# Patient Record
Sex: FEMALE | Race: WHITE | NOT HISPANIC OR LATINO | Employment: OTHER | URBAN - METROPOLITAN AREA
[De-identification: names, ages, dates, MRNs, and addresses within clinical notes are randomized per-mention and may not be internally consistent; named-entity substitution may affect disease eponyms.]

---

## 2017-04-04 ENCOUNTER — GENERIC CONVERSION - ENCOUNTER (OUTPATIENT)
Dept: OTHER | Facility: OTHER | Age: 67
End: 2017-04-04

## 2017-08-11 ENCOUNTER — ALLSCRIPTS OFFICE VISIT (OUTPATIENT)
Dept: OTHER | Facility: OTHER | Age: 67
End: 2017-08-11

## 2017-08-11 DIAGNOSIS — R29.890 LOSS OF HEIGHT: ICD-10-CM

## 2017-08-11 DIAGNOSIS — Z12.31 ENCOUNTER FOR SCREENING MAMMOGRAM FOR MALIGNANT NEOPLASM OF BREAST: ICD-10-CM

## 2017-08-11 DIAGNOSIS — Z01.419 ENCOUNTER FOR GYNECOLOGICAL EXAMINATION WITHOUT ABNORMAL FINDING: ICD-10-CM

## 2017-08-11 PROCEDURE — G0145 SCR C/V CYTO,THINLAYER,RESCR: HCPCS | Performed by: OBSTETRICS & GYNECOLOGY

## 2017-08-14 ENCOUNTER — LAB REQUISITION (OUTPATIENT)
Dept: LAB | Facility: HOSPITAL | Age: 67
End: 2017-08-14
Payer: MEDICARE

## 2017-08-14 DIAGNOSIS — Z01.419 ENCOUNTER FOR GYNECOLOGICAL EXAMINATION WITHOUT ABNORMAL FINDING: ICD-10-CM

## 2017-08-18 LAB
LAB AP GYN PRIMARY INTERPRETATION: NORMAL
Lab: NORMAL

## 2017-08-21 ENCOUNTER — GENERIC CONVERSION - ENCOUNTER (OUTPATIENT)
Dept: OTHER | Facility: OTHER | Age: 67
End: 2017-08-21

## 2017-08-25 ENCOUNTER — HOSPITAL ENCOUNTER (OUTPATIENT)
Dept: RADIOLOGY | Age: 67
Discharge: HOME | End: 2017-08-25
Payer: MEDICARE

## 2017-08-25 DIAGNOSIS — Z12.31 ENCOUNTER FOR SCREENING MAMMOGRAM FOR MALIGNANT NEOPLASM OF BREAST: ICD-10-CM

## 2017-08-25 DIAGNOSIS — R29.890 LOSS OF HEIGHT: ICD-10-CM

## 2017-08-25 PROCEDURE — 77080 DXA BONE DENSITY AXIAL: CPT

## 2017-08-25 PROCEDURE — G0202 SCR MAMMO BI INCL CAD: HCPCS

## 2017-08-25 PROCEDURE — 77063 BREAST TOMOSYNTHESIS BI: CPT

## 2017-08-28 ENCOUNTER — GENERIC CONVERSION - ENCOUNTER (OUTPATIENT)
Dept: OTHER | Facility: OTHER | Age: 67
End: 2017-08-28

## 2017-10-13 ENCOUNTER — GENERIC CONVERSION - ENCOUNTER (OUTPATIENT)
Dept: OTHER | Facility: OTHER | Age: 67
End: 2017-10-13

## 2017-11-01 ENCOUNTER — GENERIC CONVERSION - ENCOUNTER (OUTPATIENT)
Dept: OTHER | Facility: OTHER | Age: 67
End: 2017-11-01

## 2017-11-17 ENCOUNTER — ALLSCRIPTS OFFICE VISIT (OUTPATIENT)
Dept: OTHER | Facility: OTHER | Age: 67
End: 2017-11-17

## 2017-11-17 DIAGNOSIS — M85.80 OTHER SPECIFIED DISORDERS OF BONE DENSITY AND STRUCTURE, UNSPECIFIED SITE (CODE): ICD-10-CM

## 2017-11-17 DIAGNOSIS — M54.9 DORSALGIA: ICD-10-CM

## 2017-11-17 DIAGNOSIS — M54.50 LOW BACK PAIN: ICD-10-CM

## 2017-11-21 ENCOUNTER — GENERIC CONVERSION - ENCOUNTER (OUTPATIENT)
Dept: OTHER | Facility: OTHER | Age: 67
End: 2017-11-21

## 2017-11-29 ENCOUNTER — GENERIC CONVERSION - ENCOUNTER (OUTPATIENT)
Dept: OTHER | Facility: OTHER | Age: 67
End: 2017-11-29

## 2017-11-29 ENCOUNTER — LAB CONVERSION - ENCOUNTER (OUTPATIENT)
Dept: OTHER | Facility: OTHER | Age: 67
End: 2017-11-29

## 2017-11-29 LAB
25(OH)D3 SERPL-MCNC: 47 NG/ML (ref 30–100)
BUN SERPL-MCNC: 13 MG/DL (ref 7–25)
BUN/CREA RATIO (HISTORICAL): ABNORMAL (CALC) (ref 6–22)
CALCIUM SERPL-MCNC: 9.4 MG/DL (ref 8.6–10.4)
CHLORIDE SERPL-SCNC: 103 MMOL/L (ref 98–110)
CO2 SERPL-SCNC: 28 MMOL/L (ref 20–31)
CREAT SERPL-MCNC: 0.65 MG/DL (ref 0.5–0.99)
EGFR AFRICAN AMERICAN (HISTORICAL): 108 ML/MIN/1.73M2
EGFR-AMERICAN CALC (HISTORICAL): 93 ML/MIN/1.73M2
GLUCOSE (HISTORICAL): 111 MG/DL (ref 65–99)
POTASSIUM SERPL-SCNC: 4.1 MMOL/L (ref 3.5–5.3)
SODIUM SERPL-SCNC: 138 MMOL/L (ref 135–146)

## 2018-01-10 NOTE — PROGRESS NOTES
Assessment    1  Encounter for preventive health examination (V70 0) (Z00 00)   2  Chronic low back pain (724 2,338 29) (M54 5,G89 29)   3  Mid back pain (724 5) (M54 9)   4  Need for hepatitis C screening test (V73 89) (Z11 59)   5  Immunity status testing (V72 61) (Z01 84)   6  Need for vaccination with 13-polyvalent pneumococcal conjugate vaccine (V03 82) (Z23)    Plan  Chronic low back pain, Mid back pain    · Cyclobenzaprine HCl - 5 MG Oral Tablet; TAKE 1 TABLET 3 TIMES DAILY AS  NEEDED   · * XR SPINE LUMBAR MINIMUM 4 VIEWS NON INJURY; Status:Active; Requested  for:17Nov2017;    · * XR SPINE THORACIC 3 VIEW; Status:Active; Requested for:17Nov2017; Health Maintenance    · EKG/ECG- POC; Status:Complete - Retrospective By Protocol Authorization;   Done:  17GEQ7875 11:48AM  Immunity status testing    · (Q) VARICELLA ZOSTER VIRUS AB (IGG); Status:Active; Requested for:01Oct2018;   Need for hepatitis C screening test, Osteopenia    · (Q) HEPATITIS C ANTIBODY; Status:Active; Requested for:01Oct2018;    · *(Q) VITAMIN D, 25-HYDROXY, LC/MS/MS; Status:Active; Requested for:01Oct2018;   Need for vaccination with 13-polyvalent pneumococcal conjugate vaccine    · Prevnar 13 Intramuscular Suspension    Discussion/Summary    Her back pain appears muscular  take cyclobenzaprine prn- says she will take at night  no relief from Tylenol, intolerant to NSAIDs  I'll obtain copy of labs she did for Dr Allen Tafoya in October  Impression: Welcome to Medicare Visit, with preventive exam as well as age and risk appropriate counseling completed  Cardiovascular screening and counseling: EKG recommended  Diabetes screening and counseling: screening is current  Colorectal cancer screening and counseling: screening is current, colorectal cancer screening due every 10 year(s) and Due 2020  Breast cancer screening and counseling: screening not indicated  Cervical cancer screening and counseling: screening is current     Osteoporosis screening and counseling: screening is current  Abdominal aortic aneurysm screening and counseling: screening not indicated  Glaucoma screening and counseling: screening is current  HIV screening and counseling: screening not indicated  Hepatitis C Screening:  The patient agrees to Hepatitis C screening  Immunizations: pneumococcal vaccine due today and the risks and benefits of the Zostavax vaccine were discussed with the patient  Advance Directive Planning: paperwork and instructions were given to the patient  Patient Discussion: plan discussed with the patient, follow-up visit needed in one year  Possible side effects of new medications were reviewed with the patient/guardian today  The treatment plan was reviewed with the patient/guardian  The patient/guardian understands and agrees with the treatment plan      History of Present Illness  Welcome to Medicare and Wellness Visits: The patient is being seen for the welcome to medicare visit  Medicare Screening and Risk Factors   Hospitalizations: no previous hospitalizations  Once per lifetime medicare screening tests: ECG has not been done  Medicare Screening Tests Risk Questions   Abdominal aortic aneurysm risk assessment: none indicated  Osteoporosis risk assessment: , female gender and over 48years of age  HIV risk assessment: none indicated  Drug and Alcohol Use: The patient has never smoked cigarettes  The patient reports frequent alcohol use, drinking 1 drinks per day, drinking 6 drinks per week and drinking 24 drinks per month  Alcohol concern:   The patient has no concerns about alcohol abuse  Diet and Physical Activity: Current diet includes well balanced meals, 2 servings of fruit per day, 2 servings of vegetables per day, 1 servings of meat per day, 1 servings of dairy products per day and 1 cups of tea per day  She exercises infrequently  Exercise: walking     Mood Disorder and Cognitive Impairment Screening: Depression screening  no significant symptoms  She denies feeling down, depressed, or hopeless over the past two weeks  She denies feeling little interest or pleasure in doing things over the past two weeks  Cognitive impairment screening: denies difficulty learning/retaining new information, denies difficulty handling complex tasks, denies difficulty with reasoning, denies difficulty with language and denies difficulty with behavior  Functional Ability/Level of Safety: Hearing is normal bilaterally  She does not use a hearing aid  The patient is currently able to do activities of daily living without limitations, able to do instrumental activities of daily living without limitations, able to participate in social activities without limitations and able to drive without limitations  Activities of daily living details: does not need help using the phone, no transportation help needed, does not need help shopping, no meal preparation help needed, does not need help doing housework, does not need help doing laundry, does not need help managing medications and does not need help managing money  Fall risk factors: The patient fell 0 times in the past 12 months  Injury History: no polypharmacy, no alcohol use, no mobility impairment, antidepressant use, no deconditioning, no postural hypotension, no sedative use, no visual impairment, no urinary incontinence, antihypertensive use, no cognitive impairment, up and go test was normal and no previous fall  Home safety risk factors:  loose rugs and no grab bars in the bathroom, but no unfamiliar surroundings, no poor household lighting, no uneven floors, no household clutter and handrails on the stairs  Advance Directives: Advance directives: no living will, no durable power of  for health care directives and no advance directives     Co-Managers and Medical Equipment/Suppliers: See Patient Care Team      Patient Care Team    Care Team Member Role Specialty Office Number   Lisa Downey MD  Cardiology [phone]     Review of Systems    Constitutional: no fever and no chills  ENT: no hearing loss  Cardiovascular: no chest pain and no palpitations  Respiratory: no shortness of breath and no cough  Gastrointestinal: no abdominal pain, no diarrhea, no constipation, no bright red blood per rectum and no melena  Genitourinary: denies incontinence, denies vaginal bleeding  Musculoskeletal: pain across the lower and mid back on and off for years;currently seeing her chiropractor who is asking to get xrays to follow what was done last year  Active Problems    1  Anxiety (300 00) (F41 9)   2  Denied: History of Carpal tunnel syndrome   3  Encounter for routine gynecological examination (V72 31) (Z01 419)   4  Encounter for screening mammogram for malignant neoplasm of breast (V76 12)   (Z12 31)   5  Malignant neoplasm of right breast (174 9) (C50 911)   6  Osteopenia (733 90) (M85 80)   7  Palpitations (785 1) (R00 2)   8  Pap smear, as part of routine gynecological examination (V76 2) (Z01 419)   9  Visit for screening mammogram (V76 12) (Z12 31)    Past Medical History    · History of Absence of breast (V45 71) (Z90 10)   · History of Acute lumbar back pain (724 2) (M54 5)   · History of Bilateral hand pain (729 5) (M79 641,M79 642)   · Denied: History of Carpal tunnel syndrome   · History of squamous cell carcinoma of skin (V10 83) (Y63 384)   · History of syncope (V15 89) (P24 121)   · History of urinary frequency (V13 09) (J31 427)   · History of urinary frequency (V13 09) (K10 033)   · History of Left upper quadrant abdominal swelling, mass and lump (789 32) (R19 02)   · History of Stiffness of hand joint (719 54) (M25 649)   · History of Taking Female Hormones - Long-term Tamoxifen Use (V58 69)   · Urinary tract infection (599 0) (N39 0)   · History of UTI (urinary tract infection), bacterial (599 0,041 9) (N39 0,A49  9)    The active problems and past medical history were reviewed and updated today  Surgical History    · History of Appendectomy   · History of Breast Surgery Lumpectomy   · History of Breast Surgery Mastectomy   · History of Bunion Correction By Cheilectomy   · History of Colonoscopy (Fiberoptic)   · History of Dilation And Curettage    The surgical history was reviewed and updated today  Family History  Mother    · Family history of Diabetes Mellitus (V18 0)   · Family history of Osteoporosis (V17 81)  Father    · Family history of cardiac disorder (V17 49) (Z80 55)  Sister    · Family history of malignant neoplasm of breast (V16 3) (Z80 3)  Family History    · Family history of Acute Myocardial Infarction (V17 3)   · Family history of Benign Brain Tumor   · Family history of Club Foot    The family history was reviewed and updated today  Social History    · Being A Social Drinker   · Caffeine Use   · Never A Smoker   · One child  The social history was reviewed and updated today  Current Meds   1  Lexapro 10 MG Oral Tablet; Therapy: (Recorded:51Jlk1501) to Recorded   2  Metoprolol Tartrate 50 MG Oral Tablet; TAKE ONE  TABLET TWICEDAILY AS DIRECTED; Therapy: 86Qyl1373 to (Evaluate:58Bhn9136) Recorded   3  Oyster Shell Calcium + D 500-400 MG-UNIT Oral Tablet; Therapy: 07JSJ1150 to Recorded    The medication list was reviewed and updated today  Allergies    1  Advil TABS    Vitals  Signs    Heart Rate: 94  Systolic: 870  Diastolic: 76  Height: 5 ft 0 5 in  Weight: 134 lb   BMI Calculated: 25 74  BSA Calculated: 1 58  O2 Saturation: 97    Physical Exam    Constitutional   General appearance: No acute distress, well appearing and well nourished  Head and Face   Head and face: Normal     Eyes   Conjunctiva and lids: No swelling, erythema or discharge  Ears, Nose, Mouth, and Throat   Otoscopic examination: Tympanic membranes translucent with normal light reflex  Canals patent without erythema  Oropharynx: Normal with no erythema, edema, exudate or lesions  Neck   Neck: Supple, symmetric, trachea midline, no masses  Thyroid: Normal, no thyromegaly  Pulmonary   Respiratory effort: No increased work of breathing or signs of respiratory distress  Auscultation of lungs: Clear to auscultation  Cardiovascular   Auscultation of heart: Normal rate and rhythm, normal S1 and S2, no murmurs  Examination of extremities for edema and/or varicosities: Normal     Abdomen   Abdomen: Non-tender, no masses  Liver and spleen: No hepatomegaly or splenomegaly  Lymphatic   Palpation of lymph nodes in neck: No lymphadenopathy  Musculoskeletal   Gait and station: Normal   no tenderness over the spine  Psychiatric   Orientation to person, place, and time: Normal     Mood and affect: Normal        Results/Data  EKG/ECG- POC 74IYH5731 11:48AM Kalpesh Agent     Test Name Result Flag Reference   EKG/ECG 11/17/17       PHQ-9 Adult Depression Screening 48YGI0910 11:18AM User, Ahs     Test Name Result Flag Reference   PHQ-9 Adult Depression Score 0     Over the last two weeks, how often have you been bothered by any of the following problems? Little interest or pleasure in doing things: Not at all - 0  Feeling down, depressed, or hopeless: Not at all - 0  Trouble falling or staying asleep, or sleeping too much: Not at all - 0  Feeling tired or having little energy: Not at all - 0  Poor appetite or over eating: Not at all - 0  Feeling bad about yourself - or that you are a failure or have let yourself or your family down: Not at all - 0  Trouble concentrating on things, such as reading the newspaper or watching television: Not at all - 0  Moving or speaking so slowly that other people could have noticed   Or the opposite -  being so fidgety or restless that you have been moving around a lot more than usual: Not at all - 0  Thoughts that you would be better off dead, or of hurting yourself in some way: Not at all - 0   PHQ-9 Adult Depression Screening Negative     PHQ-9 Difficulty Level Not difficult at all     PHQ-9 Severity No Depression       PHQ-2 Adult Depression Screening 34XVR6770 11:17AM User, Ahs     Test Name Result Flag Reference   PHQ-2 Adult Depression Score 0     Over the last two weeks, how often have you been bothered by any of the following problems? Little interest or pleasure in doing things: Not at all - 0  Feeling down, depressed, or hopeless: Not at all - 0   PHQ-2 Adult Depression Screening Negative       Falls Risk Assessment (Dx Z13 89 Screen for Neurologic Disorder) [id number] 11:17AM User, Ahs     Test Name Result Flag Reference   Falls Risk      No falls in the past year     SNELLEN VISION- POC [id number] 11:17AM Sagrario Duarte     Test Name Result Flag Reference   Right Eye 20/25     Left Eye 20/25     Bilateral Eyes 20/30         Procedure    Procedure:   Results: 20/30 in both eyes with corrective device, 20/25 in the right eye with corrective device, 20/25 in the left eye with corrective device      Future Appointments    Date/Time Provider Specialty Site   11/19/2018 01:00 PM JESSICA Miller   Internal Medicine [facility]   08/15/2018 01:30 PM Raiza Gleason MD Obstetrics/Gynecology [facility] \[facility] [place] [facility]\""     Signatures   Electronically signed by : JESSICA Cox ; Nov 17 2017 12:03PM EST                       (Author)

## 2018-01-10 NOTE — MISCELLANEOUS
Message  Spoke with patient  Urine culture negative  Instructed her to stop antibiotic if she has any doses left  She is feeling well and has no complaints  Instructed her to follow up with PCP as needed        Signatures   Electronically signed by : Merry Darden, 10 Fabian May; Jun 9 2016 11:40AM EST                       (Author)

## 2018-01-11 NOTE — MISCELLANEOUS
Message   Recorded as Task   Date: 08/28/2017 02:11 PM, Created By: Monet Cruz   Task Name: Follow Up   Assigned To: Faby Uribe   Regarding Patient: Quincy Teran, Status: In Progress   Ino Romp - 19 Aug 2017 2:11 PM     TASK CREATED  Call Severa Crow her bone mineral density test shows osteopenia  This is best treated with oral calcium and vitamin D  Ángel Manley - 92 Aug 2017 2:51 PM     TASK IN PROGRESS   Ángel Manley - 28 Aug 2017 3:00 PM     TASK EDITED  Pt takes ca - 1200 and vit d - 800 to 1000 daily  Exercise daily - walking        Active Problems    1  Absence of breast (V45 71) (Z90 10)   2  Acute lumbar back pain (724 2) (M54 5)   3  Anxiety (300 00) (F41 9)   4  Denied: History of Carpal tunnel syndrome   5  Encounter for routine gynecological examination (V72 31) (Z01 419)   6  Encounter for screening mammogram for malignant neoplasm of breast (V76 12)   (Z12 31)   7  Left upper quadrant abdominal swelling, mass and lump (789 32) (R19 02)   8  Loss of height (781 91) (R29 890)   9  Malignant neoplasm of right breast (174 9) (C50 911)   10  Osteopenia (733 90) (M85 80)   11  Palpitations (785 1) (R00 2)   12  Pap smear, as part of routine gynecological examination (V76 2) (Z01 419)   13  Visit for screening mammogram (V76 12) (Z12 31)    Current Meds   1  Lexapro 10 MG Oral Tablet (Escitalopram Oxalate); Therapy: (Recorded:18Jan2016) to Recorded   2  Metoprolol Tartrate 50 MG Oral Tablet; TAKE ONE  TABLET TWICEDAILY AS DIRECTED; Therapy: 38Yuc5263 to (Evaluate:42Zqs7313) Recorded   3  Oyster Shell Calcium + D 500-400 MG-UNIT Oral Tablet; Therapy: 23HQU6730 to Recorded    Allergies    1  Advil TABS    Signatures   Electronically signed by :  Ariel Jacques, ; Aug 28 2017  3:01PM EST                       (Author)

## 2018-01-12 VITALS
BODY MASS INDEX: 25.3 KG/M2 | HEIGHT: 61 IN | OXYGEN SATURATION: 97 % | WEIGHT: 134 LBS | HEART RATE: 94 BPM | SYSTOLIC BLOOD PRESSURE: 136 MMHG | DIASTOLIC BLOOD PRESSURE: 76 MMHG

## 2018-01-12 NOTE — RESULT NOTES
Verified Results  (1) THIN PREP PAP WITH IMAGING 03Qhv4966 11:32AM David Loving     Test Name Result Flag Reference   LAB AP CASE REPORT (Report)     Gynecologic Cytology Report            Case: [id number]                  Authorizing Provider: Yadira Jo MD     Collected:      08/11/2017           First Screen:     GEOVANNA Hutton    Received:      08/15/2017 1351        Specimen:  LIQUID-BASED PAP, SCREENING, Cervix   LAB AP GYN PRIMARY INTERPRETATION      Negative for intraepithelial lesion or malignancy  Electronically signed by GEOVANNA Hutton on 8/18/2017 at 11:32 AM   LAB AP GYN SPECIMEN ADEQUACY      Satisfactory for evaluation  (See note)   LAB AP GYN NOTE      No endocervical cells identified  It is difficult to differentiate between   squamous metaplastic cells and parabasal cells in atrophic specimens due   to numerous causes including menopause, postpartum changes and   progestational agents  LAB AP GYN ADDITIONAL INFORMATION (Report)     [facility] FDA approved ,  and ThinPrep Imaging System are   utilized with strict adherence to the 's instruction manual to   prepare gynecologic and non-gynecologic cytology specimens for the   production of ThinPrep slides as well as for gynecologic ThinPrep imaging  These processes have been validated by our laboratory and/or by the     The Pap test is not a diagnostic procedure and should not be used as the   sole means to detect cervical cancer  It is only a screening procedure to   aid in the detection of cervical cancer and its precursors  Both   false-negative and false-positive results have been experienced  Your   patient's test result should be interpreted in this context together with   the history and clinical findings

## 2018-01-12 NOTE — MISCELLANEOUS
Message  Return to work or school:   Cindy Catalan is under my professional care  She was seen in my office on 1/18/2016   She is able to return to work on  1/22/2016            Future Appointments    Signatures   Electronically signed by :  Stuart Wilkinson MD; Jan 18 2016  8:16PM EST                       (Author)

## 2018-01-13 NOTE — RESULT NOTES
Verified Results  (B) PAP (REFLEX TO HPV PLUS WHEN ASC-US) [id number] 12:00AM Emily Fuentes     Test Name Result Flag Reference   PAP, LIQUID-BASED NILM     DIAGNOSIS:            Negative for intraepithelial lesion or malignancy  ADEQUACY:             Satisfactory for evaluation /                         Endocervical/transformation zone component                         present  COMMENT:              This Pap smear was screened with the assistance                         of the KoruPrep(TM) Imaging System and                         screened by a cytotechnologist   SPECIMEN SOURCE:      PAP (RFLX HPV PLUS WHENASC-US), CERVIX  CLINICAL INFORMATION: LMP: N/A                        Post menopausal                        Provided Diagnosis Codes: Z01 419,V72 31                                                Cervicovaginal cytology should be considered a                         screening procedure subject to false negatives                         and false positives  Results are more reliable                         when a satisfactory sample is obtained on a                         regular repetitive basis, and should be                         interpreted together with past and current                         clinical data    ELECTRONICALLY SIGNED   BY:                   Screened By: GEOVANNA [place] (ASCP)   Case                         Electronically Signed 08/11/2016

## 2018-01-14 VITALS
HEIGHT: 61 IN | SYSTOLIC BLOOD PRESSURE: 142 MMHG | BODY MASS INDEX: 25.3 KG/M2 | DIASTOLIC BLOOD PRESSURE: 80 MMHG | WEIGHT: 134 LBS

## 2018-01-14 NOTE — MISCELLANEOUS
Message   Recorded as Task   Date: 08/28/2017 02:13 PM, Created By: Marcus Alcantara   Task Name: Follow Up   Assigned To: Agustina Jenkins   Regarding Patient: Donald Mensah, Status: Active   CommentHazen Liner - 28 Aug 2017 2:13 PM     TASK CREATED  Call Kevin her left mammogram is benign she should repeat the left mammogram in one year  Melina Duranenid - 73 Aug 2017 2:59 PM     TASK EDITED  Pt aware  Active Problems    1  Absence of breast (V45 71) (Z90 10)   2  Acute lumbar back pain (724 2) (M54 5)   3  Anxiety (300 00) (F41 9)   4  Denied: History of Carpal tunnel syndrome   5  Encounter for routine gynecological examination (V72 31) (Z01 419)   6  Encounter for screening mammogram for malignant neoplasm of breast (V76 12)   (Z12 31)   7  Left upper quadrant abdominal swelling, mass and lump (789 32) (R19 02)   8  Loss of height (781 91) (R29 890)   9  Malignant neoplasm of right breast (174 9) (C50 911)   10  Osteopenia (733 90) (M85 80)   11  Palpitations (785 1) (R00 2)   12  Pap smear, as part of routine gynecological examination (V76 2) (Z01 419)   13  Visit for screening mammogram (V76 12) (Z12 31)    Current Meds   1  Lexapro 10 MG Oral Tablet (Escitalopram Oxalate); Therapy: (Recorded:01Qvg1617) to Recorded   2  Metoprolol Tartrate 50 MG Oral Tablet; TAKE ONE  TABLET TWICEDAILY AS DIRECTED; Therapy: 65Sou1356 to (Evaluate:27Qvt6305) Recorded   3  Oyster Shell Calcium + D 500-400 MG-UNIT Oral Tablet; Therapy: 47JJA4235 to Recorded    Allergies    1  Advil TABS    Signatures   Electronically signed by :  Mayank Jacques, ; Aug 28 2017  3:00PM EST                       (Author)

## 2018-01-15 NOTE — RESULT NOTES
Verified Results  * DXA BONE DENSITY SPINE HIP AND PELVIS [id number] 12:39PM Crisat Lara Order Number: [id number]    - Patient Instructions: To schedule this appointment, please contact Central Scheduling at [phone]  Test Name Result Flag Reference   DXA BONE DENSITY SPINE HIP AND PELVIS (Report)     CENTRAL DXA SCAN     CLINICAL HISTORY:  74year old post-menopausal  female risk factors include Boniva use and Fosamax use  Breast carcinoma  TECHNIQUE: Bone densitometry was performed using a Hologic Horizon A bone densitometer  Regions of interest appear properly placed  There are no obvious fractures or other confounding variables which could limit the study  COMPARISON: Several, most recent November 2, 2011     RESULTS:    LUMBAR SPINE: L1-L4:   BMD 0 875 gm/cm2   T-score -1 6   Z-score 0 2     LEFT TOTAL HIP:   BMD 0 847 gm/cm2   T-score -0 8   Z-score 0 5     LEFT FEMORAL NECK:   BMD 0 579 gm/cm2   T-score -2 4   Z-score -0 9             IMPRESSION:   1  Based on the [facility] classification, the T-score of -2 4 in the left hip is consistent with low bone mineral density  2  When compared to the prior examination, there has been NO SIGNIFICANT CHANGE in the total bone mineral density of the lumbar spine or hip, when allowing for the least significant change  3  Any secondary causes of low bone mineral density should be excluded prior to treatment, if clinically indicated  4  A daily intake of at least 1200 mg calcium and 800 to 1000 IU of Vitamin D, as well as weight bearing and muscle strengthening exercise, fall prevention and avoidance of tobacco and excessive alcohol intake as basic preventive measures are suggested  5  Repeat DXA in 18 - 24 months, on the same machine, as clinically indicated            The 10 year risk of hip fracture is 2%, with the 10 year risk of major osteoporotic fracture being 12%, as calculated by the [facility] fracture risk assessment tool (FRAX)  The current NOF guidelines recommend treating patients with FRAX 10 year risk score of   >3% for hip fracture and >20% for major osteoporotic fracture  WHO CLASSIFICATION:   Normal (a T-score of -1 0 or higher)   Low bone mineral density (a T-score of less than -1 0 but higher than -2 5)   Osteoporosis (a T-score of -2 5 or less)   Severe osteoporosis (a T-score of -2 5 or less with a fragility fracture)             Workstation performed: [id number]     Signed by:   Delmi Melendrez DO   8/25/17     MAMMO SCREENING LEFT W 3D & CAD [id number] 12:39PM Cheryl May Order Number: [id number]    - Patient Instructions: To schedule this appointment, please contact Central Scheduling at [phone]  Do not wear any perfume, powder, lotion or deodorant on breast or underarm area  Please bring your doctors order, referral (if needed) and insurance information with you on the day of the test  Failure to bring this information may result in this test being rescheduled  Arrive 15 minutes prior to your appointment time to register  On the day of your test, please bring any prior mammogram or breast studies with you that were not performed at a [facility]  Failure to bring prior exams may result in your test needing to be rescheduled  Test Name Result Flag Reference   MAMMO SCREENING LEFT W 3D & CAD (Report)     Patient History:   Patient is postmenopausal and has history of cancer in the right    breast at age 54  Family history of breast cancer at age 64 in sister, breast    cancer at age 77 in sister  Malignant mastectomy of the right breast, 2000  Took hormonal contraceptives for 10 years  Took tamoxifen for 6    years  Took unspecified hormones for 4 years  Patient has never smoked  Patient's BMI is 24 7  Reason for exam: screening, asymptomatic       Mammo Screening Left W DBT and CAD: August 25, 2017 - Check In #:   [de-identified]   2D/3D Procedure   3D views: MLO view(s) were taken of the left breast    2D views: CC and XCCL view(s) were taken of the left breast        Technologist: GARRISON Allred (R)(M)   Prior study comparison: August 22, 2016, mammo screening left W    CAD performed at [address] [place] [address]  July 29, 2015,    left breast DIGITAL UNILATERAL SCREENING MAMMOGRAM WITH CAD    performed at [address] [place] [address]  April 4, 2014, left    breast DIGITAL UNILATERAL SCREENING MAMMOGRAM WITH CAD performed    at [address] [place] [address]  March 5, 2013, left breast    DIGITAL UNILATERAL SCREENING MAMMOGRAM WITH CAD performed at [address] [place] [address]  October 19, 2012, right breast    ultrasound, performed at [address] [place] [address]  February 13, 2012, bilateral DIGITAL UNILATERAL SCREENING    MAMMOGRAM WITH CAD performed at [address] [place] [address]  There are scattered fibroglandular densities  A combination of    mediolateral oblique 3-D tomographic slices as well as standard    two-dimensional orthogonal images were obtained  The parenchymal pattern appears stable  No dominant soft tissue    mass or suspicious calcifications are noted  The skin and nipple   contours are within normal limits  No mammographic evidence of malignancy  No    significant changes when compared with prior studies  ACR BI-RADSï¾® Assessments: BiRad:1 - Negative     Recommendation:   Routine screening mammogram in 1 year  A reminder letter will be   scheduled  The patient is scheduled in a reminder system  8-10% of cancers will be missed on mammography  Management of a    palpable abnormality must be based on clinical grounds  Patients   will be notified of their results via letter from our facility  Accredited by [employer] of [employer] and FDA       Transcription Location: GARRISON Martinez 98: [id number]     Risk Value(s):   Myriad Table: 4 7%, MRS : Based on personal and/or    family history, consideration of hereditary risk assessment may    be warranted     Signed by:   Bob Rios MD   8/26/17

## 2018-01-16 NOTE — RESULT NOTES
Message  reviewed recent xrays- new compression deformities T10 T12, old T11 and L1  no relief from Tylenol, minimal from Flexeril when she took with Tylenol  will send for tramadol prn, do not take with Flexeril  also, I would like to treat her osteopenia in light of compression fractures  Will work on getting Prolia for her       Plan  Osteopenia    · Prolia 60 MG/ML Subcutaneous Solution; INJECT SUBCUTANEOUSLY  60 MG / 1  ML EVERY 6 MONTHS   · TraMADol HCl - 50 MG Oral Tablet; TAKE 1 TABLET BY MOUTH EVERY 6 TO 8  HOURS AS NEEDED FOR PAIN    Signatures   Electronically signed by : JESSICA Crooks ; Nov 21 2017  6:20PM EST                       (Author)

## 2018-01-17 NOTE — PROGRESS NOTES
Assessment    1  Acute lumbar back pain (724 2) (M54 5)    Plan  Acute lumbar back pain    · Hydrocodone-Acetaminophen 7 5-300 MG Oral Tablet; TAKE 1 TABLET Every  twelve hours PRN pain   · Methocarbamol 750 MG Oral Tablet; TAKE 1 TABLET 4 TIMES DAILY AS  NEEDED  Anxiety    · ALPRAZolam 0 25 MG Oral Tablet  Palpitations    · Metoprolol Tartrate 50 MG Oral Tablet; TAKE ONE AND A HALF (1&1/2) TABLET  TWICEDAILY AS DIRECTED  Unlinked    · Lexapro 10 MG Oral Tablet (Escitalopram Oxalate)   · Oyster Shell Calcium + D 500-400 MG-UNIT Oral Tablet    Discussion/Summary    Give muscle relaxant  Give vicodin  SE educated pt  Give work note  Call office if symptoms no improving or worse  Chief Complaint  Patient is here complaining of lower back sharp constant pain that started with a cold and a cough about two weeks ago  History of Present Illness  HPI: Pt is here with her     c/o lower back for 2 weeks  Start after cold/cough 2 weeks ago  Cough is better now  Lower back pain constant, 7/10, dull, spasm  Does not radiating to legs  No numbness/tingling of legs or feet  Denies urine/BM incontinence  Denies fever, SOB, CP, nv/abd pain  Denies injury  Review of Systems    Constitutional: No fever, no chills, feels well, no tiredness, no recent weight gain or loss  Cardiovascular: no complaints of slow or fast heart rate, no chest pain, no palpitations, no leg claudication or lower extremity edema  Respiratory: no complaints of shortness of breath, no wheezing, no dyspnea on exertion, no orthopnea or PND  Gastrointestinal: no complaints of abdominal pain, no constipation, no nausea or diarrhea, no vomiting, no bloody stools  Musculoskeletal: as noted in HPI  Active Problems    1  Absence of breast (V45 71) (Z90 10)   2  Acute lumbar back pain (724 2) (M54 5)   3  Anxiety (300 00) (F41 9)   4  Bilateral hand pain (729 5) (M79 641,M79 642)   5   Carpal tunnel syndrome (354 0) (G56 00)   6  Encounter for routine gynecological examination (V72 31) (Z01 419)   7  Encounter for screening mammogram for malignant neoplasm of breast (V76 12)   (Z12 31)   8  Malignant neoplasm of right breast (174 9) (C50 911)   9  Osteopenia (733 90) (M85 80)   10  Palpitations (785 1) (R00 2)   11  Pap smear, as part of routine gynecological examination (V76 2) (Z12 4)   12  Stiffness of hand joint (719 54) (M25 649)   13  Syncope (780 2) (R55)   14  Visit for screening mammogram (V76 12) (Z12 31)    Past Medical History    1  History of squamous cell carcinoma of skin (V10 83) (Z85 828)   2  History of Taking Female Hormones - Long-term Tamoxifen Use (V58 69)   3  Urinary tract infection (599 0) (N39 0)    Family History    1  Family history of Diabetes Mellitus (V18 0)   2  Family history of Osteoporosis (V17 81)    3  Family history of Acute Myocardial Infarction (V17 3)   4  Family history of Benign Brain Tumor   5  Family history of [address]    · Being A Social Drinker   · Caffeine Use   · Never A Smoker   · One child    Surgical History    1  History of Appendectomy   2  History of Breast Surgery Lumpectomy   3  History of Breast Surgery Mastectomy   4  History of Bunion Correction By Cheilectomy   5  History of Colonoscopy (Fiberoptic)   6  History of Dilation And Curettage    Current Meds   1  ALPRAZolam 0 25 MG Oral Tablet; Take 1/2-1 tablet po TID as needed; Therapy: 75Pkb3170 to (Evaluate:22Avr7829); Last Rx:75Gfa3754 Ordered   2  Lexapro 10 MG Oral Tablet; Therapy: (Recorded:18Jan2016) to Recorded   3  Methocarbamol 750 MG Oral Tablet; TAKE 1 TABLET 4 TIMES DAILY AS NEEDED; Therapy: 25MBP1203 to (Evaluate:22Jan2016)  Requested for: 88AHW4092; Last   Rx:14Jan2016 Ordered   4  Metoprolol Tartrate 50 MG Oral Tablet; TAKE ONE AND A HALF (1&1/2) TABLET   TWICEDAILY AS DIRECTED; Therapy: 75Khe3077 to (Evaluate:13Tbc9965); Last Rx:61Mgk8915 Ordered   5   Oyster Shell Calcium + D 500-400 MG-UNIT Oral Tablet; Therapy: 55OJG2660 to Recorded    Allergies    1  Advil TABS    Vitals   Recorded: 60KCP1793 07:49PM   Temperature 98 F   Heart Rate 116   Respiration 16   Systolic 977   Diastolic 90   Height 5 ft 2 2 in   Weight 140 lb 6 08 oz   BMI Calculated 25 51   BSA Calculated 1 64   O2 Saturation 96   Pain Scale 9     Physical Exam    Constitutional   General appearance: No acute distress, well appearing and well nourished  Pulmonary   Respiratory effort: No increased work of breathing or signs of respiratory distress  Auscultation of lungs: Clear to auscultation  Cardiovascular   Auscultation of heart: Normal rate and rhythm, normal S1 and S2, without murmurs  Examination of extremities for edema and/or varicosities: Normal     Carotid pulses: Normal     Abdomen   Abdomen: Non-tender, no masses  Liver and spleen: No hepatomegaly or splenomegaly  Lymphatic   Palpation of lymph nodes in neck: No lymphadenopathy  Musculoskeletal   Gait and station: Normal     Inspection/palpation of joints, bones, and muscles: Abnormal   lower back tenderness, worse on left side  Message  Return to work or school:   Bev Ramsey is under my professional care  She was seen in my office on 1/18/2016   She is able to return to work on  1/22/2016            Future Appointments    Date/Time Provider Specialty Site   07/29/2016 01:00 PM Velia Crowley MD Obstetrics/Gynecology  [facility]      Signatures   Electronically signed by :  Mervin Solis MD; Jan 18 2016  8:16PM EST                       (Author)

## 2018-01-18 NOTE — PROGRESS NOTES
Assessment    1  Acute lumbar back pain (724 2) (M54 5)    Plan  Acute lumbar back pain    · Methocarbamol 750 MG Oral Tablet; TAKE 1 TABLET 4 TIMES DAILY AS NEEDED   · Vicodin 5-300 MG Oral Tablet; 1 q 4-6 hours prn pain    Discussion/Summary  Discussion Summary:   May continue heat or ice as needed for back pain  Do not take Tylenol in addition to the narcotic pain medicine as this also has Tylenol in it  Do not take the muscle relaxant and the narcotic pain medicine at the same time  If no improvement or pain is worsening, proceed to the emergency room for further evaluation  Medication Side Effects Reviewed: Possible side effects of new medications were reviewed with the patient/guardian today  Understands and agrees with treatment plan: The treatment plan was reviewed with the patient/guardian  The patient/guardian understands and agrees with the treatment plan   Counseling Documentation With Imm: The patient was counseled regarding instructions for management  Follow Up Instructions: Follow Up with your Primary Care Provider in 5-7 days  If your symptoms worsen, go to the nearest [facility]  Chief Complaint    1  Back Pain  Chief Complaint Free Text Note Form: c/o low back pain  History of Present Illness  HPI: 82-year-old female with increased back pain over the last 2 weeks  Denies any known injury but says started with cold and cough symptoms 2 weeks ago and that's when her back pain started  Pain is worse with cough however her cough is improving  Now she is having a lot of spasms in her back and pain with motion  She feels best if she is laying on her back with her knees bent  No increased shortness of breath, palpitations, nausea vomiting  Denies constipation or incontinence of bowel  No difficulty with urination  No fever or chills  She states that she's had 2 prior episodes of severe back pain but it has been a few years   Denies any tingling numbness weakness of the legs  History of kidney stones  Hospital Based Practices Required Assessment:   Pain Assessment   the patient states they have pain  The pain is located in the low back pain  The patient describes the pain as sharp  Abuse And Domestic Violence Screen    Yes, the patient is safe at home  The patient states no one is hurting them  Depression And Suicide Screen  No, the patient has not had thoughts of hurting themself  No, the patient has not felt depressed in the past 7 days  Review of Systems  Focused-Female:   Constitutional: No fever, no chills, feels well, no tiredness, no recent weight gain or loss  ENT: no ear ache, no loss of hearing, no nosebleeds or nasal discharge, no sore throat or hoarseness  Cardiovascular: no complaints of slow or fast heart rate, no chest pain, no palpitations, no leg claudication or lower extremity edema  Respiratory: cough, but no complaints of shortness of breath, no wheezing, no dyspnea on exertion, no orthopnea or PND  Gastrointestinal: no complaints of abdominal pain, no constipation, no nausea or diarrhea, no vomiting, no bloody stools  Genitourinary: no complaints of dysuria, no incontinence, no pelvic pain, no dysmenorrhea, no vaginal discharge or abnormal vaginal bleeding  Musculoskeletal: as noted in HPI  Integumentary: no complaints of skin rash or lesion, no itching or dry skin, no skin wounds  Neurological: no complaints of headache, no confusion, no numbness or tingling, no dizziness or fainting  Active Problems    1  Absence of breast (V45 71) (Z90 10)   2  Anxiety (300 00) (F41 9)   3  Bilateral hand pain (729 5) (M79 641,M79 642)   4  Carpal tunnel syndrome (354 0) (G56 00)   5  Encounter for routine gynecological examination (V72 31) (Z01 419)   6  Encounter for screening mammogram for malignant neoplasm of breast (V76 12)   (Z12 31)   7  Malignant neoplasm of right breast (174 9) (C50 911)   8   Osteopenia (733 90) (M85 80)   9  Palpitations (785 1) (R00 2)   10  Pap smear, as part of routine gynecological examination (V76 2) (Z12 4)   11  Stiffness of hand joint (719 54) (M25 649)   12  Syncope (780 2) (R55)   13  Visit for screening mammogram (V76 12) (Z12 31)    Past Medical History    1  History of squamous cell carcinoma of skin (V10 83) (Z85 828)   2  History of Taking Female Hormones - Long-term Tamoxifen Use (V58 69)   3  Urinary tract infection (599 0) (N39 0)  Active Problems And Past Medical History Reviewed: The active problems and past medical history were reviewed and updated today  Family History    1  Family history of Diabetes Mellitus (V18 0)   2  Family history of Osteoporosis (V17 81)    3  Family history of Acute Myocardial Infarction (V17 3)   4  Family history of Benign Brain Tumor   5  Family history of Club Foot  Family History Reviewed: The family history was reviewed and updated today  Social History    · Being A Social Drinker   · Caffeine Use   · Never A Smoker   · One child  Social History Reviewed: The social history was reviewed and updated today  Surgical History    1  History of Appendectomy   2  History of Breast Surgery Lumpectomy   3  History of Breast Surgery Mastectomy   4  History of Bunion Correction By Cheilectomy   5  History of Colonoscopy (Fiberoptic)   6  History of Dilation And Curettage  Surgical History Reviewed: The surgical history was reviewed and updated today  Current Meds   1  ALPRAZolam 0 25 MG Oral Tablet; Take 1/2-1 tablet po TID as needed; Therapy: 77Zho3543 to (Evaluate:77Gmy8129); Last Rx:78Cxn2304 Ordered   2  Lexapro 10 MG Oral Tablet; Therapy: (Fay Cook) to Recorded   3  Metoprolol Tartrate 50 MG Oral Tablet; TAKE ONE AND A HALF (1&1/2) TABLET   TWICEDAILY AS DIRECTED; Therapy: 53Byt1337 to (Evaluate:28Ftj7377); Last Rx:97Vkq9295 Ordered   4  Oyster Shell Calcium + D 500-400 MG-UNIT Oral Tablet;    Therapy: 54DME8584 to Recorded  Medication List Reviewed: The medication list was reviewed and updated today  Allergies    1  Advil TABS    Vitals  Signs [Data Includes: Current Encounter]   Recorded: 31OKZ4712 03:03PM   Temperature: 98 6 F  Heart Rate: 107  Respiration: 18  Systolic: 299  Diastolic: 82    Physical Exam    Constitutional   General appearance: Abnormal   well developed, appears healthy, uncomfortable and well nourished  Eyes   Conjunctiva and lids: Abnormal   Sub-conjunctival hemorrhage of the right eye  Pupils and irises: Equal, round and reactive to light  Pulmonary   Respiratory effort: No increased work of breathing or signs of respiratory distress  Auscultation of lungs: Clear to auscultation  Cardiovascular   Palpation of heart: Normal PMI, no thrills  Auscultation of heart: Normal rate and rhythm, normal S1 and S2, without murmurs  Examination of extremities for edema and/or varicosities: Normal     Abdomen   Abdomen: Non-tender, no masses  Liver and spleen: No hepatomegaly or splenomegaly  Lymphatic   Palpation of lymph nodes in neck: No lymphadenopathy  Musculoskeletal   Gait and station: Abnormal   Antalgic gait holding back and slightly hunched over  Digits and nails: Normal without clubbing or cyanosis  Inspection/palpation of joints, bones, and muscles: Abnormal   Left paralumbar muscles tender to palpation  No gross bony tenderness to palpation of the vertebral spine  Pain with left lateral band of the spine and significant limited range of motion forward flexion due to pain  Skin   Skin and subcutaneous tissue: Normal without rashes or lesions  No vesicular or papular lesions noted of her back  Psychiatric   Orientation to person, place, and time: Normal     Mood and affect: Normal     Additional Exam:  Hips with good range of motion  No EHL weakness  Sensory motor of the lower extremities is intact  Patellar and Achilles DTRs are equal bilaterally   Negative straight leg raise in seated position  Future Appointments    Date/Time Provider Specialty Site   07/29/2016 01:00 PM Lorena Barnett MD Obstetrics/Gynecology [facility]  [address]     Signatures   Electronically signed by :  Gabe Alexandra [facility]; Jan 14 2016  4:06PM EST                       (Author)    Electronically signed by : Yaritza Gonzales DO; Jan 14 2016  4:28PM EST                       (Co-author)

## 2018-03-16 ENCOUNTER — OFFICE VISIT (OUTPATIENT)
Dept: INTERNAL MEDICINE CLINIC | Facility: CLINIC | Age: 68
End: 2018-03-16
Payer: MEDICARE

## 2018-03-16 VITALS
HEART RATE: 107 BPM | DIASTOLIC BLOOD PRESSURE: 80 MMHG | SYSTOLIC BLOOD PRESSURE: 138 MMHG | BODY MASS INDEX: 26.74 KG/M2 | WEIGHT: 136.2 LBS | HEIGHT: 60 IN

## 2018-03-16 DIAGNOSIS — M54.9 MID BACK PAIN: ICD-10-CM

## 2018-03-16 DIAGNOSIS — M85.89 OSTEOPENIA OF MULTIPLE SITES: ICD-10-CM

## 2018-03-16 DIAGNOSIS — S22.000S CLOSED COMPRESSION FRACTURE OF THORACIC VERTEBRA, SEQUELA: Primary | ICD-10-CM

## 2018-03-16 PROBLEM — S22.000A CLOSED COMPRESSION FRACTURE OF THORACIC VERTEBRA (HCC): Status: ACTIVE | Noted: 2018-03-16

## 2018-03-16 PROCEDURE — 99214 OFFICE O/P EST MOD 30 MIN: CPT | Performed by: INTERNAL MEDICINE

## 2018-03-16 RX ORDER — METOPROLOL TARTRATE 50 MG/1
50 TABLET, FILM COATED ORAL 2 TIMES DAILY
COMMUNITY
Start: 2013-08-20 | End: 2018-12-10 | Stop reason: SDUPTHER

## 2018-03-16 RX ORDER — ALENDRONATE SODIUM 70 MG/1
70 TABLET ORAL
Qty: 12 TABLET | Refills: 3 | Status: SHIPPED | OUTPATIENT
Start: 2018-03-16 | End: 2019-02-28 | Stop reason: SDUPTHER

## 2018-03-16 RX ORDER — ESCITALOPRAM OXALATE 10 MG/1
10 TABLET ORAL DAILY
COMMUNITY
Start: 2018-01-08 | End: 2019-10-13 | Stop reason: SDUPTHER

## 2018-03-16 NOTE — PROGRESS NOTES
Assessment/Plan:    Mid back pain appears muscular- schedule PT  Agrees to starting oral bisphosphonate given osteopenia and osteoporosis related fractures in the spine  BMP in 4 weeks after starting Fosamax  Cont Ca +D  Tylenol prn     Problem List Items Addressed This Visit     Mid back pain    Relevant Orders    Ambulatory referral to Physical Therapy    Osteopenia    Relevant Medications    alendronate (FOSAMAX) 70 mg tablet    Other Relevant Orders    Ambulatory referral to Physical Therapy    Basic metabolic panel    Closed compression fracture of thoracic vertebra (HCC) - Primary    Relevant Medications    alendronate (FOSAMAX) 70 mg tablet    Other Relevant Orders    Ambulatory referral to Physical Therapy    Basic metabolic panel            Subjective:      Patient ID: Saray Daniel is a 74 y o  female  HPI   Chronic low back pain lower back across the back, non radiating  Worsened last year after she lifted something  Pain on the sides of her back   Taking Tylenol prn  She was and continues to see the chiropractor 75 % better from November  Asking about PT  New compression deformities T10 T12 on Xray in November, old  T11 and L1 compression deformities  DXA in Sept  Was stable from 2011 +loss of height  She took [place] in the past but after a while had unexplaned pelvic pain so stopped it and pain resolved  She read side effects of Prolia and does not want to take it  Denies abdominal issues-heartburn, pain, reflux  +frequent hiccups  +h/o breast cancer, was on Femara    The following portions of the patient's history were reviewed and updated as appropriate: allergies, current medications, past family history, past medical history, past social history, past surgical history and problem list     Review of Systems   Constitutional: Negative for fatigue, fever and unexpected weight change  HENT: Negative for sinus pain, sinus pressure and sore throat      Respiratory: Negative for cough, shortness of breath and wheezing  Cardiovascular: Positive for palpitations  Negative for chest pain  Gastrointestinal: Negative for abdominal pain, constipation, diarrhea, nausea and vomiting  Musculoskeletal: Positive for back pain  Objective:      /80 (BP Location: Left arm, Patient Position: Sitting, Cuff Size: Standard)   Pulse (!) 107   Ht 5' (1 524 m)   Wt 61 8 kg (136 lb 3 2 oz)   BMI 26 60 kg/m²          Physical Exam   Constitutional: She is oriented to person, place, and time  She appears well-developed and well-nourished  HENT:   Head: Normocephalic and atraumatic  Eyes: Conjunctivae are normal    Cardiovascular: Normal rate, regular rhythm and normal heart sounds  No murmur heard  Pulmonary/Chest: Effort normal and breath sounds normal  No respiratory distress  She has no wheezes  She has no rales  Abdominal: Soft  Bowel sounds are normal  She exhibits no distension and no mass  There is no tenderness  There is no rebound and no guarding  Musculoskeletal: Normal range of motion  She exhibits tenderness (thoracic paraspinal muscles , no tenderness over the thoracic /lumbar spine)  Neurological: She is alert and oriented to person, place, and time  Skin: Skin is warm and dry  Psychiatric: She has a normal mood and affect   Her behavior is normal  Judgment and thought content normal

## 2018-03-26 ENCOUNTER — EVALUATION (OUTPATIENT)
Dept: PHYSICAL THERAPY | Facility: CLINIC | Age: 68
End: 2018-03-26
Payer: MEDICARE

## 2018-03-26 DIAGNOSIS — S22.000S CLOSED COMPRESSION FRACTURE OF THORACIC VERTEBRA, SEQUELA: Primary | ICD-10-CM

## 2018-03-26 DIAGNOSIS — M54.9 MID BACK PAIN: ICD-10-CM

## 2018-03-26 DIAGNOSIS — M85.89 OSTEOPENIA OF MULTIPLE SITES: ICD-10-CM

## 2018-03-26 PROCEDURE — 97112 NEUROMUSCULAR REEDUCATION: CPT | Performed by: PHYSICAL MEDICINE & REHABILITATION

## 2018-03-26 PROCEDURE — G8991 OTHER PT/OT GOAL STATUS: HCPCS | Performed by: PHYSICAL MEDICINE & REHABILITATION

## 2018-03-26 PROCEDURE — G8990 OTHER PT/OT CURRENT STATUS: HCPCS | Performed by: PHYSICAL MEDICINE & REHABILITATION

## 2018-03-26 PROCEDURE — 97162 PT EVAL MOD COMPLEX 30 MIN: CPT | Performed by: PHYSICAL MEDICINE & REHABILITATION

## 2018-03-26 NOTE — PROGRESS NOTES
PT Evaluation     Today's date: 3/26/2018  Patient name: Garcia Sauceda  : 1950  MRN: [id number]  Referring provider: Daryl Hagen MD  Dx:   Encounter Diagnosis     ICD-10-CM    1  Closed compression fracture of thoracic vertebra, sequela S22 000S Ambulatory referral to Physical Therapy   2  Osteopenia of multiple sites M85 89 Ambulatory referral to Physical Therapy   3  Mid back pain M54 9 Ambulatory referral to Physical Therapy     Start Time: 0915  Stop Time: 1000  Total time in clinic (min): 45 minutes    Assessment    Assessment details: Patient is a 75 y/o female who reports lower back pain which she reports occurred after lower Tspine and upper Lspine fractures  No further referral appears necessary at this time based upon examination results  Resulting in pathoanatomical symptoms of pain, decreased strength, decrease weight bearing, and decreased ROM which is limiting her ability to walk, carry objects, sit for long periods of time, and vacuuming  Skilled physical therapy is warranted for the application of the interventions found below in the planned interventions section  Etiologic factors include osteoporosis  Prognosis is good given HEP compliance and attendance to physical therapy 2x a week for 8 weeks  Positive prognostic indicators include positive attitude  Negative prognostic indicators include osteoporosis and re-occurring problem  Please contact me if you have any questions or recommendations  Thank you for the referral and the opportunity to share in Luis care  Patient verbalized understanding of POC, HEP, and return demonstrated HEP  Goals  STG:  King Rachelle will report lifting and carrying groceries is 50% easier than at IE within 4 weeks to increase independence  King Rachelle will demonstrate proper sit to stand form within 4 weeks to increase independence      LTG:  King Rachelle will report lifting and carrying groceries is 80% easier than at IE within 8 weeks to increase independence  Darren Fritz will report she can walk for one mile with max 3/10 pain within 8 weeks to increase independence and ability to exercise  Plan  Planned therapy interventions: massage, balance, neuromuscular re-education, motor coordination training, patient education, postural training, strengthening, stretching, therapeutic activities, therapeutic exercise, therapeutic training, home exercise program, graded exercise and graded activity  Frequency: 2x week  Duration in weeks: 8  Treatment plan discussed with: patient        Subjective Evaluation    History of Present Illness  Mechanism of injury: Darren Fritz presents to outpatient physical therapy with a referral for thoracic pain following thoracic fxs, she reports the first one was 2016, and more recently in 2017  Work/School: retired - office work  Home Life: cooking, laundry, dogs, cleaning, bending over activities increases her pain, sitting activities increases her pain  Hobbies/exercise: shopping, walking,   Pain location: along waist line, left sided pain, waist line to the L glute  HPI: she reports having had fxs in 2016 which made it hard to walk, she does not remember the pain distrubution, more recently Nov she reports she was underneath a table and tried to lift the table up and heard a "crunching sound"  Aggravating factors: bending forward, twisting, standing for 15-20mins  Relieving factors: rest after it settles down,   PMH: breast cancer, skin cancer,   Patient Goals: increase muscles around my waist, afraid to do anything because she doesn't want to hurt herself more, be able to walk around for a day,     Denies night pain without movement, however, if she wakes up at night to use the restroom and starts moving the pain is worse at night than if she did that in the morning     Quality of life: good    Pain  Current pain ratin  At best pain ratin  At worst pain ratin          Objective     Static Posture Comments  ROM:  Flexion: 50% limited - positive for Ankita's sign   - patient unable to tolerate prone instability test  Extension: 50% limited  Rotation: R 75% limited, L 75% limited    Prone:  Lying flat caused constant pain  Prone on elbows took away pain in static position, changing positions caused an increase in pain     Bed mobility mechanics:  Decreased mechanics prone to sit - increased her pain    Gait:  Felisha Zambrano demonstrated decreased L LE weight bearing during gait, after 200ft she exhibited left sided back pain which subsided the moment she stopped moving           Flowsheet Rows    Flowsheet Row Most Recent Value   PT/OT G-Codes   Current Score  42   Projected Score  47   FOTO information reviewed  Yes   Assessment Type  Evaluation   G code set  Other PT/OT Primary   Other PT Primary Current Status ()  CK   Other PT Primary Goal Status ()  CH          Precautions: osteoporosis, history of cancer    Daily Treatment Diary     Manual  3/26/2018                                                                                 Exercise Diary              Bike - progress to tread             Gait training             Standing marches             Heel raises             Prone on elbows             TrA contractions                                                                                                                                                                                      HEP education and return demonstration 15min                Modalities

## 2018-04-02 ENCOUNTER — OFFICE VISIT (OUTPATIENT)
Dept: PHYSICAL THERAPY | Facility: CLINIC | Age: 68
End: 2018-04-02
Payer: MEDICARE

## 2018-04-02 DIAGNOSIS — M54.9 MID BACK PAIN: ICD-10-CM

## 2018-04-02 DIAGNOSIS — S22.000S CLOSED COMPRESSION FRACTURE OF THORACIC VERTEBRA, SEQUELA: Primary | ICD-10-CM

## 2018-04-02 PROCEDURE — 97112 NEUROMUSCULAR REEDUCATION: CPT | Performed by: PHYSICAL MEDICINE & REHABILITATION

## 2018-04-02 PROCEDURE — 97110 THERAPEUTIC EXERCISES: CPT | Performed by: PHYSICAL MEDICINE & REHABILITATION

## 2018-04-02 NOTE — PROGRESS NOTES
Daily Note     Today's date: 2018  Patient name: Darin Kapoor  : 1950  MRN: [id number]  Referring provider: Anny Louis MD  Dx: No diagnosis found  Subjective: Mary Riojas reported "I have a 2 out of 10 for pain today "      Objective: See treatment diary below  Precautions: osteoporosis, history of cancer    Daily Treatment Diary     Manual  3/26/2018 2018                                                                                Exercise Diary              Bike - progress to tread             Gait training             Standing marches             Heel raises             Prone on elbows  15mins           TrA contractions  3x 10           Standing extensions  3x 10                                                                                                                                                                       HEP education and return demonstration 15min                Modalities                                                         Assessment: Tolerated treatment well  Patient would benefit from continued PT  Mary Riojas was able to centralize her symptoms to higher on her back  Initially, that increased her pain, however, following additional standing extensions decreased the centralized pain  She reported feeling more stiffness than pain following the centralization and decrease in pain  She also demonstrated needing to practice breathing during TrA activation to properly support her back  Plan: Continue per plan of care

## 2018-04-06 ENCOUNTER — OFFICE VISIT (OUTPATIENT)
Dept: PHYSICAL THERAPY | Facility: CLINIC | Age: 68
End: 2018-04-06
Payer: MEDICARE

## 2018-04-06 DIAGNOSIS — S22.000S CLOSED COMPRESSION FRACTURE OF THORACIC VERTEBRA, SEQUELA: Primary | ICD-10-CM

## 2018-04-06 DIAGNOSIS — M54.9 MID BACK PAIN: ICD-10-CM

## 2018-04-06 PROCEDURE — 97112 NEUROMUSCULAR REEDUCATION: CPT | Performed by: PHYSICAL MEDICINE & REHABILITATION

## 2018-04-06 PROCEDURE — 97110 THERAPEUTIC EXERCISES: CPT | Performed by: PHYSICAL MEDICINE & REHABILITATION

## 2018-04-06 NOTE — PROGRESS NOTES
Daily Note     Today's date: 2018  Patient name: Austin Cohn  : 1950  MRN: [id number]  Referring provider: Beau Almaraz MD  Dx:   Encounter Diagnosis     ICD-10-CM    1  Closed compression fracture of thoracic vertebra, sequela S22 000S    2  Mid back pain M54 9                   Subjective: Kareem Robertson reported "I don't have too much pain, I have maybe a 1 for pain today, but it is more stiffness than pain "      Objective: See treatment diary below  Precautions: osteoporosis, history of cancer    Daily Treatment Diary     Manual  3/26/2018 2018 2018                                                                               Exercise Diary              Bike - progress to tread   8min          Gait training   100ft          Standing marches             Heel raises   3x 10          Prone on elbows  15mins 5min          TrA contractions  3x 10 3x 12          Standing extensions  3x 10 3x 10          Leg fall outs   3x 10 x 2          squats   2x 15                                                                                                                                            HEP education and return demonstration 15min                Modalities                                                         Assessment: Tolerated treatment well  Patient would benefit from continued PT  Kareem Robertson was able to add squats to her therapy program during today's session which shows progress towards her goals  She needed verbal cueing for proper standing extensions however, once she did them correctly she reported a decrease in pain  Plan: Continue per plan of care

## 2018-04-10 ENCOUNTER — OFFICE VISIT (OUTPATIENT)
Dept: PHYSICAL THERAPY | Facility: CLINIC | Age: 68
End: 2018-04-10
Payer: MEDICARE

## 2018-04-10 DIAGNOSIS — S22.000S CLOSED COMPRESSION FRACTURE OF THORACIC VERTEBRA, SEQUELA: Primary | ICD-10-CM

## 2018-04-10 PROCEDURE — 97112 NEUROMUSCULAR REEDUCATION: CPT | Performed by: PHYSICAL MEDICINE & REHABILITATION

## 2018-04-10 PROCEDURE — 97110 THERAPEUTIC EXERCISES: CPT | Performed by: PHYSICAL MEDICINE & REHABILITATION

## 2018-04-10 NOTE — PROGRESS NOTES
Daily Note     Today's date: 4/10/2018  Patient name: Arabella Torres  : 1950  MRN: [id number]  Referring provider: Simón Perry MD  Dx:   Encounter Diagnosis     ICD-10-CM    1  Closed compression fracture of thoracic vertebra, sequela S22 000S                   Subjective: Vazquez Rdz reported "I had a 1/10 pain over the weekend, it is much much better "    Objective: See treatment diary below  Precautions: osteoporosis, history of cancer    Daily Treatment Diary     Manual  3/26/2018 2018 2018 4/10/2018                                                                              Exercise Diary              Bike - progress to tread   8min 8min         Gait training   100ft          Standing marches             Heel raises   3x 10          Prone on elbows  15mins 5min 5min         TrA contractions  3x 10 3x 12 3x 12         Standing extensions  3x 10 3x 10 2x 10         Leg fall outs   3x 10 x 2 3x 10 x 2         squats   2x 15 2x 15         EIL - therapist overpressure    2x 10                                                                                                                              HEP education and return demonstration 15min                Modalities                                                         Assessment: Tolerated treatment well  Patient would benefit from continued PT  Vazquezalexander Rdz was able to add extension in lying to her therapy which shows progress towards her goals  Vazquez Rdz was also able to abolish her pain while laying prone which also shows progress towards her goals  Plan: Continue per plan of care

## 2018-04-13 ENCOUNTER — OFFICE VISIT (OUTPATIENT)
Dept: PHYSICAL THERAPY | Facility: CLINIC | Age: 68
End: 2018-04-13
Payer: MEDICARE

## 2018-04-13 DIAGNOSIS — S22.000S CLOSED COMPRESSION FRACTURE OF THORACIC VERTEBRA, SEQUELA: Primary | ICD-10-CM

## 2018-04-13 PROCEDURE — 97110 THERAPEUTIC EXERCISES: CPT | Performed by: PHYSICAL MEDICINE & REHABILITATION

## 2018-04-13 PROCEDURE — 97112 NEUROMUSCULAR REEDUCATION: CPT | Performed by: PHYSICAL MEDICINE & REHABILITATION

## 2018-04-13 NOTE — PROGRESS NOTES
Daily Note     Today's date: 2018  Patient name: Arlene Damon  : 1950  MRN: [id number]  Referring provider: Neel Miller MD  Dx:   Encounter Diagnosis     ICD-10-CM    1  Closed compression fracture of thoracic vertebra, sequela S22 000S                   Subjective: Cecile Armando reported "I still have pain while sitting for long periods of time and getting in and out of bed "      Objective: See treatment diary below  Precautions: osteoporosis, history of cancer    Daily Treatment Diary     Manual  3/26/2018 2018 2018 4/10/2018 2018                                                                             Exercise Diary              Bike - progress to tread   8min 8min 8min        Gait training   100ft          Standing marches             Heel raises   3x 10  3x 15        Prone on elbows  15mins 5min 5min         TrA contractions  3x 10 3x 12 3x 12 3x 15        Standing extensions  3x 10 3x 10 2x 10 3x 15        Leg fall outs   3x 10 x 2 3x 10 x 2 3x 12 x 2        squats   2x 15 2x 15         EIL - therapist overpressure    2x 10         Bed mobility training     20min        Standing calf stretch     3x 30sec                                                                                                   HEP education and return demonstration 15min                Modalities                                                         Assessment: Tolerated treatment well  Patient would benefit from continued PT  Following bed mobility training Cecile Armando was able to demonstrate a decrease in her pain during transfers which shows progress towards her goals  [place] also needed sitting posture verbal cueing during today's session  Plan: Continue per plan of care

## 2018-04-17 ENCOUNTER — OFFICE VISIT (OUTPATIENT)
Dept: PHYSICAL THERAPY | Facility: CLINIC | Age: 68
End: 2018-04-17
Payer: MEDICARE

## 2018-04-17 DIAGNOSIS — S22.000S CLOSED COMPRESSION FRACTURE OF THORACIC VERTEBRA, SEQUELA: Primary | ICD-10-CM

## 2018-04-17 PROCEDURE — 97110 THERAPEUTIC EXERCISES: CPT | Performed by: PHYSICAL MEDICINE & REHABILITATION

## 2018-04-17 PROCEDURE — 97112 NEUROMUSCULAR REEDUCATION: CPT | Performed by: PHYSICAL MEDICINE & REHABILITATION

## 2018-04-17 NOTE — PROGRESS NOTES
Daily Note     Today's date: 2018  Patient name: Flory Qureshi  : 1950  MRN: [id number]  Referring provider: Marylee Smack, MD  Dx: No diagnosis found  Subjective: Benjamin Arango reported "I have less pain while turning in bed, and I was able to wash my windows without pain which was really nice"    Objective: See treatment diary below  Precautions: osteoporosis, history of cancer    Daily Treatment Diary     Manual  3/26/2018 2018 2018 4/10/2018 2018 2018                                                                            Exercise Diary              Bike - progress to tread   8min 8min 8min 8min       Gait training   100ft          Standing marches             Heel raises   3x 10  3x 15 3x 20       Prone on elbows  15mins 5min 5min         TrA contractions  3x 10 3x 12 3x 12 3x 15 3x 15       Standing extensions  3x 10 3x 10 2x 10 3x 15 1x 15       Leg fall outs   3x 10 x 2 3x 10 x 2 3x 12 x 2 3x 15 x 2       squats   2x 15 2x 15  3x 15       EIL - therapist overpressure    2x 10         Bed mobility training     20min        Standing calf stretch     3x 30sec 3x 30sec       Extension in prone      3x 10       TrA with reciprocal arms      3x 10                                                                        HEP education and return demonstration 15min                Modalities                                                         Assessment: Tolerated treatment well  Patient would benefit from continued PT  Benjamin Arango was able to add reciprocal arm movements to her TrA contractions today without pain which shows progress towards her goals  She reported some stiffness following the session after performing her bed mobility to sit up which showed an improvement over pain from previous sessions  Plan: Continue per plan of care

## 2018-04-20 ENCOUNTER — OFFICE VISIT (OUTPATIENT)
Dept: PHYSICAL THERAPY | Facility: CLINIC | Age: 68
End: 2018-04-20
Payer: MEDICARE

## 2018-04-20 DIAGNOSIS — S22.000S CLOSED COMPRESSION FRACTURE OF THORACIC VERTEBRA, SEQUELA: Primary | ICD-10-CM

## 2018-04-20 DIAGNOSIS — M54.9 MID BACK PAIN: ICD-10-CM

## 2018-04-20 DIAGNOSIS — M85.89 OSTEOPENIA OF MULTIPLE SITES: ICD-10-CM

## 2018-04-20 PROCEDURE — G8986 CARRY D/C STATUS: HCPCS | Performed by: PHYSICAL MEDICINE & REHABILITATION

## 2018-04-20 PROCEDURE — G8985 CARRY GOAL STATUS: HCPCS | Performed by: PHYSICAL MEDICINE & REHABILITATION

## 2018-04-20 PROCEDURE — 97112 NEUROMUSCULAR REEDUCATION: CPT | Performed by: PHYSICAL MEDICINE & REHABILITATION

## 2018-04-20 NOTE — PROGRESS NOTES
PT Discharge    Today's date: 2018  Patient name: Melchor Scott  : 1950  MRN: [id number]  Referring provider: Param Jaramillo MD  Dx:   Encounter Diagnosis     ICD-10-CM    1  Closed compression fracture of thoracic vertebra, sequela S22 000S    2  Mid back pain M54 9    3  Osteopenia of multiple sites M85 89                 Assessment    Assessment details: Yordy Castro has made completed progress towards her therapy goals  She has demonstrated good exercise form and good understanding of her HEP  She reports she feels she is almost at 100% and is confident she can continue to make progress towards her goals utilizing her HEP  Due to her progress and correct demonstration as well as confidence with her HEP she has been D/Chinedu to her HEP at this time  Patient verbalized understanding of POC and HEP  Thank you for the opportunity to share in Luis care  Understanding of Dx/Px/POC: good   Prognosis: good    Goals  STG:  Yordy Castro will report lifting and carrying groceries is 50% easier than at IE within 4 weeks to increase independence  - met  Yordy Castro will demonstrate proper sit to stand form within 4 weeks to increase independence  - met    LTG:  Yordy Castro will report lifting and carrying groceries is 80% easier than at IE within 8 weeks to increase independence  - met  Yordy Castro will report she can walk for one mile with max 3/10 pain within 8 weeks to increase independence and ability to exercise  - met    Plan  Planned therapy interventions: massage, balance, neuromuscular re-education, motor coordination training, patient education, postural training, strengthening, stretching, therapeutic activities, therapeutic exercise, therapeutic training, home exercise program, graded exercise and graded activity  Treatment plan discussed with: patient  Plan details: D/C patient to HEP          Subjective Evaluation    History of Present Illness  Mechanism of injury: Yordy Castro reported "I feel like I am just about at 100% better than I was before  I feel confident I am continue to make progress by doing my exercises at home "  Quality of life: good    Pain  Current pain ratin  At best pain ratin  At worst pain rating: 3          Objective     Static Posture     Comments  ROM:  Flexion: 25% limited - Ankita's sign no longer present    Extension: 25% limited  Rotation: R 25% limited, L 25% limited    Prone:  Lying flat caused constant pain  Prone on elbows took away pain in static position, changing positions caused an increase in pain     Bed mobility mechanics:  Proper bed mobility - patient reported slight increase in pain        Flowsheet Rows    Flowsheet Row Most Recent Value   PT/OT G-Codes   Current Score  47   Projected Score  41   FOTO information reviewed  Yes   Assessment Type  Discharge   G code set  Carrying, Moving & Handling Objects   Carrying, Moving and Handling Objects Goal Status ()  [address] [place] [address]   Carrying, Moving and Handling Objects Discharge Status ()  CK          Precautions: osteoporosis, history of cancer    Daily Treatment Diary     Manual  2018                                                                                 Exercise Diary              Bike - progress to tread             Gait training             Standing marches             Heel raises             Prone on elbows             TrA contractions                                                                                                                                                                                      HEP education and return demonstration 15min                Modalities

## 2018-04-24 ENCOUNTER — APPOINTMENT (OUTPATIENT)
Dept: PHYSICAL THERAPY | Facility: CLINIC | Age: 68
End: 2018-04-24
Payer: MEDICARE

## 2018-04-27 ENCOUNTER — APPOINTMENT (OUTPATIENT)
Dept: PHYSICAL THERAPY | Facility: CLINIC | Age: 68
End: 2018-04-27
Payer: MEDICARE

## 2018-04-27 LAB
BUN SERPL-MCNC: 14 MG/DL (ref 7–25)
BUN/CREAT SERPL: NORMAL (CALC) (ref 6–22)
CALCIUM SERPL-MCNC: 9.3 MG/DL (ref 8.6–10.4)
CHLORIDE SERPL-SCNC: 103 MMOL/L (ref 98–110)
CO2 SERPL-SCNC: 27 MMOL/L (ref 20–31)
CREAT SERPL-MCNC: 0.66 MG/DL (ref 0.5–0.99)
GLUCOSE SERPL-MCNC: 95 MG/DL (ref 65–99)
POTASSIUM SERPL-SCNC: 3.9 MMOL/L (ref 3.5–5.3)
SL AMB EGFR AFRICAN AMERICAN: 107 ML/MIN/1.73M2
SL AMB EGFR NON AFRICAN AMERICAN: 93 ML/MIN/1.73M2
SODIUM SERPL-SCNC: 137 MMOL/L (ref 135–146)

## 2018-05-15 ENCOUNTER — TELEPHONE (OUTPATIENT)
Dept: OBGYN CLINIC | Facility: CLINIC | Age: 68
End: 2018-05-15

## 2018-05-15 NOTE — TELEPHONE ENCOUNTER
Pt gets mastectomy items from [employer] and needs a fax with notes from her last visit for authorization for medicare   Fax # [phone]

## 2018-08-15 ENCOUNTER — ANNUAL EXAM (OUTPATIENT)
Dept: OBGYN CLINIC | Facility: MEDICAL CENTER | Age: 68
End: 2018-08-15
Payer: MEDICARE

## 2018-08-15 VITALS
DIASTOLIC BLOOD PRESSURE: 84 MMHG | WEIGHT: 135 LBS | HEIGHT: 61 IN | SYSTOLIC BLOOD PRESSURE: 156 MMHG | BODY MASS INDEX: 25.49 KG/M2

## 2018-08-15 DIAGNOSIS — Z12.31 ENCOUNTER FOR SCREENING MAMMOGRAM FOR MALIGNANT NEOPLASM OF BREAST: ICD-10-CM

## 2018-08-15 DIAGNOSIS — Z01.419 ENCOUNTER FOR GYNECOLOGICAL EXAMINATION (GENERAL) (ROUTINE) WITHOUT ABNORMAL FINDINGS: Primary | ICD-10-CM

## 2018-08-15 PROCEDURE — G0101 CA SCREEN;PELVIC/BREAST EXAM: HCPCS | Performed by: OBSTETRICS & GYNECOLOGY

## 2018-08-15 NOTE — PATIENT INSTRUCTIONS
This 73-year-old patient was told that her breast and pelvic exam were normal  She will call if she sees any vaginal bleeding over the next year

## 2018-08-15 NOTE — PROGRESS NOTES
Assessment/Plan: This 82-year-old patient is seen this 82-year-old patient is seen today for gyn evaluation she does have a history of right breast cancer in mastectomy  She has had as thoracic vertebra fractures  No problem-specific Assessment & Plan notes found for this encounter  Subjective:      Patient ID: Yanet Shrestha is a 79 y o  female  This 82-year-old patient has had no vaginal bleeding or episodes of vaginitis over the past year  She does use a lubricant with intercourse and is comfortable  She takes Fosamax calcium and vitamin D for compression fractures of her thoracic vertebra  She has no chronic headaches or fainting spells or flashes  She is in bed from 11 night till 9 in the morning  Her bowel function is normal  She occasionally sees blood with hemorrhoids  The she occasionally does lose urine with stress and wears a liner at times  She has had no urinary tract infections over the year  Review of Systems   Constitutional: Negative  HENT: Negative  Eyes: Negative  Respiratory: Negative  Cardiovascular: Negative  Gastrointestinal: Positive for blood in stool  Endocrine: Negative  Genitourinary: Negative  Musculoskeletal: Negative  Skin: Negative  Allergic/Immunologic: Negative  Neurological: Negative  Hematological: Negative  Psychiatric/Behavioral: Negative  Objective:      /84 (BP Location: Left arm, Patient Position: Sitting, Cuff Size: Standard)   Ht 5' 0 5" (1 537 m)   Wt 61 2 kg (135 lb)   BMI 25 93 kg/m²          Physical Exam   Constitutional: She is oriented to person, place, and time  She appears well-developed and well-nourished  HENT:   Head: Normocephalic  Neck: Normal range of motion  Neck supple  Cardiovascular: Normal rate, regular rhythm and intact distal pulses  Murmur heard  Pulmonary/Chest: Effort normal and breath sounds normal    Abdominal: Soft   Bowel sounds are normal  Genitourinary: Uterus normal    Musculoskeletal: Normal range of motion  Neurological: She is alert and oriented to person, place, and time  Skin: Skin is warm and dry  Psychiatric: She has a normal mood and affect  Nursing note and vitals reviewed  pelvic exam revealed uterus to be anterior mobile normal size and well supported  No adnexal masses are present  The cervix is normal to appearance  There is no blood or discharge in the vagina  The vulva is normal  Rectal exam shows no bladder masses in the rectum and no nodularity in the cul-de-sac   Breast exam is normal

## 2018-08-16 ENCOUNTER — TRANSCRIBE ORDERS (OUTPATIENT)
Dept: ADMINISTRATIVE | Facility: HOSPITAL | Age: 68
End: 2018-08-16

## 2018-08-16 DIAGNOSIS — Z12.31 VISIT FOR SCREENING MAMMOGRAM: Primary | ICD-10-CM

## 2018-08-28 ENCOUNTER — HOSPITAL ENCOUNTER (OUTPATIENT)
Dept: RADIOLOGY | Age: 68
Discharge: HOME | End: 2018-08-28
Payer: MEDICARE

## 2018-08-28 DIAGNOSIS — Z12.31 SCREENING MAMMOGRAM, ENCOUNTER FOR: Primary | ICD-10-CM

## 2018-08-28 DIAGNOSIS — Z12.31 SCREENING MAMMOGRAM, ENCOUNTER FOR: ICD-10-CM

## 2018-08-28 DIAGNOSIS — Z12.31 VISIT FOR SCREENING MAMMOGRAM: ICD-10-CM

## 2018-08-28 PROCEDURE — 77063 BREAST TOMOSYNTHESIS BI: CPT

## 2018-08-28 PROCEDURE — 77067 SCR MAMMO BI INCL CAD: CPT

## 2018-10-09 ENCOUNTER — HOSPITAL ENCOUNTER (EMERGENCY)
Facility: HOSPITAL | Age: 68
Discharge: HOME | End: 2018-10-09
Attending: EMERGENCY MEDICINE | Admitting: EMERGENCY MEDICINE
Payer: MEDICARE

## 2018-10-09 ENCOUNTER — APPOINTMENT (EMERGENCY)
Dept: RADIOLOGY | Facility: HOSPITAL | Age: 68
End: 2018-10-09
Payer: MEDICARE

## 2018-10-09 VITALS
DIASTOLIC BLOOD PRESSURE: 79 MMHG | SYSTOLIC BLOOD PRESSURE: 154 MMHG | OXYGEN SATURATION: 95 % | HEART RATE: 95 BPM | RESPIRATION RATE: 20 BRPM | TEMPERATURE: 98 F

## 2018-10-09 DIAGNOSIS — R55 NEAR SYNCOPE: ICD-10-CM

## 2018-10-09 DIAGNOSIS — M54.9 BACK PAIN: Primary | ICD-10-CM

## 2018-10-09 LAB
ALBUMIN SERPL BCP-MCNC: 3.8 G/DL (ref 3.5–5)
ALP SERPL-CCNC: 73 U/L (ref 46–116)
ALT SERPL W P-5'-P-CCNC: 27 U/L (ref 12–78)
ANION GAP SERPL CALCULATED.3IONS-SCNC: 6 MMOL/L (ref 4–13)
AST SERPL W P-5'-P-CCNC: 21 U/L (ref 5–45)
ATRIAL RATE: 83 BPM
BASOPHILS # BLD AUTO: 0.01 THOUSANDS/ΜL (ref 0–0.1)
BASOPHILS NFR BLD AUTO: 0 % (ref 0–1)
BILIRUB SERPL-MCNC: 0.46 MG/DL (ref 0.2–1)
BUN SERPL-MCNC: 16 MG/DL (ref 5–25)
CALCIUM SERPL-MCNC: 9 MG/DL (ref 8.3–10.1)
CHLORIDE SERPL-SCNC: 106 MMOL/L (ref 100–108)
CO2 SERPL-SCNC: 25 MMOL/L (ref 21–32)
CREAT SERPL-MCNC: 0.74 MG/DL (ref 0.6–1.3)
EOSINOPHIL # BLD AUTO: 0.04 THOUSAND/ΜL (ref 0–0.61)
EOSINOPHIL NFR BLD AUTO: 1 % (ref 0–6)
ERYTHROCYTE [DISTWIDTH] IN BLOOD BY AUTOMATED COUNT: 13 % (ref 11.6–15.1)
GFR SERPL CREATININE-BSD FRML MDRD: 85 ML/MIN/1.73SQ M
GLUCOSE SERPL-MCNC: 124 MG/DL (ref 65–140)
HCT VFR BLD AUTO: 37.8 % (ref 34.8–46.1)
HGB BLD-MCNC: 12.7 G/DL (ref 11.5–15.4)
IMM GRANULOCYTES # BLD AUTO: 0.09 THOUSAND/UL (ref 0–0.2)
IMM GRANULOCYTES NFR BLD AUTO: 1 % (ref 0–2)
LYMPHOCYTES # BLD AUTO: 2.58 THOUSANDS/ΜL (ref 0.6–4.47)
LYMPHOCYTES NFR BLD AUTO: 31 % (ref 14–44)
MCH RBC QN AUTO: 31.1 PG (ref 26.8–34.3)
MCHC RBC AUTO-ENTMCNC: 33.6 G/DL (ref 31.4–37.4)
MCV RBC AUTO: 93 FL (ref 82–98)
MONOCYTES # BLD AUTO: 0.69 THOUSAND/ΜL (ref 0.17–1.22)
MONOCYTES NFR BLD AUTO: 8 % (ref 4–12)
NEUTROPHILS # BLD AUTO: 4.86 THOUSANDS/ΜL (ref 1.85–7.62)
NEUTS SEG NFR BLD AUTO: 59 % (ref 43–75)
NRBC BLD AUTO-RTO: 0 /100 WBCS
P AXIS: 37 DEGREES
PLATELET # BLD AUTO: 326 THOUSANDS/UL (ref 149–390)
PMV BLD AUTO: 9.3 FL (ref 8.9–12.7)
POTASSIUM SERPL-SCNC: 3.7 MMOL/L (ref 3.5–5.3)
PR INTERVAL: 150 MS
PROT SERPL-MCNC: 8 G/DL (ref 6.4–8.2)
QRS AXIS: -49 DEGREES
QRSD INTERVAL: 94 MS
QT INTERVAL: 370 MS
QTC INTERVAL: 434 MS
RBC # BLD AUTO: 4.08 MILLION/UL (ref 3.81–5.12)
SODIUM SERPL-SCNC: 137 MMOL/L (ref 136–145)
T WAVE AXIS: 14 DEGREES
TROPONIN I SERPL-MCNC: <0.02 NG/ML
VENTRICULAR RATE: 83 BPM
WBC # BLD AUTO: 8.27 THOUSAND/UL (ref 4.31–10.16)

## 2018-10-09 PROCEDURE — 36415 COLL VENOUS BLD VENIPUNCTURE: CPT

## 2018-10-09 PROCEDURE — 84484 ASSAY OF TROPONIN QUANT: CPT | Performed by: EMERGENCY MEDICINE

## 2018-10-09 PROCEDURE — 93005 ELECTROCARDIOGRAM TRACING: CPT

## 2018-10-09 PROCEDURE — 99285 EMERGENCY DEPT VISIT HI MDM: CPT

## 2018-10-09 PROCEDURE — 96374 THER/PROPH/DIAG INJ IV PUSH: CPT

## 2018-10-09 PROCEDURE — 80053 COMPREHEN METABOLIC PANEL: CPT | Performed by: EMERGENCY MEDICINE

## 2018-10-09 PROCEDURE — 85025 COMPLETE CBC W/AUTO DIFF WBC: CPT | Performed by: EMERGENCY MEDICINE

## 2018-10-09 PROCEDURE — 93010 ELECTROCARDIOGRAM REPORT: CPT | Performed by: INTERNAL MEDICINE

## 2018-10-09 RX ORDER — LIDOCAINE 50 MG/G
1 PATCH TOPICAL ONCE
Status: DISCONTINUED | OUTPATIENT
Start: 2018-10-09 | End: 2018-10-09 | Stop reason: HOSPADM

## 2018-10-09 RX ORDER — METHOCARBAMOL 500 MG/1
500 TABLET, FILM COATED ORAL 2 TIMES DAILY
Qty: 20 TABLET | Refills: 0 | Status: SHIPPED | OUTPATIENT
Start: 2018-10-09 | End: 2018-10-16 | Stop reason: ALTCHOICE

## 2018-10-09 RX ORDER — DIAZEPAM 5 MG/1
5 TABLET ORAL ONCE
Status: COMPLETED | OUTPATIENT
Start: 2018-10-09 | End: 2018-10-09

## 2018-10-09 RX ORDER — KETOROLAC TROMETHAMINE 30 MG/ML
15 INJECTION, SOLUTION INTRAMUSCULAR; INTRAVENOUS ONCE
Status: COMPLETED | OUTPATIENT
Start: 2018-10-09 | End: 2018-10-09

## 2018-10-09 RX ADMIN — KETOROLAC TROMETHAMINE 15 MG: 30 INJECTION, SOLUTION INTRAMUSCULAR at 16:16

## 2018-10-09 RX ADMIN — DIAZEPAM 5 MG: 5 TABLET ORAL at 16:16

## 2018-10-09 RX ADMIN — LIDOCAINE 1 PATCH: 50 PATCH CUTANEOUS at 16:17

## 2018-10-09 NOTE — ED ATTENDING ATTESTATION
Evi Vargas MD, saw and evaluated the patient  All available labs and X-rays were ordered by me or the resident and have been reviewed by myself  I discussed the patient with the resident / non-physician and agree with the resident's / non-physician practitioner's findings and plan as documented in the resident's / non-physician practicitioner's note, except where noted  At this point, I agree with the current assessment done in the ED  Chief Complaint   Patient presents with    Syncope     syncopal episode after injuring back with her dogs  patient reports pain was so severe, and she was worried she re-injured her lower back, she states she lost consciousness, and was lowered to the ground by bystanders who state she did not fall or hit her head  This is a 79year old female presenting for near syncope  The patient has a very long history of chronic back pain for years  She has been following up with physical therapy about this pain in the past  She thinks in the last 2 weeks the pain has been significnatly better and controlled  She has been at her baseline for several days, being pain free  Today she was getting her cockerspaniels from the car to go to the [place]  One of the jumped and pulled her at a weird angle and she had onset of severe back strain/pain  She then felt LH from this and felt like she passed out onto her car  The [employer] came outside and lowered her to the ground  She never struck the ground  She had enough tone to maintain a standing position  Denies f/ch/n/v/cp/sob  No palpitations  No prodrome  Denies any urinary tract infection symptoms (burning, itching, pain, blood, frequency)  Denies any upper respiratory tract infection symptoms (cough, congestion, rhinorrhea, sore throat)  No saddle anesthesia  No numbness/tingling down the arms or legs    PMH:  - SCC / Breast CA  - chronic back pain  PSH:  - Appendectomy  - Mastectomy  - Cheilectomy  - Colonoscopy  - D&C  No smoking drinking drugs  PE:  Vitals:    10/09/18 1459 10/09/18 1600   BP: 146/66 154/79   BP Location: Right arm Right arm   Pulse: 85 95   Resp: 18 20   Temp: 98 °F (36 7 °C)    TempSrc: Oral    SpO2: 94% 95%   General: VSS, NAD, awake, alert  Well-nourished, well-developed  Appears stated age  Speaking normally in full sentences  Head: Normocephalic, atraumatic, nontender  Eyes: PERRL, EOM-I  No diplopia  No hyphema  No subconjunctival hemorrhages  Symmetrical lids  ENT: Atraumatic external nose and ears  MMM  No malocclusion  No stridor  Normal phonation  No drooling  Normal swallowing  Neck: Symmetric, trachea midline  No JVD  CV: RRR  +S1/S2  No murmurs or gallops  Peripheral pulses +2 throughout  No chest wall tenderness  Lungs:   Unlabored No retractions  CTAB, lungs sounds equal bilateral    No tachypnea  Abd: +BS, soft, NT/ND    MSK:   FROM   SLR negative bilaterally but causes back pain when I'm raising it bilterally   No midline tenderness, has only paravertebral tenderness and nothing that is point tenderness  No saddle anesthesia  EHL FHL PF DF 5/5  No saddle  2+ pulses bilaterally equal  Back:   No rashes  Skin: Dry, intact  Neuro: AAOx3, GCS 15, CN II-XII grossly intact  Motor grossly intact  Psychiatric/Behavioral: Appropriate mood and affect   Exam: deferred  A:  - near syncope from back pain  P:  - near syncope r/o cardiac cause  - treat back pain, back sprain  - likely DC with supportive care --> reassurance  - 13 point ROS was performed and all are normal unless stated in the history above  - Nursing note reviewed  Vitals reviewed  - Orders placed by myself and/or advanced practitioner / resident     - Previous chart was reviewed  - No language barrier    - History obtained from patient  - There are no limitations to the history obtained  - Critical care time: Not applicable for this patient  Final Diagnosis:  1  Back pain    2   Near syncope ED Course as of Oct 12 1321   Tue Oct 09, 2018   1557 This EKG was interpreted by me  The EKG demonstrates Normal sinus rhythm, normal intervals and axis, normal QRS, non specific acute ST-T changes  Only new change in inverted T-wave in III alone compared to old EKG      Medications   diazepam (VALIUM) tablet 5 mg (5 mg Oral Given 10/9/18 1616)   ketorolac (TORADOL) injection 15 mg (15 mg Intravenous Given 10/9/18 1616)     No orders to display     Orders Placed This Encounter   Procedures    Comprehensive metabolic panel    CBC and differential    Troponin I    Continuous cardiac monitoring    Continuous pulse oximetry    EKG RESULTS    ECG 12 lead    ECG 12 lead     Labs Reviewed   TROPONIN I - Normal       Result Value Ref Range Status    Troponin I <0 02  <=0 04 ng/mL Final    Comment:   [employer] Chemistry analyzer 99% cutoff is > 0 04 ng/mL in network labs     o cTnI 99% cutoff is useful only when applied to patients in the clinical setting of myocardial ischemia   o cTnI 99% cutoff should be interpreted in the context of clinical history, ECG findings and possibly cardiac imaging to establish correct diagnosis  o cTnI 99% cutoff may be suggestive but clearly not indicative of a coronary event without the clinical setting of myocardial ischemia  COMPREHENSIVE METABOLIC PANEL    Sodium 519  136 - 145 mmol/L Final    Potassium 3 7  3 5 - 5 3 mmol/L Final    Chloride 106  100 - 108 mmol/L Final    CO2 25  21 - 32 mmol/L Final    ANION GAP 6  4 - 13 mmol/L Final    BUN 16  5 - 25 mg/dL Final    Creatinine 0 74  0 60 - 1 30 mg/dL Final    Comment: Standardized to IDMS reference method    Glucose 124  65 - 140 mg/dL Final    Comment:   If the patient is fasting, the ADA then defines impaired fasting glucose as > 100 mg/dL and diabetes as > or equal to 123 mg/dL  Specimen collection should occur prior to Sulfasalazine administration due to the potential for falsely depressed results   Specimen collection should occur prior to Sulfapyridine administration due to the potential for falsely elevated results  Calcium 9 0  8 3 - 10 1 mg/dL Final    AST 21  5 - 45 U/L Final    Comment:   Specimen collection should occur prior to Sulfasalazine administration due to the potential for falsely depressed results  ALT 27  12 - 78 U/L Final    Comment:   Specimen collection should occur prior to Sulfasalazine and/or Sulfapyridine administration due to the potential for falsely depressed results  Alkaline Phosphatase 73  46 - 116 U/L Final    Total Protein 8 0  6 4 - 8 2 g/dL Final    Albumin 3 8  3 5 - 5 0 g/dL Final    Total Bilirubin 0 46  0 20 - 1 00 mg/dL Final    eGFR 85  ml/min/1 73sq m Final    Narrative:     National Kidney Disease Education Program recommendations are as follows:  GFR calculation is accurate only with a steady state creatinine  Chronic Kidney disease less than 60 ml/min/1 73 sq  meters  Kidney failure less than 15 ml/min/1 73 sq  meters     CBC AND DIFFERENTIAL    WBC 8 27  4 31 - 10 16 Thousand/uL Final    RBC 4 08  3 81 - 5 12 Million/uL Final    Hemoglobin 12 7  11 5 - 15 4 g/dL Final    Hematocrit 37 8  34 8 - 46 1 % Final    MCV 93  82 - 98 fL Final    MCH 31 1  26 8 - 34 3 pg Final    MCHC 33 6  31 4 - 37 4 g/dL Final    RDW 13 0  11 6 - 15 1 % Final    MPV 9 3  8 9 - 12 7 fL Final    Platelets 166  542 - 390 Thousands/uL Final    nRBC 0  /100 WBCs Final    Neutrophils Relative 59  43 - 75 % Final    Immat GRANS % 1  0 - 2 % Final    Lymphocytes Relative 31  14 - 44 % Final    Monocytes Relative 8  4 - 12 % Final    Eosinophils Relative 1  0 - 6 % Final    Basophils Relative 0  0 - 1 % Final    Neutrophils Absolute 4 86  1 85 - 7 62 Thousands/µL Final    Immature Grans Absolute 0 09  0 00 - 0 20 Thousand/uL Final    Lymphocytes Absolute 2 58  0 60 - 4 47 Thousands/µL Final    Monocytes Absolute 0 69  0 17 - 1 22 Thousand/µL Final    Eosinophils Absolute 0 04  0 00 - 0 61 Thousand/µL Final    Basophils Absolute 0 01  0 00 - 0 10 Thousands/µL Final     Time reflects when diagnosis was documented in both MDM as applicable and the Disposition within this note     Time User Action Codes Description Comment    10/9/2018  4:51 PM Dandy Potash Add [M54 9] Back pain     10/9/2018  4:51 PM Dandy Potash Modify [M54 9] Back pain acute on chronic    10/9/2018  4:51 PM Dandy Summers Add [R55] Near syncope       ED Disposition     ED Disposition Condition Comment    Discharge  Favian Martines discharge to home/self care      Condition at discharge: Good        Follow-up Information     Follow up With Specialties Details Why Contact Info Additional Luz Marina Mandel MD Internal Medicine Schedule an appointment as soon as possible for a visit in 1 day  follow-up [address] [place]        [facility] Physical Therapy Call  make appointment for back pain [address] [id number] [phone] [facility], [place], [place], [address] [facility] Emergency Department Emergency Medicine Go in 1 day As needed, If symptoms worsen [address]  [phone]  ED, [address] [place] [address], [id number]        Discharge Medication List as of 10/9/2018  4:58 PM      START taking these medications    Details   diclofenac sodium (VOLTAREN) 1 % Apply 2 g topically 4 (four) times a day, Starting Tue 10/9/2018, Print      methocarbamol (ROBAXIN) 500 mg tablet Take 1 tablet (500 mg total) by mouth 2 (two) times a day, Starting Tue 10/9/2018, Print         CONTINUE these medications which have NOT CHANGED    Details   alendronate (FOSAMAX) 70 mg tablet Take 1 tablet (70 mg total) by mouth every 7 days, Starting Fri 3/16/2018, Normal      Calcium Citrate-Vitamin D (CALCIUM CITRATE + D PO) Take 1 tablet by mouth 2 (two) times a week, Starting Mon 8/13/2012, Historical Med      escitalopram (LEXAPRO) 10 mg tablet Starting Mon 1/8/2018, Historical Med      metoprolol tartrate (LOPRESSOR) 50 mg tablet Take 50 mg by mouth 2 (two) times a day , Starting Tue 8/20/2013, Historical Med           No discharge procedures on file  Prior to Admission Medications   Prescriptions Last Dose Informant Patient Reported? Taking? Calcium Citrate-Vitamin D (CALCIUM CITRATE + D PO) 10/9/2018 at Unknown time  Yes Yes   Sig: Take 1 tablet by mouth 2 (two) times a week   alendronate (FOSAMAX) 70 mg tablet 10/9/2018 at Unknown time  No Yes   Sig: Take 1 tablet (70 mg total) by mouth every 7 days   escitalopram (LEXAPRO) 10 mg tablet 10/9/2018 at Unknown time  Yes Yes   metoprolol tartrate (LOPRESSOR) 50 mg tablet 10/9/2018 at Unknown time  Yes Yes   Sig: Take 50 mg by mouth 2 (two) times a day       Facility-Administered Medications: None       Portions of the record may have been created with voice recognition software  Occasional wrong word or "sound a like" substitutions may have occurred due to the inherent limitations of voice recognition software  Read the chart carefully and recognize, using context, where substitutions have occurred      Electronically signed by:  Hilarie Fothergill

## 2018-10-09 NOTE — DISCHARGE INSTRUCTIONS
Acute Low Back Pain   WHAT YOU NEED TO KNOW:   Acute low back pain is sudden discomfort in your lower back area that lasts for up to 6 weeks  The discomfort makes it difficult to tolerate activity  DISCHARGE INSTRUCTIONS:   Seek care immediately or call 911 if:   · You have severe pain  · You have sudden stiffness and heaviness on both buttocks down to both legs  · You have numbness or weakness in one leg, or pain in both legs  · You have numbness in your genital area or across your lower back  · You cannot control your urine or bowel movements  Contact your healthcare provider if:   · You have a fever  · You have pain at night or when you rest     · Your pain does not get better with treatment  · You have pain that worsens when you cough or sneeze  · You suddenly feel something pop or snap in your back  · You have questions or concerns about your condition or care  Medicines: The following medicines may be ordered by your healthcare provider:  · Acetaminophen  decreases pain  It is available without a doctor's order  Ask how much to take and how often to take it  Follow directions  Acetaminophen can cause liver damage if not taken correctly  · NSAIDs  help decrease swelling and pain  This medicine is available with or without a doctor's order  NSAIDs can cause stomach bleeding or kidney problems in certain people  If you take blood thinner medicine, always ask your healthcare provider if NSAIDs are safe for you  Always read the medicine label and follow directions  · Prescription pain medicine  may be given  Ask your healthcare provider how to take this medicine safely  · Muscle relaxers  decrease pain by relaxing the muscles in your lower spine  · Take your medicine as directed  Contact your healthcare provider if you think your medicine is not helping or if you have side effects  Tell him of her if you are allergic to any medicine   Keep a list of the medicines, vitamins, and herbs you take  Include the amounts, and when and why you take them  Bring the list or the pill bottles to follow-up visits  Carry your medicine list with you in case of an emergency  Self-care:   · Stay active  as much as you can without causing more pain  Bed rest could make your back pain worse  Start with some light exercises such as walking  Avoid heavy lifting until your pain is gone  Ask for more information about the activities or exercises that are right for you  · Ice  helps decrease swelling, pain, and muscle spams  Put crushed ice in a plastic bag  Cover it with a towel  Place it on your lower back for 20 to 30 minutes every 2 hours  Do this for about 2 to 3 days after your pain starts, or as directed  · Heat  helps decrease pain and muscle spasms  Start to use heat after treatment with ice has stopped  Use a small towel dampened with warm water or a heating pad, or sit in a warm bath  Apply heat on the area for 20 to 30 minutes every 2 hours for as many days as directed  Alternate heat and ice  Prevent acute low back pain:   · Use proper body mechanics  ¨ Bend at the hips and knees when you  objects  Do not bend from the waist  Use your leg muscles as you lift the load  Do not use your back  Keep the object close to your chest as you lift it  Try not to twist or lift anything above your waist     ¨ Change your position often when you stand for long periods of time  Rest one foot on a small box or footrest, and then switch to the other foot often  ¨ Try not to sit for long periods of time  When you do, sit in a straight-backed chair with your feet flat on the floor  Never reach, pull, or push while you are sitting  · Do exercises that strengthen your back muscles  Warm up before you exercise  Ask your healthcare provider the best exercises for you  · Maintain a healthy weight  Ask your healthcare provider how much you should weigh   Ask him to help you create a weight loss plan if you are overweight  Follow up with your healthcare provider as directed:  Return for a follow-up visit if you still have pain after 1 to 3 weeks of treatment  You may need to visit an orthopedist if your back pain lasts more than 6 to 12 weeks  Write down your questions so you remember to ask them during your visits  © 2017 [employer]  Information is for End User's use only and may not be sold, redistributed or otherwise used for commercial purposes  All illustrations and images included in CareNotes® are the copyrighted property of [employer]  or Chadd Mattson  The above information is an  only  It is not intended as medical advice for individual conditions or treatments  Talk to your doctor, nurse or pharmacist before following any medical regimen to see if it is safe and effective for you

## 2018-10-09 NOTE — ED PROVIDER NOTES
History  Chief Complaint   Patient presents with    Syncope     syncopal episode after injuring back with her dogs  patient reports pain was so severe, and she was worried she re-injured her lower back, she states she lost consciousness, and was lowered to the ground by bystanders who state she did not fall or hit her head  Presents for LOC which she states occurred with back pain about 2 hrs ago  She was unloading her dogs from a groomer and they pulled her and caused sharp back pain and she passed out  Is lower back no radiation; she has had this pain for 2 years but acutely with this episode  She has done PT and currently done with that with improvement  She did not hit the ground she leaned against the care and two groomers ran out of building and laid her to the ground therefore no trauma  Therefore also unlikely syncope as did not lose lower extremity tone  She denies CP, SOB, Abd pain  Denies any neuro symptoms of loss of bowel bladder/vision changes, light headedness  Denies GI bleeding  Only complaint as of now is back pain  Denies issues with urination, no numbness tingling or weakness             Prior to Admission Medications   Prescriptions Last Dose Informant Patient Reported? Taking?    Calcium Citrate-Vitamin D (CALCIUM CITRATE + D PO) 10/9/2018 at Unknown time  Yes Yes   Sig: Take 1 tablet by mouth 2 (two) times a week   alendronate (FOSAMAX) 70 mg tablet 10/9/2018 at Unknown time  No Yes   Sig: Take 1 tablet (70 mg total) by mouth every 7 days   escitalopram (LEXAPRO) 10 mg tablet 10/9/2018 at Unknown time  Yes Yes   metoprolol tartrate (LOPRESSOR) 50 mg tablet 10/9/2018 at Unknown time  Yes Yes   Sig: Take 50 mg by mouth 2 (two) times a day       Facility-Administered Medications: None       Past Medical History:   Diagnosis Date    Breast CA ([facility] [address] )     Squamous cell carcinoma of skin        Past Surgical History:   Procedure Laterality Date    APPENDECTOMY  01/2011    BREAST LUMPECTOMY      CHEILECTOMY      Resolved:  2006, Bunion correction    COLONOSCOPY  07/2010    Diverticula; recheck 5 yrs    DILATION AND CURETTAGE, DIAGNOSTIC / THERAPEUTIC      MASTECTOMY Right 2000       Family History   Problem Relation Age of Onset    Diabetes Mother     Osteoporosis Mother     Heart disease Father     Breast cancer Sister     Heart attack Family     Other Family         Benign brain tumor    Club foot Family      I have reviewed and agree with the history as documented  Social History   Substance Use Topics    Smoking status: Never Smoker    Smokeless tobacco: Never Used    Alcohol use Yes      Comment: Social        Review of Systems   Constitutional: Negative for activity change, appetite change, chills and fever  HENT: Negative for congestion, ear pain, rhinorrhea, sore throat and trouble swallowing  Eyes: Negative for photophobia and pain  Respiratory: Negative for cough, chest tightness, shortness of breath and wheezing  Cardiovascular: Negative for chest pain and palpitations  Gastrointestinal: Negative for abdominal distention, abdominal pain, constipation, diarrhea, nausea and vomiting  Genitourinary: Negative for dysuria, flank pain, frequency, hematuria and urgency  Musculoskeletal: Positive for back pain  Negative for arthralgias, joint swelling, myalgias, neck pain and neck stiffness  Skin: Negative for color change, pallor and rash  Neurological: Positive for light-headedness  Negative for dizziness, seizures, weakness and headaches  She states syncope would never lost lower extremity tone so near syncope   Hematological: Negative for adenopathy  Psychiatric/Behavioral: Negative for agitation, confusion, hallucinations and self-injury                     Physical Exam  ED Triage Vitals [10/09/18 1459]   Temperature Pulse Respirations Blood Pressure SpO2   98 °F (36 7 °C) 85 18 146/66 94 %      Temp Source Heart Rate Source Patient Position - Orthostatic VS BP Location FiO2 (%)   Oral Monitor Sitting Right arm --      Pain Score       7           Orthostatic Vital Signs  Vitals:    10/09/18 1459 10/09/18 1600   BP: 146/66 154/79   Pulse: 85 95   Patient Position - Orthostatic VS: Sitting Lying       Physical Exam   Constitutional: She is oriented to person, place, and time  She appears well-developed and well-nourished  No distress  HENT:   Head: Normocephalic and atraumatic  Mouth/Throat: Oropharynx is clear and moist  No oropharyngeal exudate  Eyes: Conjunctivae and EOM are normal  Right eye exhibits no discharge  Left eye exhibits no discharge  Neck: Normal range of motion  Neck supple  No tracheal deviation present  No thyromegaly present  Cardiovascular: Normal rate and normal heart sounds  No murmur heard  Pulmonary/Chest: Effort normal and breath sounds normal  No respiratory distress  She has no wheezes  She has no rales  Abdominal: Soft  Bowel sounds are normal  She exhibits no distension  There is no tenderness  There is no rebound and no guarding  No discomfort throughout    Pictures of aorta below no aortic dilation appreciated     Musculoskeletal: Normal range of motion  She exhibits no edema or deformity  Lymphadenopathy:     She has no cervical adenopathy  Neurological: She is alert and oriented to person, place, and time  No cranial nerve deficit  She exhibits normal muscle tone  GCS 15  Able to ambulate  Normal finger to nose  No dysarthria  No cranial nerve findings  Skin: Skin is warm  Capillary refill takes less than 2 seconds  No rash noted  She is not diaphoretic  No erythema  Psychiatric: She has a normal mood and affect   Her behavior is normal  Judgment and thought content normal        ED Medications  Medications   diazepam (VALIUM) tablet 5 mg (5 mg Oral Given 10/9/18 1616)   ketorolac (TORADOL) injection 15 mg (15 mg Intravenous Given 10/9/18 1616)       Diagnostic Studies  Results Reviewed     Procedure Component Value Units Date/Time    Troponin I [[id number]]  (Normal) Collected:  10/09/18 1505    Lab Status:  Final result Specimen:  Blood from Arm, Left Updated:  10/09/18 1534     Troponin I <0 02 ng/mL     Comprehensive metabolic panel [[id number]] Collected:  10/09/18 1505    Lab Status:  Final result Specimen:  Blood from Arm, Left Updated:  10/09/18 1532     Sodium 137 mmol/L      Potassium 3 7 mmol/L      Chloride 106 mmol/L      CO2 25 mmol/L      ANION GAP 6 mmol/L      BUN 16 mg/dL      Creatinine 0 74 mg/dL      Glucose 124 mg/dL      Calcium 9 0 mg/dL      AST 21 U/L      ALT 27 U/L      Alkaline Phosphatase 73 U/L      Total Protein 8 0 g/dL      Albumin 3 8 g/dL      Total Bilirubin 0 46 mg/dL      eGFR 85 ml/min/1 73sq m     Narrative:         National Kidney Disease Education Program recommendations are as follows:  GFR calculation is accurate only with a steady state creatinine  Chronic Kidney disease less than 60 ml/min/1 73 sq  meters  Kidney failure less than 15 ml/min/1 73 sq  meters      CBC and differential [[id number]] Collected:  10/09/18 1505    Lab Status:  Final result Specimen:  Blood from Arm, Left Updated:  10/09/18 1521     WBC 8 27 Thousand/uL      RBC 4 08 Million/uL      Hemoglobin 12 7 g/dL      Hematocrit 37 8 %      MCV 93 fL      MCH 31 1 pg      MCHC 33 6 g/dL      RDW 13 0 %      MPV 9 3 fL      Platelets 643 Thousands/uL      nRBC 0 /100 WBCs      Neutrophils Relative 59 %      Immat GRANS % 1 %      Lymphocytes Relative 31 %      Monocytes Relative 8 %      Eosinophils Relative 1 %      Basophils Relative 0 %      Neutrophils Absolute 4 86 Thousands/µL      Immature Grans Absolute 0 09 Thousand/uL      Lymphocytes Absolute 2 58 Thousands/µL      Monocytes Absolute 0 69 Thousand/µL      Eosinophils Absolute 0 04 Thousand/µL      Basophils Absolute 0 01 Thousands/µL                  No orders to display Procedures  Procedures      Phone Consults  ED Phone Contact    ED Course  ED Course as of Oct 10 1234   Tue Oct 09, 2018   1602 EKG shows sinus rhythm rate of 82  Only finding is nonspecific T-wave abnormality in lead 3 compared to prior  Stable left axis  Normal R-wave progression  66 91 21   The patient refusing chest x-ray    1651  Patient is sleeping in the room states she got significant relief with medication                                MDM  Number of Diagnoses or Management Options  Back pain:   Near syncope:   Diagnosis management comments: Acute exacerbation of chronic lower back pain cause near-syncope never lost lower extremity tone was lowered to the ground  No external evidence of trauma  Normal EKG no evidence of anemia or electrolyte abnormalities patient refused chest x-ray states back pain was backed how was before physical therapy  Did give medications for back pain on re-evaluation patient is sleeping notes much improvement  Discussed comprehensive spine follow-up given script for diclofenac gel along with muscle relaxant  CritCare Time    Disposition  Final diagnoses:   Back pain - acute on chronic   Near syncope     Time reflects when diagnosis was documented in both MDM as applicable and the Disposition within this note     Time User Action Codes Description Comment    10/9/2018  4:51 PM Rosie Sanchez Add [M54 9] Back pain     10/9/2018  4:51 PM Rosie Sanchez Modify [M54 9] Back pain acute on chronic    10/9/2018  4:51 PM Rosie Sanchez Add [R55] Near syncope       ED Disposition     ED Disposition Condition Comment    Discharge  Garcia Sauceda discharge to home/self care      Condition at discharge: Good        Follow-up Information     Follow up With Specialties Details Why Contact Info Additional Jonathan Benitez MD Internal Medicine Schedule an appointment as soon as possible for a visit in 1 day  follow-up [address]  [phone] [facility] Physical Therapy Call  make appointment for back pain [address] [address] [phone] [facility], [place], [place], [address] [facility] Emergency Medicine Go in 1 day As needed, If symptoms worsen [address] [place] [address] ED, [address], [address]          Discharge Medication List as of 10/9/2018  4:58 PM      START taking these medications    Details   diclofenac sodium (VOLTAREN) 1 % Apply 2 g topically 4 (four) times a day, Starting Tue 10/9/2018, Print      methocarbamol (ROBAXIN) 500 mg tablet Take 1 tablet (500 mg total) by mouth 2 (two) times a day, Starting Tue 10/9/2018, Print         CONTINUE these medications which have NOT CHANGED    Details   alendronate (FOSAMAX) 70 mg tablet Take 1 tablet (70 mg total) by mouth every 7 days, Starting Fri 3/16/2018, Normal      Calcium Citrate-Vitamin D (CALCIUM CITRATE + D PO) Take 1 tablet by mouth 2 (two) times a week, Starting Mon 8/13/2012, Historical Med      escitalopram (LEXAPRO) 10 mg tablet Starting Mon 1/8/2018, Historical Med      metoprolol tartrate (LOPRESSOR) 50 mg tablet Take 50 mg by mouth 2 (two) times a day , Starting Tue 8/20/2013, Historical Med           No discharge procedures on file  ED Provider  Attending physically available and evaluated Curtis Eveleth  I managed the patient along with the ED Attending      Electronically Signed by         Chayo Allison,   10/10/18 4185 Alexandre Rico,   10/10/18 9408

## 2018-10-15 ENCOUNTER — NURSE TRIAGE (OUTPATIENT)
Dept: PHYSICAL THERAPY | Facility: OTHER | Age: 68
End: 2018-10-15

## 2018-10-15 DIAGNOSIS — M54.41 BILATERAL LOW BACK PAIN WITH RIGHT-SIDED SCIATICA, UNSPECIFIED CHRONICITY: Primary | ICD-10-CM

## 2018-10-15 NOTE — TELEPHONE ENCOUNTER
Acute issue on chronic back pain  Patient has also had a thoracic spine compression fracture  Reason for Disposition   Is this a chronic condition? Additional Information   Commented on: Has the patient experienced major trauma? (fall from height, high speed collision, direct blow to spine) and is also experiencing nausea, light-headedness, or loss of consciousness? Dennis Calderón on 10/9/18 walking dog - managed in ED  Background - Initial Assessment  Clinical complaint: Low back pain and R thigh pain  Date of onset: On and off for two years   This episode 10/9/18  Mechanism of injury: Dennis Calderón on 10/9 walking dog    Previous Treatment - Previous Treatment  Previous evaluation:ED evaluation 10/9/18  Current provider: Dr Rosanna Toribio: Xrays ordered   Previous treatment: Physical therapy within past few months    Protocols used:  AMB [address] [facility]

## 2018-10-15 NOTE — PROGRESS NOTES
Assessment/Plan:      Diagnoses and all orders for this visit:    Muscle spasm  -     tiZANidine (ZANAFLEX) 2 mg tablet; Take 1 tablet (2 mg total) by mouth 3 (three) times a day for 90 days  -     Ambulatory referral to Physical Therapy; Future    Thoracolumbar back pain  -     XR spine lumbar minimum 4 views non injury; Future  -     Ambulatory referral to Physical Therapy; Future  -     XR spine thoracic 3 vw; Future          Subjective:     Patient ID: Daysi Cm is a 79 y o  female  HPI Was referred through comprehensive spine protocol  Was evaluated in ED for this issue 10/9/18 with benign cardiac work-up  Was unloading dogs from car when she felt sharp pain in her back causing her to almost faint  Was prescribed diclofenac gel and methocarbamol  XR of thoracic and lumbar spine ordered but not completed  Previously completed PTx for this issue with improvement  Feels pain back to where it was prior to PTx completion on April 2018  Injured back in 2016 feels from severe cold and cough, saw DC x 6 months with temporary benefit  Believes fractured thoracic vertebrae was from severe coughing  Pain returned and PCP sent her to PTx  Has a history of thoracic compression fracture  DEXA recently completed showing osteopenia  Taking Calcium and Vit D supplementation  History of breast cancer  Describes pain as an axial aching, pinching sensation in thoracolumbar spine  Notes occasional pain in right lateral thigh but not radiating  Denies N/T  Does feel she has some weakness/stiffness in right leg but denies falls  Denies bowel or bladder changes, denies saddle anesthesia  Does have increased pain with valsalva  Currently taking tylenol 3G/day for pain relief; cannot take NSAIDs cause palpitations  Methocarbamol was not effective  Fernando Rhodes RN   10/15/18 10:56 AM   Note      Acute issue on chronic back pain   Patient has also had a thoracic spine compression fracture      Reason for Disposition   Is this a chronic condition? Additional Information   Commented on: Has the patient experienced major trauma? (fall from height, high speed collision, direct blow to spine) and is also experiencing nausea, light-headedness, or loss of consciousness? Deon Leach on 10/9/18 walking dog - managed in ED  Background - Initial Assessment  Clinical complaint: Low back pain and R thigh pain  Date of onset: On and off for two years   This episode 10/9/18  Mechanism of injury: Deon Leach on 10/9 walking dog    Previous Treatment - Previous Treatment  Previous evaluation:ED evaluation 10/9/18  Current provider: Dr Marley Castro: Xrays ordered   Previous treatment: Physical therapy within past few months    Protocols used: SL AMB [address] PROTOCOL          PAST MEDICAL HISTORY  Past Medical History:   Diagnosis Date    Breast CA ([facility] [address] )     Squamous cell carcinoma of skin      PAST SURGICAL HISTORY  Past Surgical History:   Procedure Laterality Date    APPENDECTOMY  01/2011    BREAST LUMPECTOMY      CHEILECTOMY      Resolved:  2006, Bunion correction    COLONOSCOPY  07/2010    Diverticula; recheck 5 yrs    DILATION AND CURETTAGE, DIAGNOSTIC / THERAPEUTIC      MASTECTOMY Right 2000       FAMILY HISTORY  Family History   Problem Relation Age of Onset    Diabetes Mother     Osteoporosis Mother     Heart disease Father     Breast cancer Sister     Heart attack Family     Other Family         Benign brain tumor    Club foot Family      HOME MEDICATIONS  Current Outpatient Prescriptions   Medication Sig Dispense Refill    alendronate (FOSAMAX) 70 mg tablet Take 1 tablet (70 mg total) by mouth every 7 days 12 tablet 3    Calcium Citrate-Vitamin D (CALCIUM CITRATE + D PO) Take 1 tablet by mouth 2 (two) times a week      diclofenac sodium (VOLTAREN) 1 % Apply 2 g topically 4 (four) times a day 1 Tube 0    escitalopram (LEXAPRO) 10 mg tablet       metoprolol tartrate (LOPRESSOR) 50 mg tablet Take 50 mg by mouth 2 (two) times a day       tiZANidine (ZANAFLEX) 2 mg tablet Take 1 tablet (2 mg total) by mouth 3 (three) times a day for 90 days 90 tablet 0     No current facility-administered medications for this visit  ALLERGIES  Ibuprofen      SOCIAL HISTORY  History   Alcohol Use    Yes     Comment: Social     History   Drug Use No     History   Smoking Status    Never Smoker   Smokeless Tobacco    Never Used     Review of Systems   Constitutional: Positive for activity change  Negative for chills, fatigue, fever and unexpected weight change  Respiratory: Negative  Negative for chest tightness and shortness of breath  Cardiovascular: Negative  Negative for chest pain  Gastrointestinal: Negative  Genitourinary: Negative  Musculoskeletal: Positive for back pain and myalgias  Negative for arthralgias, gait problem, joint swelling, neck pain and neck stiffness  Skin: Negative for rash  Neurological: Positive for weakness (right leg)  Negative for numbness  Psychiatric/Behavioral: Positive for sleep disturbance  Objective:  Vitals:    10/16/18 0845   BP: 148/92   Pulse: 88       Imaging:  No images are attached to the encounter  Labs:  Ref Range & Units 11/28/17 11:17 AM    Vit D, 25-Hydroxy 30 - 100 ng/mL 47           Physical Exam   Constitutional: She is oriented to person, place, and time  She appears well-developed and well-nourished  No distress  Musculoskeletal:        Lumbar back: She exhibits decreased range of motion, bony tenderness and spasm  She exhibits no tenderness and no pain  Back:    Bony tenderness T12 and L5 no swelling or edema    Markedly tight hip girdle R>L, hamstrings, and hip flexors    No TTP GTB, ITB, SIJ, or piriformis       -- Lionel's maneuver bilaterally      Neurological: She is alert and oriented to person, place, and time  She has normal strength  She displays no atrophy and no tremor  No sensory deficit   She exhibits normal muscle tone  Coordination and gait normal    Reflex Scores:       Tricep reflexes are 2+ on the right side and 2+ on the left side  Bicep reflexes are 2+ on the right side and 2+ on the left side  Brachioradialis reflexes are 2+ on the right side and 2+ on the left side  Patellar reflexes are 1+ on the right side and 2+ on the left side  Achilles reflexes are 2+ on the right side and 2+ on the left side  -- SLR bilaterally  NO root tension signs   No ankle clonus     LE Strength 5/5 bilaterally    Able to heel stand, toe stand and knee bend        Skin: She is not diaphoretic  Psychiatric: She has a normal mood and affect   Her behavior is normal

## 2018-10-16 ENCOUNTER — OFFICE VISIT (OUTPATIENT)
Dept: PAIN MEDICINE | Facility: CLINIC | Age: 68
End: 2018-10-16
Payer: MEDICARE

## 2018-10-16 ENCOUNTER — APPOINTMENT (OUTPATIENT)
Dept: RADIOLOGY | Facility: CLINIC | Age: 68
End: 2018-10-16
Payer: MEDICARE

## 2018-10-16 VITALS
HEART RATE: 88 BPM | HEIGHT: 60 IN | BODY MASS INDEX: 26.7 KG/M2 | WEIGHT: 136 LBS | DIASTOLIC BLOOD PRESSURE: 92 MMHG | SYSTOLIC BLOOD PRESSURE: 148 MMHG

## 2018-10-16 DIAGNOSIS — M54.50 THORACOLUMBAR BACK PAIN: ICD-10-CM

## 2018-10-16 DIAGNOSIS — M54.6 THORACOLUMBAR BACK PAIN: ICD-10-CM

## 2018-10-16 DIAGNOSIS — M62.838 MUSCLE SPASM: Primary | ICD-10-CM

## 2018-10-16 PROCEDURE — 99204 OFFICE O/P NEW MOD 45 MIN: CPT | Performed by: PHYSICIAN ASSISTANT

## 2018-10-16 PROCEDURE — 72110 X-RAY EXAM L-2 SPINE 4/>VWS: CPT

## 2018-10-16 RX ORDER — TIZANIDINE 2 MG/1
2 TABLET ORAL 3 TIMES DAILY
Qty: 90 TABLET | Refills: 0 | Status: SHIPPED | OUTPATIENT
Start: 2018-10-16 | End: 2019-01-17 | Stop reason: ALTCHOICE

## 2018-10-16 NOTE — PATIENT INSTRUCTIONS
1  New medication for muscle spasm take at bedtime   2  X-Ray lumbar and thoracic spine, will call with results  3   Restart physical therapy as scheduled

## 2018-10-19 ENCOUNTER — TELEPHONE (OUTPATIENT)
Dept: PAIN MEDICINE | Facility: CLINIC | Age: 68
End: 2018-10-19

## 2018-10-22 ENCOUNTER — TELEPHONE (OUTPATIENT)
Dept: PAIN MEDICINE | Facility: CLINIC | Age: 68
End: 2018-10-22

## 2018-10-22 DIAGNOSIS — M54.40 LOW BACK PAIN WITH SCIATICA, SCIATICA LATERALITY UNSPECIFIED, UNSPECIFIED BACK PAIN LATERALITY, UNSPECIFIED CHRONICITY: ICD-10-CM

## 2018-10-22 DIAGNOSIS — S32.000A FRACTURE, LUMBAR VERTEBRA, COMPRESSION, CLOSED, INITIAL ENCOUNTER (HCC): Primary | ICD-10-CM

## 2018-10-22 NOTE — TELEPHONE ENCOUNTER
Called patient regarding XR of lumbar spine  Left VM stating to hold physical therapy pending MRI of L/S to further evaluate fractures

## 2018-10-29 ENCOUNTER — TELEPHONE (OUTPATIENT)
Dept: PAIN MEDICINE | Facility: CLINIC | Age: 68
End: 2018-10-29

## 2018-10-29 NOTE — TELEPHONE ENCOUNTER
Returned pts call 10/29/18 6620  Discussed results of lumbar XR in detail  States she had know previous fracture at T11, T12 and L1, but not L4  Advised to hold off on PTx and complete MRI

## 2018-10-29 NOTE — TELEPHONE ENCOUNTER
Pt returned call and states that she did schedule the MRI for 11/6 at [facility]  Pt can be reached at [phone]

## 2018-11-06 ENCOUNTER — HOSPITAL ENCOUNTER (OUTPATIENT)
Dept: RADIOLOGY | Age: 68
Discharge: HOME | End: 2018-11-06
Payer: MEDICARE

## 2018-11-06 ENCOUNTER — TELEPHONE (OUTPATIENT)
Dept: PAIN MEDICINE | Facility: CLINIC | Age: 68
End: 2018-11-06

## 2018-11-06 DIAGNOSIS — S32.040A CLOSED COMPRESSION FRACTURE OF FOURTH LUMBAR VERTEBRA, INITIAL ENCOUNTER: ICD-10-CM

## 2018-11-06 DIAGNOSIS — S22.000K CLOSED COMPRESSION FRACTURE OF THORACIC VERTEBRA WITH NONUNION, SUBSEQUENT ENCOUNTER: ICD-10-CM

## 2018-11-06 DIAGNOSIS — S32.000A FRACTURE, LUMBAR VERTEBRA, COMPRESSION, CLOSED, INITIAL ENCOUNTER (HCC): ICD-10-CM

## 2018-11-06 DIAGNOSIS — C50.011 MALIGNANT NEOPLASM OF NIPPLE OF RIGHT BREAST IN FEMALE, UNSPECIFIED ESTROGEN RECEPTOR STATUS (HCC): Primary | ICD-10-CM

## 2018-11-06 PROCEDURE — 72158 MRI LUMBAR SPINE W/O & W/DYE: CPT

## 2018-11-06 PROCEDURE — A9585 GADOBUTROL INJECTION: HCPCS | Performed by: PHYSICIAN ASSISTANT

## 2018-11-06 RX ADMIN — GADOBUTROL 6 ML: 604.72 INJECTION INTRAVENOUS at 11:46

## 2018-11-06 NOTE — TELEPHONE ENCOUNTER
Called patient to review results of MRI, placed referral to Dr Theo Viramontes for possible metastasis with Hx of breast cancer

## 2018-11-07 ENCOUNTER — TELEPHONE (OUTPATIENT)
Dept: HEMATOLOGY ONCOLOGY | Facility: CLINIC | Age: 68
End: 2018-11-07

## 2018-11-07 NOTE — TELEPHONE ENCOUNTER
Patient needs to see me or Susan  Last seen in Aug 2015  Please schedule as new patient  I am not sure which office she will prefer  ----- Message from Mayank Cuba PA-C sent at 11/6/2018  4:34 PM EST -----  You previously saw this patient for breast cancer in 2015  I placed a referral for you to see her again to evaluate for metastasis    ----- Message -----  From: Interface, Radiology Results In  Sent: 11/6/2018   3:57 PM  To:  Mayank Cuba PA-C

## 2018-11-08 LAB
25(OH)D3 SERPL-MCNC: 52 NG/ML (ref 30–100)
HCV AB S/CO SERPL IA: 0
HCV AB SERPL QL IA: NORMAL
VZV IGG SER IA-ACNC: 1772 INDEX

## 2018-11-12 ENCOUNTER — OFFICE VISIT (OUTPATIENT)
Dept: HEMATOLOGY ONCOLOGY | Facility: CLINIC | Age: 68
End: 2018-11-12
Payer: MEDICARE

## 2018-11-12 VITALS
HEIGHT: 60 IN | SYSTOLIC BLOOD PRESSURE: 138 MMHG | HEART RATE: 88 BPM | TEMPERATURE: 97.6 F | BODY MASS INDEX: 26.31 KG/M2 | RESPIRATION RATE: 18 BRPM | OXYGEN SATURATION: 98 % | DIASTOLIC BLOOD PRESSURE: 98 MMHG | WEIGHT: 134 LBS

## 2018-11-12 DIAGNOSIS — C50.011 MALIGNANT NEOPLASM OF NIPPLE OF RIGHT BREAST IN FEMALE, UNSPECIFIED ESTROGEN RECEPTOR STATUS (HCC): ICD-10-CM

## 2018-11-12 DIAGNOSIS — S22.000K CLOSED COMPRESSION FRACTURE OF THORACIC VERTEBRA WITH NONUNION, SUBSEQUENT ENCOUNTER: ICD-10-CM

## 2018-11-12 DIAGNOSIS — S32.040A CLOSED COMPRESSION FRACTURE OF FOURTH LUMBAR VERTEBRA, INITIAL ENCOUNTER: ICD-10-CM

## 2018-11-12 DIAGNOSIS — M84.48XA PATHOLOGICAL FRACTURE OF VERTEBRA, UNSPECIFIED PATHOLOGICAL CAUSE, INITIAL ENCOUNTER: Primary | ICD-10-CM

## 2018-11-12 PROCEDURE — 99204 OFFICE O/P NEW MOD 45 MIN: CPT | Performed by: PHYSICIAN ASSISTANT

## 2018-11-12 NOTE — LETTER
November 12, 2018     [address]  [address] [facility]    Patient: Garcia Sauceda   YOB: 1950   Date of Visit: 11/12/2018       Dear Dr Beck Hager: Thank you for referring Raghav Grady to me for evaluation  Below are my notes for this consultation  If you have questions, please do not hesitate to call me  I look forward to following your patient along with you  Sincerely,        Tito Fregoso PA-C        CC: MD Tito Alvares PA-C  11/12/2018  4:58 PM  Sign at close encounter  Hematology/Oncology Outpatient Consult  Garcia Sauceda 79 y o  female 1950 [id number]    Date:  11/12/2018      Assessment and Plan:    1  Malignant neoplasm of nipple of right breast in female, unspecified estrogen receptor status ([facility] [address] ), 2  Pathological fracture of vertebra, unspecified pathological cause, initial encounter  80-year-old female with history of stage I breast cancer in 2000  She recently started to experience worsening back pain of the lumbar area  She was seen by [facility] [place] to at our center  She had an MRI which showed possible pathologic fracture  Discussed that this possibly could be recurrent breast cancer  We will proceed with a PET/CT to assess for other lesions  We then would proceed with a biopsy to determine treatment  Reviewed that a CBC, CMP were all normal   Patient does not have any of the symptoms except for her back pain  Will also evaluate CA 27-29  Mammogram assessed in August 2018 was negative for recurrent disease     - NM PET CT skull base to mid thigh; Future  - Cancer antigen 27 29    HPI:  80-year-old female with history of breast cancer  In 2000 patient was diagnosed with grade 1, stage I, 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   ER/[place] positive, her 2 negative  She had lymph node dissection and right mastectomy    She was treated with tamoxifen for 5 years  Patient was seen in the emergency department on 10/09/2018 due to a syncopal episode after injuring her back when on loading dogs from her car  She had a workup in the emergency room which was negative for cardiac source  She then was referred to the comprehensive spine program     X-ray of the lumbar spine showed multilevel compression deformities of uncertain chronicity  Patient was recommended to have MRI of the lumbar spine  This was performed on 11/06/2018 this showed an L4 compression fracture with associated T1 hypo intense infiltrate enhancing marrow lesion involving the entire L4 vertebral body and associated with anterior lateral paravertebral enhancing soft tissue  This was concerning for pathologic fracture from metastatic disease  ROS: Review of Systems   Constitutional: Negative for activity change, appetite change, chills, fatigue, fever and unexpected weight change  HENT: Negative for mouth sores and nosebleeds  Respiratory: Negative for cough and shortness of breath  Cardiovascular: Negative for chest pain, palpitations and leg swelling  Gastrointestinal: Negative for abdominal pain, blood in stool, constipation, diarrhea, nausea and vomiting  Genitourinary: Negative for difficulty urinating, dysuria and hematuria  Musculoskeletal: Positive for arthralgias, back pain and joint swelling  Skin: Negative  Neurological: Negative for dizziness, weakness, light-headedness, numbness and headaches  Hematological: Negative  Psychiatric/Behavioral: Negative          Past Medical History:   Diagnosis Date    Breast CA ([facility] [address] )     Squamous cell carcinoma of skin        Past Surgical History:   Procedure Laterality Date    APPENDECTOMY  01/2011    BREAST LUMPECTOMY      CHEILECTOMY      Resolved:  2006, Bunion correction    COLONOSCOPY  07/2010    Diverticula; recheck 5 yrs    DILATION AND CURETTAGE, DIAGNOSTIC / THERAPEUTIC      MASTECTOMY Right 2000       Social History     Social History    Marital status: /Civil Union     Spouse name: N/A    Number of children: 1    Years of education: N/A     Social History Main Topics    Smoking status: Never Smoker    Smokeless tobacco: Never Used    Alcohol use Yes      Comment: Social    Drug use: No    Sexual activity: Yes     Other Topics Concern    None     Social History Narrative    Caffeine use       Family History   Problem Relation Age of Onset    Diabetes Mother     Osteoporosis Mother     Heart disease Father     Breast cancer Sister     Heart attack Family     Other Family         Benign brain tumor    Club foot Family        Allergies   Allergen Reactions    Ibuprofen Other (See Comments)     Reaction Date: [id number];          Current Outpatient Prescriptions:     alendronate (FOSAMAX) 70 mg tablet, Take 1 tablet (70 mg total) by mouth every 7 days, Disp: 12 tablet, Rfl: 3    Calcium Citrate-Vitamin D (CALCIUM CITRATE + D PO), Take 1 tablet by mouth 2 (two) times a week, Disp: , Rfl:     escitalopram (LEXAPRO) 10 mg tablet, , Disp: , Rfl:     metoprolol tartrate (LOPRESSOR) 50 mg tablet, Take 50 mg by mouth 2 (two) times a day , Disp: , Rfl:     diclofenac sodium (VOLTAREN) 1 %, Apply 2 g topically 4 (four) times a day (Patient not taking: Reported on 11/12/2018 ), Disp: 1 Tube, Rfl: 0    tiZANidine (ZANAFLEX) 2 mg tablet, Take 1 tablet (2 mg total) by mouth 3 (three) times a day for 90 days (Patient not taking: Reported on 11/12/2018 ), Disp: 90 tablet, Rfl: 0      Physical Exam:  /98 (BP Location: Left arm, Patient Position: Sitting, Cuff Size: Adult)   Pulse 88   Temp 97 6 °F (36 4 °C)   Resp 18   Ht 5' (1 524 m)   Wt 60 8 kg (134 lb)   SpO2 98%   BMI 26 17 kg/m²      Physical Exam   Constitutional: She is oriented to person, place, and time  She appears well-developed and well-nourished  No distress  HENT:   Head: Normocephalic and atraumatic     Eyes: Conjunctivae are normal  No scleral icterus  Neck: Normal range of motion  Neck supple  Cardiovascular: Normal rate, regular rhythm and normal heart sounds  No murmur heard  Pulmonary/Chest: Effort normal and breath sounds normal  No respiratory distress  Abdominal: Soft  There is no tenderness  Musculoskeletal: Normal range of motion  She exhibits no edema or tenderness  Lymphadenopathy:     She has no cervical adenopathy  She has no axillary adenopathy  Right: No supraclavicular adenopathy present  Left: No supraclavicular adenopathy present  Neurological: She is alert and oriented to person, place, and time  No cranial nerve deficit  Skin: Skin is warm and dry  Psychiatric: She has a normal mood and affect  Vitals reviewed  Labs:  Lab Results   Component Value Date    WBC 8 27 10/09/2018    HGB 12 7 10/09/2018    HCT 37 8 10/09/2018    MCV 93 10/09/2018     10/09/2018     Lab Results   Component Value Date     11/28/2017    K 3 7 10/09/2018     10/09/2018    CO2 25 10/09/2018    ANIONGAP 9 09/02/2014    BUN 16 10/09/2018    CREATININE 0 74 10/09/2018    GLUCOSE 93 09/02/2014    CALCIUM 9 0 10/09/2018    AST 21 10/09/2018    ALT 27 10/09/2018    ALKPHOS 73 10/09/2018    PROT 8 0 09/02/2014    BILITOT 0 32 09/02/2014    EGFR 85 10/09/2018       Patient voiced understanding and agreement in the above discussion  Aware to contact our office with questions/symptoms in the interim

## 2018-11-12 NOTE — PROGRESS NOTES
Hematology/Oncology Outpatient Consult  Gordo Han 79 y o  female 1950 [id number]    Date:  11/12/2018      Assessment and Plan:    1  Malignant neoplasm of nipple of right breast in female, unspecified estrogen receptor status ([address] ), 2  Pathological fracture of vertebra, unspecified pathological cause, initial encounter  80-year-old female with history of stage I breast cancer in 2000  She recently started to experience worsening back pain of the lumbar area  She was seen by [facility] [place] to at our center  She had an MRI which showed possible pathologic fracture  Discussed that this possibly could be recurrent breast cancer  We will proceed with a PET/CT to assess for other lesions  We then would proceed with a biopsy to determine treatment  Reviewed that a CBC, CMP were all normal   Patient does not have any of the symptoms except for her back pain  Will also evaluate CA 27-29  Mammogram assessed in August 2018 was negative for recurrent disease     - NM PET CT skull base to mid thigh; Future  - Cancer antigen 27 29    HPI:  80-year-old female with history of breast cancer  In 2000 patient was diagnosed with grade 1, stage I, 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   ER/[place] positive, her 2 negative  She had lymph node dissection and right mastectomy  She was treated with tamoxifen for 5 years  Patient was seen in the emergency department on 10/09/2018 due to a syncopal episode after injuring her back when on loading dogs from her car  She had a workup in the emergency room which was negative for cardiac source  She then was referred to the comprehensive spine program     X-ray of the lumbar spine showed multilevel compression deformities of uncertain chronicity  Patient was recommended to have MRI of the lumbar spine    This was performed on 11/06/2018 this showed an L4 compression fracture with associated T1 hypo intense infiltrate enhancing marrow lesion involving the entire L4 vertebral body and associated with anterior lateral paravertebral enhancing soft tissue  This was concerning for pathologic fracture from metastatic disease  ROS: Review of Systems   Constitutional: Negative for activity change, appetite change, chills, fatigue, fever and unexpected weight change  HENT: Negative for mouth sores and nosebleeds  Respiratory: Negative for cough and shortness of breath  Cardiovascular: Negative for chest pain, palpitations and leg swelling  Gastrointestinal: Negative for abdominal pain, blood in stool, constipation, diarrhea, nausea and vomiting  Genitourinary: Negative for difficulty urinating, dysuria and hematuria  Musculoskeletal: Positive for arthralgias, back pain and joint swelling  Skin: Negative  Neurological: Negative for dizziness, weakness, light-headedness, numbness and headaches  Hematological: Negative  Psychiatric/Behavioral: Negative          Past Medical History:   Diagnosis Date    Breast CA ([facility] [address] )     Squamous cell carcinoma of skin        Past Surgical History:   Procedure Laterality Date    APPENDECTOMY  01/2011    BREAST LUMPECTOMY      CHEILECTOMY      Resolved:  2006, Bunion correction    COLONOSCOPY  07/2010    Diverticula; recheck 5 yrs    DILATION AND CURETTAGE, DIAGNOSTIC / THERAPEUTIC      MASTECTOMY Right 2000       Social History     Social History    Marital status: /Civil Union     Spouse name: N/A    Number of children: 1    Years of education: N/A     Social History Main Topics    Smoking status: Never Smoker    Smokeless tobacco: Never Used    Alcohol use Yes      Comment: Social    Drug use: No    Sexual activity: Yes     Other Topics Concern    None     Social History Narrative    Caffeine use       Family History   Problem Relation Age of Onset    Diabetes Mother     Osteoporosis Mother     Heart disease Father     Breast cancer Sister     Heart attack Family     Other Family         Benign brain tumor    Club foot Family        Allergies   Allergen Reactions    Ibuprofen Other (See Comments)     Reaction Date: [id number];          Current Outpatient Prescriptions:     alendronate (FOSAMAX) 70 mg tablet, Take 1 tablet (70 mg total) by mouth every 7 days, Disp: 12 tablet, Rfl: 3    Calcium Citrate-Vitamin D (CALCIUM CITRATE + D PO), Take 1 tablet by mouth 2 (two) times a week, Disp: , Rfl:     escitalopram (LEXAPRO) 10 mg tablet, , Disp: , Rfl:     metoprolol tartrate (LOPRESSOR) 50 mg tablet, Take 50 mg by mouth 2 (two) times a day , Disp: , Rfl:     diclofenac sodium (VOLTAREN) 1 %, Apply 2 g topically 4 (four) times a day (Patient not taking: Reported on 11/12/2018 ), Disp: 1 Tube, Rfl: 0    tiZANidine (ZANAFLEX) 2 mg tablet, Take 1 tablet (2 mg total) by mouth 3 (three) times a day for 90 days (Patient not taking: Reported on 11/12/2018 ), Disp: 90 tablet, Rfl: 0      Physical Exam:  /98 (BP Location: Left arm, Patient Position: Sitting, Cuff Size: Adult)   Pulse 88   Temp 97 6 °F (36 4 °C)   Resp 18   Ht 5' (1 524 m)   Wt 60 8 kg (134 lb)   SpO2 98%   BMI 26 17 kg/m²     Physical Exam   Constitutional: She is oriented to person, place, and time  She appears well-developed and well-nourished  No distress  HENT:   Head: Normocephalic and atraumatic  Eyes: Conjunctivae are normal  No scleral icterus  Neck: Normal range of motion  Neck supple  Cardiovascular: Normal rate, regular rhythm and normal heart sounds  No murmur heard  Pulmonary/Chest: Effort normal and breath sounds normal  No respiratory distress  Abdominal: Soft  There is no tenderness  Musculoskeletal: Normal range of motion  She exhibits no edema or tenderness  Lymphadenopathy:     She has no cervical adenopathy  She has no axillary adenopathy  Right: No supraclavicular adenopathy present  Left: No supraclavicular adenopathy present     Neurological: She is alert and oriented to person, place, and time  No cranial nerve deficit  Skin: Skin is warm and dry  Psychiatric: She has a normal mood and affect  Vitals reviewed  Labs:  Lab Results   Component Value Date    WBC 8 27 10/09/2018    HGB 12 7 10/09/2018    HCT 37 8 10/09/2018    MCV 93 10/09/2018     10/09/2018     Lab Results   Component Value Date     11/28/2017    K 3 7 10/09/2018     10/09/2018    CO2 25 10/09/2018    ANIONGAP 9 09/02/2014    BUN 16 10/09/2018    CREATININE 0 74 10/09/2018    GLUCOSE 93 09/02/2014    CALCIUM 9 0 10/09/2018    AST 21 10/09/2018    ALT 27 10/09/2018    ALKPHOS 73 10/09/2018    PROT 8 0 09/02/2014    BILITOT 0 32 09/02/2014    EGFR 85 10/09/2018       Patient voiced understanding and agreement in the above discussion  Aware to contact our office with questions/symptoms in the interim

## 2018-11-17 LAB — CANCER AG27-29 SERPL-ACNC: <8 U/ML

## 2018-11-19 ENCOUNTER — OFFICE VISIT (OUTPATIENT)
Dept: INTERNAL MEDICINE CLINIC | Facility: CLINIC | Age: 68
End: 2018-11-19
Payer: MEDICARE

## 2018-11-19 VITALS
BODY MASS INDEX: 26.19 KG/M2 | WEIGHT: 133.4 LBS | DIASTOLIC BLOOD PRESSURE: 76 MMHG | OXYGEN SATURATION: 97 % | SYSTOLIC BLOOD PRESSURE: 142 MMHG | HEIGHT: 60 IN | HEART RATE: 90 BPM

## 2018-11-19 DIAGNOSIS — Z23 NEED FOR PNEUMOCOCCAL VACCINATION: ICD-10-CM

## 2018-11-19 DIAGNOSIS — Z23 NEED FOR INFLUENZA VACCINATION: ICD-10-CM

## 2018-11-19 DIAGNOSIS — Z00.00 MEDICARE ANNUAL WELLNESS VISIT, INITIAL: Primary | ICD-10-CM

## 2018-11-19 PROCEDURE — G0008 ADMIN INFLUENZA VIRUS VAC: HCPCS

## 2018-11-19 PROCEDURE — G0009 ADMIN PNEUMOCOCCAL VACCINE: HCPCS

## 2018-11-19 PROCEDURE — 90732 PPSV23 VACC 2 YRS+ SUBQ/IM: CPT

## 2018-11-19 PROCEDURE — G0438 PPPS, INITIAL VISIT: HCPCS | Performed by: INTERNAL MEDICINE

## 2018-11-19 PROCEDURE — 90662 IIV NO PRSV INCREASED AG IM: CPT

## 2018-11-19 NOTE — PROGRESS NOTES
Assessment and Plan:    Problem List Items Addressed This Visit     None      Visit Diagnoses     Medicare annual wellness visit, initial    -  Primary    Need for influenza vaccination        Relevant Orders    influenza vaccine, [id number], high-dose, PF 0 5 mL, for patients 65 yr+ (FLUZONE HIGH-DOSE) (Completed)    Need for pneumococcal vaccination        Relevant Orders    PNEUMOCOCCAL POLYSACCHARIDE VACCINE 23-VALENT =>3YO SQ IM (Completed)        Health Maintenance Due   Topic Date Due    DTaP,Tdap,and Td Vaccines (1 - Tdap) 06/09/1973    Pneumococcal PPSV23/PCV13 65+ Years / High and Highest Risk (2 of 2 - PPSV23) 01/12/2018    INFLUENZA VACCINE  07/01/2018         HPI:  Unique Holt is a 79 y o  female here for her Initial Wellness Visit       Scheduled for PET CT tomorrow- had a lumbar MRI concerning for pathologic fracture from metastatic disease    Patient Active Problem List   Diagnosis    Back pain    Mid back pain    Osteopenia    Malignant neoplasm of right breast ([address] )    Closed compression fracture of thoracic vertebra (HCC)    Closed compression fracture of fourth lumbar vertebra (HCC)     Past Medical History:   Diagnosis Date    Breast CA ([address] )     Squamous cell carcinoma of skin      Past Surgical History:   Procedure Laterality Date    APPENDECTOMY  01/2011    BREAST LUMPECTOMY      CHEILECTOMY      Resolved:  2006, Bunion correction    COLONOSCOPY  07/2010    Diverticula; recheck 5 yrs    DILATION AND CURETTAGE, DIAGNOSTIC / THERAPEUTIC      MASTECTOMY Right 2000     Family History   Problem Relation Age of Onset    Diabetes Mother     Osteoporosis Mother     Heart disease Father     Breast cancer Sister     Heart attack Family     Other Family         Benign brain tumor    Club foot Family      History   Smoking Status    Never Smoker   Smokeless Tobacco    Never Used     History   Alcohol Use    Yes     Comment: Social      History   Drug Use No       Current Outpatient Prescriptions   Medication Sig Dispense Refill    alendronate (FOSAMAX) 70 mg tablet Take 1 tablet (70 mg total) by mouth every 7 days 12 tablet 3    Calcium Citrate-Vitamin D (CALCIUM CITRATE + D PO) Take 1 tablet by mouth 2 (two) times a week      diclofenac sodium (VOLTAREN) 1 % Apply 2 g topically 4 (four) times a day 1 Tube 0    escitalopram (LEXAPRO) 10 mg tablet       metoprolol tartrate (LOPRESSOR) 50 mg tablet Take 50 mg by mouth 2 (two) times a day       tiZANidine (ZANAFLEX) 2 mg tablet Take 1 tablet (2 mg total) by mouth 3 (three) times a day for 90 days 90 tablet 0     No current facility-administered medications for this visit  Allergies   Allergen Reactions    Ibuprofen Other (See Comments)     Reaction Date: [id number];      Immunization History   Administered Date(s) Administered    Influenza, high dose seasonal 0 5 mL 11/19/2018    Pneumococcal Conjugate 13-Valent 11/17/2017    Pneumococcal Polysaccharide PPV23 11/19/2018       Patient Care Team:  Kumar Gaffney MD as PCP - General    Medicare Screening Tests and Risk Assessments:  Darryl Santos is here for her Initial Wellness visit  Health Risk Assessment:  Patient rates overall health as good  Patient feels that their physical health rating is Same  Eyesight was rated as Same  Hearing was rated as Same  Patient feels that their emotional and mental health rating is Same  Pain experienced by patient in the last 7 days has been Some  Patient's pain rating has been 2/10  Patient states that she has experienced no weight loss or gain in last 6 months  Emotional/Mental Health:  Patient has been feeling nervous/anxious  PHQ-9 Depression Screening:    Frequency of the following problems over the past two weeks:      1  Little interest or pleasure in doing things: 1 - several days      2  Feeling down, depressed, or hopeless: 1 - several days      3   Trouble falling or staying asleep, or sleeping too much: 1 - several days 4  Feeling tired or having little energy: 2 - more than half the days      5  Poor appetite or overeatin - not at all      6  Feeling bad about yourself - or that you are a failure or have let yourself or your family down: 0 - not at all      7  Trouble concentrating on things, such as reading the newspaper or watching television: 0 - not at all      8  Moving or speaking so slowly that other people could have noticed  Or the opposite - being so fidgety or restless that you have been moving around a lot more than usual: 0 - not at all      9  Thoughts that you would be better off dead, or of hurting yourself in some way: 0 - not at all  PHQ-2 Score: 2  PHQ-9 Score: 5    Broken Bones/Falls: Fall Risk Assessment:    In the past year, patient has experienced: History of falling in past year     Number of falls: 1          Bladder/Bowel:  Patient has not leaked urine accidently in the last six months  Patient reports no loss of bowel control  Immunizations:  Patient has had a flu vaccination within the last year  Patient has received a pneumonia shot  Patient has not received a shingles shot  Patient has received tetanus/diphtheria shot  Home Safety:  Patient does not have trouble with stairs inside or outside of their home  Patient currently reports that there are no safety hazards present in home, working smoke alarms, working carbon monoxide detectors  Preventative Screenings:   Breast cancer screening performed, 2018  colon cancer screen completed, 2010  cholesterol screen completed, glaucoma eye exam completed,     Nutrition:  Current diet: Regular with servings of the following:    Medications:  Patient is currently taking over-the-counter supplements  List of OTC medications includes: calcium caltrate  Patient is able to manage medications  Lifestyle Choices:  Patient reports no tobacco use    Patient has not smoked or used tobacco in the past   Patient reports alcohol use         Alcohol use per week: 5 glasses   Patient drives a vehicle  Patient wears seat belt  Current level of exercise of physical activity described by patient as: walk for 10-15 minutes daily   Activities of Daily Living:  Can get out of bed by his or her self, able to dress self, able to make own meals, able to do own shopping, able to bathe self, can do own laundry/housekeeping, can manage own money, pay bills and track expenses    Previous Hospitalizations:  Hospitalization or ED visit in past 12 months  Number of hospitalizations within the last year: 1-2  Additional Comments: ER visit on 10/09/18 for back pain  Advanced Directives:  Patient has not decided on power of   Patient has not completed advanced directive  Preventative Screening/Counseling:      Cardiovascular:      General: Screening Current          Diabetes:      General: Screening Current          Colorectal Cancer:      General: Screening Current      Comments: Due for colonoscopy in 2020        Breast Cancer:      General: Screening Current          Cervical Cancer:      General: Screening Current          Osteoporosis:      General: Screening Current          AAA:      General: Screening Not Indicated          Glaucoma:      Comments: Due for eye exam        HIV:      General: Screening Not Indicated          Hepatitis C:      General: Screening Current        Advanced Directives:   Patient has no living will for healthcare,     Review of Systems   Constitutional: Negative for fatigue, fever and unexpected weight change  HENT: Negative for sinus pain, sinus pressure and sore throat  Respiratory: Negative for cough, shortness of breath and wheezing  Cardiovascular: Negative for chest pain, palpitations  Gastrointestinal: Negative for abdominal pain, constipation, diarrhea, nausea and vomiting  Musculoskeletal: Positive for chronic low back pain       Physical Exam   Constitutional: She is oriented to person, place, and time  She appears well-developed and well-nourished  HENT:   Head: Normocephalic and atraumatic  Eyes: Conjunctivae are normal    Cardiovascular: Normal rate, regular rhythm and normal heart sounds  No murmur heard  Pulmonary/Chest: Effort normal and breath sounds normal  No respiratory distress  She has no wheezes  She has no rales  Abdominal: Soft  Bowel sounds are normal  She exhibits no distension and no mass  There is no tenderness  There is no rebound and no guarding  Musculoskeletal: Normal range of motion  No tenderness over the spine  Neurological: She is alert and oriented to person, place, and time  Skin: Skin is warm and dry  Psychiatric: She has a normal mood and affect   Her behavior is normal  Judgment and thought content normal

## 2018-11-20 ENCOUNTER — HOSPITAL ENCOUNTER (OUTPATIENT)
Dept: RADIOLOGY | Age: 68
Discharge: HOME | End: 2018-11-20
Payer: MEDICARE

## 2018-11-20 DIAGNOSIS — C50.011 MALIGNANT NEOPLASM OF NIPPLE OF RIGHT BREAST IN FEMALE, UNSPECIFIED ESTROGEN RECEPTOR STATUS (HCC): ICD-10-CM

## 2018-11-20 DIAGNOSIS — M84.48XA PATHOLOGICAL FRACTURE OF VERTEBRA, UNSPECIFIED PATHOLOGICAL CAUSE, INITIAL ENCOUNTER: ICD-10-CM

## 2018-11-20 LAB — GLUCOSE SERPL-MCNC: 113 MG/DL (ref 65–140)

## 2018-11-20 PROCEDURE — 82948 REAGENT STRIP/BLOOD GLUCOSE: CPT

## 2018-11-23 ENCOUNTER — HOSPITAL ENCOUNTER (OUTPATIENT)
Dept: RADIOLOGY | Age: 68
Discharge: HOME | End: 2018-11-23
Payer: MEDICARE

## 2018-11-23 LAB — GLUCOSE SERPL-MCNC: 91 MG/DL (ref 65–140)

## 2018-11-23 PROCEDURE — 82948 REAGENT STRIP/BLOOD GLUCOSE: CPT

## 2018-11-23 PROCEDURE — 78815 PET IMAGE W/CT SKULL-THIGH: CPT

## 2018-11-23 PROCEDURE — A9552 F18 FDG: HCPCS

## 2018-11-27 ENCOUNTER — OFFICE VISIT (OUTPATIENT)
Dept: PAIN MEDICINE | Facility: CLINIC | Age: 68
End: 2018-11-27
Payer: MEDICARE

## 2018-11-27 VITALS
DIASTOLIC BLOOD PRESSURE: 90 MMHG | HEART RATE: 92 BPM | SYSTOLIC BLOOD PRESSURE: 146 MMHG | BODY MASS INDEX: 26.5 KG/M2 | HEIGHT: 60 IN | WEIGHT: 135 LBS

## 2018-11-27 DIAGNOSIS — S22.000K CLOSED COMPRESSION FRACTURE OF THORACIC VERTEBRA WITH NONUNION, SUBSEQUENT ENCOUNTER: ICD-10-CM

## 2018-11-27 DIAGNOSIS — M85.89 OSTEOPENIA OF MULTIPLE SITES: Primary | ICD-10-CM

## 2018-11-27 DIAGNOSIS — S32.040A CLOSED COMPRESSION FRACTURE OF FOURTH LUMBAR VERTEBRA, INITIAL ENCOUNTER: ICD-10-CM

## 2018-11-27 PROCEDURE — 99214 OFFICE O/P EST MOD 30 MIN: CPT | Performed by: PHYSICIAN ASSISTANT

## 2018-11-27 NOTE — PROGRESS NOTES
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, subsequent encounter  -     Ambulatory referral to Physical Therapy; Future    Closed compression fracture of fourth lumbar vertebra, initial encounter [facility])  -     Ambulatory referral to Physical Therapy; Future      Discussion: 78 yo female presenting for follow up of acute thoracic and lumbar back pain  MRI of lumbar spine reviewed with patient today  Fractures discussed in detail and images reviewed with patient  She was advised to follow up with oncology as scheduled for possible metastatic disease  I will refer to physical therapy for HEP, light weight bearing exercises and core strengthening for her known osteoporosis  She is to follow up with PCP for further management of osteoporosis  I will follow up with patient after starting physical therapy  Subjective:     Patient ID: Garcia Sauceda is a 79 y o  female  HPI Last seen 10/16/18 for consultation with myself  Was referred through [facility] for acute lumbar and thoracic back pain after [place] [facility] [address] [place]  XR initially ordered showing multiple fractures of indeterminate age  Was then sent for MRI of lumbar spine showing possible pathologic L4 compression fracture from metastatic disease as well as previous T11-L1 fractures  She has a hx of right breast cancer  Was referred back to oncologist and completed PET scan of spine  She has follow up scheduled to discuss this Thursday  No taking any medication for her back  States pain has improved since last visit  Still experiences occasional axial LBP without radiation with certain activities as well as morning stiffness improved with movement  She denies any bowel or bladder changes, N/T, saddle anesthesia or LE weakness       PAST MEDICAL HISTORY  Past Medical History:   Diagnosis Date    Breast CA ([facility] [address] )     Squamous cell carcinoma of skin      PAST SURGICAL HISTORY  Past Surgical History:   Procedure Laterality Date    APPENDECTOMY  01/2011    BREAST LUMPECTOMY      CHEILECTOMY      Resolved:  2006, Bunion correction    COLONOSCOPY  07/2010    Diverticula; recheck 5 yrs    DILATION AND CURETTAGE, DIAGNOSTIC / THERAPEUTIC      MASTECTOMY Right 2000       FAMILY HISTORY  Family History   Problem Relation Age of Onset    Diabetes Mother     Osteoporosis Mother     Heart disease Father     Breast cancer Sister     Heart attack Family     Other Family         Benign brain tumor    Club foot Family      HOME MEDICATIONS  Current Outpatient Prescriptions   Medication Sig Dispense Refill    alendronate (FOSAMAX) 70 mg tablet Take 1 tablet (70 mg total) by mouth every 7 days 12 tablet 3    Calcium Citrate-Vitamin D (CALCIUM CITRATE + D PO) Take 1 tablet by mouth 2 (two) times a week      escitalopram (LEXAPRO) 10 mg tablet       metoprolol tartrate (LOPRESSOR) 50 mg tablet Take 50 mg by mouth 2 (two) times a day       tiZANidine (ZANAFLEX) 2 mg tablet Take 1 tablet (2 mg total) by mouth 3 (three) times a day for 90 days 90 tablet 0     No current facility-administered medications for this visit  ALLERGIES  Ibuprofen      SOCIAL HISTORY  History   Alcohol Use    Yes     Comment: Social     History   Drug Use No     History   Smoking Status    Never Smoker   Smokeless Tobacco    Never Used     Review of Systems   Constitutional: Positive for fatigue  Negative for chills, diaphoresis, fever and unexpected weight change  HENT: Negative for trouble swallowing  Eyes: Negative for visual disturbance  Respiratory: Negative  Negative for shortness of breath  Cardiovascular: Negative  Negative for chest pain  Gastrointestinal: Negative  Genitourinary: Negative  Musculoskeletal: Positive for back pain   Negative for arthralgias, gait problem and myalgias  Skin: Negative  Negative for rash  Neurological: Negative for weakness and numbness  Psychiatric/Behavioral: Negative for sleep disturbance  Objective:  Vitals:    11/27/18 1100   BP: 146/90   Pulse: 92       Imaging:  MRI LUMBAR SPINE WITH AND WITHOUT CONTRAST 11/6/18     INDICATION: S32 [id number]: Wedge compression fracture of unspecified lumbar vertebra, initial encounter for closed fracture      COMPARISON:  Plain film 10/16/2018     TECHNIQUE:  Sagittal T1, sagittal T2, sagittal inversion recovery, axial T1 and axial T2, coronal T2  Sagittal and axial T1 postcontrast      IV Contrast:  6 mL of Gadobutrol injection (SINGLE-DOSE)      IMAGE QUALITY:  Diagnostic     FINDINGS:     ALIGNMENT AND MARROW SIGNAL: There is dextroscoliotic curvature of lumbar spine with apex centered at L1      Redemonstration of multiple compression fracture deformities including: Mild anterior wedge compression fracture deformity of T10 and T11; T12 anterior wedge compression deformity with approximately 30 % height loss, L1 compression deformity with   approximately 75% height loss, and L4 compression deformity with approximately 60 % height loss  These are also visualized on plain film 10/16/2018        There is T1 hypointense infiltrative enhancing marrow lesion involving entire L4 vertebral body with associated anterior and lateral paravertebral enhancing soft tissue  There is ventral epidural extension of enhancing soft tissue causing mild canal   stenosis at level L4        DISTAL CORD AND CONUS:  Normal size and signal of the distal cord and conus  The conus ends at the L2 level      PARASPINAL SOFT TISSUES:  Other than described paraspinal enhancing soft tissue the remaining visualized soft tissues are unremarkable      SACRUM:  Normal signal within the sacrum   No evidence of insufficiency or stress fracture      LOWER THORACIC DISC SPACES:  Normal disc height and signal   No disc herniation, canal stenosis or foraminal narrowing      LUMBAR DISC SPACES:       L1-L2:  Mild retrolisthesis of chronically fractured L1 on L2 with associated disc bulge causing mild canal narrowing and moderate to severe left foraminal stenosis     L2-L3:  Disc bulge  Moderate bilateral facet arthropathy  No spinal canal stenosis  Mild bilateral foraminal narrowing      L3-L4:  Disc bulge  Moderate bilateral facet arthropathy  Ligamentum flavum infolding  Mild canal narrowing  Mild right and mild to moderate left foraminal stenosis      L4-L5:  Disc bulge  Moderate bilateral facet arthropathy  No canal stenosis  Moderate right and mild left foraminal stenosis from degenerative changes and extension of described enhancing lesion      L5-S1:  No significant disc bulge  Mild facet arthropathy  No spinal or foraminal stenosis     IMPRESSION:     1   Redemonstration of L4 compression fracture deformity as seen on plain film 10/16/2018 with associated T1 hypointense infiltrative enhancing marrow lesion involving entire L4 vertebral body and associated anterior lateral paravertebral enhancing soft   tissue, and mild extension to the ventral epidural space causing mild canal narrowing at L4 vertebral body level  This is concerning for a pathologic fracture from metastatic disease      2   Redemonstration of other chronic compression fracture deformities of T10, T11, T12, and L1      3  Degenerative changes of lumbar spine, as described, without high-grade canal stenosis  Moderate to severe left foraminal narrowing at level L1-2      I personally discussed this study with Lorelei Jonas on 11/6/2018 at 3:55 PM        Physical Exam   Constitutional: She is oriented to person, place, and time  She appears well-developed and well-nourished  No distress  HENT:   Head: Normocephalic and atraumatic  Musculoskeletal:        Lumbar back: She exhibits decreased range of motion   She exhibits no tenderness, no bony tenderness, no pain and no spasm  Limited extension with pain  Neurological: She is alert and oriented to person, place, and time  She has normal strength  She displays no atrophy  No sensory deficit  She exhibits normal muscle tone  Coordination and gait normal    Reflex Scores:       Tricep reflexes are 2+ on the right side and 2+ on the left side  Bicep reflexes are 2+ on the right side and 2+ on the left side  Brachioradialis reflexes are 2+ on the right side and 2+ on the left side  Patellar reflexes are 2+ on the right side and 2+ on the left side  Achilles reflexes are 2+ on the right side and 2+ on the left side   - SLR bilaterally    Skin: Skin is warm and dry  No rash noted  She is not diaphoretic  Psychiatric: She has a normal mood and affect   Her behavior is normal  Judgment and thought content normal

## 2018-11-27 NOTE — PATIENT INSTRUCTIONS
1  Follow up with oncology as scheduled  2  New order for physical therapy for core strengthening and light weight bearing exercises  3   Ask PCP for endocrinologist recommendations for osteoporosis

## 2018-11-29 ENCOUNTER — OFFICE VISIT (OUTPATIENT)
Dept: HEMATOLOGY ONCOLOGY | Facility: CLINIC | Age: 68
End: 2018-11-29
Payer: MEDICARE

## 2018-11-29 VITALS
TEMPERATURE: 98 F | RESPIRATION RATE: 20 BRPM | WEIGHT: 135 LBS | OXYGEN SATURATION: 97 % | DIASTOLIC BLOOD PRESSURE: 78 MMHG | HEART RATE: 85 BPM | SYSTOLIC BLOOD PRESSURE: 130 MMHG | HEIGHT: 60 IN | BODY MASS INDEX: 26.5 KG/M2

## 2018-11-29 DIAGNOSIS — M84.48XA PATHOLOGICAL FRACTURE OF VERTEBRA, UNSPECIFIED PATHOLOGICAL CAUSE, INITIAL ENCOUNTER: ICD-10-CM

## 2018-11-29 DIAGNOSIS — S22.009A CLOSED FRACTURE OF MULTIPLE THORACIC VERTEBRAE, INITIAL ENCOUNTER (HCC): ICD-10-CM

## 2018-11-29 DIAGNOSIS — C50.011 MALIGNANT NEOPLASM OF NIPPLE OF RIGHT BREAST IN FEMALE, UNSPECIFIED ESTROGEN RECEPTOR STATUS (HCC): Primary | ICD-10-CM

## 2018-11-29 PROCEDURE — 99214 OFFICE O/P EST MOD 30 MIN: CPT | Performed by: PHYSICIAN ASSISTANT

## 2018-12-03 ENCOUNTER — TELEPHONE (OUTPATIENT)
Dept: HEMATOLOGY ONCOLOGY | Facility: CLINIC | Age: 68
End: 2018-12-03

## 2018-12-03 DIAGNOSIS — N94.89 ADNEXAL MASS: Primary | ICD-10-CM

## 2018-12-03 NOTE — PROGRESS NOTES
Hematology/Oncology Outpatient Follow-up  Harshal Abreu 79 y o  female 1950 [id number]    Date:  12/3/2018      Assessment and Plan:    1  Malignant neoplasm of nipple of right breast in female, unspecified estrogen receptor status ([facility] [address] ), 2  Closed fracture of multiple thoracic vertebrae, initial encounter ([facility] [address] ), 3  Pathological fracture of vertebra, unspecified pathological cause, initial encounter  71-year-old female with history of breast cancer in 2000  Due to suspicion of pathologic fracture found on MRI, patient underwent PET/CT  Corresponding areas on PET/CTs MRI do show some activity  Patient will proceed with biopsy of bone  CA 27-29 was normal, CBC and CMP are also normal  Discussed with patient and  that this may not be metastatic disease but it is safe to do biopsy to know definitively then to miss treatment  Follow-up after biopsy to discuss results  - IR image guided biopsy/aspiration bone; Future      HPI:  71-year-old female with history of breast cancer  In 2000 patient was diagnosed with grade 1, stage I, 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   ER/VT positive, her 2 negative  She had lymph node dissection and right mastectomy  She was treated with tamoxifen for 5 years      Patient was seen in the emergency department on 10/09/2018 due to a syncopal episode after injuring her back when on loading dogs from her car  She had a workup in the emergency room which was negative for cardiac source  She then was referred to the [facility]      X-ray of the lumbar spine showed multilevel compression deformities of uncertain chronicity  Patient was recommended to have MRI of the lumbar spine  This was performed on 11/06/2018 this showed an L4 compression fracture with associated T1 hypo intense infiltrate enhancing marrow lesion involving the entire L4 vertebral body and associated with anterior lateral paravertebral enhancing soft tissue  This was concerning for pathologic fracture from metastatic disease  She had PET/CT on 11/23/18 which showed multiple thoracic and lumbar compression fractures with L4, T9, T7 compression fractures demonstrating more intense activity  Possibility for underlying metastasis is possible  No additional hypermetabolic metastases visualized  4 x 3 1 cm right adnexal mass without significant FDG activity  ROS: Review of Systems   Constitutional: Negative for activity change, appetite change, fatigue and fever  Respiratory: Negative for cough and shortness of breath  Cardiovascular: Negative for palpitations and leg swelling  Gastrointestinal: Negative for abdominal pain, constipation, diarrhea and nausea  Genitourinary: Negative for difficulty urinating and dysuria  Musculoskeletal: Positive for back pain (will be restarting PT)  Skin: Negative  Neurological: Negative for dizziness, weakness, light-headedness and headaches  Hematological: Negative  Psychiatric/Behavioral: The patient is nervous/anxious          Past Medical History:   Diagnosis Date    Breast CA ([facility] [address] )     Squamous cell carcinoma of skin        Past Surgical History:   Procedure Laterality Date    APPENDECTOMY  01/2011    BREAST LUMPECTOMY      CHEILECTOMY      Resolved:  2006, Bunion correction    COLONOSCOPY  07/2010    Diverticula; recheck 5 yrs    DILATION AND CURETTAGE, DIAGNOSTIC / THERAPEUTIC      MASTECTOMY Right 2000       Social History     Social History    Marital status: /Civil Union     Spouse name: N/A    Number of children: 1    Years of education: N/A     Social History Main Topics    Smoking status: Never Smoker    Smokeless tobacco: Never Used    Alcohol use Yes      Comment: Social    Drug use: No    Sexual activity: Yes     Other Topics Concern    None     Social History Narrative    Caffeine use       Family History   Problem Relation Age of Onset    Diabetes Mother     Osteoporosis Mother     Heart disease Father     Breast cancer Sister     Heart attack Family     Other Family         Benign brain tumor    Club foot Family        Allergies   Allergen Reactions    Ibuprofen Other (See Comments)     Reaction Date: 14Jun2011;          Current Outpatient Prescriptions:     alendronate (FOSAMAX) 70 mg tablet, Take 1 tablet (70 mg total) by mouth every 7 days, Disp: 12 tablet, Rfl: 3    Calcium Citrate-Vitamin D (CALCIUM CITRATE + D PO), Take 1 tablet by mouth 2 (two) times a week, Disp: , Rfl:     escitalopram (LEXAPRO) 10 mg tablet, , Disp: , Rfl:     metoprolol tartrate (LOPRESSOR) 50 mg tablet, Take 50 mg by mouth 2 (two) times a day , Disp: , Rfl:     tiZANidine (ZANAFLEX) 2 mg tablet, Take 1 tablet (2 mg total) by mouth 3 (three) times a day for 90 days, Disp: 90 tablet, Rfl: 0      Physical Exam:  /78 (BP Location: Left arm, Patient Position: Sitting, Cuff Size: Adult)   Pulse 85   Temp 98 °F (36 7 °C)   Resp 20   Ht 4' 11 75" (1 518 m)   Wt 61 2 kg (135 lb)   SpO2 97%   BMI 26 59 kg/m²     Physical Exam   Constitutional: She is oriented to person, place, and time  She appears well-developed and well-nourished  No distress  HENT:   Head: Normocephalic and atraumatic  Eyes: Conjunctivae are normal  No scleral icterus  Neck: Normal range of motion  Neck supple  Cardiovascular: Normal rate, regular rhythm and normal heart sounds  No murmur heard  Pulmonary/Chest: Effort normal and breath sounds normal  No respiratory distress  Abdominal: Soft  There is no tenderness  Musculoskeletal: Normal range of motion  She exhibits no edema or tenderness  Neurological: She is alert and oriented to person, place, and time  No cranial nerve deficit  Skin: Skin is warm and dry  Psychiatric: She has a normal mood and affect  Vitals reviewed          Labs:  Lab Results   Component Value Date    WBC 8 27 10/09/2018    HGB 12 7 10/09/2018 HCT 37 8 10/09/2018    MCV 93 10/09/2018     10/09/2018     Lab Results   Component Value Date     11/28/2017    K 3 7 10/09/2018     10/09/2018    CO2 25 10/09/2018    ANIONGAP 9 09/02/2014    BUN 16 10/09/2018    CREATININE 0 74 10/09/2018    GLUCOSE 93 09/02/2014    CALCIUM 9 0 10/09/2018    AST 21 10/09/2018    ALT 27 10/09/2018    ALKPHOS 73 10/09/2018    PROT 8 0 09/02/2014    BILITOT 0 32 09/02/2014    EGFR 85 10/09/2018       Patient voiced understanding and agreement in the above discussion  Aware to contact our office with questions/symptoms in the interim

## 2018-12-03 NOTE — TELEPHONE ENCOUNTER
Apt for US pelvis scheduled at [facility], Dec  21, 10:30 am  Spoke with Elpidio Cruz, she is aware of this apt

## 2018-12-10 ENCOUNTER — OFFICE VISIT (OUTPATIENT)
Dept: CARDIOLOGY CLINIC | Facility: CLINIC | Age: 68
End: 2018-12-10
Payer: MEDICARE

## 2018-12-10 VITALS
DIASTOLIC BLOOD PRESSURE: 80 MMHG | WEIGHT: 134 LBS | HEIGHT: 60 IN | SYSTOLIC BLOOD PRESSURE: 140 MMHG | BODY MASS INDEX: 26.31 KG/M2 | RESPIRATION RATE: 18 BRPM | HEART RATE: 80 BPM

## 2018-12-10 DIAGNOSIS — I10 ESSENTIAL HYPERTENSION: ICD-10-CM

## 2018-12-10 DIAGNOSIS — R00.0 TACHYCARDIA: ICD-10-CM

## 2018-12-10 DIAGNOSIS — I35.1 NONRHEUMATIC AORTIC VALVE INSUFFICIENCY: Primary | ICD-10-CM

## 2018-12-10 PROCEDURE — 99213 OFFICE O/P EST LOW 20 MIN: CPT | Performed by: INTERNAL MEDICINE

## 2018-12-10 RX ORDER — METOPROLOL TARTRATE 50 MG/1
75 TABLET, FILM COATED ORAL 2 TIMES DAILY
Qty: 270 TABLET | Refills: 3 | Status: SHIPPED | OUTPATIENT
Start: 2018-12-10 | End: 2019-11-21

## 2018-12-10 NOTE — PROGRESS NOTES
Assessment/Plan:    Tachycardia  Tachycardia, stable with no symptoms of palpitation or racing heart  Nonrheumatic aortic valve insufficiency  Aortic regurgitation, moderate, stable at this time with no symptoms of chest pain should or shortness of breath  Essential hypertension  Hypertension  The patient presented with elevated blood pressure initially in the vicinity of 150/100 however upon resting it settled down to 140/80  Heart rate was in the city of 80 beats per minute  Patient may benefit from increasing the dose of metoprolol to 75 mg b i d  Diagnoses and all orders for this visit:    Nonrheumatic aortic valve insufficiency    Tachycardia    Essential hypertension  -     metoprolol tartrate (LOPRESSOR) 50 mg tablet; Take 1 5 tablets (75 mg total) by mouth 2 (two) times a day          Subjective:  Feels well but for residual back pain  Patient ID: Adonsi Vicente is a 79 y o  female  The patient presented to this office for cardiac follow-up  She has a history of palpitation associated with sinus tachycardia  She also had a history of anxiety disorder  She was recently seen in the emergency room because she passed out presumably because of severe pain in the back related to a vertebral fracture  Further workup so far showed no evidence of any recurrence of malignancy  She described no symptoms of chest pain, shortness of breath, palpitation, dizziness or lightheadedness  She has no leg edema  The following portions of the patient's history were reviewed and updated as appropriate: allergies, current medications, past family history, past medical history, past social history, past surgical history and problem list     Review of Systems   Respiratory: Negative for apnea, cough, chest tightness, shortness of breath and wheezing  Cardiovascular: Negative for chest pain, palpitations and leg swelling  Gastrointestinal: Negative for abdominal pain     Musculoskeletal: Positive for back pain  Neurological: Negative for dizziness and light-headedness  Objective:  Stable cardiac-wise but for mildly elevated blood pressure  /80   Pulse 80   Resp 18   Ht 5' (1 524 m)   Wt 60 8 kg (134 lb)   BMI 26 17 kg/m²          Physical Exam   Constitutional: She appears well-developed and well-nourished  No distress  HENT:   Head: Normocephalic and atraumatic  Neck: Normal range of motion  Neck supple  No JVD present  No thyromegaly present  Cardiovascular: Normal rate, regular rhythm, S1 normal and S2 normal   Exam reveals no gallop and no friction rub  Murmur heard  Crescendo systolic murmur is present with a grade of 2/6   Pulmonary/Chest: Effort normal  No respiratory distress  She has no wheezes  She has no rales  She exhibits no tenderness  Abdominal: Soft  Musculoskeletal: She exhibits no edema or deformity  Neurological: She is alert  Skin: Skin is warm and dry  She is not diaphoretic  Psychiatric: She has a normal mood and affect  Vitals reviewed

## 2018-12-10 NOTE — ASSESSMENT & PLAN NOTE
Hypertension  The patient presented with elevated blood pressure initially in the vicinity of 150/100 however upon resting it settled down to 140/80  Heart rate was in the city of 80 beats per minute  Patient may benefit from increasing the dose of metoprolol to 75 mg b i d

## 2018-12-10 NOTE — LETTER
December 10, 2018     Aleyda Melara, [address] [facility] [address]  [address] [id number]    Patient: Abhi Crawley   YOB: 1950   Date of Visit: 12/10/2018       Dear Dr Lubna Mishra: Thank you for referring Paty Mary to me for evaluation  Below are my notes for this consultation  If you have questions, please do not hesitate to call me  I look forward to following your patient along with you           Sincerely,        Aleisha Benitez MD        CC: No Recipients

## 2018-12-10 NOTE — ASSESSMENT & PLAN NOTE
Aortic regurgitation, moderate, stable at this time with no symptoms of chest pain should or shortness of breath

## 2018-12-10 NOTE — PATIENT INSTRUCTIONS
The patient was advised to increase metoprolol tartrate to 75 mg b i d  She will otherwise continue on the remainder of her medications unchanged

## 2018-12-18 ENCOUNTER — HOSPITAL ENCOUNTER (OUTPATIENT)
Dept: RADIOLOGY | Facility: HOSPITAL | Age: 68
Discharge: HOME | End: 2018-12-18
Admitting: RADIOLOGY
Payer: MEDICARE

## 2018-12-18 VITALS
DIASTOLIC BLOOD PRESSURE: 62 MMHG | WEIGHT: 132 LBS | HEIGHT: 60 IN | TEMPERATURE: 97.5 F | OXYGEN SATURATION: 98 % | BODY MASS INDEX: 25.91 KG/M2 | HEART RATE: 79 BPM | RESPIRATION RATE: 15 BRPM | SYSTOLIC BLOOD PRESSURE: 110 MMHG

## 2018-12-18 DIAGNOSIS — M84.48XA PATHOLOGICAL FRACTURE OF VERTEBRA, UNSPECIFIED PATHOLOGICAL CAUSE, INITIAL ENCOUNTER: ICD-10-CM

## 2018-12-18 DIAGNOSIS — S22.009A CLOSED FRACTURE OF MULTIPLE THORACIC VERTEBRAE, INITIAL ENCOUNTER (HCC): ICD-10-CM

## 2018-12-18 DIAGNOSIS — C50.011 MALIGNANT NEOPLASM OF NIPPLE OF RIGHT BREAST IN FEMALE, UNSPECIFIED ESTROGEN RECEPTOR STATUS (HCC): ICD-10-CM

## 2018-12-18 LAB
INR PPP: 1.03 (ref 0.86–1.17)
PROTHROMBIN TIME: 13.6 SECONDS (ref 11.8–14.2)

## 2018-12-18 PROCEDURE — 88364 INSITU HYBRIDIZATION (FISH): CPT | Performed by: PATHOLOGY

## 2018-12-18 PROCEDURE — 85610 PROTHROMBIN TIME: CPT | Performed by: RADIOLOGY

## 2018-12-18 PROCEDURE — 88333 PATH CONSLTJ SURG CYTO XM 1: CPT | Performed by: PATHOLOGY

## 2018-12-18 PROCEDURE — 88305 TISSUE EXAM BY PATHOLOGIST: CPT | Performed by: PATHOLOGY

## 2018-12-18 PROCEDURE — 88365 INSITU HYBRIDIZATION (FISH): CPT | Performed by: PATHOLOGY

## 2018-12-18 PROCEDURE — 88341 IMHCHEM/IMCYTCHM EA ADD ANTB: CPT | Performed by: PATHOLOGY

## 2018-12-18 PROCEDURE — 77002 NEEDLE LOCALIZATION BY XRAY: CPT

## 2018-12-18 PROCEDURE — 99152 MOD SED SAME PHYS/QHP 5/>YRS: CPT | Performed by: RADIOLOGY

## 2018-12-18 PROCEDURE — 20225 BONE BIOPSY TROCAR/NDL DEEP: CPT | Performed by: RADIOLOGY

## 2018-12-18 PROCEDURE — 99153 MOD SED SAME PHYS/QHP EA: CPT

## 2018-12-18 PROCEDURE — 88342 IMHCHEM/IMCYTCHM 1ST ANTB: CPT | Performed by: PATHOLOGY

## 2018-12-18 PROCEDURE — 20225 BONE BIOPSY TROCAR/NDL DEEP: CPT

## 2018-12-18 PROCEDURE — 77012 CT SCAN FOR NEEDLE BIOPSY: CPT | Performed by: RADIOLOGY

## 2018-12-18 PROCEDURE — 99152 MOD SED SAME PHYS/QHP 5/>YRS: CPT

## 2018-12-18 RX ORDER — FENTANYL CITRATE 50 UG/ML
INJECTION, SOLUTION INTRAMUSCULAR; INTRAVENOUS CODE/TRAUMA/SEDATION MEDICATION
Status: COMPLETED | OUTPATIENT
Start: 2018-12-18 | End: 2018-12-18

## 2018-12-18 RX ORDER — SODIUM CHLORIDE 9 MG/ML
75 INJECTION, SOLUTION INTRAVENOUS CONTINUOUS
Status: DISCONTINUED | OUTPATIENT
Start: 2018-12-18 | End: 2018-12-19 | Stop reason: HOSPADM

## 2018-12-18 RX ORDER — MIDAZOLAM HYDROCHLORIDE 1 MG/ML
INJECTION INTRAMUSCULAR; INTRAVENOUS CODE/TRAUMA/SEDATION MEDICATION
Status: COMPLETED | OUTPATIENT
Start: 2018-12-18 | End: 2018-12-18

## 2018-12-18 RX ADMIN — FENTANYL CITRATE 25 MCG: 50 INJECTION INTRAMUSCULAR; INTRAVENOUS at 14:40

## 2018-12-18 RX ADMIN — FENTANYL CITRATE 50 MCG: 50 INJECTION INTRAMUSCULAR; INTRAVENOUS at 14:38

## 2018-12-18 RX ADMIN — MIDAZOLAM 1 MG: 1 INJECTION INTRAMUSCULAR; INTRAVENOUS at 14:38

## 2018-12-18 RX ADMIN — FENTANYL CITRATE 25 MCG: 50 INJECTION INTRAMUSCULAR; INTRAVENOUS at 15:01

## 2018-12-18 RX ADMIN — SODIUM CHLORIDE 75 ML/HR: 0.9 INJECTION, SOLUTION INTRAVENOUS at 12:36

## 2018-12-18 RX ADMIN — MIDAZOLAM 0.5 MG: 1 INJECTION INTRAMUSCULAR; INTRAVENOUS at 14:46

## 2018-12-18 RX ADMIN — MIDAZOLAM 0.5 MG: 1 INJECTION INTRAMUSCULAR; INTRAVENOUS at 14:40

## 2018-12-18 RX ADMIN — FENTANYL CITRATE 25 MCG: 50 INJECTION INTRAMUSCULAR; INTRAVENOUS at 14:47

## 2018-12-18 NOTE — DISCHARGE INSTRUCTIONS
POST BIOPSY    Care after your procedure:    1  Limit your activities for 24 hours after your biopsy  2  No driving day of biopsy  3  Return to your normal diet  Small sips of flat soda will help with mild nausea  4  Remove band-aid or dressing 24 hours after procedure  Contact Interventional Radiology at [phone] [place] PATIENTS: Contact Interventional Radiology at [phone]) Keren Blankenship PATIENTS: Contact Interventional Radiology at [phone]) if:    1  Difficulty breathing, nausea or vomiting  2  Chills or fever above 101 degrees F      3  Pain at biopsy site not relieved by medication  4  Develop any redness, swelling, heat, unusual drainage, heavy bruising or bleeding from biopsy site

## 2018-12-18 NOTE — BRIEF OP NOTE (RAD/CATH)
IR IMAGE GUIDED BIOPSY/ASPIRATION BONE  Procedure Note    PATIENT NAME: Jhony Rick  : 1950  MRN: [id number]     Pre-op Diagnosis:   1  Malignant neoplasm of nipple of right breast in female, unspecified estrogen receptor status ([facility] )    2  Closed fracture of multiple thoracic vertebrae, initial encounter ([facility] )    3  Pathological fracture of vertebra, unspecified pathological cause, initial encounter      Post-op Diagnosis:   1  Malignant neoplasm of nipple of right breast in female, unspecified estrogen receptor status ([facility] )    2  Closed fracture of multiple thoracic vertebrae, initial encounter ([place] [address] )    3   Pathological fracture of vertebra, unspecified pathological cause, initial encounter        Surgeon:   Hafsa Manjarrez MD  Assistants:     No qualified resident was available, Resident is only observing    Estimated Blood Loss: Minimal  Findings: L4 core biopsy with fluoro x 3    Specimens: L4    Complications:  none    Anesthesia: Conscious sedation and Local    Hafsa Manjarrez MD     Date: 2018  Time: 3:33 PM

## 2018-12-21 ENCOUNTER — HOSPITAL ENCOUNTER (OUTPATIENT)
Dept: RADIOLOGY | Age: 68
Discharge: HOME | End: 2018-12-21
Payer: MEDICARE

## 2018-12-21 DIAGNOSIS — N94.89 ADNEXAL MASS: ICD-10-CM

## 2018-12-21 PROCEDURE — 76830 TRANSVAGINAL US NON-OB: CPT

## 2018-12-21 PROCEDURE — 76856 US EXAM PELVIC COMPLETE: CPT

## 2018-12-26 ENCOUNTER — OFFICE VISIT (OUTPATIENT)
Dept: HEMATOLOGY ONCOLOGY | Facility: MEDICAL CENTER | Age: 68
End: 2018-12-26
Payer: MEDICARE

## 2018-12-26 VITALS
HEART RATE: 72 BPM | RESPIRATION RATE: 18 BRPM | OXYGEN SATURATION: 98 % | SYSTOLIC BLOOD PRESSURE: 140 MMHG | TEMPERATURE: 98.7 F | DIASTOLIC BLOOD PRESSURE: 90 MMHG

## 2018-12-26 DIAGNOSIS — N83.201 CYST OF RIGHT OVARY: ICD-10-CM

## 2018-12-26 DIAGNOSIS — M84.48XD PATHOLOGICAL FRACTURE OF VERTEBRA WITH ROUTINE HEALING, UNSPECIFIED PATHOLOGICAL CAUSE, SUBSEQUENT ENCOUNTER: ICD-10-CM

## 2018-12-26 DIAGNOSIS — D72.822 PLASMACYTOSIS: Primary | ICD-10-CM

## 2018-12-26 PROCEDURE — 99215 OFFICE O/P EST HI 40 MIN: CPT | Performed by: PHYSICIAN ASSISTANT

## 2018-12-26 NOTE — PROGRESS NOTES
Hematology/Oncology Outpatient Follow-up  Mayi Orellana 79 y o  female 1950 [id number]    Date:  12/26/2018      Assessment and Plan:  1  Pathological fracture of vertebra with routine healing, unspecified pathological cause, subsequent encounter, 2  Plasmacytosis  79year old female with history of breast cancer in 2000  Due to suspicion of pathologic fracture found on MRI, patient underwent PET/CT  Corresponding areas on PET/CTs MRI do show some activity  Due to this she underwent biopsy of bone  This showed 10% lambda predominant plasmacytosis  Hematopoietic marrow with trilineage hematopoiesis  No metastatic carcinoma identified in the material submitted  There was plasma cells that compromised 10-15% of hematopoietic elements  Kappa and lambda demonstrate predominance of lambda positive cells with a kappa to lambda ratio of 1-4  The significance of plasma cell proliferation was noted to be undetermined  Further workup with labs as below as well as urine testing  Discussed with patient and her  that we are ruling out multiple myeloma or MGUS  We discussed both of these diagnoses  Stated that further investigation with lab in urine testing will give us an answer  Acknowledge that this process has been frustrating in regards to finding an answer for presence of pathologic fracture seen on MRI  - Beta 2 microglobulin, serum; Future  - CBC and differential; Future  - Comprehensive metabolic panel; Future  - IgG, IgA, IgM; Future  - Immunoglobulin free LT chains blood; Future  - LD,Blood; Future  - Protein electrophoresis, serum; Future  - Protein electrophoresis, urine  - Kappa / lambda light chains, urine, 24 hour; Future    3  Cyst of right ovary  PET scan in November 2018 showed a right adnexal mass without FDG activity  An ultrasound was recommended  This was performed on 12/21/2018    This showed right ovarian cyst measuring 3 x 2 4 x 2 9 cm appearing to be a simple cyst   However there is no of focus of debris or soft tissue along 1 wall without internal vascularity  Radiology recommended follow-up ultrasound in 12 weeks  Patient is hesitant to repeat as it did not have activity on PET/CT and also noted as to likely be a simple cyst   We will rediscuss this at her next follow-up in less than 2 weeks  HPI:  61-year-old female with history of breast cancer   In 2000 patient was diagnosed with grade 1, stage I, 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   ER/HI positive, her 2 negative   She had lymph node dissection and right mastectomy   She was treated with tamoxifen for 5 years      Patient was seen in the emergency department on 10/09/2018 due to a syncopal episode after injuring her back when on loading dogs from her car   She had a workup in the emergency room which was negative for cardiac source   She then was referred to the comprehensive spine program      X-ray of the lumbar spine showed multilevel compression deformities of uncertain chronicity   Patient was recommended to have MRI of the lumbar spine   This was performed on 11/06/2018 this showed an L4 compression fracture with associated T1 hypo intense infiltrate enhancing marrow lesion involving the entire L4 vertebral body and associated with anterior lateral paravertebral enhancing soft tissue   This was concerning for pathologic fracture from metastatic disease      She had PET/CT on 11/23/18 which showed multiple thoracic and lumbar compression fractures with L4, T9, T7 compression fractures demonstrating more intense activity  Possibility for underlying metastasis is possible  No additional hypermetabolic metastases visualized  4 x 3 1 cm right adnexal mass without significant FDG activity  Interval history: previous back pain is intermittent  She does not require pain medication  She states it sometimes becomes bothersome after being on her feet all day       ROS: Review of Systems Constitutional: Negative for activity change, appetite change, fatigue and unexpected weight change  Respiratory: Negative for cough and shortness of breath  Cardiovascular: Negative for palpitations and leg swelling  Gastrointestinal: Negative for abdominal pain, constipation, diarrhea, nausea and vomiting  Genitourinary: Negative for difficulty urinating and dysuria  Musculoskeletal: Negative for arthralgias  Skin: Negative  Neurological: Negative for dizziness, weakness, light-headedness and headaches  Hematological: Negative for adenopathy  Does not bruise/bleed easily  Psychiatric/Behavioral: Negative          Past Medical History:   Diagnosis Date    Aortic valve disorder     Breast CA ([address] )     2000-R mastectomy-took tamoxifen/femira    Cardiac dysrhythmia     History of spinal fracture     8 since 2016-1 fall related  spinal bone marrow bx today 12/18/2018    Hypertension     Osteoporosis     Palpitations     Squamous cell carcinoma of skin        Past Surgical History:   Procedure Laterality Date    APPENDECTOMY  01/2011    BREAST LUMPECTOMY      CHEILECTOMY      Resolved:  2006, Bunion correction    COLONOSCOPY  07/2010    Diverticula; recheck 5 yrs    DILATION AND CURETTAGE, DIAGNOSTIC / THERAPEUTIC      IR IMAGE GUIDED BIOPSY/ASPIRATION BONE  12/18/2018    MASTECTOMY Right 2000       Social History     Social History    Marital status: /Civil Union     Spouse name: N/A    Number of children: 1    Years of education: N/A     Social History Main Topics    Smoking status: Never Smoker    Smokeless tobacco: Never Used    Alcohol use 3 0 oz/week     5 Glasses of wine per week      Comment: Social   5  glasses weekl;y    Drug use: No    Sexual activity: Yes     Other Topics Concern    None     Social History Narrative    Caffeine use       Family History   Problem Relation Age of Onset    Diabetes Mother     Osteoporosis Mother     Heart disease Father  Breast cancer Sister     Heart attack Family     Other Family         Benign brain tumor    Club foot Family        Allergies   Allergen Reactions    Ibuprofen Other (See Comments)     Reaction Date: [id number];          Current Outpatient Prescriptions:     alendronate (FOSAMAX) 70 mg tablet, Take 1 tablet (70 mg total) by mouth every 7 days, Disp: 12 tablet, Rfl: 3    Calcium Citrate-Vitamin D (CALCIUM CITRATE + D PO), Take 1 tablet by mouth 2 (two) times a week, Disp: , Rfl:     escitalopram (LEXAPRO) 10 mg tablet, , Disp: , Rfl:     metoprolol tartrate (LOPRESSOR) 50 mg tablet, Take 1 5 tablets (75 mg total) by mouth 2 (two) times a day, Disp: 270 tablet, Rfl: 3    tiZANidine (ZANAFLEX) 2 mg tablet, Take 1 tablet (2 mg total) by mouth 3 (three) times a day for 90 days, Disp: 90 tablet, Rfl: 0      Physical Exam:  /90 (BP Location: Left arm, Cuff Size: Standard)   Pulse 72   Temp 98 7 °F (37 1 °C) (Tympanic)   Resp 18   SpO2 98%     Physical Exam   Constitutional: She is oriented to person, place, and time  She appears well-developed and well-nourished  No distress  HENT:   Head: Normocephalic and atraumatic  Eyes: Conjunctivae are normal  No scleral icterus  Neck: Normal range of motion  Neck supple  Cardiovascular: Normal rate, regular rhythm and normal heart sounds  No murmur heard  Pulmonary/Chest: Effort normal and breath sounds normal  No respiratory distress  Abdominal: Soft  There is no tenderness  Musculoskeletal: Normal range of motion  She exhibits no edema or tenderness  Neurological: She is alert and oriented to person, place, and time  No cranial nerve deficit  Skin: Skin is warm and dry  Psychiatric: She has a normal mood and affect  Vitals reviewed          Labs:  Lab Results   Component Value Date    WBC 8 27 10/09/2018    HGB 12 7 10/09/2018    HCT 37 8 10/09/2018    MCV 93 10/09/2018     10/09/2018     Lab Results   Component Value Date    NA 138 11/28/2017    K 3 7 10/09/2018     10/09/2018    CO2 25 10/09/2018    ANIONGAP 9 09/02/2014    BUN 16 10/09/2018    CREATININE 0 74 10/09/2018    GLUCOSE 93 09/02/2014    CALCIUM 9 0 10/09/2018    AST 21 10/09/2018    ALT 27 10/09/2018    ALKPHOS 73 10/09/2018    PROT 8 0 09/02/2014    BILITOT 0 32 09/02/2014    EGFR 85 10/09/2018       Patient voiced understanding and agreement in the above discussion  Aware to contact our office with questions/symptoms in the interim

## 2018-12-26 NOTE — PATIENT INSTRUCTIONS
Further work up to determine if this is consistent with either of the below:     Multiple myeloma or MGUS (monoclonal gammopathy of undetermined significance)

## 2018-12-28 LAB
ALBUMIN ?TM MFR UR ELPH: 100 %
ALBUMIN SERPL ELPH-MCNC: 4.1 G/DL (ref 3.8–4.8)
ALBUMIN SERPL-MCNC: 4.3 G/DL (ref 3.6–5.1)
ALBUMIN/GLOB SERPL: 1.4 (CALC) (ref 1–2.5)
ALP SERPL-CCNC: 58 U/L (ref 33–130)
ALPHA1 GLOB ?TM MFR UR ELPH: 0 %
ALPHA1 GLOB SERPL ELPH-MCNC: 0.3 G/DL (ref 0.2–0.3)
ALPHA2 GLOB ?TM MFR UR ELPH: 0 %
ALPHA2 GLOB SERPL ELPH-MCNC: 0.7 G/DL (ref 0.5–0.9)
ALT SERPL-CCNC: 13 U/L (ref 6–29)
AST SERPL-CCNC: 16 U/L (ref 10–35)
B-GLOBULIN ?TM MFR UR ELPH: 0 %
B2 MICROGLOB SERPL-MCNC: 2.11 MG/L
BASOPHILS # BLD AUTO: 11 CELLS/UL (ref 0–200)
BASOPHILS NFR BLD AUTO: 0.2 %
BETA1 GLOB SERPL ELPH-MCNC: 0.4 G/DL (ref 0.4–0.6)
BETA2 GLOB SERPL ELPH-MCNC: 0.3 G/DL (ref 0.2–0.5)
BILIRUB SERPL-MCNC: 0.4 MG/DL (ref 0.2–1.2)
BUN SERPL-MCNC: 13 MG/DL (ref 7–25)
BUN/CREAT SERPL: NORMAL (CALC) (ref 6–22)
CALCIUM SERPL-MCNC: 9.7 MG/DL (ref 8.6–10.4)
CHLORIDE SERPL-SCNC: 104 MMOL/L (ref 98–110)
CO2 SERPL-SCNC: 25 MMOL/L (ref 20–32)
CREAT SERPL-MCNC: 0.62 MG/DL (ref 0.5–0.99)
CREAT UR-MCNC: 24 MG/DL (ref 20–275)
EOSINOPHIL # BLD AUTO: 90 CELLS/UL (ref 15–500)
EOSINOPHIL NFR BLD AUTO: 1.6 %
ERYTHROCYTE [DISTWIDTH] IN BLOOD BY AUTOMATED COUNT: 13.2 % (ref 11–15)
GAMMA GLOB ?TM MFR UR ELPH: 0 %
GAMMA GLOB SERPL ELPH-MCNC: 1.7 G/DL (ref 0.8–1.7)
GLOBULIN SER CALC-MCNC: 3.1 G/DL (CALC) (ref 1.9–3.7)
GLUCOSE SERPL-MCNC: 93 MG/DL (ref 65–139)
HCT VFR BLD AUTO: 36.6 % (ref 35–45)
HGB BLD-MCNC: 12.9 G/DL (ref 11.7–15.5)
IGA SERPL-MCNC: 63 MG/DL (ref 81–463)
IGG SERPL-MCNC: 1644 MG/DL (ref 694–1618)
IGM SERPL-MCNC: 97 MG/DL (ref 48–271)
KAPPA LC FREE SER-MCNC: 9.9 MG/L (ref 3.3–19.4)
LDH SERPL-CCNC: 70 U/L (ref 120–250)
LYMPHOCYTES # BLD AUTO: 1758 CELLS/UL (ref 850–3900)
LYMPHOCYTES NFR BLD AUTO: 31.4 %
M PROTEIN 1 SERPL ELPH-MCNC: 1.1 G/DL
MCH RBC QN AUTO: 32.3 PG (ref 27–33)
MCHC RBC AUTO-ENTMCNC: 35.2 G/DL (ref 32–36)
MCV RBC AUTO: 91.5 FL (ref 80–100)
MONOCYTES # BLD AUTO: 678 CELLS/UL (ref 200–950)
MONOCYTES NFR BLD AUTO: 12.1 %
NEUTROPHILS # BLD AUTO: 3063 CELLS/UL (ref 1500–7800)
NEUTROPHILS NFR BLD AUTO: 54.7 %
PLATELET # BLD AUTO: 330 THOUSAND/UL (ref 140–400)
PMV BLD REES-ECKER: 9.8 FL (ref 7.5–12.5)
POTASSIUM SERPL-SCNC: 4.4 MMOL/L (ref 3.5–5.3)
PROT SERPL-MCNC: 7.4 G/DL (ref 6.1–8.1)
PROT SERPL-MCNC: 7.4 G/DL (ref 6.1–8.1)
PROT UR-MCNC: <4 MG/DL (ref 5–24)
PROT/CREAT UR: ABNORMAL MG/G CREAT (ref 21–161)
RBC # BLD AUTO: 4 MILLION/UL (ref 3.8–5.1)
SL AMB EGFR AFRICAN AMERICAN: 109 ML/MIN/1.73M2
SL AMB EGFR NON AFRICAN AMERICAN: 94 ML/MIN/1.73M2
SODIUM SERPL-SCNC: 139 MMOL/L (ref 135–146)
WBC # BLD AUTO: 5.6 THOUSAND/UL (ref 3.8–10.8)

## 2019-01-03 DIAGNOSIS — D72.822 PLASMACYTOSIS: Primary | ICD-10-CM

## 2019-01-04 LAB
KAPPA LC FREE UR-MCNC: 7.54 MG/L (ref 1.35–24.19)
KAPPA LC FREE/LAMBDA FREE UR: 5.5 (ref 2.04–10.37)
LAMBDA LC FREE UR-MCNC: 1.37 MG/L (ref 0.24–6.66)

## 2019-01-07 ENCOUNTER — APPOINTMENT (OUTPATIENT)
Dept: LAB | Facility: CLINIC | Age: 69
End: 2019-01-07
Payer: MEDICARE

## 2019-01-07 ENCOUNTER — TRANSCRIBE ORDERS (OUTPATIENT)
Dept: LAB | Facility: CLINIC | Age: 69
End: 2019-01-07

## 2019-01-07 ENCOUNTER — TELEPHONE (OUTPATIENT)
Dept: HEMATOLOGY ONCOLOGY | Facility: CLINIC | Age: 69
End: 2019-01-07

## 2019-01-07 DIAGNOSIS — D72.822 PLASMACYTOSIS: ICD-10-CM

## 2019-01-07 DIAGNOSIS — D72.818: ICD-10-CM

## 2019-01-07 DIAGNOSIS — E74.09 1,4,ALPHA-GLUCAN 6-ALPHA-GLUCOSYLTRANSFERASE DEFICIENCY (HCC): Primary | ICD-10-CM

## 2019-01-07 DIAGNOSIS — E74.09 1,4,ALPHA-GLUCAN 6-ALPHA-GLUCOSYLTRANSFERASE DEFICIENCY (HCC): ICD-10-CM

## 2019-01-07 DIAGNOSIS — D72.822 PLASMACYTOSIS: Primary | ICD-10-CM

## 2019-01-07 LAB
IGA SERPL-MCNC: 64 MG/DL (ref 70–400)
IGG SERPL-MCNC: 1670 MG/DL (ref 700–1600)
IGM SERPL-MCNC: 101 MG/DL (ref 40–230)

## 2019-01-07 PROCEDURE — 83883 ASSAY NEPHELOMETRY NOT SPEC: CPT

## 2019-01-07 PROCEDURE — 36415 COLL VENOUS BLD VENIPUNCTURE: CPT

## 2019-01-07 PROCEDURE — 86334 IMMUNOFIX E-PHORESIS SERUM: CPT

## 2019-01-07 PROCEDURE — 86334 IMMUNOFIX E-PHORESIS SERUM: CPT | Performed by: PATHOLOGY

## 2019-01-07 PROCEDURE — 82784 ASSAY IGA/IGD/IGG/IGM EACH: CPT

## 2019-01-07 PROCEDURE — 84165 PROTEIN E-PHORESIS SERUM: CPT | Performed by: PATHOLOGY

## 2019-01-07 PROCEDURE — 84165 PROTEIN E-PHORESIS SERUM: CPT

## 2019-01-07 NOTE — TELEPHONE ENCOUNTER
Spoke with patient regarding labs  [facility] did not complete the correct labs  She will go to [facility] today to have completed  I will call her with results by the end of the week  Follow up to be advised after labs reviewed  Please cancel appt for tomorrow 1/8/19

## 2019-01-08 LAB
KAPPA LC FREE SER-MCNC: 8.5 MG/L (ref 3.3–19.4)
KAPPA LC FREE SER-MCNC: 8.7 MG/L (ref 3.3–19.4)
KAPPA LC FREE/LAMBDA FREE SER: 0.06 {RATIO} (ref 0.26–1.65)
KAPPA LC FREE/LAMBDA FREE SER: 0.06 {RATIO} (ref 0.26–1.65)
LAMBDA LC FREE SERPL-MCNC: 137.4 MG/L (ref 5.7–26.3)
LAMBDA LC FREE SERPL-MCNC: 145.1 MG/L (ref 5.7–26.3)

## 2019-01-09 LAB
ALBUMIN SERPL ELPH-MCNC: 4.54 G/DL (ref 3.5–5)
ALBUMIN SERPL ELPH-MCNC: 56 % (ref 52–65)
ALPHA1 GLOB SERPL ELPH-MCNC: 0.33 G/DL (ref 0.1–0.4)
ALPHA1 GLOB SERPL ELPH-MCNC: 4.1 % (ref 2.5–5)
ALPHA2 GLOB SERPL ELPH-MCNC: 0.75 G/DL (ref 0.4–1.2)
ALPHA2 GLOB SERPL ELPH-MCNC: 9.2 % (ref 7–13)
BETA GLOB ABNORMAL SERPL ELPH-MCNC: 0.42 G/DL (ref 0.4–0.8)
BETA1 GLOB SERPL ELPH-MCNC: 5.2 % (ref 5–13)
BETA2 GLOB SERPL ELPH-MCNC: 3.6 % (ref 2–8)
BETA2+GAMMA GLOB SERPL ELPH-MCNC: 0.29 G/DL (ref 0.2–0.5)
GAMMA GLOB ABNORMAL SERPL ELPH-MCNC: 1.77 G/DL (ref 0.5–1.6)
GAMMA GLOB SERPL ELPH-MCNC: 21.9 % (ref 12–22)
IGG/ALB SER: 1.27 {RATIO} (ref 1.1–1.8)
M PROTEIN 1 MFR SERPL ELPH: 11.8 %
M PROTEIN 1 SERPL ELPH-MCNC: 0.96 G/DL
PROT SERPL-MCNC: 8.1 G/DL (ref 6.4–8.2)

## 2019-01-10 LAB — INTERPRETATION UR IFE-IMP: NORMAL

## 2019-01-11 ENCOUNTER — TELEPHONE (OUTPATIENT)
Dept: HEMATOLOGY ONCOLOGY | Facility: HOSPITAL | Age: 69
End: 2019-01-11

## 2019-01-11 DIAGNOSIS — C90.30 PLASMACYTOMA (HCC): Primary | ICD-10-CM

## 2019-01-11 NOTE — TELEPHONE ENCOUNTER
Spoke with patient  Discussed that labs are consistent with plasmacytoma  Recommend RT  [facility] arrange [facility]

## 2019-01-14 ENCOUNTER — TELEPHONE (OUTPATIENT)
Dept: HEMATOLOGY ONCOLOGY | Facility: CLINIC | Age: 69
End: 2019-01-14

## 2019-01-14 NOTE — TELEPHONE ENCOUNTER
Spoke w/ pt and informed her that she is scheduled for Thur, 1/17 at 12:30 at the [facility] for a consult with Dr Rina Santiago  Pt was fine with this

## 2019-01-14 NOTE — TELEPHONE ENCOUNTER
Please also arrange follow up with Dr Nely Dejesus in 2 months, prefers [facility]

## 2019-01-15 NOTE — TELEPHONE ENCOUNTER
Patient has been scheduled for 03/12/19 at 11:40 pm with Dr Bjorn Gonzales in the [place] location  Patient is aware of this appt

## 2019-01-17 ENCOUNTER — RADIATION ONCOLOGY CONSULT (OUTPATIENT)
Dept: RADIATION ONCOLOGY | Facility: HOSPITAL | Age: 69
End: 2019-01-17
Attending: RADIOLOGY
Payer: MEDICARE

## 2019-01-17 ENCOUNTER — CLINICAL SUPPORT (OUTPATIENT)
Dept: RADIATION ONCOLOGY | Facility: HOSPITAL | Age: 69
End: 2019-01-17
Payer: MEDICARE

## 2019-01-17 VITALS
OXYGEN SATURATION: 98 % | SYSTOLIC BLOOD PRESSURE: 128 MMHG | TEMPERATURE: 98.3 F | HEART RATE: 74 BPM | WEIGHT: 133.8 LBS | BODY MASS INDEX: 26.27 KG/M2 | HEIGHT: 60 IN | RESPIRATION RATE: 16 BRPM | DIASTOLIC BLOOD PRESSURE: 80 MMHG

## 2019-01-17 DIAGNOSIS — C90.30 PLASMACYTOMA (HCC): Primary | ICD-10-CM

## 2019-01-17 DIAGNOSIS — C50.011 MALIGNANT NEOPLASM OF NIPPLE OF RIGHT BREAST IN FEMALE, UNSPECIFIED ESTROGEN RECEPTOR STATUS (HCC): Primary | ICD-10-CM

## 2019-01-17 DIAGNOSIS — C90.30 PLASMACYTOMA (HCC): ICD-10-CM

## 2019-01-17 PROCEDURE — 99215 OFFICE O/P EST HI 40 MIN: CPT | Performed by: RADIOLOGY

## 2019-01-17 NOTE — PROGRESS NOTES
Consultation - Radiation Oncology     YXT:[id number] : 1950  Encounter: [id number]  Patient Information: [place]  Chief Complaint   Patient presents with    Consult     Cancer Staging  Plasmacytoma [facility])  Staging form: Plasma Cell Myeloma and Disorders, AJCC 8th Edition  - Clinical: No stage assigned - Unsigned           History of Present Illness   Garcia Sauceda is a 79y o  year old female with a history of stage I right breast carcinoma in   She receive right mastectomy and lymph node dissection  She completed 5 years of tamoxifen therapy  In 2018 she was pulled by her dog and fell injuring her back  She was seen in the emergency room at which time x-ray revealed multi Level compression deformities  She was referred to the spine program   MRI of the spine revealed a L4 compression fracture associated with enhancing marrow lesion involving the L the entire L4 vertebral body and anterior lateral paravertebral soft tissue  This was concerning for pathologic fracture due to metastasis  On 2018 she underwent PET-CT which revealed multiple thoracic and lumbar compression fractures at L4, T7, T9  There is a right adnexal mass measuring 4 x 3 1 cm without FDG activity  On 2018 she underwent L4 biopsy  The pathology was consistent with plasma cells  She was seen by Dr Oscar Li and underwent workup for myeloma  On discussion with him he believe she has plasmacytoma at L4 and no clinical evidence of myeloma  She has intermittent pain in her low back  She currently is pain-free and does not take pain medications    No numbness or tingling             Historical Information   Oncology History                              Malignant neoplasm of right breast ([facility] [address] )    2000 Surgery     Right breast mastectomy         2012 Initial Diagnosis     Malignant neoplasm of right breast (HCC)          Chemotherapy     Tamoxifen for 5 years 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[facility] [address] )    12/18/2018 Biopsy     Final Diagnosis   A  Bone, L-4, core biopsy:  - Fragments of trabecular bone with changes compatible with prior fracture site  - 10% lambda predominant plasmacytosis  See note  - Hematopoietic marrow with trilineage hematopoiesis  - No epithelial elements identified, confirmed with negative keratin AE1/AE3 stains                 12/18/2018 Initial Diagnosis     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HCC)              Past Medical History:   Diagnosis Date    Aortic valve disorder     Breast CA ([facility] [address] )     2000-R mastectomy-took tamoxifen/femira    Cardiac dysrhythmia     History of spinal fracture     8 since 2016-1 fall related  spinal bone marrow bx today 12/18/2018    Hypertension     Osteoporosis     Palpitations     Squamous cell carcinoma of skin      Past Surgical History:   Procedure Laterality Date    APPENDECTOMY  01/2011    BREAST LUMPECTOMY      CHEILECTOMY      Resolved:  2006, Bunion correction    COLONOSCOPY  07/2010    Diverticula; recheck 5 yrs    DILATION AND CURETTAGE, DIAGNOSTIC / THERAPEUTIC      IR IMAGE GUIDED BIOPSY/ASPIRATION BONE  12/18/2018    MASTECTOMY Right 2000       Family History   Problem Relation Age of Onset    Diabetes Mother     Osteoporosis Mother     Heart disease Father     Breast cancer Sister     Heart attack Family     Other Family         Benign brain tumor    Club foot Family     Breast cancer Sister        Social History   History   Alcohol Use    3 0 oz/week    5 Glasses of wine per week     Comment: Social   5  glasses weekl;y     History   Drug Use No     History   Smoking Status    Never Smoker   Smokeless Tobacco    Never Used         Meds/Allergies     Current Outpatient Prescriptions:     alendronate (FOSAMAX) 70 mg tablet, Take 1 tablet (70 mg total) by mouth every 7 days, Disp: 12 tablet, Rfl: 3    Calcium Citrate-Vitamin D (CALCIUM CITRATE + D PO), Take 1 tablet by mouth 2 (two) times a day  , Disp: , Rfl:    escitalopram (LEXAPRO) 10 mg tablet, Take 10 mg by mouth daily  , Disp: , Rfl:     metoprolol tartrate (LOPRESSOR) 50 mg tablet, Take 1 5 tablets (75 mg total) by mouth 2 (two) times a day, Disp: 270 tablet, Rfl: 3  Allergies   Allergen Reactions    Ibuprofen Abdominal Pain and Tachycardia     Reaction Date: [id number];          Review of Systems     Constitutional: Positive for fatigue  HENT: Negative  Eyes: Negative  Respiratory: Negative  Cardiovascular: Negative  Gastrointestinal: Negative  Endocrine: Negative  Genitourinary: Negative  Musculoskeletal: Positive for arthralgias and back pain  Skin: Negative  Allergic/Immunologic: Negative  Neurological: Negative  Hematological: Negative  Psychiatric/Behavioral: Positive for sleep disturbance (Having difficulty falling asleep)  The patient is nervous/anxious              OBJECTIVE:   /80 (BP Location: Left arm, Patient Position: Sitting, Cuff Size: Standard)   Pulse 74   Temp 98 3 °F (36 8 °C) (Oral)   Resp 16   Ht 5' (1 524 m)   Wt 60 7 kg (133 lb 12 8 oz)   SpO2 98%   BMI 26 13 kg/m²   Pain Assessment:  0  Performance Status: ECOG/Zubrod/WHO: 1 - Symptomatic but completely ambulatory    Physical Exam   Constitutional: She appears well-developed  HENT:   Head: Normocephalic  Hair is normal    Mouth/Throat: Oropharynx is clear and moist    Eyes: EOM are normal  No scleral icterus  Neck: Neck supple  No spinous process tenderness present  No edema present  Cardiovascular: Normal rate and regular rhythm  Pulmonary/Chest: Effort normal and breath sounds normal  No respiratory distress  She has no wheezes  She exhibits no tenderness  Abdominal: Soft  Bowel sounds are normal  She exhibits no distension, no ascites and no mass  There is no hepatosplenomegaly  There is no tenderness  There is no rebound  Genitourinary: No breast swelling or tenderness  Musculoskeletal: Normal range of motion   She exhibits no edema or tenderness  She has no tenderness to palpation throughout the spine  Her muscle strength in the upper lower extremity symmetric bilaterally  She is ambulating independently without any signs of imbalance or assistive device  Lymphadenopathy:     She has no cervical adenopathy  She has no axillary adenopathy  Neurological: She is alert  She has normal strength  No cranial nerve deficit  Coordination and gait normal    Skin: Skin is intact  Psychiatric: She has a normal mood and affect  Her speech is normal and behavior is normal           RESULTS  Lab Results    Chemistry        Component Value Date/Time     11/28/2017 1117    K 4 4 12/26/2018 1410     12/26/2018 1410    CO2 25 12/26/2018 1410    BUN 13 12/26/2018 1410    CREATININE 0 74 10/09/2018 1505    CREATININE 0 65 11/28/2017 1117        Component Value Date/Time    CALCIUM 9 7 12/26/2018 1410    ALKPHOS 58 12/26/2018 1410    AST 21 10/09/2018 1505    AST 23 09/02/2014 1207    ALT 27 10/09/2018 1505    ALT 24 09/02/2014 1207    BILITOT 0 32 09/02/2014 1207            Lab Results   Component Value Date    WBC 5 6 12/26/2018    HGB 12 9 12/26/2018    HCT 36 6 12/26/2018    MCV 91 5 12/26/2018     12/26/2018         Imaging Studies  Ir Image Guided Biopsy/aspiration Bone    Result Date: 12/18/2018  Narrative: Fluoroscopic guided vertebral biopsy Clinical History: History of breast and skin cancer with PET positive L4 compression fracture Sedation time: 30 minutes Fluoroscopy time: 6 3 minutes Images: 3 Procedure: After explaining the risks and benefits of the procedure to the patient, informed consent was obtained  CT was used to localize the L4 vertebral body   Radiation dose length product (DLP) for this visit:  532 mGy     This examination, like all CT scans performed in the [facility], was performed utilizing techniques to minimize radiation dose exposure, including the use of iterative reconstruction and automated exposure control  The overlying skin was prepped and draped in the usual sterile fashion  Local anesthesia was obtained with a 1% lidocaine solution  Using CT guidance, 3 passes with a trapped needle set was performed using a left transpedicular approach  The tissue was given to Dr Dora Arredondo from the department of pathology  The preliminary interpretation is nondiagnostic  Deferred until review of the cores   The patient tolerated the procedure well and left the department in stable condition  Impression: Impression: Successful biopsy of the L4 vertebral body   Workstation performed: [id number]     Us Pelvis Complete W Transvaginal    Result Date: 12/21/2018  Narrative: PELVIC ULTRASOUND, COMPLETE INDICATION:  79years old  N94 9: Unspecified condition associated with female genital organs and menstrual cycle  History of abnormal PET/CT  Further evaluation of right adnexal nodule COMPARISON: Head CT performed on 11/23/2018 TECHNIQUE:   Transabdominal pelvic ultrasound was performed in sagittal and transverse planes with a curvilinear transducer  Additional transvaginal imaging was performed to better evaluate the endometrium and ovaries  Imaging included volumetric sweeps as well as traditional still imaging technique  FINDINGS: UTERUS: The uterus is anteverted in position, measuring 7 2 x 2 8 x 4 6 cm  Contour and echotexture appear normal  The cervix shows no suspicious abnormality  ENDOMETRIUM:  Normal caliber of 3 mm  Homogenous and normal in appearance  OVARIES/ADNEXA: Right ovary:  4 0 x 3 0 x 4 3 cm  I right ovarian cyst is identified, predominantly fluid-filled and without abnormal vascularity but with an ovoid area of soft tissue or debris along one wall measuring approximately 1 1 x 0 9 cm  There is no internal vascularity in this region and on the prior PET scan, there was no abnormal glucose metabolism    The overall size of the cyst is 2 9 x 3 0 x 2 4 cm Left ovary: 1 7 x 0 8 x 1 6 cm  No suspicious left ovarian abnormality  Doppler flow within normal limits  There is no free fluid  Impression:  Right ovarian cyst measuring 3 0 x 2 4 x 2 9 cm appearing photopenic on prior PET scan  Although predominantly of a simple cystic nature there is a focus of debris or soft tissue along one wall without internal vascularity  According to the consensus conference statement from the Society of Radiologists in Ultrasound (Radiology:Volume 256: Number 3-September 2010  pp [phone] ), the nodule renders the cyst indeterminate in nature  Recommend reassessment ultrasound in 12 weeks  Workstation performed: [id number]         Pathology:  A  Bone, L-4, core biopsy:  - Fragments of trabecular bone with changes compatible with prior fracture site  - 10% lambda predominant plasmacytosis  See note  - Hematopoietic marrow with trilineage hematopoiesis  - No epithelial elements identified, confirmed with negative keratin AE1/AE3 stains  See note      Note: Plasma cells are highlighted with  immunoperoxidase stain and comprise approximately 10 to 15% pf hematopoietic elements  In situ hybridization with kappa and lambda demonstrate predominance of lambda positive cells with a kappa to lambda ratio of 1:4  The significance of the plasma cells proliferation is undetermined  Further patient evaluation, including serum and urine protein electrophoresis is recommended to exclude a plasma cell neoplasm as clinically indicated  No metastatic carcinoma identified in the material submitted  ASSESSMENT  1  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[facility] [address] )  Radiation Simulation Treatment     Cancer Staging  Plasmacytoma 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    I have discussed with Dr Amin Staff possible need for bone marrow biopsy however he feels her laboratory results in conjunction with her imaging does not support a diagnosis of multiple myeloma  Therefore we discussed definitive therapy for plasmacytoma to the L4 vertebral body  We discussed the local field to this site to a dose of 4500 cGy  They understand the goal of treatment and had multiple questions regarding the diagnosis and possibility of transitioning to myeloma which we discussed can occur in the future and will be monitored by Dr Amin Staff  We also discussed that should she want to undergo bone marrow biopsy now this can also be arranged however she is okay with proceeding  with CT simulation planning next week with her treatment initiating shortly thereafter  All of her and her 's questions were answered to their satisfaction I believe  Marlene Palma MD  1/17/2019,3:28 PM      Portions of the record may have been created with voice recognition software   Occasional wrong word or "sound a like" substitutions may have occurred due to the inherent limitations of voice recognition software   Read the chart carefully and recognize, using context, where substitutions have occurred

## 2019-01-17 NOTE — PROGRESS NOTES
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   ER/OR positive, her 2 negative  9/2000 underwent lymph node dissection and right mastectomy  Completed 5 years of tamoxifen      10/09/2018 presented to the ED  due to a syncopal episode after injuring her back  She was referred to the comprehensive spine program      X-ray of the lumbar spine showed multilevel compression deformities of uncertain chronicity  11/06/2018 MRI of the lumbar spine showed an L4 compression fracture with associated T1 hypo intense infiltrate enhancing marrow lesion involving the entire L4 vertebral body and associated with anterior lateral paravertebral enhancing soft tissue   This was concerning for pathologic fracture from metastatic disease      11/23/18 PET/CT showed multiple thoracic and lumbar compression fractures with L4, T9, T7 compression fractures demonstrating more intense activity  No additional hypermetabolic metastases visualized   4 x 3 1 cm right adnexal mass without significant FDG activity      12/18/18 underwent bone biopsy with pathology positive for plasmacytosis      12/26/18 F/U with Medical Oncology: No metastatic carcinoma identified in the material submitted   There was plasma cells that compromised 10-15% of hematopoietic elements   Kappa and lambda demonstrate predominance of lambda positive cells with a kappa to lambda ratio of 1-4  Further workup with labs as well as urine testing needed for ruling out multiple myeloma or MGUS      Referred to Radiation Oncology         Clinical Trial: no    Screening  Tobacco  Current tobacco user: no  If yes, brief counseling provided: No    Hypertension  Hypertension screening performed: yes  Normotensive:  no  If no, referred to PCP: yes, no    Depression Screening  Screened for depression using PHQ-2: yes    Screened for depression using PHQ-9:  yes  Screening positive or negative:  negative  If score >4, was any of the following actions taken?    Additional evaluation for depression, suicide risk assesment, referral to PCP or psychiatry, medication started:  no    Advanced Care Planning for Patients >65 years  Advanced Care Planning Discussed:  yes  Patient named surrogate decision maker or care plan in chart: yes      Health Maintenance   Topic Date Due    DTaP,Tdap,and Td Vaccines (1 - Tdap) 06/09/1973    Fall Risk  11/19/2019    Depression Screening PHQ  11/19/2019    Urinary Incontinence Screening  11/19/2019    Medicare Annual Wellness Visit (AWV)  11/19/2019    CRC Screening: Colonoscopy  07/26/2020    Hepatitis C Screening  Completed    INFLUENZA VACCINE  Completed    Pneumococcal PPSV23/PCV13 65+ Years / High and Highest Risk  Completed       Patient Active Problem List   Diagnosis    Back pain    Mid back pain    Osteopenia    Malignant neoplasm of right breast ([address]    Closed compression fracture of thoracic vertebra ([address]    Closed compression fracture of fourth lumbar vertebra ([address]    Mult fractures of thoracic spine, closed ([address] )    Nonrheumatic aortic valve insufficiency    Tachycardia    Essential hypertension    Plasmacytosis    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[address] )     Past Medical History:   Diagnosis Date    Aortic valve disorder     Breast CA ([address] )     2000-R mastectomy-took tamoxifen/femira    Cardiac dysrhythmia     History of spinal fracture     8 since 2016-1 fall related  spinal bone marrow bx today 12/18/2018    Hypertension     Osteoporosis     Palpitations     Squamous cell carcinoma of skin      Past Surgical History:   Procedure Laterality Date    APPENDECTOMY  01/2011    BREAST LUMPECTOMY      CHEILECTOMY      Resolved:  2006, Bunion correction    COLONOSCOPY  07/2010    Diverticula; recheck 5 yrs    DILATION AND CURETTAGE, DIAGNOSTIC / THERAPEUTIC      IR IMAGE GUIDED BIOPSY/ASPIRATION BONE  12/18/2018    MASTECTOMY Right 2000     Family History   Problem Relation Age of Onset    Diabetes Mother     Osteoporosis Mother     Heart disease Father     Breast cancer Sister     Heart attack Family     Other Family         Benign brain tumor    Club foot Family     Breast cancer Sister      Social History     Social History    Marital status: /Civil Union     Spouse name: Phoebe Da Silva Number of children: 1    Years of education: N/A     Occupational History    Not on file  Social History Main Topics    Smoking status: Never Smoker    Smokeless tobacco: Never Used    Alcohol use 3 0 oz/week     5 Glasses of wine per week      Comment: Social   5  glasses weekl;y    Drug use: No    Sexual activity: Yes     Other Topics Concern    Not on file     Social History Narrative    Caffeine use       Current Outpatient Prescriptions:     alendronate (FOSAMAX) 70 mg tablet, Take 1 tablet (70 mg total) by mouth every 7 days, Disp: 12 tablet, Rfl: 3    Calcium Citrate-Vitamin D (CALCIUM CITRATE + D PO), Take 1 tablet by mouth 2 (two) times a day  , Disp: , Rfl:     escitalopram (LEXAPRO) 10 mg tablet, Take 10 mg by mouth daily  , Disp: , Rfl:     metoprolol tartrate (LOPRESSOR) 50 mg tablet, Take 1 5 tablets (75 mg total) by mouth 2 (two) times a day, Disp: 270 tablet, Rfl: 3    Allergies   Allergen Reactions    Ibuprofen Abdominal Pain and Tachycardia     Reaction Date: [id number];        Review of Systems:  Review of Systems   Constitutional: Positive for fatigue  HENT: Negative  Eyes: Negative  Respiratory: Negative  Cardiovascular: Negative  Gastrointestinal: Negative  Endocrine: Negative  Genitourinary: Negative  Musculoskeletal: Positive for arthralgias and back pain  Skin: Negative  Allergic/Immunologic: Negative  Neurological: Negative      Hematological: Negative  Psychiatric/Behavioral: Positive for sleep disturbance (Having difficulty falling asleep)  The patient is nervous/anxious  Vitals:    01/17/19 1245   BP: 128/80   BP Location: Left arm   Patient Position: Sitting   Cuff Size: Standard   Pulse: 74   Resp: 16   Temp: 98 3 °F (36 8 °C)   TempSrc: Oral   SpO2: 98%   Weight: 60 7 kg (133 lb 12 8 oz)   Height: 5' (1 524 m)        Imaging:Ir Image Guided Biopsy/aspiration Bone    Result Date: 12/18/2018  Narrative: Fluoroscopic guided vertebral biopsy Clinical History: History of breast and skin cancer with PET positive L4 compression fracture Sedation time: 30 minutes Fluoroscopy time: 6 3 minutes Images: 3 Procedure: After explaining the risks and benefits of the procedure to the patient, informed consent was obtained  CT was used to localize the L4 vertebral body   Radiation dose length product (DLP) for this visit:  532 mGy   This examination, like all CT scans performed in the [facility], was performed utilizing techniques to minimize radiation dose exposure, including the use of iterative reconstruction and automated exposure control  The overlying skin was prepped and draped in the usual sterile fashion  Local anesthesia was obtained with a 1% lidocaine solution  Using CT guidance, 3 passes with a trapped needle set was performed using a left transpedicular approach  The tissue was given to Dr Araceli Hernandez from the department of pathology  The preliminary interpretation is nondiagnostic  Deferred until review of the cores   The patient tolerated the procedure well and left the department in stable condition  Impression: Impression: Successful biopsy of the L4 vertebral body   Workstation performed: [id number]     Us Pelvis Complete W Transvaginal    Result Date: 12/21/2018  Narrative: PELVIC ULTRASOUND, COMPLETE INDICATION:  79years old  N94 9: Unspecified condition associated with female genital organs and menstrual cycle  History of abnormal PET/CT  Further evaluation of right adnexal nodule COMPARISON: Head CT performed on 11/23/2018 TECHNIQUE:   Transabdominal pelvic ultrasound was performed in sagittal and transverse planes with a curvilinear transducer  Additional transvaginal imaging was performed to better evaluate the endometrium and ovaries  Imaging included volumetric sweeps as well as traditional still imaging technique  FINDINGS: UTERUS: The uterus is anteverted in position, measuring 7 2 x 2 8 x 4 6 cm  Contour and echotexture appear normal  The cervix shows no suspicious abnormality  ENDOMETRIUM:  Normal caliber of 3 mm  Homogenous and normal in appearance  OVARIES/ADNEXA: Right ovary:  4 0 x 3 0 x 4 3 cm  I right ovarian cyst is identified, predominantly fluid-filled and without abnormal vascularity but with an ovoid area of soft tissue or debris along one wall measuring approximately 1 1 x 0 9 cm  There is no internal vascularity in this region and on the prior PET scan, there was no abnormal glucose metabolism  The overall size of the cyst is 2 9 x 3 0 x 2 4 cm Left ovary:  1 7 x 0 8 x 1 6 cm  No suspicious left ovarian abnormality  Doppler flow within normal limits  There is no free fluid  Impression:  Right ovarian cyst measuring 3 0 x 2 4 x 2 9 cm appearing photopenic on prior PET scan  Although predominantly of a simple cystic nature there is a focus of debris or soft tissue along one wall without internal vascularity  According to the consensus conference statement from the Society of Radiologists in Ultrasound (Radiology:Volume 256: Number 3-September 2010  pp [phone] ), the nodule renders the cyst indeterminate in nature  Recommend reassessment ultrasound in 12 weeks   Workstation performed: [id number]     Teaching

## 2019-01-17 NOTE — PROGRESS NOTES
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   ER/[place] positive, her 2 negative  2000 underwent lymph node dissection and right mastectomy  Completed 5 years of tamoxifen      10/09/2018 presented to the ED  due to a syncopal episode after injuring her back  She was referred to the comprehensive spine program      X-ray of the lumbar spine showed multilevel compression deformities of uncertain chronicity  2018 MRI of the lumbar spine showed an L4 compression fracture with associated T1 hypo intense infiltrate enhancing marrow lesion involving the entire L4 vertebral body and associated with anterior lateral paravertebral enhancing soft tissue   This was concerning for pathologic fracture from metastatic disease      18 PET/CT showed multiple thoracic and lumbar compression fractures with L4, T9, T7 compression fractures demonstrating more intense activity  No additional hypermetabolic metastases visualized   4 x 3 1 cm right adnexal mass without significant FDG activity  18 underwent bone biopsy with pathology positive for plasmacytosis  18 F/U with Medical Oncology: No metastatic carcinoma identified in the material submitted  There was plasma cells that compromised 10-15% of hematopoietic elements  Kappa and lambda demonstrate predominance of lambda positive cells with a kappa to lambda ratio of 1-4    Further workup with labs as well as urine testing needed for ruling out multiple myeloma or MGUS  Referred to Radiation Oncology  Malignant neoplasm of right [address] ([facility] [address] )    9/2000 Surgery     Right breast mastectomy         8/13/2012 Initial Diagnosis     Malignant neoplasm of right breast [facility])          Chemotherapy     Tamoxifen for 5 years           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[facility] [address] )    12/18/2018 Biopsy     Final Diagnosis   A  Bone, L-4, core biopsy:  - Fragments of trabecular bone with changes compatible with prior fracture site  - 10% lambda predominant plasmacytosis  See note  - Hematopoietic marrow with trilineage hematopoiesis  - No epithelial elements identified, confirmed with negative keratin AE1/AE3 stains                 12/18/2018 Initial Diagnosis     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[facility])              Past Medical History:   Diagnosis Date    Aortic valve disorder     Breast CA ([facility] )     2000-R mastectomy-took tamoxifen/femira    Cardiac dysrhythmia     History of spinal fracture     8 since 2016-1 fall related  spinal bone marrow bx today 12/18/2018    Hypertension     Osteoporosis     Palpitations     Squamous cell carcinoma of skin      Past Surgical History:   Procedure Laterality Date    APPENDECTOMY  01/2011    BREAST LUMPECTOMY      CHEILECTOMY      Resolved:  2006, Bunion correction    COLONOSCOPY  07/2010    Diverticula; recheck 5 yrs    DILATION AND CURETTAGE, DIAGNOSTIC / THERAPEUTIC      IR IMAGE GUIDED BIOPSY/ASPIRATION BONE  12/18/2018    MASTECTOMY Right 2000       Family History   Problem Relation Age of Onset    Diabetes Mother     Osteoporosis Mother     Heart disease Father     Breast cancer Sister     Heart attack Family     Other Family         Benign brain tumor    Club foot Family     Breast cancer Sister        Social History   History   Alcohol Use    3 0 oz/week    5 Glasses of wine per week     Comment: Social   5  glasses weekl;y     History   Drug Use No     History   Smoking Status    Never Smoker Smokeless Tobacco    Never Used         Meds/Allergies     Current Outpatient Prescriptions:     alendronate (FOSAMAX) 70 mg tablet, Take 1 tablet (70 mg total) by mouth every 7 days, Disp: 12 tablet, Rfl: 3    Calcium Citrate-Vitamin D (CALCIUM CITRATE + D PO), Take 1 tablet by mouth 2 (two) times a day  , Disp: , Rfl:     escitalopram (LEXAPRO) 10 mg tablet, Take 10 mg by mouth daily  , Disp: , Rfl:     metoprolol tartrate (LOPRESSOR) 50 mg tablet, Take 1 5 tablets (75 mg total) by mouth 2 (two) times a day, Disp: 270 tablet, Rfl: 3  Allergies   Allergen Reactions    Ibuprofen Abdominal Pain and Tachycardia     Reaction Date: [id number];          Review of Systems***      OBJECTIVE:   /80 (BP Location: Left arm, Patient Position: Sitting, Cuff Size: Standard)   Pulse 74   Temp 98 3 °F (36 8 °C) (Oral)   Resp 16   Ht 5' (1 524 m)   Wt 60 7 kg (133 lb 12 8 oz)   SpO2 98%   BMI 26 13 kg/m²   Pain Assessment:  {Pain Score 0-10, 0 default:92500}  Performance Status: {MDA IP Performance Status Choice:84479}    Physical Exam ***      RESULTS  Lab Results    Chemistry        Component Value Date/Time     11/28/2017 1117    K 4 4 12/26/2018 1410     12/26/2018 1410    CO2 25 12/26/2018 1410    BUN 13 12/26/2018 1410    CREATININE 0 74 10/09/2018 1505    CREATININE 0 65 11/28/2017 1117        Component Value Date/Time    CALCIUM 9 7 12/26/2018 1410    ALKPHOS 58 12/26/2018 1410    AST 21 10/09/2018 1505    AST 23 09/02/2014 1207    ALT 27 10/09/2018 1505    ALT 24 09/02/2014 1207    BILITOT 0 32 09/02/2014 1207            Lab Results   Component Value Date    WBC 5 6 12/26/2018    HGB 12 9 12/26/2018    HCT 36 6 12/26/2018    MCV 91 5 12/26/2018     12/26/2018         Imaging Studies  Ir Image Guided Biopsy/aspiration Bone    Result Date: 12/18/2018  Narrative: Fluoroscopic guided vertebral biopsy Clinical History: History of breast and skin cancer with PET positive L4 compression fracture Sedation time: 30 minutes Fluoroscopy time: 6 3 minutes Images: 3 Procedure: After explaining the risks and benefits of the procedure to the patient, informed consent was obtained  CT was used to localize the L4 vertebral body   Radiation dose length product (DLP) for this visit:  532 mGy   This examination, like all CT scans performed in the [facility], was performed utilizing techniques to minimize radiation dose exposure, including the use of iterative reconstruction and automated exposure control  The overlying skin was prepped and draped in the usual sterile fashion  Local anesthesia was obtained with a 1% lidocaine solution  Using CT guidance, 3 passes with a trapped needle set was performed using a left transpedicular approach  The tissue was given to Dr Darci Cortez from the department of pathology  The preliminary interpretation is nondiagnostic  Deferred until review of the cores   The patient tolerated the procedure well and left the department in stable condition  Impression: Impression: Successful biopsy of the L4 vertebral body   Workstation performed: [id number]     Us Pelvis Complete W Transvaginal    Result Date: 12/21/2018  Narrative: PELVIC ULTRASOUND, COMPLETE INDICATION:  79years old  N94 9: Unspecified condition associated with female genital organs and menstrual cycle  History of abnormal PET/CT  Further evaluation of right adnexal nodule COMPARISON: Head CT performed on 11/23/2018 TECHNIQUE:   Transabdominal pelvic ultrasound was performed in sagittal and transverse planes with a curvilinear transducer  Additional transvaginal imaging was performed to better evaluate the endometrium and ovaries  Imaging included volumetric sweeps as well as traditional still imaging technique  FINDINGS: UTERUS: The uterus is anteverted in position, measuring 7 2 x 2 8 x 4 6 cm  Contour and echotexture appear normal  The cervix shows no suspicious abnormality   ENDOMETRIUM: Normal caliber of 3 mm  Homogenous and normal in appearance  OVARIES/ADNEXA: Right ovary:  4 0 x 3 0 x 4 3 cm  I right ovarian cyst is identified, predominantly fluid-filled and without abnormal vascularity but with an ovoid area of soft tissue or debris along one wall measuring approximately 1 1 x 0 9 cm  There is no internal vascularity in this region and on the prior PET scan, there was no abnormal glucose metabolism  The overall size of the cyst is 2 9 x 3 0 x 2 4 cm Left ovary:  1 7 x 0 8 x 1 6 cm  No suspicious left ovarian abnormality  Doppler flow within normal limits  There is no free fluid  Impression:  Right ovarian cyst measuring 3 0 x 2 4 x 2 9 cm appearing photopenic on prior PET scan  Although predominantly of a simple cystic nature there is a focus of debris or soft tissue along one wall without internal vascularity  According to the consensus conference statement from the Society of Radiologists in Ultrasound (Radiology:Volume 256: Number 3-September 2010  pp [phone] ), the nodule renders the cyst indeterminate in nature  Recommend reassessment ultrasound in 12 weeks  Workstation performed: [id number]         Pathology:***        ASSESSMENT  1  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[address] )  Radiation Simulation Treatment     Cancer Staging  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[address] )  Staging form: Plasma Cell Myeloma and Disorders, AJCC 8th Edition  - Clinical: No stage assigned - Unsigned        PLAN/DISCUSSION  Orders Placed This Encounter   Procedures    Radiation Simulation Treatment          Brody Peguero is a 79y o  year old female with ***          Sommer Sanford MD  1/17/2019,3:06 PM      Portions of the record may have been created with voice recognition software   Occasional wrong word or "sound a like" substitutions may have occurred due to the inherent limitations of voice recognition software   Read the chart carefully and recognize, using context, where substitutions have occurred

## 2019-01-24 ENCOUNTER — APPOINTMENT (OUTPATIENT)
Dept: RADIATION ONCOLOGY | Facility: HOSPITAL | Age: 69
End: 2019-01-24
Attending: RADIOLOGY
Payer: MEDICARE

## 2019-01-24 PROCEDURE — 77290 THER RAD SIMULAJ FIELD CPLX: CPT | Performed by: RADIOLOGY

## 2019-01-30 PROCEDURE — 77334 RADIATION TREATMENT AID(S): CPT | Performed by: RADIOLOGY

## 2019-01-30 PROCEDURE — 77295 3-D RADIOTHERAPY PLAN: CPT | Performed by: RADIOLOGY

## 2019-01-30 PROCEDURE — 77300 RADIATION THERAPY DOSE PLAN: CPT | Performed by: RADIOLOGY

## 2019-01-31 PROCEDURE — 77280 THER RAD SIMULAJ FIELD SMPL: CPT | Performed by: RADIOLOGY

## 2019-02-01 ENCOUNTER — APPOINTMENT (OUTPATIENT)
Dept: RADIATION ONCOLOGY | Facility: HOSPITAL | Age: 69
End: 2019-02-01
Attending: RADIOLOGY
Payer: MEDICARE

## 2019-02-01 PROCEDURE — 77387 GUIDANCE FOR RADJ TX DLVR: CPT | Performed by: RADIOLOGY

## 2019-02-01 PROCEDURE — 77412 RADIATION TX DELIVERY LVL 3: CPT | Performed by: RADIOLOGY

## 2019-02-04 ENCOUNTER — APPOINTMENT (OUTPATIENT)
Dept: RADIATION ONCOLOGY | Facility: HOSPITAL | Age: 69
End: 2019-02-04
Attending: RADIOLOGY
Payer: MEDICARE

## 2019-02-04 PROCEDURE — 77387 GUIDANCE FOR RADJ TX DLVR: CPT | Performed by: RADIOLOGY

## 2019-02-04 PROCEDURE — 77412 RADIATION TX DELIVERY LVL 3: CPT | Performed by: RADIOLOGY

## 2019-02-04 PROCEDURE — 77331 SPECIAL RADIATION DOSIMETRY: CPT | Performed by: RADIOLOGY

## 2019-02-05 ENCOUNTER — APPOINTMENT (OUTPATIENT)
Dept: RADIATION ONCOLOGY | Facility: HOSPITAL | Age: 69
End: 2019-02-05
Attending: RADIOLOGY
Payer: MEDICARE

## 2019-02-05 PROCEDURE — 77387 GUIDANCE FOR RADJ TX DLVR: CPT | Performed by: RADIOLOGY

## 2019-02-05 PROCEDURE — 77412 RADIATION TX DELIVERY LVL 3: CPT | Performed by: RADIOLOGY

## 2019-02-06 ENCOUNTER — APPOINTMENT (OUTPATIENT)
Dept: RADIATION ONCOLOGY | Facility: HOSPITAL | Age: 69
End: 2019-02-06
Attending: RADIOLOGY
Payer: MEDICARE

## 2019-02-06 DIAGNOSIS — C90.30 PLASMACYTOMA (HCC): Primary | ICD-10-CM

## 2019-02-06 PROCEDURE — 77387 GUIDANCE FOR RADJ TX DLVR: CPT | Performed by: RADIOLOGY

## 2019-02-06 PROCEDURE — 77412 RADIATION TX DELIVERY LVL 3: CPT | Performed by: RADIOLOGY

## 2019-02-07 ENCOUNTER — APPOINTMENT (OUTPATIENT)
Dept: RADIATION ONCOLOGY | Facility: HOSPITAL | Age: 69
End: 2019-02-07
Attending: RADIOLOGY
Payer: MEDICARE

## 2019-02-07 ENCOUNTER — APPOINTMENT (OUTPATIENT)
Dept: LAB | Facility: CLINIC | Age: 69
End: 2019-02-07
Payer: MEDICARE

## 2019-02-07 LAB
BASOPHILS # BLD AUTO: 0.01 THOUSANDS/ΜL (ref 0–0.1)
BASOPHILS NFR BLD AUTO: 0 % (ref 0–1)
EOSINOPHIL # BLD AUTO: 0.05 THOUSAND/ΜL (ref 0–0.61)
EOSINOPHIL NFR BLD AUTO: 1 % (ref 0–6)
ERYTHROCYTE [DISTWIDTH] IN BLOOD BY AUTOMATED COUNT: 13 % (ref 11.6–15.1)
HCT VFR BLD AUTO: 38.9 % (ref 34.8–46.1)
HGB BLD-MCNC: 13.2 G/DL (ref 11.5–15.4)
IMM GRANULOCYTES # BLD AUTO: 0.01 THOUSAND/UL (ref 0–0.2)
IMM GRANULOCYTES NFR BLD AUTO: 0 % (ref 0–2)
LYMPHOCYTES # BLD AUTO: 1.84 THOUSANDS/ΜL (ref 0.6–4.47)
LYMPHOCYTES NFR BLD AUTO: 33 % (ref 14–44)
MCH RBC QN AUTO: 31 PG (ref 26.8–34.3)
MCHC RBC AUTO-ENTMCNC: 33.9 G/DL (ref 31.4–37.4)
MCV RBC AUTO: 91 FL (ref 82–98)
MONOCYTES # BLD AUTO: 0.71 THOUSAND/ΜL (ref 0.17–1.22)
MONOCYTES NFR BLD AUTO: 13 % (ref 4–12)
NEUTROPHILS # BLD AUTO: 2.9 THOUSANDS/ΜL (ref 1.85–7.62)
NEUTS SEG NFR BLD AUTO: 53 % (ref 43–75)
NRBC BLD AUTO-RTO: 0 /100 WBCS
PLATELET # BLD AUTO: 319 THOUSANDS/UL (ref 149–390)
PMV BLD AUTO: 9.5 FL (ref 8.9–12.7)
RBC # BLD AUTO: 4.26 MILLION/UL (ref 3.81–5.12)
WBC # BLD AUTO: 5.52 THOUSAND/UL (ref 4.31–10.16)

## 2019-02-07 PROCEDURE — 77387 GUIDANCE FOR RADJ TX DLVR: CPT | Performed by: RADIOLOGY

## 2019-02-07 PROCEDURE — 85025 COMPLETE CBC W/AUTO DIFF WBC: CPT

## 2019-02-07 PROCEDURE — 77336 RADIATION PHYSICS CONSULT: CPT | Performed by: RADIOLOGY

## 2019-02-07 PROCEDURE — 77412 RADIATION TX DELIVERY LVL 3: CPT | Performed by: RADIOLOGY

## 2019-02-07 PROCEDURE — 36415 COLL VENOUS BLD VENIPUNCTURE: CPT

## 2019-02-08 ENCOUNTER — APPOINTMENT (OUTPATIENT)
Dept: RADIATION ONCOLOGY | Facility: HOSPITAL | Age: 69
End: 2019-02-08
Attending: RADIOLOGY
Payer: MEDICARE

## 2019-02-08 PROCEDURE — 77387 GUIDANCE FOR RADJ TX DLVR: CPT | Performed by: RADIOLOGY

## 2019-02-08 PROCEDURE — 77412 RADIATION TX DELIVERY LVL 3: CPT | Performed by: RADIOLOGY

## 2019-02-11 ENCOUNTER — APPOINTMENT (OUTPATIENT)
Dept: RADIATION ONCOLOGY | Facility: HOSPITAL | Age: 69
End: 2019-02-11
Attending: RADIOLOGY
Payer: MEDICARE

## 2019-02-11 PROCEDURE — 77387 GUIDANCE FOR RADJ TX DLVR: CPT | Performed by: RADIOLOGY

## 2019-02-11 PROCEDURE — 77412 RADIATION TX DELIVERY LVL 3: CPT | Performed by: RADIOLOGY

## 2019-02-12 ENCOUNTER — APPOINTMENT (OUTPATIENT)
Dept: RADIATION ONCOLOGY | Facility: HOSPITAL | Age: 69
End: 2019-02-12
Attending: RADIOLOGY
Payer: MEDICARE

## 2019-02-12 PROCEDURE — 77412 RADIATION TX DELIVERY LVL 3: CPT | Performed by: RADIOLOGY

## 2019-02-12 PROCEDURE — 77387 GUIDANCE FOR RADJ TX DLVR: CPT | Performed by: RADIOLOGY

## 2019-02-13 ENCOUNTER — APPOINTMENT (OUTPATIENT)
Dept: RADIATION ONCOLOGY | Facility: HOSPITAL | Age: 69
End: 2019-02-13
Attending: RADIOLOGY
Payer: MEDICARE

## 2019-02-13 PROCEDURE — 77387 GUIDANCE FOR RADJ TX DLVR: CPT | Performed by: RADIOLOGY

## 2019-02-13 PROCEDURE — 77412 RADIATION TX DELIVERY LVL 3: CPT | Performed by: RADIOLOGY

## 2019-02-13 PROCEDURE — 77417 THER RADIOLOGY PORT IMAGE(S): CPT | Performed by: RADIOLOGY

## 2019-02-14 ENCOUNTER — APPOINTMENT (OUTPATIENT)
Dept: RADIATION ONCOLOGY | Facility: HOSPITAL | Age: 69
End: 2019-02-14
Attending: RADIOLOGY
Payer: MEDICARE

## 2019-02-14 PROCEDURE — 77412 RADIATION TX DELIVERY LVL 3: CPT | Performed by: RADIOLOGY

## 2019-02-14 PROCEDURE — 77387 GUIDANCE FOR RADJ TX DLVR: CPT | Performed by: RADIOLOGY

## 2019-02-14 PROCEDURE — 77336 RADIATION PHYSICS CONSULT: CPT | Performed by: RADIOLOGY

## 2019-02-15 ENCOUNTER — APPOINTMENT (OUTPATIENT)
Dept: RADIATION ONCOLOGY | Facility: HOSPITAL | Age: 69
End: 2019-02-15
Attending: RADIOLOGY
Payer: MEDICARE

## 2019-02-15 PROCEDURE — 77412 RADIATION TX DELIVERY LVL 3: CPT | Performed by: RADIOLOGY

## 2019-02-15 PROCEDURE — 77387 GUIDANCE FOR RADJ TX DLVR: CPT | Performed by: RADIOLOGY

## 2019-02-18 ENCOUNTER — APPOINTMENT (OUTPATIENT)
Dept: RADIATION ONCOLOGY | Facility: HOSPITAL | Age: 69
End: 2019-02-18
Attending: RADIOLOGY
Payer: MEDICARE

## 2019-02-18 PROCEDURE — 77387 GUIDANCE FOR RADJ TX DLVR: CPT | Performed by: RADIOLOGY

## 2019-02-18 PROCEDURE — 77412 RADIATION TX DELIVERY LVL 3: CPT | Performed by: RADIOLOGY

## 2019-02-19 ENCOUNTER — APPOINTMENT (OUTPATIENT)
Dept: RADIATION ONCOLOGY | Facility: HOSPITAL | Age: 69
End: 2019-02-19
Attending: RADIOLOGY
Payer: MEDICARE

## 2019-02-19 PROCEDURE — 77387 GUIDANCE FOR RADJ TX DLVR: CPT | Performed by: RADIOLOGY

## 2019-02-19 PROCEDURE — 77412 RADIATION TX DELIVERY LVL 3: CPT | Performed by: RADIOLOGY

## 2019-02-20 ENCOUNTER — APPOINTMENT (OUTPATIENT)
Dept: RADIATION ONCOLOGY | Facility: HOSPITAL | Age: 69
End: 2019-02-20
Attending: RADIOLOGY
Payer: MEDICARE

## 2019-02-20 PROCEDURE — 77412 RADIATION TX DELIVERY LVL 3: CPT | Performed by: RADIOLOGY

## 2019-02-20 PROCEDURE — 77387 GUIDANCE FOR RADJ TX DLVR: CPT | Performed by: RADIOLOGY

## 2019-02-21 ENCOUNTER — APPOINTMENT (OUTPATIENT)
Dept: LAB | Facility: CLINIC | Age: 69
End: 2019-02-21
Payer: MEDICARE

## 2019-02-21 ENCOUNTER — APPOINTMENT (OUTPATIENT)
Dept: RADIATION ONCOLOGY | Facility: HOSPITAL | Age: 69
End: 2019-02-21
Attending: RADIOLOGY
Payer: MEDICARE

## 2019-02-21 PROCEDURE — 77412 RADIATION TX DELIVERY LVL 3: CPT | Performed by: RADIOLOGY

## 2019-02-21 PROCEDURE — 77336 RADIATION PHYSICS CONSULT: CPT | Performed by: RADIOLOGY

## 2019-02-21 PROCEDURE — 77387 GUIDANCE FOR RADJ TX DLVR: CPT | Performed by: RADIOLOGY

## 2019-02-21 PROCEDURE — 77417 THER RADIOLOGY PORT IMAGE(S): CPT | Performed by: RADIOLOGY

## 2019-02-22 ENCOUNTER — APPOINTMENT (OUTPATIENT)
Dept: RADIATION ONCOLOGY | Facility: HOSPITAL | Age: 69
End: 2019-02-22
Attending: RADIOLOGY
Payer: MEDICARE

## 2019-02-22 PROCEDURE — 77387 GUIDANCE FOR RADJ TX DLVR: CPT | Performed by: RADIOLOGY

## 2019-02-22 PROCEDURE — 77412 RADIATION TX DELIVERY LVL 3: CPT | Performed by: RADIOLOGY

## 2019-02-25 ENCOUNTER — APPOINTMENT (OUTPATIENT)
Dept: RADIATION ONCOLOGY | Facility: HOSPITAL | Age: 69
End: 2019-02-25
Attending: RADIOLOGY
Payer: MEDICARE

## 2019-02-25 PROCEDURE — 77412 RADIATION TX DELIVERY LVL 3: CPT | Performed by: RADIOLOGY

## 2019-02-25 PROCEDURE — 77387 GUIDANCE FOR RADJ TX DLVR: CPT | Performed by: RADIOLOGY

## 2019-02-26 ENCOUNTER — APPOINTMENT (OUTPATIENT)
Dept: RADIATION ONCOLOGY | Facility: HOSPITAL | Age: 69
End: 2019-02-26
Attending: RADIOLOGY
Payer: MEDICARE

## 2019-02-26 PROCEDURE — 77387 GUIDANCE FOR RADJ TX DLVR: CPT | Performed by: RADIOLOGY

## 2019-02-26 PROCEDURE — 77412 RADIATION TX DELIVERY LVL 3: CPT | Performed by: RADIOLOGY

## 2019-02-27 ENCOUNTER — APPOINTMENT (OUTPATIENT)
Dept: RADIATION ONCOLOGY | Facility: HOSPITAL | Age: 69
End: 2019-02-27
Attending: RADIOLOGY
Payer: MEDICARE

## 2019-02-27 PROCEDURE — 77412 RADIATION TX DELIVERY LVL 3: CPT | Performed by: RADIOLOGY

## 2019-02-27 PROCEDURE — 77387 GUIDANCE FOR RADJ TX DLVR: CPT | Performed by: RADIOLOGY

## 2019-02-28 ENCOUNTER — APPOINTMENT (OUTPATIENT)
Dept: RADIATION ONCOLOGY | Facility: HOSPITAL | Age: 69
End: 2019-02-28
Attending: RADIOLOGY
Payer: MEDICARE

## 2019-02-28 DIAGNOSIS — M85.89 OSTEOPENIA OF MULTIPLE SITES: ICD-10-CM

## 2019-02-28 DIAGNOSIS — S22.000S CLOSED COMPRESSION FRACTURE OF THORACIC VERTEBRA, SEQUELA: ICD-10-CM

## 2019-02-28 PROCEDURE — 77412 RADIATION TX DELIVERY LVL 3: CPT | Performed by: RADIOLOGY

## 2019-02-28 PROCEDURE — 77336 RADIATION PHYSICS CONSULT: CPT | Performed by: RADIOLOGY

## 2019-02-28 PROCEDURE — 77387 GUIDANCE FOR RADJ TX DLVR: CPT | Performed by: RADIOLOGY

## 2019-02-28 PROCEDURE — 77417 THER RADIOLOGY PORT IMAGE(S): CPT | Performed by: RADIOLOGY

## 2019-02-28 RX ORDER — ALENDRONATE SODIUM 70 MG/1
TABLET ORAL
Qty: 12 TABLET | Refills: 3 | Status: SHIPPED | OUTPATIENT
Start: 2019-02-28 | End: 2020-01-27 | Stop reason: ALTCHOICE

## 2019-03-01 ENCOUNTER — APPOINTMENT (OUTPATIENT)
Dept: RADIATION ONCOLOGY | Facility: HOSPITAL | Age: 69
End: 2019-03-01
Attending: RADIOLOGY
Payer: MEDICARE

## 2019-03-01 PROCEDURE — 77387 GUIDANCE FOR RADJ TX DLVR: CPT | Performed by: RADIOLOGY

## 2019-03-01 PROCEDURE — 77412 RADIATION TX DELIVERY LVL 3: CPT | Performed by: RADIOLOGY

## 2019-03-04 ENCOUNTER — APPOINTMENT (OUTPATIENT)
Dept: RADIATION ONCOLOGY | Facility: HOSPITAL | Age: 69
End: 2019-03-04
Attending: RADIOLOGY
Payer: MEDICARE

## 2019-03-04 PROCEDURE — 77387 GUIDANCE FOR RADJ TX DLVR: CPT | Performed by: RADIOLOGY

## 2019-03-04 PROCEDURE — 77412 RADIATION TX DELIVERY LVL 3: CPT | Performed by: RADIOLOGY

## 2019-03-05 ENCOUNTER — APPOINTMENT (OUTPATIENT)
Dept: RADIATION ONCOLOGY | Facility: HOSPITAL | Age: 69
End: 2019-03-05
Attending: RADIOLOGY
Payer: MEDICARE

## 2019-03-05 PROCEDURE — 77412 RADIATION TX DELIVERY LVL 3: CPT | Performed by: RADIOLOGY

## 2019-03-05 PROCEDURE — 77387 GUIDANCE FOR RADJ TX DLVR: CPT | Performed by: RADIOLOGY

## 2019-03-06 ENCOUNTER — APPOINTMENT (OUTPATIENT)
Dept: RADIATION ONCOLOGY | Facility: HOSPITAL | Age: 69
End: 2019-03-06
Attending: RADIOLOGY
Payer: MEDICARE

## 2019-03-06 PROCEDURE — 77387 GUIDANCE FOR RADJ TX DLVR: CPT | Performed by: RADIOLOGY

## 2019-03-06 PROCEDURE — 77412 RADIATION TX DELIVERY LVL 3: CPT | Performed by: RADIOLOGY

## 2019-03-07 ENCOUNTER — APPOINTMENT (OUTPATIENT)
Dept: RADIATION ONCOLOGY | Facility: HOSPITAL | Age: 69
End: 2019-03-07
Attending: RADIOLOGY
Payer: MEDICARE

## 2019-03-07 PROCEDURE — 77336 RADIATION PHYSICS CONSULT: CPT | Performed by: RADIOLOGY

## 2019-03-07 PROCEDURE — 77412 RADIATION TX DELIVERY LVL 3: CPT | Performed by: RADIOLOGY

## 2019-03-07 PROCEDURE — 77387 GUIDANCE FOR RADJ TX DLVR: CPT | Performed by: RADIOLOGY

## 2019-03-12 ENCOUNTER — TELEPHONE (OUTPATIENT)
Dept: OBGYN CLINIC | Facility: CLINIC | Age: 69
End: 2019-03-12

## 2019-03-12 ENCOUNTER — OFFICE VISIT (OUTPATIENT)
Dept: HEMATOLOGY ONCOLOGY | Facility: CLINIC | Age: 69
End: 2019-03-12
Payer: MEDICARE

## 2019-03-12 VITALS
DIASTOLIC BLOOD PRESSURE: 80 MMHG | BODY MASS INDEX: 25.3 KG/M2 | OXYGEN SATURATION: 99 % | TEMPERATURE: 98.1 F | SYSTOLIC BLOOD PRESSURE: 112 MMHG | WEIGHT: 134 LBS | HEART RATE: 85 BPM | HEIGHT: 61 IN | RESPIRATION RATE: 18 BRPM

## 2019-03-12 DIAGNOSIS — N83.201 CYST OF RIGHT OVARY: ICD-10-CM

## 2019-03-12 DIAGNOSIS — N83.209 CYST OF OVARY, UNSPECIFIED LATERALITY: Primary | ICD-10-CM

## 2019-03-12 DIAGNOSIS — C90.30 PLASMACYTOMA (HCC): Primary | ICD-10-CM

## 2019-03-12 DIAGNOSIS — C50.011 MALIGNANT NEOPLASM OF NIPPLE OF RIGHT BREAST IN FEMALE, UNSPECIFIED ESTROGEN RECEPTOR STATUS (HCC): ICD-10-CM

## 2019-03-12 PROCEDURE — 99214 OFFICE O/P EST MOD 30 MIN: CPT | Performed by: INTERNAL MEDICINE

## 2019-03-12 NOTE — PATIENT INSTRUCTIONS
Blood and urine tests and bone marrow test prior to next visit in July 2019   Referral to Dr Floyd Naik for right ovarian cyst

## 2019-03-12 NOTE — PROGRESS NOTES
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 and also compression fracture of lower dorsal/lumbar spine  Patient has completed radiation to lower dorsal/lumbar spine area and last treatment was on 03/07/2019  Workup for multiple myeloma showed a small spike IgG spike of 960 mg  Patient was symptomatic from plasmacytoma  Now she does not have low back pain and feels much better  Patient will have follow-up workup for plasma cell dyscrasia/plasmacytoma and original plan was to repeat blood and urine tests and also to bone marrow test in June 2019 but she is going to be away in the later part of May and whole month of June and have tests in early July and visit after that  As part of the workup she had PET-CT scan that also showed right ovarian cyst without FDG activity  Patient will be referred to her gynecologist   Patient has remote past history of stage I, grade 1, 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  Positive hormone receptors and negative her 2  Patient had right mastectomy  She had adjuvant tamoxifen for 5 years     She has some tiredness                                     Current Outpatient Medications:     alendronate (FOSAMAX) 70 mg tablet, TAKE 1 TABLET BY MOUTH EVERY 7 DAYS, Disp: 12 tablet, Rfl: 3    Calcium Citrate-Vitamin D (CALCIUM CITRATE + D PO), Take 1 tablet by mouth 2 (two) times a day  , Disp: , Rfl:     escitalopram (LEXAPRO) 10 mg tablet, Take 10 mg by mouth daily  , Disp: , Rfl:     metoprolol tartrate (LOPRESSOR) 50 mg tablet, Take 1 5 tablets (75 mg total) by mouth 2 (two) times a day, Disp: 270 tablet, Rfl: 3    Allergies   Allergen Reactions    Ibuprofen Abdominal Pain and Tachycardia     Reaction Date: [id number];         Oncology History                              Malignant neoplasm of right breast ([facility] [address] )    9/2000 Surgery     Right breast mastectomy         8/13/2012 Initial Diagnosis     Malignant neoplasm of right breast ([facility] [address] ) Chemotherapy     Tamoxifen for 5 years           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[facility] [address] )    12/18/2018 Biopsy     Final Diagnosis   A  Bone, L-4, core biopsy:  - Fragments of trabecular bone with changes compatible with prior fracture site  - 10% lambda predominant plasmacytosis  See note  - Hematopoietic marrow with trilineage hematopoiesis  - No epithelial elements identified, confirmed with negative keratin AE1/AE3 stains  12/18/2018 Initial Diagnosis     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HCC)            ROS:  03/12/19 Reviewed 13 systems:  Presently no headaches, seizures, dizziness, diplopia, dysphagia, hoarseness, chest pain, palpitations, shortness of breath, cough, hemoptysis, abdominal pain, nausea, vomiting, change in bowel habits, melena, hematuria, fever, chills, bleeding, bone pains, skin rash, weight loss, arthritic symptoms,   weakness, numbness,  claudication and gait problem  No frequent infections  Not unusually sensitive to heat or cold  No swelling of the ankles  No swollen glands  Patient is anxious     No GYN symptoms  Other symptoms are in HPI        /80 (BP Location: Left arm)   Pulse 85   Temp 98 1 °F (36 7 °C) (Oral)   Resp 18   Ht 5' 0 63" (1 54 m)   Wt 60 8 kg (134 lb)   SpO2 99%   BMI 25 63 kg/m²     Physical Exam:  Alert, oriented, not in distress, no icterus, no oral thrush, no palpable neck mass, clear lung fields, regular heart rate, abdomen  soft and non tender, no palpable abdominal mass, no ascites, no edema of ankles, no calf tenderness, no focal neurological deficit, no skin rash, no palpable lymphadenopathy, good arterial pulses, no clubbing  Patient is anxious     No lymphedema  She goes to her gynecologist for GYN examination, examination of breasts and mammography  Performance status 1      IMAGING:      LABS:  Results for orders placed or performed in visit on 02/15/19   CBC and differential   Result Value Ref Range    WBC 4 22 (L) 4 31 - 10 16 Thousand/uL    RBC 4 28 3 81 - 5 12 Million/uL    Hemoglobin 13 6 11 5 - 15 4 g/dL    Hematocrit 39 4 34 8 - 46 1 %    MCV 92 82 - 98 fL    MCH 31 8 26 8 - 34 3 pg    MCHC 34 5 31 4 - 37 4 g/dL    RDW 12 8 11 6 - 15 1 %    MPV 9 2 8 9 - 12 7 fL    Platelets 794 833 - 933 Thousands/uL    nRBC 0 /100 WBCs    Neutrophils Relative 53 43 - 75 %    Immat GRANS % 0 0 - 2 %    Lymphocytes Relative 31 14 - 44 %    Monocytes Relative 15 (H) 4 - 12 %    Eosinophils Relative 1 0 - 6 %    Basophils Relative 0 0 - 1 %    Neutrophils Absolute 2 22 1 85 - 7 62 Thousands/µL    Immature Grans Absolute 0 01 0 00 - 0 20 Thousand/uL    Lymphocytes Absolute 1 31 0 60 - 4 47 Thousands/µL    Monocytes Absolute 0 63 0 17 - 1 22 Thousand/µL    Eosinophils Absolute 0 04 0 00 - 0 61 Thousand/µL    Basophils Absolute 0 01 0 00 - 0 10 Thousands/µL     Labs, Imaging, & Other studies:   All pertinent labs and imaging studies were personally reviewed    Lab Results   Component Value Date     11/28/2017    K 4 4 12/26/2018     12/26/2018    CO2 25 12/26/2018    ANIONGAP 9 09/02/2014    BUN 13 12/26/2018    CREATININE 0 74 10/09/2018    GLUCOSE 93 09/02/2014    CALCIUM 9 7 12/26/2018    AST 16 12/26/2018    ALT 13 12/26/2018    ALKPHOS 58 12/26/2018    PROT 8 0 09/02/2014    BILITOT 0 32 09/02/2014    EGFR 85 10/09/2018     Lab Results   Component Value Date    WBC 4 22 (L) 02/21/2019    HGB 13 6 02/21/2019    HCT 39 4 02/21/2019    MCV 92 02/21/2019     02/21/2019       Reviewed and discussed with patient  Assessment and plan: 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 and also compression fracture of lower dorsal/lumbar spine  Patient has completed radiation to lower dorsal/lumbar spine area and last treatment was on 03/07/2019  Workup for multiple myeloma showed a small spike IgG spike of 960 mg  Patient was symptomatic from plasmacytoma  Now she does not have low back pain and feels much better    Patient will have follow-up workup for plasma cell dyscrasia/plasmacytoma and original plan was to repeat blood and urine tests and also to bone marrow test in June 2019 but she is going to be away in the later part of May and whole month of June and have tests in early July and visit after that  As part of the workup she had PET-CT scan that also showed right ovarian cyst without FDG activity  Patient will be referred to her gynecologist   Patient has remote past history of stage I, grade 1, 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  Positive hormone receptors and negative her 2  Patient had right mastectomy  She had adjuvant tamoxifen for 5 years     She has some tiredness  Dara Soulier Physical examination and test results are as recorded and discussed  See orders below  All discussed in detail  Questions answered  Also discussed the importance of self-breast examination, eating healthy foods, staying active and health screening tests  Patient is capable of self-care  1  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HCC)    - Beta 2 microglobulin, serum; Future  - CBC and differential; Future  - Comprehensive metabolic panel; Future  - IgG, IgA, IgM; Future  - Immunoglobulin free LT chains blood; Future  - LD,Blood; Future  - Protein electrophoresis, serum; Future  - Protein electrophoresis, urine; Future  - Ambulatory referral to Interventional Radiology; Future  - IR bone marrow biopsy/aspiration; Future  - MRI lumbar spine w wo contrast; Future    2  Malignant neoplasm of nipple of right breast in female, unspecified estrogen receptor status ([facility] [address] )      3  Cyst of right ovary  - Ambulatory referral to Gynecology; Future        Patient voiced understanding and agreement in the discussion  Counseling / Coordination of Care   Greater than 50% of total time was spent with the patient and / or family counseling and / or coordination of care

## 2019-03-12 NOTE — LETTER
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 and also compression fracture of lower dorsal/lumbar spine  Patient has completed radiation to lower dorsal/lumbar spine area and last treatment was on 03/07/2019  Workup for multiple myeloma showed a small spike IgG spike of 960 mg  Patient was symptomatic from plasmacytoma  Now she does not have low back pain and feels much better  Patient will have follow-up workup for plasma cell dyscrasia/plasmacytoma and original plan was to repeat blood and urine tests and also to bone marrow test in June 2019 but she is going to be away in the later part of May and whole month of June and have tests in early July and visit after that  As part of the workup she had PET-CT scan that also showed right ovarian cyst without FDG activity  Patient will be referred to her gynecologist   Patient has remote past history of stage I, grade 1, 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  Positive hormone receptors and negative her 2  Patient had right mastectomy  She had adjuvant tamoxifen for 5 years       She has some tiredness                                     Current Outpatient Medications:     alendronate (FOSAMAX) 70 mg tablet, TAKE 1 TABLET BY MOUTH EVERY 7 DAYS, Disp: 12 tablet, Rfl: 3    Calcium Citrate-Vitamin D (CALCIUM CITRATE + D PO), Take 1 tablet by mouth 2 (two) times a day  , Disp: , Rfl:     escitalopram (LEXAPRO) 10 mg tablet, Take 10 mg by mouth daily  , Disp: , Rfl:     metoprolol tartrate (LOPRESSOR) 50 mg tablet, Take 1 5 tablets (75 mg total) by mouth 2 (two) times a day, Disp: 270 tablet, Rfl: 3    Allergies   Allergen Reactions    Ibuprofen Abdominal Pain and Tachycardia     Reaction Date: [id number]; Oncology History                              Malignant neoplasm of right breast ([facility] )    9/2000 Surgery     Right breast mastectomy         8/13/2012 Initial Diagnosis     Malignant neoplasm of right breast [facility])          Chemotherapy     Tamoxifen for 5 years           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[facility] )    12/18/2018 Biopsy     Final Diagnosis   A  Bone, L-4, core biopsy:  - Fragments of trabecular bone with changes compatible with prior fracture site  - 10% lambda predominant plasmacytosis  See note  - Hematopoietic marrow with trilineage hematopoiesis  - No epithelial elements identified, confirmed with negative keratin AE1/AE3 stains  12/18/2018 Initial Diagnosis     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HCC)            ROS:  03/12/19 Reviewed 13 systems:  Presently no headaches, seizures, dizziness, diplopia, dysphagia, hoarseness, chest pain, palpitations, shortness of breath, cough, hemoptysis, abdominal pain, nausea, vomiting, change in bowel habits, melena, hematuria, fever, chills, bleeding, bone pains, skin rash, weight loss, arthritic symptoms,   weakness, numbness,  claudication and gait problem  No frequent infections  Not unusually sensitive to heat or cold  No swelling of the ankles  No swollen glands  Patient is anxious     No GYN symptoms    Other symptoms are in HPI        /80 (BP Location: Left arm)   Pulse 85   Temp 98 1 °F (36 7 °C) (Oral)   Resp 18   Ht 5' 0 63" (1 54 m)   Wt 60 8 kg (134 lb)   SpO2 99%   BMI 25 63 kg/m²      Physical Exam:  Alert, oriented, not in distress, no icterus, no oral thrush, no palpable neck mass, clear lung fields, regular heart rate, abdomen  soft and non tender, no palpable abdominal mass, no ascites, no edema of ankles, no calf tenderness, no focal neurological deficit, no skin rash, no palpable lymphadenopathy, good arterial pulses, no clubbing  Patient is anxious     No lymphedema  She goes to her gynecologist for GYN examination, examination of breasts and mammography  Performance status 1      IMAGING:      LABS:  Results for orders placed or performed in visit on 02/15/19   CBC and differential   Result Value Ref Range    WBC 4 22 (L) 4 31 - 10 16 Thousand/uL    RBC 4 28 3 81 - 5 12 Million/uL    Hemoglobin 13 6 11 5 - 15 4 g/dL    Hematocrit 39 4 34 8 - 46 1 %    MCV 92 82 - 98 fL    MCH 31 8 26 8 - 34 3 pg    MCHC 34 5 31 4 - 37 4 g/dL    RDW 12 8 11 6 - 15 1 %    MPV 9 2 8 9 - 12 7 fL    Platelets 990 519 - 662 Thousands/uL    nRBC 0 /100 WBCs    Neutrophils Relative 53 43 - 75 %    Immat GRANS % 0 0 - 2 %    Lymphocytes Relative 31 14 - 44 %    Monocytes Relative 15 (H) 4 - 12 %    Eosinophils Relative 1 0 - 6 %    Basophils Relative 0 0 - 1 %    Neutrophils Absolute 2 22 1 85 - 7 62 Thousands/µL    Immature Grans Absolute 0 01 0 00 - 0 20 Thousand/uL    Lymphocytes Absolute 1 31 0 60 - 4 47 Thousands/µL    Monocytes Absolute 0 63 0 17 - 1 22 Thousand/µL    Eosinophils Absolute 0 04 0 00 - 0 61 Thousand/µL    Basophils Absolute 0 01 0 00 - 0 10 Thousands/µL     Labs, Imaging, & Other studies:   All pertinent labs and imaging studies were personally reviewed    Lab Results   Component Value Date     11/28/2017    K 4 4 12/26/2018     12/26/2018    CO2 25 12/26/2018    ANIONGAP 9 09/02/2014    BUN 13 12/26/2018    CREATININE 0 74 10/09/2018    GLUCOSE 93 09/02/2014    CALCIUM 9 7 12/26/2018    AST 16 12/26/2018    ALT 13 12/26/2018    ALKPHOS 58 12/26/2018    PROT 8 0 09/02/2014    BILITOT 0 32 09/02/2014    EGFR 85 10/09/2018     Lab Results   Component Value Date    WBC 4 22 (L) 02/21/2019    HGB 13 6 02/21/2019    HCT 39 4 02/21/2019    MCV 92 02/21/2019     02/21/2019       Reviewed and discussed with patient  Assessment and plan: 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 and also compression fracture of lower dorsal/lumbar spine  Patient has completed radiation to lower dorsal/lumbar spine area and last treatment was on 03/07/2019  Workup for multiple myeloma showed a small spike IgG spike of 960 mg  Patient was symptomatic from plasmacytoma  Now she does not have low back pain and feels much better  Patient will have follow-up workup for plasma cell dyscrasia/plasmacytoma and original plan was to repeat blood and urine tests and also to bone marrow test in June 2019 but she is going to be away in the later part of May and whole month of June and have tests in early July and visit after that  As part of the workup she had PET-CT scan that also showed right ovarian cyst without FDG activity  Patient will be referred to her gynecologist   Patient has remote past history of stage I, grade 1, 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  Positive hormone receptors and negative her 2  Patient had right mastectomy  She had adjuvant tamoxifen for 5 years     She has some tiredness  Alysas Carlton Physical examination and test results are as recorded and discussed  See orders below  All discussed in detail  Questions answered  Also discussed the importance of self-breast examination, eating healthy foods, staying active and health screening tests  Patient is capable of self-care  1  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HCC)    - Beta 2 microglobulin, serum; Future  - CBC and differential; Future  - Comprehensive metabolic panel; Future  - IgG, IgA, IgM; Future  - Immunoglobulin free LT chains blood; Future  - LD,Blood; Future  - Protein electrophoresis, serum; Future  - Protein electrophoresis, urine; Future  - Ambulatory referral to Interventional Radiology; Future  - IR bone marrow biopsy/aspiration;  Future  - MRI lumbar spine w wo contrast; Future    2  Malignant neoplasm of nipple of right breast in female, unspecified estrogen receptor status ([facility] [address] )      3  Cyst of right ovary  - Ambulatory referral to Gynecology; Future        Patient voiced understanding and agreement in the discussion  Counseling / Coordination of Care   Greater than 50% of total time was spent with the patient and / or family counseling and / or coordination of care

## 2019-03-12 NOTE — TELEPHONE ENCOUNTER
Dr Baism Bray  is  suggesting that Daphne Daley have a follow up ultrasound for an ovarian cyst  She is not having symptoms; she is being treated for plasma cytosis  Pt  Of Dr Candance Gehrig

## 2019-03-19 ENCOUNTER — HOSPITAL ENCOUNTER (OUTPATIENT)
Dept: RADIOLOGY | Age: 69
Discharge: HOME | End: 2019-03-19
Payer: MEDICARE

## 2019-03-19 DIAGNOSIS — N83.209 CYST OF OVARY, UNSPECIFIED LATERALITY: ICD-10-CM

## 2019-03-19 PROCEDURE — 76830 TRANSVAGINAL US NON-OB: CPT

## 2019-03-19 PROCEDURE — 76856 US EXAM PELVIC COMPLETE: CPT

## 2019-03-25 ENCOUNTER — TELEPHONE (OUTPATIENT)
Dept: OBGYN CLINIC | Facility: CLINIC | Age: 69
End: 2019-03-25

## 2019-03-25 NOTE — TELEPHONE ENCOUNTER
----- Message from Irving Bonilla MD sent at 3/25/2019  3:39 PM EDT -----  I talked to Linnea Augustine and told her of the radiologist concern about her right ovarian cyst   I contacted the gyn Oncology dept  who will make an appointment in [place] to evaluate her ovarian cyst

## 2019-03-29 PROBLEM — D49.59 OVARIAN NEOPLASM: Status: ACTIVE | Noted: 2019-03-12

## 2019-04-02 ENCOUNTER — CONSULT (OUTPATIENT)
Dept: GYNECOLOGIC ONCOLOGY | Facility: CLINIC | Age: 69
End: 2019-04-02
Payer: MEDICARE

## 2019-04-02 ENCOUNTER — APPOINTMENT (OUTPATIENT)
Dept: LAB | Facility: CLINIC | Age: 69
End: 2019-04-02
Payer: MEDICARE

## 2019-04-02 VITALS
HEIGHT: 61 IN | DIASTOLIC BLOOD PRESSURE: 84 MMHG | BODY MASS INDEX: 25.3 KG/M2 | WEIGHT: 134 LBS | SYSTOLIC BLOOD PRESSURE: 138 MMHG | HEART RATE: 86 BPM | TEMPERATURE: 98.2 F | RESPIRATION RATE: 16 BRPM

## 2019-04-02 DIAGNOSIS — D49.59 OVARIAN NEOPLASM: Primary | ICD-10-CM

## 2019-04-02 DIAGNOSIS — D49.59 OVARIAN NEOPLASM: ICD-10-CM

## 2019-04-02 LAB
ABO GROUP BLD: NORMAL
ALBUMIN SERPL BCP-MCNC: 4 G/DL (ref 3.5–5)
ALP SERPL-CCNC: 73 U/L (ref 46–116)
ALT SERPL W P-5'-P-CCNC: 28 U/L (ref 12–78)
ANION GAP SERPL CALCULATED.3IONS-SCNC: 11 MMOL/L (ref 4–13)
AST SERPL W P-5'-P-CCNC: 19 U/L (ref 5–45)
BILIRUB SERPL-MCNC: 0.4 MG/DL (ref 0.2–1)
BLD GP AB SCN SERPL QL: NEGATIVE
BUN SERPL-MCNC: 15 MG/DL (ref 5–25)
CALCIUM SERPL-MCNC: 9.9 MG/DL (ref 8.3–10.1)
CANCER AG125 SERPL-ACNC: 5.6 U/ML (ref 0–30)
CHLORIDE SERPL-SCNC: 103 MMOL/L (ref 100–108)
CO2 SERPL-SCNC: 26 MMOL/L (ref 21–32)
CREAT SERPL-MCNC: 0.75 MG/DL (ref 0.6–1.3)
EST. AVERAGE GLUCOSE BLD GHB EST-MCNC: 114 MG/DL
GFR SERPL CREATININE-BSD FRML MDRD: 83 ML/MIN/1.73SQ M
GLUCOSE SERPL-MCNC: 105 MG/DL (ref 65–140)
HBA1C MFR BLD: 5.6 % (ref 4.2–6.3)
POTASSIUM SERPL-SCNC: 4.2 MMOL/L (ref 3.5–5.3)
PROT SERPL-MCNC: 8.3 G/DL (ref 6.4–8.2)
RH BLD: POSITIVE
SODIUM SERPL-SCNC: 140 MMOL/L (ref 136–145)
SPECIMEN EXPIRATION DATE: NORMAL

## 2019-04-02 PROCEDURE — 83036 HEMOGLOBIN GLYCOSYLATED A1C: CPT

## 2019-04-02 PROCEDURE — 86901 BLOOD TYPING SEROLOGIC RH(D): CPT

## 2019-04-02 PROCEDURE — 36415 COLL VENOUS BLD VENIPUNCTURE: CPT

## 2019-04-02 PROCEDURE — 80053 COMPREHEN METABOLIC PANEL: CPT

## 2019-04-02 PROCEDURE — 86850 RBC ANTIBODY SCREEN: CPT

## 2019-04-02 PROCEDURE — 99214 OFFICE O/P EST MOD 30 MIN: CPT | Performed by: OBSTETRICS & GYNECOLOGY

## 2019-04-02 PROCEDURE — 86900 BLOOD TYPING SEROLOGIC ABO: CPT

## 2019-04-02 PROCEDURE — 86304 IMMUNOASSAY TUMOR CA 125: CPT

## 2019-04-02 RX ORDER — CEFAZOLIN SODIUM 1 G/50ML
1000 SOLUTION INTRAVENOUS ONCE
Status: CANCELLED | OUTPATIENT
Start: 2019-04-02 | End: 2019-04-02

## 2019-04-03 ENCOUNTER — ANESTHESIA EVENT (OUTPATIENT)
Dept: PERIOP | Facility: HOSPITAL | Age: 69
End: 2019-04-03
Payer: MEDICARE

## 2019-04-04 ENCOUNTER — ANESTHESIA (OUTPATIENT)
Dept: PERIOP | Facility: HOSPITAL | Age: 69
End: 2019-04-04
Payer: MEDICARE

## 2019-04-04 ENCOUNTER — HOSPITAL ENCOUNTER (OUTPATIENT)
Facility: HOSPITAL | Age: 69
Setting detail: OUTPATIENT SURGERY
Discharge: HOME | End: 2019-04-04
Attending: OBSTETRICS & GYNECOLOGY | Admitting: OBSTETRICS & GYNECOLOGY
Payer: MEDICARE

## 2019-04-04 VITALS
OXYGEN SATURATION: 94 % | BODY MASS INDEX: 26.5 KG/M2 | TEMPERATURE: 97.4 F | HEART RATE: 77 BPM | DIASTOLIC BLOOD PRESSURE: 60 MMHG | HEIGHT: 60 IN | RESPIRATION RATE: 18 BRPM | WEIGHT: 135 LBS | SYSTOLIC BLOOD PRESSURE: 128 MMHG

## 2019-04-04 DIAGNOSIS — Z90.710 HISTORY OF ROBOT-ASSISTED LAPAROSCOPIC HYSTERECTOMY: Primary | ICD-10-CM

## 2019-04-04 DIAGNOSIS — D49.59 OVARIAN NEOPLASM: ICD-10-CM

## 2019-04-04 LAB — GLUCOSE SERPL-MCNC: 104 MG/DL (ref 65–140)

## 2019-04-04 PROCEDURE — 88307 TISSUE EXAM BY PATHOLOGIST: CPT | Performed by: PATHOLOGY

## 2019-04-04 PROCEDURE — 88112 CYTOPATH CELL ENHANCE TECH: CPT | Performed by: PATHOLOGY

## 2019-04-04 PROCEDURE — 58571 TLH W/T/O 250 G OR LESS: CPT | Performed by: OBSTETRICS & GYNECOLOGY

## 2019-04-04 PROCEDURE — NC001 PR NO CHARGE: Performed by: PHYSICIAN ASSISTANT

## 2019-04-04 PROCEDURE — 82948 REAGENT STRIP/BLOOD GLUCOSE: CPT

## 2019-04-04 RX ORDER — SODIUM CHLORIDE 9 MG/ML
INJECTION, SOLUTION INTRAVENOUS CONTINUOUS PRN
Status: DISCONTINUED | OUTPATIENT
Start: 2019-04-04 | End: 2019-04-04 | Stop reason: SURG

## 2019-04-04 RX ORDER — OXYCODONE HYDROCHLORIDE AND ACETAMINOPHEN 5; 325 MG/1; MG/1
1 TABLET ORAL EVERY 4 HOURS PRN
Qty: 15 TABLET | Refills: 0 | Status: SHIPPED | OUTPATIENT
Start: 2019-04-04 | End: 2019-04-04 | Stop reason: SDUPTHER

## 2019-04-04 RX ORDER — HYDROMORPHONE HCL/PF 1 MG/ML
0.5 SYRINGE (ML) INJECTION
Status: DISCONTINUED | OUTPATIENT
Start: 2019-04-04 | End: 2019-04-04 | Stop reason: HOSPADM

## 2019-04-04 RX ORDER — MAGNESIUM HYDROXIDE 1200 MG/15ML
LIQUID ORAL AS NEEDED
Status: DISCONTINUED | OUTPATIENT
Start: 2019-04-04 | End: 2019-04-04 | Stop reason: HOSPADM

## 2019-04-04 RX ORDER — EPHEDRINE SULFATE 50 MG/ML
INJECTION INTRAVENOUS AS NEEDED
Status: DISCONTINUED | OUTPATIENT
Start: 2019-04-04 | End: 2019-04-04 | Stop reason: SURG

## 2019-04-04 RX ORDER — METOCLOPRAMIDE HYDROCHLORIDE 5 MG/ML
10 INJECTION INTRAMUSCULAR; INTRAVENOUS ONCE AS NEEDED
Status: DISCONTINUED | OUTPATIENT
Start: 2019-04-04 | End: 2019-04-04 | Stop reason: HOSPADM

## 2019-04-04 RX ORDER — HYDROMORPHONE HCL/PF 1 MG/ML
SYRINGE (ML) INJECTION AS NEEDED
Status: DISCONTINUED | OUTPATIENT
Start: 2019-04-04 | End: 2019-04-04 | Stop reason: SURG

## 2019-04-04 RX ORDER — CEFAZOLIN SODIUM 1 G/50ML
1000 SOLUTION INTRAVENOUS ONCE
Status: COMPLETED | OUTPATIENT
Start: 2019-04-04 | End: 2019-04-04

## 2019-04-04 RX ORDER — ONDANSETRON 2 MG/ML
4 INJECTION INTRAMUSCULAR; INTRAVENOUS ONCE AS NEEDED
Status: DISCONTINUED | OUTPATIENT
Start: 2019-04-04 | End: 2019-04-04 | Stop reason: HOSPADM

## 2019-04-04 RX ORDER — GLYCOPYRROLATE 0.2 MG/ML
INJECTION INTRAMUSCULAR; INTRAVENOUS AS NEEDED
Status: DISCONTINUED | OUTPATIENT
Start: 2019-04-04 | End: 2019-04-04 | Stop reason: SURG

## 2019-04-04 RX ORDER — OXYCODONE HYDROCHLORIDE 5 MG/1
5 TABLET ORAL EVERY 4 HOURS PRN
Status: DISCONTINUED | OUTPATIENT
Start: 2019-04-04 | End: 2019-04-04 | Stop reason: HOSPADM

## 2019-04-04 RX ORDER — ROCURONIUM BROMIDE 10 MG/ML
INJECTION, SOLUTION INTRAVENOUS AS NEEDED
Status: DISCONTINUED | OUTPATIENT
Start: 2019-04-04 | End: 2019-04-04 | Stop reason: SURG

## 2019-04-04 RX ORDER — ACETAMINOPHEN 325 MG/1
650 TABLET ORAL EVERY 6 HOURS PRN
Status: DISCONTINUED | OUTPATIENT
Start: 2019-04-04 | End: 2019-04-04 | Stop reason: HOSPADM

## 2019-04-04 RX ORDER — OXYCODONE HYDROCHLORIDE 5 MG/1
10 TABLET ORAL EVERY 4 HOURS PRN
Status: DISCONTINUED | OUTPATIENT
Start: 2019-04-04 | End: 2019-04-04 | Stop reason: HOSPADM

## 2019-04-04 RX ORDER — FENTANYL CITRATE 50 UG/ML
INJECTION, SOLUTION INTRAMUSCULAR; INTRAVENOUS AS NEEDED
Status: DISCONTINUED | OUTPATIENT
Start: 2019-04-04 | End: 2019-04-04 | Stop reason: SURG

## 2019-04-04 RX ORDER — DEXAMETHASONE SODIUM PHOSPHATE 4 MG/ML
INJECTION, SOLUTION INTRA-ARTICULAR; INTRALESIONAL; INTRAMUSCULAR; INTRAVENOUS; SOFT TISSUE AS NEEDED
Status: DISCONTINUED | OUTPATIENT
Start: 2019-04-04 | End: 2019-04-04 | Stop reason: SURG

## 2019-04-04 RX ORDER — ONDANSETRON 2 MG/ML
INJECTION INTRAMUSCULAR; INTRAVENOUS AS NEEDED
Status: DISCONTINUED | OUTPATIENT
Start: 2019-04-04 | End: 2019-04-04 | Stop reason: SURG

## 2019-04-04 RX ORDER — MIDAZOLAM HYDROCHLORIDE 1 MG/ML
INJECTION INTRAMUSCULAR; INTRAVENOUS AS NEEDED
Status: DISCONTINUED | OUTPATIENT
Start: 2019-04-04 | End: 2019-04-04 | Stop reason: SURG

## 2019-04-04 RX ORDER — FENTANYL CITRATE/PF 50 MCG/ML
50 SYRINGE (ML) INJECTION
Status: DISCONTINUED | OUTPATIENT
Start: 2019-04-04 | End: 2019-04-04 | Stop reason: HOSPADM

## 2019-04-04 RX ORDER — PROPOFOL 10 MG/ML
INJECTION, EMULSION INTRAVENOUS AS NEEDED
Status: DISCONTINUED | OUTPATIENT
Start: 2019-04-04 | End: 2019-04-04 | Stop reason: SURG

## 2019-04-04 RX ORDER — NEOSTIGMINE METHYLSULFATE 1 MG/ML
INJECTION INTRAVENOUS AS NEEDED
Status: DISCONTINUED | OUTPATIENT
Start: 2019-04-04 | End: 2019-04-04 | Stop reason: SURG

## 2019-04-04 RX ORDER — BUPIVACAINE HYDROCHLORIDE 2.5 MG/ML
INJECTION, SOLUTION INFILTRATION; PERINEURAL AS NEEDED
Status: DISCONTINUED | OUTPATIENT
Start: 2019-04-04 | End: 2019-04-04 | Stop reason: HOSPADM

## 2019-04-04 RX ORDER — CEFAZOLIN SODIUM 1 G/50ML
SOLUTION INTRAVENOUS AS NEEDED
Status: DISCONTINUED | OUTPATIENT
Start: 2019-04-04 | End: 2019-04-04 | Stop reason: SURG

## 2019-04-04 RX ORDER — OXYCODONE HYDROCHLORIDE AND ACETAMINOPHEN 5; 325 MG/1; MG/1
1 TABLET ORAL EVERY 4 HOURS PRN
Qty: 15 TABLET | Refills: 0 | Status: SHIPPED | OUTPATIENT
Start: 2019-04-04 | End: 2019-04-15 | Stop reason: ALTCHOICE

## 2019-04-04 RX ORDER — SODIUM CHLORIDE 9 MG/ML
50 INJECTION, SOLUTION INTRAVENOUS CONTINUOUS
Status: CANCELLED | OUTPATIENT
Start: 2019-04-04

## 2019-04-04 RX ORDER — ONDANSETRON 2 MG/ML
4 INJECTION INTRAMUSCULAR; INTRAVENOUS EVERY 6 HOURS PRN
Status: DISCONTINUED | OUTPATIENT
Start: 2019-04-04 | End: 2019-04-04 | Stop reason: HOSPADM

## 2019-04-04 RX ADMIN — NEOSTIGMINE METHYLSULFATE 4 MG: 1 INJECTION INTRAVENOUS at 14:25

## 2019-04-04 RX ADMIN — HYDROMORPHONE HYDROCHLORIDE 0.5 MG: 1 INJECTION, SOLUTION INTRAMUSCULAR; INTRAVENOUS; SUBCUTANEOUS at 13:14

## 2019-04-04 RX ADMIN — ROCURONIUM BROMIDE 10 MG: 10 INJECTION, SOLUTION INTRAVENOUS at 13:15

## 2019-04-04 RX ADMIN — SODIUM CHLORIDE: 9 INJECTION, SOLUTION INTRAVENOUS at 11:05

## 2019-04-04 RX ADMIN — EPHEDRINE SULFATE 5 MG: 50 INJECTION, SOLUTION INTRAVENOUS at 13:00

## 2019-04-04 RX ADMIN — ROCURONIUM BROMIDE 40 MG: 10 INJECTION, SOLUTION INTRAVENOUS at 12:46

## 2019-04-04 RX ADMIN — MIDAZOLAM HYDROCHLORIDE 2 MG: 1 INJECTION, SOLUTION INTRAMUSCULAR; INTRAVENOUS at 12:37

## 2019-04-04 RX ADMIN — DEXAMETHASONE SODIUM PHOSPHATE 8 MG: 4 INJECTION, SOLUTION INTRAMUSCULAR; INTRAVENOUS at 13:14

## 2019-04-04 RX ADMIN — EPHEDRINE SULFATE 10 MG: 50 INJECTION, SOLUTION INTRAVENOUS at 13:02

## 2019-04-04 RX ADMIN — SODIUM CHLORIDE: 0.9 INJECTION, SOLUTION INTRAVENOUS at 12:55

## 2019-04-04 RX ADMIN — CEFAZOLIN SODIUM 1000 MG: 1 SOLUTION INTRAVENOUS at 11:56

## 2019-04-04 RX ADMIN — EPHEDRINE SULFATE 5 MG: 50 INJECTION, SOLUTION INTRAVENOUS at 13:56

## 2019-04-04 RX ADMIN — OXYCODONE HYDROCHLORIDE 10 MG: 5 TABLET ORAL at 17:23

## 2019-04-04 RX ADMIN — PROPOFOL 200 MG: 10 INJECTION, EMULSION INTRAVENOUS at 12:45

## 2019-04-04 RX ADMIN — ROCURONIUM BROMIDE 10 MG: 10 INJECTION, SOLUTION INTRAVENOUS at 13:30

## 2019-04-04 RX ADMIN — FENTANYL CITRATE 50 MCG: 50 INJECTION INTRAMUSCULAR; INTRAVENOUS at 13:16

## 2019-04-04 RX ADMIN — PHENYLEPHRINE HYDROCHLORIDE 20 MCG/MIN: 10 INJECTION INTRAVENOUS at 13:56

## 2019-04-04 RX ADMIN — ROCURONIUM BROMIDE 10 MG: 10 INJECTION, SOLUTION INTRAVENOUS at 13:54

## 2019-04-04 RX ADMIN — ROCURONIUM BROMIDE 10 MG: 10 INJECTION, SOLUTION INTRAVENOUS at 14:10

## 2019-04-04 RX ADMIN — GLYCOPYRROLATE 0.6 MG: 0.2 INJECTION, SOLUTION INTRAMUSCULAR; INTRAVENOUS at 14:25

## 2019-04-04 RX ADMIN — CEFAZOLIN SODIUM 1000 MG: 1 SOLUTION INTRAVENOUS at 12:35

## 2019-04-04 RX ADMIN — FENTANYL CITRATE 50 MCG: 50 INJECTION INTRAMUSCULAR; INTRAVENOUS at 13:46

## 2019-04-04 RX ADMIN — FENTANYL CITRATE 50 MCG: 50 INJECTION INTRAMUSCULAR; INTRAVENOUS at 12:45

## 2019-04-04 RX ADMIN — ONDANSETRON 4 MG: 2 INJECTION INTRAMUSCULAR; INTRAVENOUS at 14:25

## 2019-04-04 RX ADMIN — FENTANYL CITRATE 50 MCG: 50 INJECTION INTRAMUSCULAR; INTRAVENOUS at 14:45

## 2019-04-04 RX ADMIN — LIDOCAINE HYDROCHLORIDE 100 MG: 20 INJECTION, SOLUTION INTRAVENOUS at 12:44

## 2019-04-10 ENCOUNTER — TELEPHONE (OUTPATIENT)
Dept: GYNECOLOGIC ONCOLOGY | Facility: CLINIC | Age: 69
End: 2019-04-10

## 2019-04-15 ENCOUNTER — OFFICE VISIT (OUTPATIENT)
Dept: CARDIOLOGY CLINIC | Facility: CLINIC | Age: 69
End: 2019-04-15
Payer: MEDICARE

## 2019-04-15 VITALS
RESPIRATION RATE: 18 BRPM | HEART RATE: 70 BPM | DIASTOLIC BLOOD PRESSURE: 80 MMHG | WEIGHT: 134.4 LBS | SYSTOLIC BLOOD PRESSURE: 130 MMHG | BODY MASS INDEX: 26.39 KG/M2 | HEIGHT: 60 IN

## 2019-04-15 DIAGNOSIS — I35.1 NONRHEUMATIC AORTIC VALVE INSUFFICIENCY: Primary | ICD-10-CM

## 2019-04-15 DIAGNOSIS — I10 ESSENTIAL HYPERTENSION: ICD-10-CM

## 2019-04-15 PROCEDURE — 99213 OFFICE O/P EST LOW 20 MIN: CPT | Performed by: INTERNAL MEDICINE

## 2019-04-21 PROBLEM — Z90.721 S/P HYSTERECTOMY WITH OOPHORECTOMY: Status: ACTIVE | Noted: 2019-04-04

## 2019-04-24 ENCOUNTER — OFFICE VISIT (OUTPATIENT)
Dept: GYNECOLOGIC ONCOLOGY | Facility: CLINIC | Age: 69
End: 2019-04-24

## 2019-04-24 VITALS
WEIGHT: 133 LBS | HEART RATE: 88 BPM | BODY MASS INDEX: 26.11 KG/M2 | SYSTOLIC BLOOD PRESSURE: 134 MMHG | TEMPERATURE: 98 F | DIASTOLIC BLOOD PRESSURE: 80 MMHG | HEIGHT: 60 IN

## 2019-04-24 DIAGNOSIS — Z90.721 S/P HYSTERECTOMY WITH OOPHORECTOMY: Primary | ICD-10-CM

## 2019-04-24 DIAGNOSIS — Z90.710 S/P HYSTERECTOMY WITH OOPHORECTOMY: Primary | ICD-10-CM

## 2019-04-24 PROBLEM — D49.59 OVARIAN NEOPLASM: Status: RESOLVED | Noted: 2019-03-12 | Resolved: 2019-04-24

## 2019-04-24 PROCEDURE — 99024 POSTOP FOLLOW-UP VISIT: CPT | Performed by: OBSTETRICS & GYNECOLOGY

## 2019-05-02 ENCOUNTER — OFFICE VISIT (OUTPATIENT)
Dept: PAIN MEDICINE | Facility: CLINIC | Age: 69
End: 2019-05-02
Payer: MEDICARE

## 2019-05-02 VITALS
SYSTOLIC BLOOD PRESSURE: 150 MMHG | BODY MASS INDEX: 26.11 KG/M2 | HEART RATE: 76 BPM | DIASTOLIC BLOOD PRESSURE: 90 MMHG | WEIGHT: 133 LBS | HEIGHT: 60 IN

## 2019-05-02 DIAGNOSIS — M85.89 OSTEOPENIA OF MULTIPLE SITES: ICD-10-CM

## 2019-05-02 DIAGNOSIS — S32.040D CLOSED COMPRESSION FRACTURE OF L4 LUMBAR VERTEBRA WITH ROUTINE HEALING, SUBSEQUENT ENCOUNTER: Primary | ICD-10-CM

## 2019-05-02 DIAGNOSIS — M54.50 ACUTE MIDLINE LOW BACK PAIN WITHOUT SCIATICA: ICD-10-CM

## 2019-05-02 PROCEDURE — 99213 OFFICE O/P EST LOW 20 MIN: CPT | Performed by: PHYSICIAN ASSISTANT

## 2019-05-13 ENCOUNTER — RADIATION ONCOLOGY FOLLOW-UP (OUTPATIENT)
Dept: RADIATION ONCOLOGY | Facility: HOSPITAL | Age: 69
End: 2019-05-13
Attending: RADIOLOGY
Payer: MEDICARE

## 2019-05-13 VITALS
HEIGHT: 60 IN | DIASTOLIC BLOOD PRESSURE: 68 MMHG | BODY MASS INDEX: 26.07 KG/M2 | WEIGHT: 132.8 LBS | SYSTOLIC BLOOD PRESSURE: 118 MMHG | HEART RATE: 78 BPM | RESPIRATION RATE: 16 BRPM

## 2019-05-13 DIAGNOSIS — C90.30 PLASMACYTOMA (HCC): ICD-10-CM

## 2019-05-13 DIAGNOSIS — C90.30 PLASMACYTOMA (HCC): Primary | ICD-10-CM

## 2019-05-13 DIAGNOSIS — D49.59 OVARIAN NEOPLASM: Primary | ICD-10-CM

## 2019-05-13 PROCEDURE — 99215 OFFICE O/P EST HI 40 MIN: CPT | Performed by: RADIOLOGY

## 2019-05-16 ENCOUNTER — OFFICE VISIT (OUTPATIENT)
Dept: GYNECOLOGIC ONCOLOGY | Facility: CLINIC | Age: 69
End: 2019-05-16

## 2019-05-16 VITALS
DIASTOLIC BLOOD PRESSURE: 86 MMHG | HEIGHT: 60 IN | HEART RATE: 83 BPM | BODY MASS INDEX: 26.21 KG/M2 | TEMPERATURE: 97.9 F | WEIGHT: 133.5 LBS | SYSTOLIC BLOOD PRESSURE: 130 MMHG | RESPIRATION RATE: 16 BRPM

## 2019-05-16 DIAGNOSIS — Z90.710 S/P HYSTERECTOMY WITH OOPHORECTOMY: Primary | ICD-10-CM

## 2019-05-16 DIAGNOSIS — Z90.721 S/P HYSTERECTOMY WITH OOPHORECTOMY: Primary | ICD-10-CM

## 2019-05-16 PROCEDURE — 99024 POSTOP FOLLOW-UP VISIT: CPT | Performed by: OBSTETRICS & GYNECOLOGY

## 2019-05-16 PROCEDURE — 1124F ACP DISCUSS-NO DSCNMKR DOCD: CPT | Performed by: OBSTETRICS & GYNECOLOGY

## 2019-07-01 ENCOUNTER — APPOINTMENT (OUTPATIENT)
Dept: LAB | Facility: CLINIC | Age: 69
End: 2019-07-01
Payer: MEDICARE

## 2019-07-01 DIAGNOSIS — C90.30 PLASMACYTOMA (HCC): ICD-10-CM

## 2019-07-01 LAB
ALBUMIN SERPL BCP-MCNC: 3.8 G/DL (ref 3.5–5)
ALP SERPL-CCNC: 70 U/L (ref 46–116)
ALT SERPL W P-5'-P-CCNC: 22 U/L (ref 12–78)
ANION GAP SERPL CALCULATED.3IONS-SCNC: 7 MMOL/L (ref 4–13)
AST SERPL W P-5'-P-CCNC: 16 U/L (ref 5–45)
BASOPHILS # BLD AUTO: 0.01 THOUSANDS/ΜL (ref 0–0.1)
BASOPHILS NFR BLD AUTO: 0 % (ref 0–1)
BILIRUB SERPL-MCNC: 0.4 MG/DL (ref 0.2–1)
BUN SERPL-MCNC: 14 MG/DL (ref 5–25)
CALCIUM SERPL-MCNC: 9 MG/DL (ref 8.3–10.1)
CHLORIDE SERPL-SCNC: 108 MMOL/L (ref 100–108)
CO2 SERPL-SCNC: 25 MMOL/L (ref 21–32)
CREAT SERPL-MCNC: 0.66 MG/DL (ref 0.6–1.3)
EOSINOPHIL # BLD AUTO: 0.1 THOUSAND/ΜL (ref 0–0.61)
EOSINOPHIL NFR BLD AUTO: 3 % (ref 0–6)
ERYTHROCYTE [DISTWIDTH] IN BLOOD BY AUTOMATED COUNT: 12.8 % (ref 11.6–15.1)
GFR SERPL CREATININE-BSD FRML MDRD: 92 ML/MIN/1.73SQ M
GLUCOSE P FAST SERPL-MCNC: 88 MG/DL (ref 65–99)
HCT VFR BLD AUTO: 36.1 % (ref 34.8–46.1)
HGB BLD-MCNC: 12.2 G/DL (ref 11.5–15.4)
IGA SERPL-MCNC: 58 MG/DL (ref 70–400)
IGG SERPL-MCNC: 1490 MG/DL (ref 700–1600)
IGM SERPL-MCNC: 91 MG/DL (ref 40–230)
IMM GRANULOCYTES # BLD AUTO: 0.01 THOUSAND/UL (ref 0–0.2)
IMM GRANULOCYTES NFR BLD AUTO: 0 % (ref 0–2)
LDH SERPL-CCNC: 123 U/L (ref 81–234)
LYMPHOCYTES # BLD AUTO: 1.12 THOUSANDS/ΜL (ref 0.6–4.47)
LYMPHOCYTES NFR BLD AUTO: 30 % (ref 14–44)
MCH RBC QN AUTO: 31.6 PG (ref 26.8–34.3)
MCHC RBC AUTO-ENTMCNC: 33.8 G/DL (ref 31.4–37.4)
MCV RBC AUTO: 94 FL (ref 82–98)
MONOCYTES # BLD AUTO: 0.6 THOUSAND/ΜL (ref 0.17–1.22)
MONOCYTES NFR BLD AUTO: 16 % (ref 4–12)
NEUTROPHILS # BLD AUTO: 1.96 THOUSANDS/ΜL (ref 1.85–7.62)
NEUTS SEG NFR BLD AUTO: 51 % (ref 43–75)
NRBC BLD AUTO-RTO: 0 /100 WBCS
PLATELET # BLD AUTO: 284 THOUSANDS/UL (ref 149–390)
PMV BLD AUTO: 9.9 FL (ref 8.9–12.7)
POTASSIUM SERPL-SCNC: 3.8 MMOL/L (ref 3.5–5.3)
PROT SERPL-MCNC: 7.5 G/DL (ref 6.4–8.2)
RBC # BLD AUTO: 3.86 MILLION/UL (ref 3.81–5.12)
SODIUM SERPL-SCNC: 140 MMOL/L (ref 136–145)
WBC # BLD AUTO: 3.8 THOUSAND/UL (ref 4.31–10.16)

## 2019-07-01 PROCEDURE — 82784 ASSAY IGA/IGD/IGG/IGM EACH: CPT

## 2019-07-01 PROCEDURE — 83615 LACTATE (LD) (LDH) ENZYME: CPT

## 2019-07-01 PROCEDURE — 84165 PROTEIN E-PHORESIS SERUM: CPT | Performed by: PATHOLOGY

## 2019-07-01 PROCEDURE — 85025 COMPLETE CBC W/AUTO DIFF WBC: CPT

## 2019-07-01 PROCEDURE — 84166 PROTEIN E-PHORESIS/URINE/CSF: CPT | Performed by: PATHOLOGY

## 2019-07-01 PROCEDURE — 82232 ASSAY OF BETA-2 PROTEIN: CPT

## 2019-07-01 PROCEDURE — 83883 ASSAY NEPHELOMETRY NOT SPEC: CPT

## 2019-07-01 PROCEDURE — 84165 PROTEIN E-PHORESIS SERUM: CPT

## 2019-07-01 PROCEDURE — 80053 COMPREHEN METABOLIC PANEL: CPT

## 2019-07-01 PROCEDURE — 84166 PROTEIN E-PHORESIS/URINE/CSF: CPT

## 2019-07-01 PROCEDURE — 36415 COLL VENOUS BLD VENIPUNCTURE: CPT

## 2019-07-02 LAB
ALBUMIN SERPL ELPH-MCNC: 4.15 G/DL (ref 3.5–5)
ALBUMIN SERPL ELPH-MCNC: 55.3 % (ref 52–65)
ALBUMIN UR ELPH-MCNC: 100 %
ALPHA1 GLOB MFR UR ELPH: 0 %
ALPHA1 GLOB SERPL ELPH-MCNC: 0.32 G/DL (ref 0.1–0.4)
ALPHA1 GLOB SERPL ELPH-MCNC: 4.2 % (ref 2.5–5)
ALPHA2 GLOB MFR UR ELPH: 0 %
ALPHA2 GLOB SERPL ELPH-MCNC: 0.68 G/DL (ref 0.4–1.2)
ALPHA2 GLOB SERPL ELPH-MCNC: 9.1 % (ref 7–13)
B-GLOBULIN MFR UR ELPH: 0 %
B2 MICROGLOB SERPL-MCNC: 1.6 MG/L (ref 0.6–2.4)
BETA GLOB ABNORMAL SERPL ELPH-MCNC: 0.4 G/DL (ref 0.4–0.8)
BETA1 GLOB SERPL ELPH-MCNC: 5.3 % (ref 5–13)
BETA2 GLOB SERPL ELPH-MCNC: 3.6 % (ref 2–8)
BETA2+GAMMA GLOB SERPL ELPH-MCNC: 0.27 G/DL (ref 0.2–0.5)
GAMMA GLOB ABNORMAL SERPL ELPH-MCNC: 1.69 G/DL (ref 0.5–1.6)
GAMMA GLOB MFR UR ELPH: 0 %
GAMMA GLOB SERPL ELPH-MCNC: 22.5 % (ref 12–22)
IGG/ALB SER: 1.24 {RATIO} (ref 1.1–1.8)
KAPPA LC FREE SER-MCNC: 11.7 MG/L (ref 3.3–19.4)
KAPPA LC FREE/LAMBDA FREE SER: 0.08 {RATIO} (ref 0.26–1.65)
LAMBDA LC FREE SERPL-MCNC: 148.9 MG/L (ref 5.7–26.3)
PROT PATTERN SERPL ELPH-IMP: ABNORMAL
PROT PATTERN UR ELPH-IMP: NORMAL
PROT SERPL-MCNC: 7.5 G/DL (ref 6.4–8.2)
PROT UR-MCNC: 6 MG/DL

## 2019-07-08 ENCOUNTER — HOSPITAL ENCOUNTER (OUTPATIENT)
Dept: MRI IMAGING | Facility: HOSPITAL | Age: 69
Discharge: HOME | End: 2019-07-08
Attending: INTERNAL MEDICINE
Payer: MEDICARE

## 2019-07-08 DIAGNOSIS — C90.30 PLASMACYTOMA (HCC): ICD-10-CM

## 2019-07-08 PROCEDURE — A9585 GADOBUTROL INJECTION: HCPCS | Performed by: INTERNAL MEDICINE

## 2019-07-08 PROCEDURE — 72158 MRI LUMBAR SPINE W/O & W/DYE: CPT

## 2019-07-08 RX ADMIN — GADOBUTROL 6 ML: 604.72 INJECTION INTRAVENOUS at 10:49

## 2019-07-09 ENCOUNTER — HOSPITAL ENCOUNTER (OUTPATIENT)
Dept: RADIOLOGY | Facility: HOSPITAL | Age: 69
Discharge: HOME | End: 2019-07-09
Attending: RADIOLOGY | Admitting: RADIOLOGY
Payer: MEDICARE

## 2019-07-09 VITALS
OXYGEN SATURATION: 95 % | HEIGHT: 60 IN | BODY MASS INDEX: 26.31 KG/M2 | DIASTOLIC BLOOD PRESSURE: 60 MMHG | WEIGHT: 134 LBS | RESPIRATION RATE: 18 BRPM | TEMPERATURE: 98 F | SYSTOLIC BLOOD PRESSURE: 128 MMHG | HEART RATE: 76 BPM

## 2019-07-09 DIAGNOSIS — C90.30 PLASMACYTOMA (HCC): ICD-10-CM

## 2019-07-09 PROCEDURE — 88185 FLOWCYTOMETRY/TC ADD-ON: CPT

## 2019-07-09 PROCEDURE — 88305 TISSUE EXAM BY PATHOLOGIST: CPT | Performed by: PATHOLOGY

## 2019-07-09 PROCEDURE — 88342 IMHCHEM/IMCYTCHM 1ST ANTB: CPT | Performed by: PATHOLOGY

## 2019-07-09 PROCEDURE — 88313 SPECIAL STAINS GROUP 2: CPT | Performed by: PATHOLOGY

## 2019-07-09 PROCEDURE — 85097 BONE MARROW INTERPRETATION: CPT | Performed by: PATHOLOGY

## 2019-07-09 PROCEDURE — 88262 CHROMOSOME ANALYSIS 15-20: CPT | Performed by: INTERNAL MEDICINE

## 2019-07-09 PROCEDURE — 88373 M/PHMTRC ALYS ISHQUANT/SEMIQ: CPT

## 2019-07-09 PROCEDURE — 88312 SPECIAL STAINS GROUP 1: CPT | Performed by: PATHOLOGY

## 2019-07-09 PROCEDURE — 88365 INSITU HYBRIDIZATION (FISH): CPT | Performed by: PATHOLOGY

## 2019-07-09 PROCEDURE — 88367 INSITU HYBRIDIZATION AUTO: CPT | Performed by: INTERNAL MEDICINE

## 2019-07-09 PROCEDURE — 99152 MOD SED SAME PHYS/QHP 5/>YRS: CPT

## 2019-07-09 PROCEDURE — 88374 M/PHMTRC ALYS ISHQUANT/SEMIQ: CPT

## 2019-07-09 PROCEDURE — 38222 DX BONE MARROW BX & ASPIR: CPT

## 2019-07-09 PROCEDURE — 88341 IMHCHEM/IMCYTCHM EA ADD ANTB: CPT | Performed by: PATHOLOGY

## 2019-07-09 PROCEDURE — 88311 DECALCIFY TISSUE: CPT | Performed by: PATHOLOGY

## 2019-07-09 PROCEDURE — 88230 TISSUE CULTURE LYMPHOCYTE: CPT | Performed by: INTERNAL MEDICINE

## 2019-07-09 PROCEDURE — NC001 PR NO CHARGE: Performed by: PHYSICIAN ASSISTANT

## 2019-07-09 PROCEDURE — 88184 FLOWCYTOMETRY/ TC 1 MARKER: CPT | Performed by: INTERNAL MEDICINE

## 2019-07-09 PROCEDURE — 99153 MOD SED SAME PHYS/QHP EA: CPT

## 2019-07-09 PROCEDURE — 77012 CT SCAN FOR NEEDLE BIOPSY: CPT

## 2019-07-09 RX ORDER — FENTANYL CITRATE 50 UG/ML
INJECTION, SOLUTION INTRAMUSCULAR; INTRAVENOUS CODE/TRAUMA/SEDATION MEDICATION
Status: COMPLETED | OUTPATIENT
Start: 2019-07-09 | End: 2019-07-09

## 2019-07-09 RX ORDER — MIDAZOLAM HYDROCHLORIDE 1 MG/ML
INJECTION INTRAMUSCULAR; INTRAVENOUS CODE/TRAUMA/SEDATION MEDICATION
Status: COMPLETED | OUTPATIENT
Start: 2019-07-09 | End: 2019-07-09

## 2019-07-09 RX ORDER — SODIUM CHLORIDE 9 MG/ML
75 INJECTION, SOLUTION INTRAVENOUS CONTINUOUS
Status: DISCONTINUED | OUTPATIENT
Start: 2019-07-09 | End: 2019-07-09 | Stop reason: HOSPADM

## 2019-07-09 RX ADMIN — MIDAZOLAM 0.5 MG: 1 INJECTION INTRAMUSCULAR; INTRAVENOUS at 11:15

## 2019-07-09 RX ADMIN — MIDAZOLAM 0.5 MG: 1 INJECTION INTRAMUSCULAR; INTRAVENOUS at 11:11

## 2019-07-09 RX ADMIN — MIDAZOLAM 1 MG: 1 INJECTION INTRAMUSCULAR; INTRAVENOUS at 11:09

## 2019-07-09 RX ADMIN — MIDAZOLAM 0.5 MG: 1 INJECTION INTRAMUSCULAR; INTRAVENOUS at 11:25

## 2019-07-09 RX ADMIN — FENTANYL CITRATE 50 MCG: 50 INJECTION, SOLUTION INTRAMUSCULAR; INTRAVENOUS at 11:09

## 2019-07-09 RX ADMIN — FENTANYL CITRATE 25 MCG: 50 INJECTION, SOLUTION INTRAMUSCULAR; INTRAVENOUS at 11:15

## 2019-07-09 RX ADMIN — FENTANYL CITRATE 25 MCG: 50 INJECTION, SOLUTION INTRAMUSCULAR; INTRAVENOUS at 11:25

## 2019-07-09 RX ADMIN — FENTANYL CITRATE 25 MCG: 50 INJECTION, SOLUTION INTRAMUSCULAR; INTRAVENOUS at 11:11

## 2019-07-09 RX ADMIN — SODIUM CHLORIDE 75 ML/HR: 0.9 INJECTION, SOLUTION INTRAVENOUS at 09:49

## 2019-07-09 NOTE — DISCHARGE INSTRUCTIONS
Bone Marrow Biopsy     WHAT YOU NEED TO KNOW:   A bone marrow biopsy is a procedure to remove a small amount of bone marrow from your bone  Bone marrow is the soft tissue inside your bone that helps to make blood cells  The sample is tested for disease or infection  DISCHARGE INSTRUCTIONS:     1  Limit your activities day of biopsy as directed by your doctor  2  Use medication as ordered  3  Return to your normal diet  Small sips of flat soda will help with nausea  4  Remove band-aid or dressing 24 hours after procedure  Contact Interventional Radiology at [phone] [place] PATIENTS: Contact Interventional Radiology at [phone]) [place] PATIENTS: Contact Interventional Radiology at [phone]) if:    1  Difficulty breathing, nausea or vomiting  2  Chills or fever above 101 F     3  Pain at biopsy site not relieved by medication  4  Develop any redness, swelling, heat, unusual drainage, heavy bruising or bleeding from biopsy site

## 2019-07-09 NOTE — BRIEF OP NOTE (RAD/CATH)
IR BONE MARROW BIOPSY/ASPIRATION  Procedure Note    PATIENT NAME: Erwin Andrest  : 1950  MRN: [id number]     Pre-op Diagnosis:   1  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HCC)      Post-op Diagnosis:   1   Plasmacytoma [facility])        Surgeon:   Brittani Harris MD    Estimated Blood Loss:  Minimal  Findings:  Bone marrow biopsy with CT guidance    Specimens:  Aspirate and core x2    Complications:  None    Anesthesia: Conscious sedation    Brittani Harris MD     Date: 2019  Time: 11:38 AM

## 2019-07-09 NOTE — H&P
H&P Exam - Medical   Sandrita Russo 81 y o  female MRN: [id number]  Unit/Bed#: [facility] 06-01 Encounter: [id number]      History of Present Illness     HPI:  Sandrita Russo is a 81 y o  female who presents to interventional Radiology to undergo a bone marrow biopsy  Patient gives a history of having had right breast cancer 20 years ago  For for the last 3 years she has had recurring back pain and has had 8 compression fractures in her back and lost 2 5 inches in her height  At present time her back pain is not that severe and she takes Tylenol as needed  She has seen multiple healthcare professionals including chiropractic and the spine [place], and has been treated for osteoporosis  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 and underwent 5 weeks of spine radiation           Historical Information   Past Medical History:   Diagnosis Date    Anxiety     Aortic valve disorder     Breast CA ([address] )     2000-R mastectomy-took tamoxifen/femira    Cardiac dysrhythmia     Depression     History of spinal fracture     8 since 2016-1 fall related  spinal bone marrow bx today 12/18/2018    Hypertension     Osteoporosis     Palpitations     Squamous cell carcinoma of skin      Past Surgical History:   Procedure Laterality Date    APPENDECTOMY  01/2011    BREAST LUMPECTOMY      CHEILECTOMY Right     Resolved:  2006, Bunion correction    COLONOSCOPY  07/2010    Diverticula; recheck 5 yrs    DILATION AND CURETTAGE, DIAGNOSTIC / THERAPEUTIC      IR IMAGE GUIDED BIOPSY/ASPIRATION BONE  12/18/2018    MASTECTOMY Right 2000    FL LAPAROSCOPY W TOT HYSTERECTUTERUS <=250 GRAM  W TUBE/OVARY N/A 4/4/2019    Procedure: ROBOTIC TOTAL LAPAROSCOPIC HYSTERECTOMY, BILATERAL SALPINGO-OOPHORECTOMY;  Surgeon: Divine Barrera MD PhD;  Location: [facility];  Service: Gynecology Oncology     Social History   Social History     Substance and Sexual Activity   Alcohol Use Yes    Alcohol/week: 5 0 standard drinks    Types: 5 Glasses of wine per week    Frequency: 4 or more times a week    Comment: Social   5  glasses weekl;y     Social History     Substance and Sexual Activity   Drug Use No     Social History     Tobacco Use   Smoking Status Never Smoker   Smokeless Tobacco Never Used     Family History: Multiple sisters with breast cancer  Review of Systems   Constitutional: Negative for activity change, appetite change, diaphoresis, fatigue, fever and unexpected weight change  HENT: Negative for congestion and sore throat  Eyes: Negative for pain and redness  Respiratory: Negative for cough, shortness of breath and wheezing  Cardiovascular: Negative for chest pain and palpitations  Gastrointestinal: Positive for diarrhea  Negative for abdominal distention, abdominal pain, constipation, nausea and vomiting  Endocrine: Negative for cold intolerance and heat intolerance  Genitourinary: Negative for difficulty urinating and urgency  Musculoskeletal: Positive for back pain  Patient states she has relatively little back pain at this time procedures takes Tylenol  Also complains of intermittent bilateral hip pain  Skin: Negative for color change, pallor and rash  Neurological: Negative for dizziness, speech difficulty and weakness  Psychiatric/Behavioral: Negative for confusion and sleep disturbance  All other systems reviewed and are negative  Meds/Allergies   all medications and allergies reviewed  Prior to Admission medications    Medication Sig Start Date End Date Taking?  Authorizing Provider   alendronate (FOSAMAX) 70 mg tablet TAKE 1 TABLET BY MOUTH EVERY 7 DAYS 2/28/19  Yes Ken Sawant MD   Calcium Citrate-Vitamin D (CALCIUM CITRATE + D PO) Take 1 tablet by mouth 2 (two) times a day   8/13/12  Yes Historical Provider, MD   escitalopram (LEXAPRO) 10 mg tablet Take 10 mg by mouth daily   1/8/18  Yes Historical Provider, MD   metoprolol tartrate (LOPRESSOR) 50 mg tablet Take 1 5 tablets (75 mg total) by mouth 2 (two) times a day 12/10/18  Yes Elmer Sahni MD     Allergies   Allergen Reactions    Ibuprofen Abdominal Pain and Tachycardia     Reaction Date: [id number];        Objective   First Vitals:   Blood Pressure: 138/88 (07/09/19 0930)  Pulse: 79 (07/09/19 0930)  Temperature: 98 °F (36 7 °C) (07/09/19 0930)  Temp Source: Oral (07/09/19 0930)  Respirations: 18 (07/09/19 0930)  Height: 5' (152 4 cm) (07/09/19 0930)  Weight - Scale: 60 8 kg (134 lb) (07/09/19 0930)  SpO2: 98 % (07/09/19 0930)    Current Vitals:   Blood Pressure: 138/88 (07/09/19 0930)  Pulse: 79 (07/09/19 0930)  Temperature: 98 °F (36 7 °C) (07/09/19 0930)  Temp Source: Oral (07/09/19 0930)  Respirations: 18 (07/09/19 0930)  Height: 5' (152 4 cm) (07/09/19 0930)  Weight - Scale: 60 8 kg (134 lb) (07/09/19 0930)  SpO2: 98 % (07/09/19 0930)    No intake or output data in the 24 hours ending 07/09/19 0959    Invasive Devices     Peripheral Intravenous Line            Peripheral IV 07/09/19 Left Wrist less than 1 day                Physical Exam   Constitutional: She is oriented to person, place, and time  She appears well-developed and well-nourished  HENT:   Head: Normocephalic and atraumatic  Nose: Nose normal    Mouth/Throat: Oropharynx is clear and moist    Eyes: Pupils are equal, round, and reactive to light  EOM are normal    Neck: Normal range of motion  Neck supple  Cardiovascular: Normal rate and regular rhythm  Pulmonary/Chest: Effort normal and breath sounds normal  She has no wheezes  She has no rales  Abdominal: Soft  Bowel sounds are normal  She exhibits no distension and no mass  There is no tenderness  Musculoskeletal: Normal range of motion  She exhibits no edema, tenderness or deformity  No spinal tenderness   Lymphadenopathy:     She has no cervical adenopathy  Neurological: She is alert and oriented to person, place, and time  No cranial nerve deficit   Coordination normal    Skin: Skin is warm and dry  Psychiatric: She has a normal mood and affect  Lab Results:  No results found for this or any previous visit (from the past 36 hour(s))  Imaging: I have personally reviewed pertinent reports  Assessment/Plan     Assessment:  Patient Active Problem List    Diagnosis Date Noted    Acute midline low back pain without sciatica 05/02/2019    S/P hysterectomy with oophorectomy 04/04/2019    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[facility] ) 01/11/2019    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    Nonrheumatic aortic valve insufficiency 12/10/2018    Tachycardia 12/10/2018    Essential hypertension 12/10/2018    Mult fractures of thoracic spine, closed ([facility] ) 11/29/2018    Closed compression fracture of fourth lumbar vertebra ([facility] [facility] ) 11/06/2018    Back pain 03/16/2018    Closed compression fracture of thoracic vertebra ([facility] ) 03/16/2018    Mid back pain 11/17/2017    Osteopenia 08/13/2012    Malignant neoplasm of right breast ([facility] [facility] ) 08/13/2012       Plan:  She is being admitted to the intervention Radiology to undergo a bone marrow biopsy   Counseling / Coordination of Care  Total floor / unit time spent today 45 minutes minutes  Greater than 50% of total time was spent with the patient and / or family counseling and / or coordination of care  This text is generated with voice recognition software  There may be translation, syntax,  or grammatical errors  If you have any questions, please contact the dictating provider        Blossom Wheeler PA-C

## 2019-07-11 LAB — SCAN RESULT: NORMAL

## 2019-07-17 LAB — MISCELLANEOUS LAB TEST RESULT: NORMAL

## 2019-07-23 ENCOUNTER — OFFICE VISIT (OUTPATIENT)
Dept: HEMATOLOGY ONCOLOGY | Facility: CLINIC | Age: 69
End: 2019-07-23
Payer: MEDICARE

## 2019-07-23 VITALS
OXYGEN SATURATION: 98 % | BODY MASS INDEX: 26.35 KG/M2 | SYSTOLIC BLOOD PRESSURE: 136 MMHG | HEIGHT: 60 IN | DIASTOLIC BLOOD PRESSURE: 84 MMHG | TEMPERATURE: 97.7 F | RESPIRATION RATE: 18 BRPM | WEIGHT: 134.2 LBS | HEART RATE: 74 BPM

## 2019-07-23 DIAGNOSIS — C90.00 MULTIPLE MYELOMA NOT HAVING ACHIEVED REMISSION (HCC): Primary | ICD-10-CM

## 2019-07-23 DIAGNOSIS — C90.30 PLASMACYTOMA (HCC): ICD-10-CM

## 2019-07-23 DIAGNOSIS — C50.011 MALIGNANT NEOPLASM OF NIPPLE OF RIGHT BREAST IN FEMALE, UNSPECIFIED ESTROGEN RECEPTOR STATUS (HCC): ICD-10-CM

## 2019-07-23 PROCEDURE — 99215 OFFICE O/P EST HI 40 MIN: CPT | Performed by: INTERNAL MEDICINE

## 2019-07-23 NOTE — PROGRESS NOTES
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 when she presented with compression fracture of lower dorsal/lumbar spine  The last radiation treatment was on 03/07/2019  She had bone marrow test recently and that showed 15% plasma cells, scattered and in small clusters  The SPEP shows a monoclonal peak in the gamma region  Previous immunofixation 1/7/19 identified a monoclonal band as IgG lambda  Reviewed by: Vilma Wood MD  ([id number])  **Electronic Signature** Beta 2 microglobulin 1 6  Normal differential count  Normal creatinine and calcium  Albumin 3 8  IgA 58  IgG 1490 and IgM 91  Free light chain ratio 0 0 8  No spike on urine protein electrophoresis   Patient has some tiredness  She is not complaining of back pain anymore  As part of the workup she had PET-CT scan that also showed right ovarian cyst without FDG activity  Patient states she had hysterectomy and there was no cancer  Patient has remote past history of stage I, grade 1, 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  Positive hormone receptors and negative her 2  Patient had right mastectomy  She had adjuvant tamoxifen for 5 years        Current Outpatient Medications:     alendronate (FOSAMAX) 70 mg tablet, TAKE 1 TABLET BY MOUTH EVERY 7 DAYS, Disp: 12 tablet, Rfl: 3    Calcium Citrate-Vitamin D (CALCIUM CITRATE + D PO), Take 1 tablet by mouth 2 (two) times a day  , Disp: , Rfl:     escitalopram (LEXAPRO) 10 mg tablet, Take 10 mg by mouth daily  , Disp: , Rfl:     metoprolol tartrate (LOPRESSOR) 50 mg tablet, Take 1 5 tablets (75 mg total) by mouth 2 (two) times a day, Disp: 270 tablet, Rfl: 3    Allergies   Allergen Reactions    Ibuprofen Abdominal Pain and Tachycardia     Reaction Date: [id number];           Malignant neoplasm of right breast ([facility] [address] )    9/2000 Surgery     Right breast mastectomy      8/13/2012 Initial Diagnosis Malignant neoplasm of right breast [facility])       Chemotherapy     Tamoxifen for 5 years        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[facility] [address] )    12/18/2018 Initial Diagnosis     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[facility] [address] )      12/18/2018 Biopsy     Final Diagnosis   A  Bone, L-4, core biopsy:  - Fragments of trabecular bone with changes compatible with prior fracture site  - 10% lambda predominant plasmacytosis  See note  - Hematopoietic marrow with trilineage hematopoiesis  - No epithelial elements identified, confirmed with negative keratin AE1/AE3 stains  2/1/2019 - 3/7/2019 Radiation     Plan ID Energy Fractions Dose per Fraction (cGy) Dose Correction (cGy) Total Dose Delivered (cGy) Elapsed Days   L3_L5 Spine 10X/6X 25 / 25 180 0 4,500 34              ROS:  07/23/19 Reviewed 13 systems:  Presently no neurological, cardiac, pulmonary, GI and  symptoms other than mentioned above  No other symptoms like   fever, chills, bleeding, bone pains, skin rash, weight loss, arthritic symptoms,   weakness, numbness,  claudication and gait problem  No frequent infections  Not unusually sensitive to heat or cold  No swelling of the ankles  No swollen glands  Patient is anxious  Other symptoms are in HPI        /84 (BP Location: Left arm, Patient Position: Sitting, Cuff Size: Adult)   Pulse 74   Temp 97 7 °F (36 5 °C)   Resp 18   Ht 5' (1 524 m)   Wt 60 9 kg (134 lb 3 2 oz)   SpO2 98%   BMI 26 21 kg/m²     Physical Exam:    Patient is alert and oriented  She is not in distress  Vital signs are  as above  There is no scleral icterus,  no oral thrush, no palpable neck mass, clear lung fields, regular heart rate, abdomen  soft and non tender, no palpable abdominal mass, no ascites, no edema of ankles, no calf tenderness, no focal neurological deficit, no skin rash, no palpable lymphadenopathy in the neck and axillary areas, good arterial pulses, no clubbing  Patient is anxious  Performance status 0      IMAGING:      LABS:  Results for orders placed or performed during the hospital encounter of 07/09/19   Leukemia/Lymphoma flow cytometry   Result Value Ref Range    Scan Result SEE WRITTEN REPORT    MYELOMA PROGNOSIS - Miscellaneous Test   Result Value Ref Range    Miscellaneous Lab Test Result SEE WRITTEN REPORT    Chromosome analysis, peripheral blood   Result Value Ref Range    Source      Cells Counted (OS)  cells/mm2    Cells Analyzed      Cells Karyotyped      Results:      Cytogenetic Interpretation:      DIRECTOR REVIEW (CYTOGENETICS)     Tissue Exam   Result Value Ref Range    Case Report       Surgical Pathology Report                         Case: [id number]                                   Authorizing Provider:  Flex Laws MD        Collected:           07/09/2019 1122              Ordering Location:     [facility]      Received:            07/09/2019 [address]                                                   Pathologist:           Fred Watkins MD                                                        Specimens:   A) - Iliac Crest, Right, Right Iliac Crest cores x2                                                 B) - Iliac Crest, Right, Right Iliac Crest aspirate/clot                                            C) - Iliac Crest, Right, Right Iliac Crest slides x13                                      Addendum       - Fluorescence in situ hybridization (FISH) ([facility]#[id number], evaluated by JESSICA Campo ):        Interpretation  1  No evidence of IGH-MAF [translocation t(14;16)] gene rearrangement  2  No evidence of RB1 monosomy (13q14 deletion)  3 No evidence of CCND1-IGH [translocation t(11;14)] gene rearrangement, and no evidence for trisomy 11 or gain of 11q  4 No evidence of p53 (17p13) deletion or amplification  5 No evidence of FGFR3-IGH [translocation t(4;14)] gene rearrangement    6 Negative for 1q21/CKS1B gain      - Cytogenetic studies as performed on the bone marrow aspirate @ [facility] (Specimen ID: [id number], evaluated by JESSICA Kuo , [facility] ) as follows:  Karyotype:  51,XX[19]   Cytogenetics Analysis:  : Metaphases Counted Metaphases Analyzed Metaphases Karyotyped GTG Band level   Culture Type(s)   20   20   8  400       72hrU  Interpretation:  A normal female karyotype was observed in twenty metaphase cells analyzed  Within the limits of the technology utilized in this study, no consistent numerical or structural abnormality was  identified  Final Diagnosis       A -C  Bone marrow,  right iliac crest,  biopsy and aspirate:  -  Lambda light chain restricted plasma cell neoplasm compatible with plasma cell myeloma (see note)  -  Maturing trilineage hematopoiesis without significant dysplasia or increased myeloblasts  -  Adequate stainable storage iron  -  Normal reticulin fibers without fibrosis  -  Negative for collagen fibrosis, vasculitis, necrosis  -  Occasional poorly formed nonnecrotizing granulomas  Microscopic Description       Bone marrow aspirate smears: The aspirate smears are cellular and adequate for evaluation  Cellularity is composed of maturing trilineage hematopoiesis with a myeloid to erythroid ratio of 3:1  Myelopoiesis:  Myeloid precursors show normal morphology and maturation with no distinct features of dysplasia or increased blasts (myeloblasts=  <5%)  Erythropoiesis:  Erythroid precursor show normal morphology and maturation without features of dysplasia  Megakaryocytes:  Scattered megakaryocytes are present with normal appearing morphology  Lymphocytes:  Lymphocytes are not significantly increased  Plasma cells:  Plasma cells are scattered with prominent quantitative increase and occasional atypical morphologies (see below for quantitation)    Bone marrow core biopsy and aspirate clot:  Histologic sections of the aspirate clot and decalcified core biopsy are adequate for evaluation    Marrow space cellularity is mildly increased for patient age (40%)  Myelopoiesis:  Myeloid precursors show orderly maturation and distribution with no distinct features of increased blasts by H&E stain  Erythropoiesis:  Erythroid precursors show normal morphology and distribution with mildly increased appearing quantities  Megakaryocytes:  Megakaryocytes show normal quantity and distribution with normal-appearing morphology  Lymphocytes:  Scattered lymphocytes are present with occasional small benign-appearing lymphoid aggregates  Plasma cells:  Scattered plasma cells are present within the interstitium and in small groups (see below quantitation)  -  Scattered aggregates of histiocytes are present consistent with poorly formed granulomas which are associated with occasional lymphoid aggregates    Special studies:   * Iron stain performed on the aspirate smear, clot, and core biopsy highlights adequate iron stores (2/4) with no evidence of ring sideroblasts  * Reticulin stain performed on the core biopsy shows normal reticulin fibers  0 of 3 by the  Consensus grading scheme  * AFB and GMS stains are negative for acid-fast bacilli and fungal organisms, respectively    * Immunohistochemical stains were performed with appropriate controls and show the following results:  -  CD3 highlights scattered benign-appearing T cells with occasional small benign-appearing lymphoid aggregates   -  CD20 highlights scattered benign-appearing B cells with occasional small benign-appearing lymphoid aggregates  -  CD68 highlights background histiocyte lineage cells with clusters of macrophages compatible with small granulomas  -   highlights increased plasma cells within the interstitium and small clusters and aggregates accounting for approximately 15% of total cellularity  -  In situ hybridization analysis shows the plasma cells to be lambda light chain restricted with no significant kappa expression, consistent with clonality  - Keratin AE1/3 is negative for evidence of metastatic carcinoma  Note       -  CBC (7/1/19): WBC 3 80, RBC 3 86, hemoglobin 12 2, hematocrit 36 1, MCV 94, MCH 31 6, MCHC 33 8, RDW 12 8, platelets 095  Leukocyte differential count: Neutrophils 51%, lymphocytes 30%, monocytes 16%, eosinophils 3%, and basophils 0%    -  Serum protein electrophoresis (7/1/19): The SPEP shows a monoclonal peak in the gamma region  Previous immunofixation 1/7/19 identified a monoclonal band as IgG lambda  -  Urine protein electrophoresis (7/1/19): No monoclonal bands noted  Flow cytometry ([facility]# [id number], evaluated by JESSICA Carbajal )       * Interpretation: IgG lambda monoclonal plasma cell neoplasm  See comment  * Immunophenotypic Analysis:  Percentage of Abnormal Cells: 7% Cell Size: Medium Viability 7AAD: 96%  1  A monoclonal IgG lambda plasma cell (CD38 bright) population is present  The plasma cells also express  1nd CD56   2  The B-cells (2%) are polyclonal        * The following antigens were evaluated & found to be expressed as described above or by appropriate cells: CD19, CD20, CD38, CD45, CD56, , , sKappa, sLambda, sIgM, cKappa, cLambda, cIgM, cIgG, cIgA  -  Additional genetic evaluation including cytogenetic chromosomal analysis and FISH are pending and will be reported as an addendum  Overall, the combination of findings is diagnostic of a lambda light chain restricted plasma cell neoplasm most compatible with plasma cell myeloma  Correlation with clinical impression and other laboratory findings is recommended to assess for disease status and severity (smoldering myeloma versus symptomatic myeloma)  Additionally, occasional nonnecrotizing poorly formed granulomas are present of undetermined significance  These granulomas may represent a response to infection, drug effect, foreign body deposition, or sarcoidosis  Correlation with clinical impression is recommended        Additional Information       All controls performed with the immunohistochemical stains reported above reacted appropriately  These tests were developed and their performance characteristics determined by [facility] or [facility]  They may not be cleared or approved by the [place]  Food and Drug Administration  The FDA has determined that such clearance or approval is not necessary  These tests are used for clinical purposes  They should not be regarded as investigational or for research  This laboratory has been approved by [place] [address], designated as a high-complexity laboratory and is qualified to perform these tests  Intraoperative Consultation       Adequate for evaluation  Shalom Poster Vytlacil      Gross Description       A  The specimen is received in formalin, labeled with the patient's name and hospital number, and is designated "right iliac crest core  The specimen consists of 3 tan red hard tissue cores measuring 0 3 cm in greatest diameter and ranging from 0 6-1 5 cm in length  The specimen is drained into an embedding bag  Entirely submitted  One cassette  After proper fixation the specimen is placed into immunocal     B  The specimen is received in formalin, labeled with the patient's name and hospital number, and is designated "right iliac crest aspirate  The specimen consists of red-brown hemorrhagic tissue fragment measuring in aggregate of 2 1 x 2 2 x 0 5 cm  The specimen is drained into an embedding bag  Entirely submitted  One cassette  C  The specimen is received, labeled with the patient's name and hospital number, and is designated "right iliac crest snare  The specimen consists of microscopic slides submitted for histological review  No cassettes submitted                                            Note: The estimated total formalin fixation time based upon information provided by the submitting clinician and the standard processing schedule is under [address] [place]         Labs, Imaging, & Other studies:   All pertinent labs and imaging studies were personally reviewed    Lab Results   Component Value Date     11/28/2017    K 3 8 07/01/2019     07/01/2019    CO2 25 07/01/2019    ANIONGAP 9 09/02/2014    BUN 14 07/01/2019    CREATININE 0 66 07/01/2019    GLUCOSE 93 09/02/2014    GLUF 88 07/01/2019    CALCIUM 9 0 07/01/2019    AST 16 07/01/2019    ALT 22 07/01/2019    ALKPHOS 70 07/01/2019    PROT 8 0 09/02/2014    BILITOT 0 32 09/02/2014    EGFR 92 07/01/2019     Lab Results   Component Value Date    WBC 3 80 (L) 07/01/2019    HGB 12 2 07/01/2019    HCT 36 1 07/01/2019    MCV 94 07/01/2019     07/01/2019     The SPEP shows a monoclonal peak in the gamma region  Previous immunofixation 1/7/19 identified a monoclonal band as IgG lambda  Reviewed by: Marylee Snowball, MD  ([id number])  **Electronic Signature** Beta 2 microglobulin 1 6  Normal differential count  Normal creatinine and calcium  Albumin 3 8  IgA 58  IgG 1490 and IgM 91  Free light chain ratio 0 0 8  No spike on urine protein electrophoresis       Narrative         Reviewed and discussed with patient  Assessment and plan:   Patient is here with her   She received radiation therapy earlier this year to L4 area for plasmacytoma when she presented with compression fracture of lower dorsal/lumbar spine  The last radiation treatment was on 03/07/2019  She had bone marrow test recently and that showed 15% plasma cells, scattered and in small clusters  The SPEP shows a monoclonal peak in the gamma region  Previous immunofixation 1/7/19 identified a monoclonal band as IgG lambda  Reviewed by: Marylee Snowball, MD  ([id number])  **Electronic Signature** Beta 2 microglobulin 1 6  Normal differential count  Normal creatinine and calcium  Albumin 3 8  IgA 58  IgG 1490 and IgM 91  Free light chain ratio 0 0 8    No spike on urine protein electrophoresis   Patient has some tiredness  She is not complaining of back pain anymore  As part of the workup she had PET-CT scan that also showed right ovarian cyst without FDG activity  Patient states she had hysterectomy and there was no cancer  Patient has remote past history of stage I, grade 1, 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  Positive hormone receptors and negative her 2  Patient had right mastectomy  She had adjuvant tamoxifen for 5 years     Physical examination and test results are as recorded and discussed in detail especially bone marrow results  I explained to them that she has IgG lambda multiple myeloma and there is no immediate need for treatment but she needs close monitoring  Discussed treatment indications for multiple myeloma  She will have blood tests and follow-up PET-CT scan prior to next visit in November  Also patient will be seen by Dr Fernanda Bonilla in November 2019 for his expert opinion and recommendations  Arrangements are being made for the patient to see Dr Fernanda Bonilla at [facility]  Patient is going to be away in September and October 2019     All discussed in very much detail  Questions answered  Also discussed the importance of self-breast examination, eating healthy foods, staying active and health screening tests  Patient is capable of self-care         1  Malignant neoplasm of nipple of right breast in female, unspecified estrogen receptor status ([facility] [address] )    - Cancer antigen 27 29; Future    2  Multiple myeloma not having achieved remission (HCC)    - Beta 2 microglobulin, serum; Future  - CBC and differential; Future  - Comprehensive metabolic panel; Future  - IgG, IgA, IgM; Future  - Immunoglobulin free LT chains blood; Future  - LD,Blood; Future  - Protein electrophoresis, serum; Future  - NM PET CT skull base to mid thigh; Future    3  Plasmacytoma [facility])              Patient voiced understanding and agreement in the discussion         Counseling / Coordination of Care: 45 min   Greater than 50% of total time was spent with the patient and / or family counseling and / or coordination of care

## 2019-07-23 NOTE — LETTER
July 23, 2019     Sean Argueta MD  [address]  [facility] [address]    Patient: Denisse Condon   YOB: 1950   Date of Visit: 7/23/2019       Dear Dr Alfredo Carolina: Thank you for referring Claus Gibbs to me for evaluation  Below are my notes for this consultation  If you have questions, please do not hesitate to call me  I look forward to following your patient along with you  Sincerely,        Bonnie Lopez MD        CC: Merlinda Mote, MD Rayne Irvine, MD  7/23/2019  4:52 PM  Sign at close encounter    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 when she presented with compression fracture of lower dorsal/lumbar spine  The last radiation treatment was on 03/07/2019  She had bone marrow test recently and that showed 15% plasma cells, scattered and in small clusters  The SPEP shows a monoclonal peak in the gamma region  Previous immunofixation 1/7/19 identified a monoclonal band as IgG lambda  Reviewed by: Bolivar Rehman MD  ([id number])  **Electronic Signature** Beta 2 microglobulin 1 6  Normal differential count  Normal creatinine and calcium  Albumin 3 8  IgA 58  IgG 1490 and IgM 91  Free light chain ratio 0 0 8  No spike on urine protein electrophoresis   Patient has some tiredness  She is not complaining of back pain anymore  As part of the workup she had PET-CT scan that also showed right ovarian cyst without FDG activity  Patient states she had hysterectomy and there was no cancer  Patient has remote past history of stage I, grade 1, 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  Positive hormone receptors and negative her 2  Patient had right mastectomy  She had adjuvant tamoxifen for 5 years        Current Outpatient Medications:     alendronate (FOSAMAX) 70 mg tablet, TAKE 1 TABLET BY MOUTH EVERY 7 DAYS, Disp: 12 tablet, Rfl: 3    Calcium Citrate-Vitamin D (CALCIUM CITRATE + D PO), Take 1 tablet by mouth 2 (two) times a day  , Disp: , Rfl:     escitalopram (LEXAPRO) 10 mg tablet, Take 10 mg by mouth daily  , Disp: , Rfl:     metoprolol tartrate (LOPRESSOR) 50 mg tablet, Take 1 5 tablets (75 mg total) by mouth 2 (two) times a day, Disp: 270 tablet, Rfl: 3    Allergies   Allergen Reactions    Ibuprofen Abdominal Pain and Tachycardia     Reaction Date: [id number];           Malignant neoplasm of right breast ([facility] )    9/2000 Surgery     Right breast mastectomy      8/13/2012 Initial Diagnosis     Malignant neoplasm of right breast [facility])       Chemotherapy     Tamoxifen for 5 years        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[place] [address] )    12/18/2018 Initial Diagnosis     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[place] [address] )      12/18/2018 Biopsy     Final Diagnosis   A  Bone, L-4, core biopsy:  - Fragments of trabecular bone with changes compatible with prior fracture site  - 10% lambda predominant plasmacytosis  See note  - Hematopoietic marrow with trilineage hematopoiesis  - No epithelial elements identified, confirmed with negative keratin AE1/AE3 stains  2/1/2019 - 3/7/2019 Radiation     Plan ID Energy Fractions Dose per Fraction (cGy) Dose Correction (cGy) Total Dose Delivered (cGy) Elapsed Days   L3_L5 Spine 10X/6X 25 / 25 180 0 4,500 34              ROS:  07/23/19 Reviewed 13 systems:  Presently no neurological, cardiac, pulmonary, GI and  symptoms other than mentioned above  No other symptoms like   fever, chills, bleeding, bone pains, skin rash, weight loss, arthritic symptoms,   weakness, numbness,  claudication and gait problem  No frequent infections  Not unusually sensitive to heat or cold  No swelling of the ankles  No swollen glands  Patient is anxious  Other symptoms are in HPI        /84 (BP Location: Left arm, Patient Position: Sitting, Cuff Size: Adult)   Pulse 74   Temp 97 7 °F (36 5 °C)   Resp 18   Ht 5' (1 524 m)   Wt 60 9 kg (134 lb 3 2 oz)   SpO2 98%   BMI 26 21 kg/m²      Physical Exam:    Patient is alert and oriented  She is not in distress  Vital signs are  as above  There is no scleral icterus,  no oral thrush, no palpable neck mass, clear lung fields, regular heart rate, abdomen  soft and non tender, no palpable abdominal mass, no ascites, no edema of ankles, no calf tenderness, no focal neurological deficit, no skin rash, no palpable lymphadenopathy in the neck and axillary areas, good arterial pulses, no clubbing  Patient is anxious  Performance status 0      IMAGING:      LABS:  Results for orders placed or performed during the hospital encounter of 07/09/19   Leukemia/Lymphoma flow cytometry   Result Value Ref Range    Scan Result SEE WRITTEN REPORT    MYELOMA PROGNOSIS - Miscellaneous Test   Result Value Ref Range    Miscellaneous Lab Test Result SEE WRITTEN REPORT    Chromosome analysis, peripheral blood   Result Value Ref Range    Source      Cells Counted (OS)  cells/mm2    Cells Analyzed      Cells Karyotyped      Results:      Cytogenetic Interpretation:      DIRECTOR REVIEW (CYTOGENETICS)     Tissue Exam   Result Value Ref Range    Case Report       Surgical Pathology Report                         Case: [id number]                                   Authorizing Provider:  Yaritza Jimenez MD        Collected:           07/09/2019 1122              Ordering Location:     [facility]      Received:            07/09/2019 [address]                                                   Pathologist:           Alec García MD                                                        Specimens:   A) - Iliac Crest, Right, Right Iliac Crest cores x2                                                 B) - Iliac Crest, Right, Right Iliac Crest aspirate/clot                                            C) - Iliac Crest, Right, Right Iliac Crest slides x13                                      Addendum       - Fluorescence in situ hybridization (FISH) (RSKORDT#[id number], evaluated by JESSICA Valencia ):        Interpretation  1  No evidence of IGH-MAF [translocation t(14;16)] gene rearrangement  2  No evidence of RB1 monosomy (13q14 deletion)  3 No evidence of CCND1-IGH [translocation t(11;14)] gene rearrangement, and no evidence for trisomy 11 or gain of 11q  4 No evidence of p53 (17p13) deletion or amplification  5 No evidence of FGFR3-IGH [translocation t(4;14)] gene rearrangement  6 Negative for 1q21/CKS1B gain      - Cytogenetic studies as performed on the bone marrow aspirate @ [facility] (Specimen ID: [id number], evaluated by JESSICA West , [facility] ) as follows:  Karyotype:  51,XX[19]   Cytogenetics Analysis:  : Metaphases Counted Metaphases Analyzed Metaphases Karyotyped GTG Band level   Culture Type(s)   20   20   8  400       72hrU  Interpretation:  A normal female karyotype was observed in twenty metaphase cells analyzed  Within the limits of the technology utilized in this study, no consistent numerical or structural abnormality was  identified  Final Diagnosis       A -C  Bone marrow,  right iliac crest,  biopsy and aspirate:  -  Lambda light chain restricted plasma cell neoplasm compatible with plasma cell myeloma (see note)  -  Maturing trilineage hematopoiesis without significant dysplasia or increased myeloblasts  -  Adequate stainable storage iron  -  Normal reticulin fibers without fibrosis  -  Negative for collagen fibrosis, vasculitis, necrosis  -  Occasional poorly formed nonnecrotizing granulomas  Microscopic Description       Bone marrow aspirate smears: The aspirate smears are cellular and adequate for evaluation  Cellularity is composed of maturing trilineage hematopoiesis with a myeloid to erythroid ratio of 3:1  Myelopoiesis:  Myeloid precursors show normal morphology and maturation with no distinct features of dysplasia or increased blasts (myeloblasts=  <5%)    Erythropoiesis: Erythroid precursor show normal morphology and maturation without features of dysplasia  Megakaryocytes:  Scattered megakaryocytes are present with normal appearing morphology  Lymphocytes:  Lymphocytes are not significantly increased  Plasma cells:  Plasma cells are scattered with prominent quantitative increase and occasional atypical morphologies (see below for quantitation)    Bone marrow core biopsy and aspirate clot:  Histologic sections of the aspirate clot and decalcified core biopsy are adequate for evaluation  Marrow space cellularity is mildly increased for patient age (40%)  Myelopoiesis:  Myeloid precursors show orderly maturation and distribution with no distinct features of increased blasts by H&E stain  Erythropoiesis:  Erythroid precursors show normal morphology and distribution with mildly increased appearing quantities  Megakaryocytes:  Megakaryocytes show normal quantity and distribution with normal-appearing morphology  Lymphocytes:  Scattered lymphocytes are present with occasional small benign-appearing lymphoid aggregates  Plasma cells:  Scattered plasma cells are present within the interstitium and in small groups (see below quantitation)  -  Scattered aggregates of histiocytes are present consistent with poorly formed granulomas which are associated with occasional lymphoid aggregates    Special studies:   * Iron stain performed on the aspirate smear, clot, and core biopsy highlights adequate iron stores (2/4) with no evidence of ring sideroblasts  * Reticulin stain performed on the core biopsy shows normal reticulin fibers  0 of 3 by the  Consensus grading scheme  * AFB and GMS stains are negative for acid-fast bacilli and fungal organisms, respectively    * Immunohistochemical stains were performed with appropriate controls and show the following results:  -  CD3 highlights scattered benign-appearing T cells with occasional small benign-appearing lymphoid aggregates   -  CD20 highlights scattered benign-appearing B cells with occasional small benign-appearing lymphoid aggregates  -  CD68 highlights background histiocyte lineage cells with clusters of macrophages compatible with small granulomas  -   highlights increased plasma cells within the interstitium and small clusters and aggregates accounting for approximately 15% of total cellularity  -  In situ hybridization analysis shows the plasma cells to be lambda light chain restricted with no significant kappa expression, consistent with clonality  -  Keratin AE1/3 is negative for evidence of metastatic carcinoma  Note       -  CBC (7/1/19): WBC 3 80, RBC 3 86, hemoglobin 12 2, hematocrit 36 1, MCV 94, MCH 31 6, MCHC 33 8, RDW 12 8, platelets 587  Leukocyte differential count: Neutrophils 51%, lymphocytes 30%, monocytes 16%, eosinophils 3%, and basophils 0%    -  Serum protein electrophoresis (7/1/19): The SPEP shows a monoclonal peak in the gamma region  Previous immunofixation 1/7/19 identified a monoclonal band as IgG lambda  -  Urine protein electrophoresis (7/1/19): No monoclonal bands noted  Flow cytometry ([facility]# [id number], evaluated by JESSICA Hameed )       * Interpretation: IgG lambda monoclonal plasma cell neoplasm  See comment  * Immunophenotypic Analysis:  Percentage of Abnormal Cells: 7% Cell Size: Medium Viability 7AAD: 96%  1  A monoclonal IgG lambda plasma cell (CD38 bright) population is present  The plasma cells also express  1nd CD56   2  The B-cells (2%) are polyclonal        * The following antigens were evaluated & found to be expressed as described above or by appropriate cells: CD19, CD20, CD38, CD45, CD56, , , sKappa, sLambda, sIgM, cKappa, cLambda, cIgM, cIgG, cIgA  -  Additional genetic evaluation including cytogenetic chromosomal analysis and FISH are pending and will be reported as an addendum      Overall, the combination of findings is diagnostic of a lambda light chain restricted plasma cell neoplasm most compatible with plasma cell myeloma  Correlation with clinical impression and other laboratory findings is recommended to assess for disease status and severity (smoldering myeloma versus symptomatic myeloma)  Additionally, occasional nonnecrotizing poorly formed granulomas are present of undetermined significance  These granulomas may represent a response to infection, drug effect, foreign body deposition, or sarcoidosis  Correlation with clinical impression is recommended  Additional Information       All controls performed with the immunohistochemical stains reported above reacted appropriately  These tests were developed and their performance characteristics determined by [facility] or [facility]  They may not be cleared or approved by the [place]  Food and Drug Administration  The FDA has determined that such clearance or approval is not necessary  These tests are used for clinical purposes  They should not be regarded as investigational or for research  This laboratory has been approved by CLIA 88, designated as a high-complexity laboratory and is qualified to perform these tests  Intraoperative Consultation       Adequate for evaluation  [address]      Gross Description       A  The specimen is received in formalin, labeled with the patient's name and hospital number, and is designated "right iliac crest core  The specimen consists of 3 tan red hard tissue cores measuring 0 3 cm in greatest diameter and ranging from 0 6-1 5 cm in length  The specimen is drained into an embedding bag  Entirely submitted  One cassette  After proper fixation the specimen is placed into immunocal     B  The specimen is received in formalin, labeled with the patient's name and hospital number, and is designated "right iliac crest aspirate    The specimen consists of red-brown hemorrhagic tissue fragment measuring in aggregate of 2 1 x 2 2 x 0 5 cm  The specimen is drained into an embedding bag  Entirely submitted  One cassette  C  The specimen is received, labeled with the patient's name and hospital number, and is designated "right iliac crest snare  The specimen consists of microscopic slides submitted for histological review  No cassettes submitted  Note: The estimated total formalin fixation time based upon information provided by the submitting clinician and the standard processing schedule is under 72 hours  VAlvarez         Labs, Imaging, & Other studies:   All pertinent labs and imaging studies were personally reviewed    Lab Results   Component Value Date     11/28/2017    K 3 8 07/01/2019     07/01/2019    CO2 25 07/01/2019    ANIONGAP 9 09/02/2014    BUN 14 07/01/2019    CREATININE 0 66 07/01/2019    GLUCOSE 93 09/02/2014    GLUF 88 07/01/2019    CALCIUM 9 0 07/01/2019    AST 16 07/01/2019    ALT 22 07/01/2019    ALKPHOS 70 07/01/2019    PROT 8 0 09/02/2014    BILITOT 0 32 09/02/2014    EGFR 92 07/01/2019     Lab Results   Component Value Date    WBC 3 80 (L) 07/01/2019    HGB 12 2 07/01/2019    HCT 36 1 07/01/2019    MCV 94 07/01/2019     07/01/2019     The SPEP shows a monoclonal peak in the gamma region  Previous immunofixation 1/7/19 identified a monoclonal band as IgG lambda  Reviewed by: Gertrude August MD  ([id number])  **Electronic Signature** Beta 2 microglobulin 1 6  Normal differential count  Normal creatinine and calcium  Albumin 3 8  IgA 58  IgG 1490 and IgM 91  Free light chain ratio 0 0 8  No spike on urine protein electrophoresis       Narrative         Reviewed and discussed with patient  Assessment and plan:   Patient is here with her   She received radiation therapy earlier this year to L4 area for plasmacytoma when she presented with compression fracture of lower dorsal/lumbar spine    The last radiation treatment was on 03/07/2019  She had bone marrow test recently and that showed 15% plasma cells, scattered and in small clusters  The SPEP shows a monoclonal peak in the gamma region  Previous immunofixation 1/7/19 identified a monoclonal band as IgG lambda  Reviewed by: Nelli Baer MD  ([id number])  **Electronic Signature** Beta 2 microglobulin 1 6  Normal differential count  Normal creatinine and calcium  Albumin 3 8  IgA 58  IgG 1490 and IgM 91  Free light chain ratio 0 0 8  No spike on urine protein electrophoresis   Patient has some tiredness  She is not complaining of back pain anymore  As part of the workup she had PET-CT scan that also showed right ovarian cyst without FDG activity  Patient states she had hysterectomy and there was no cancer  Patient has remote past history of stage I, grade 1, 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  Positive hormone receptors and negative her 2  Patient had right mastectomy  She had adjuvant tamoxifen for 5 years     Physical examination and test results are as recorded and discussed in detail especially bone marrow results  I explained to them that she has IgG lambda multiple myeloma and there is no immediate need for treatment but she needs close monitoring  Discussed treatment indications for multiple myeloma  She will have blood tests and follow-up PET-CT scan prior to next visit in November  Also patient will be seen by Dr Ashwin Butler in November 2019 for his expert opinion and recommendations  Arrangements are being made for the patient to see Dr Ashwin Butler at [facility]  Patient is going to be away in September and October 2019     All discussed in very much detail  Questions answered  Also discussed the importance of self-breast examination, eating healthy foods, staying active and health screening tests  Patient is capable of self-care         1   Malignant neoplasm of nipple of right breast in female, unspecified estrogen receptor status ([facility] )    - Cancer antigen 27 29; Future    2  Multiple myeloma not having achieved remission (HCC)    - Beta 2 microglobulin, serum; Future  - CBC and differential; Future  - Comprehensive metabolic panel; Future  - IgG, IgA, IgM; Future  - Immunoglobulin free LT chains blood; Future  - LD,Blood; Future  - Protein electrophoresis, serum; Future  - NM PET CT skull base to mid thigh; Future    3  Plasmacytoma [facility])              Patient voiced understanding and agreement in the discussion  Counseling / Coordination of Care: 45 min   Greater than 50% of total time was spent with the patient and / or family counseling and / or coordination of care

## 2019-07-31 ENCOUNTER — TELEPHONE (OUTPATIENT)
Dept: HEMATOLOGY ONCOLOGY | Facility: CLINIC | Age: 69
End: 2019-07-31

## 2019-07-31 NOTE — TELEPHONE ENCOUNTER
Per Dr Neisha Flannery, patient is still to be scheduled with [facility], Dr Joy Osullivan  I gave her the toll free number to call and request an appt  Instructed her to call our office if she runs into any issues  Patient voiced understanding

## 2019-07-31 NOTE — TELEPHONE ENCOUNTER
Patient called stating Dr Fritz Morton wanted her to see a doctor at [facility]  Stated [facility] was recently sold to [facility] and wanted to know if she will still be seeing the recommended provider  Patient verbalized understanding to receive a call back to clarify if she will still be getting scheduled to her appointment   Please call back at [phone]

## 2019-08-01 ENCOUNTER — TELEPHONE (OUTPATIENT)
Dept: HEMATOLOGY ONCOLOGY | Facility: CLINIC | Age: 69
End: 2019-08-01

## 2019-08-01 NOTE — TELEPHONE ENCOUNTER
Patient called requesting to speak with Arsalan Parker  Stated call was regarding being scheduled with Dr Ellie Huertas from [facility]  Patient stated she was unable to schedule and appointment with Dr Ellie Huertas  Verbalized understanding to receive a call back for further assistance in scheduling   Please review and call back as needed at [phone]

## 2019-08-01 NOTE — TELEPHONE ENCOUNTER
Patient needs to be scheduled with Dr Tana Hsu at the [facility] location toward the end of the day around the second week of November  Once scheduled please call patient with the appt information

## 2019-08-08 NOTE — TELEPHONE ENCOUNTER
Patient called back because she still does not have an appointment scheduled with Dr Brandi Baez  I told her I would call Dr Josiah Ryder office and follow up

## 2019-08-15 ENCOUNTER — CLINICAL SUPPORT (OUTPATIENT)
Dept: RADIATION ONCOLOGY | Facility: HOSPITAL | Age: 69
End: 2019-08-15
Attending: RADIOLOGY

## 2019-08-15 ENCOUNTER — RADIATION ONCOLOGY FOLLOW-UP (OUTPATIENT)
Dept: RADIATION ONCOLOGY | Facility: HOSPITAL | Age: 69
End: 2019-08-15
Attending: RADIOLOGY

## 2019-08-15 VITALS
DIASTOLIC BLOOD PRESSURE: 84 MMHG | WEIGHT: 133.6 LBS | HEIGHT: 60 IN | HEART RATE: 70 BPM | SYSTOLIC BLOOD PRESSURE: 132 MMHG | BODY MASS INDEX: 26.23 KG/M2 | RESPIRATION RATE: 16 BRPM

## 2019-08-15 DIAGNOSIS — C90.30 PLASMACYTOMA (HCC): Primary | ICD-10-CM

## 2019-08-15 NOTE — PROGRESS NOTES
Junaid Broussard 1950 is a 81 y o  female     Follow up visit     Oncology History    Patient returns today for routine scheduled follow-up after completing L3-L5 spine radiation for plasmacytoma on 3/7/19  Last seen 5/13/19 7/8/19 MRI lumbar spine:   IMPRESSION:     Multifocal chronic fracture deformities affecting T10, T11, T12, L1 and L4  Footprint of radiation to the low lumbar spine  Multilevel spondylosis and osteoarthritis, not clearly compressive      Recent fracture deformity superior L5 vertebral body  This is thought to be related to osteopenia, although pathologic fracture cannot be excluded with certainty  7/9/19 Underwent CT bone marrow biopsy and aspiration  7/23/19 F/U with Dr Josephine Vera:  Patient has IgG lambda multiple myeloma  There is no immediate need for treatment but will require close monitoring  Discussed treatment indications for multiple myeloma  She will have blood tests and follow-up PET-CT scan prior to next visit in November  Will be seen by Dr Indira Quezada in November 2019 for his expert opinion and recommendations  No apt scheduled yet with Dr Indira Quezada  Follow up with Medical Oncology  10/28/19 PET scheduled    11/26/19 Dr Josephine Vera        Malignant neoplasm of right breast [facility])    9/2000 Surgery     Right breast mastectomy      8/13/2012 Initial Diagnosis     Malignant neoplasm of right breast [facility])       Chemotherapy     Tamoxifen for 5 years        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 )    12/18/2018 Initial Diagnosis     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 )      12/18/2018 Biopsy     Final Diagnosis   A  Bone, L-4, core biopsy:  - Fragments of trabecular bone with changes compatible with prior fracture site  - 10% lambda predominant plasmacytosis  See note  - Hematopoietic marrow with trilineage hematopoiesis  - No epithelial elements identified, confirmed with negative keratin AE1/AE3 stains              2/1/2019 - 3/7/2019 Radiation     Plan ID Energy Fractions Dose per Fraction (cGy) Dose Correction (cGy) Total Dose Delivered (cGy) Elapsed Days   L3_L5 Spine 10X/6X 25 / 25 180 0 4,500 34              Health Maintenance   Topic Date Due    BMI: Followup Plan  06/09/1970    DTaP,Tdap,and Td Vaccines (1 - Tdap) 06/09/1973    INFLUENZA VACCINE  07/01/2019    Fall Risk  11/19/2019    Urinary Incontinence Screening  11/19/2019    Medicare Annual Wellness Visit (AWV)  11/19/2019    Depression Screening PHQ  05/13/2020    BMI: Adult  07/23/2020    CRC Screening: Colonoscopy  07/26/2020    Hepatitis C Screening  Completed    Pneumococcal Vaccine: 65+ Years  Completed    Pneumococcal Vaccine: Pediatrics (0 to 5 Years) and At-Risk Patients (6 to 59 Years)  Aged Out    HEPATITIS B VACCINES  Aged Out       Patient Active Problem List   Diagnosis    Back pain    Mid back pain    Osteopenia    Malignant neoplasm of right breast ([address] )    Closed compression fracture of thoracic vertebra ([address] )    Closed compression fracture of fourth lumbar vertebra ([address] )    Mult fractures of thoracic spine, closed ([address] )    Nonrheumatic aortic valve insufficiency    Tachycardia    Essential hypertension    Plasmacytosis    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[address] )    S/P hysterectomy with oophorectomy    Acute midline low back pain without sciatica    Multiple myeloma not having achieved remission ([address] )     Past Medical History:   Diagnosis Date    Anxiety     Aortic valve disorder     Breast CA ([address] )     2000-R mastectomy-took tamoxifen/femira    Cardiac dysrhythmia     Depression     History of spinal fracture     8 since 2016-1 fall related  spinal bone marrow bx today 12/18/2018    Hypertension     Osteoporosis     Palpitations     Squamous cell carcinoma of skin      Past Surgical History:   Procedure Laterality Date    APPENDECTOMY  01/2011    BREAST LUMPECTOMY      CHEILECTOMY Right     Resolved:  2006, Bunion correction    COLONOSCOPY  07/2010    Diverticula; recheck 5 yrs    CT BONE MARROW BIOPSY AND ASPIRATION  7/9/2019    DILATION AND CURETTAGE, DIAGNOSTIC / THERAPEUTIC      IR IMAGE GUIDED BIOPSY/ASPIRATION BONE  12/18/2018    MASTECTOMY Right 2000    MD LAPAROSCOPY W TOT HYSTERECTUTERUS <=250 GRAM  W TUBE/OVARY N/A 4/4/2019    Procedure: ROBOTIC TOTAL LAPAROSCOPIC HYSTERECTOMY, BILATERAL SALPINGO-OOPHORECTOMY;  Surgeon: Ramón Hdz MD PhD;  [facility];  Service: Gynecology Oncology     Family History   Problem Relation Age of Onset    Diabetes Mother     Osteoporosis Mother     Heart disease Father     Breast cancer Sister     Heart attack Family     Other Family         Benign brain tumor    Club foot Family     Breast cancer Sister      Social History     Socioeconomic History    Marital status: /Civil Union     Spouse name: Damian Payer Number of children: 1    Years of education: Not on file    Highest education level: Not on file   Occupational History    Not on file   Social Needs    Financial resource strain: Not on file    Food insecurity:     Worry: Not on file     Inability: Not on file   [employer] needs:     Medical: Not on file     Non-medical: Not on file   Tobacco Use    Smoking status: Never Smoker    Smokeless tobacco: Never Used   Substance and Sexual Activity    Alcohol use:  Yes     Alcohol/week: 5 0 standard drinks     Types: 5 Glasses of wine per week     Frequency: 4 or more times a week     Comment: Social   5  glasses weekl;y    Drug use: No    Sexual activity: Yes   Lifestyle    Physical activity:     Days per week: Not on file     Minutes per session: Not on file    Stress: Not on file   Relationships    Social connections:     Talks on phone: Not on file     Gets together: Not on file     Attends Sikh service: Not on file     Active member of club or organization: Not on file     Attends meetings of clubs or organizations: Not on file     Relationship status: Not on file    Intimate partner violence:     Fear of current or ex partner: Not on file     Emotionally abused: Not on file     Physically abused: Not on file     Forced sexual activity: Not on file   Other Topics Concern    Not on file   Social History Narrative    Caffeine use       Current Outpatient Medications:     alendronate (FOSAMAX) 70 mg tablet, TAKE 1 TABLET BY MOUTH EVERY 7 DAYS, Disp: 12 tablet, Rfl: 3    Calcium Citrate-Vitamin D (CALCIUM CITRATE + D PO), Take 1 tablet by mouth 2 (two) times a day  , Disp: , Rfl:     escitalopram (LEXAPRO) 10 mg tablet, Take 10 mg by mouth daily  , Disp: , Rfl:     metoprolol tartrate (LOPRESSOR) 50 mg tablet, Take 1 5 tablets (75 mg total) by mouth 2 (two) times a day, Disp: 270 tablet, Rfl: 3  Allergies   Allergen Reactions    Ibuprofen Abdominal Pain and Tachycardia     Reaction Date: [id number];        Review of Systems:  Review of Systems   Constitutional: Positive for fatigue (not quite normal)  HENT: Negative  Eyes: Negative  Respiratory: Negative  Cardiovascular: Negative  Gastrointestinal: Positive for diarrhea (bouts of diarrhea - not new)  Endocrine: Negative  Genitourinary: Negative  Musculoskeletal: Positive for back pain (occ slight pain or twitch - none currently)  Skin: Negative  Allergic/Immunologic: Negative  Neurological: Negative  Hematological: Negative  Psychiatric/Behavioral: Negative  Vitals:    08/15/19 1130   BP: 132/84   BP Location: Left arm   Patient Position: Sitting   Cuff Size: Standard   Pulse: 70   Resp: 16   Weight: 60 6 kg (133 lb 9 6 oz)   Height: 5' (1 524 m)    Oxygen 98%    Pain assessment:0    Pain Score: 0-No pain    No results found for: PSA:    Imaging:No results found

## 2019-08-15 NOTE — PROGRESS NOTES
Follow-up - Radiation Oncology   Sp Longo 1950 81 y o  female [id number]      History of Present Illness   Cancer Staging  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  Staging form: Plasma Cell Myeloma and Disorders, AJCC 8th Edition  - Clinical: No stage assigned - Unsigned          Interval History:    Patient returns today for routine scheduled follow-up after completing L3-L5 spine radiation for plasmacytoma on 3/7/19  Last seen 5/13/19 7/8/19 MRI lumbar spine:   IMPRESSION:     Multifocal chronic fracture deformities affecting T10, T11, T12, L1 and L4  Footprint of radiation to the low lumbar spine  Multilevel spondylosis and osteoarthritis, not clearly compressive      Recent fracture deformity superior L5 vertebral body  This is thought to be related to osteopenia, although pathologic fracture cannot be excluded with certainty      4/5/22 Underwent CT bone marrow biopsy and aspiration        7/23/19 F/U with Dr Cleo Andres:  Patient has IgG lambda multiple myeloma  There is no immediate need for treatment but will require close monitoring  Discussed treatment indications for multiple myeloma  She will have blood tests and follow-up PET-CT scan prior to next visit in November  Will be seen by Dr Christian Bernstein in November 2019 for his expert opinion and recommendations        No apt scheduled yet with Dr Christian Bernstein  Follow up with Medical Oncology        10/28/19 PET scheduled     11/26/19 Dr Cleo Andres    She denies any bone pain  Energy level also is improved  Historical Information      Malignant neoplasm of right breast [facility] )    9/2000 Surgery     Right breast mastectomy      8/13/2012 Initial Diagnosis     Malignant neoplasm of right breast [facility])       Chemotherapy     Tamoxifen for 5 years        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[facility] )    12/18/2018 Initial Diagnosis     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[facility] )      12/18/2018 Biopsy     Final Diagnosis   A   Bone, L-4, core biopsy:  - Fragments of trabecular bone with changes compatible with prior fracture site   - 10% lambda predominant plasmacytosis  See note  - Hematopoietic marrow with trilineage hematopoiesis  - No epithelial elements identified, confirmed with negative keratin AE1/AE3 stains              2/1/2019 - 3/7/2019 Radiation     Plan ID Energy Fractions Dose per Fraction (cGy) Dose Correction (cGy) Total Dose Delivered (cGy) Elapsed Days   L3_L5 Spine 10X/6X 25 / 25 180 0 4,500 34              Past Medical History:   Diagnosis Date    Anxiety     Aortic valve disorder     Breast CA ([address] )     2000-R mastectomy-took tamoxifen/femira    Cardiac dysrhythmia     Depression     History of spinal fracture     8 since 2016-1 fall related  spinal bone marrow bx today 12/18/2018    Hypertension     Osteoporosis     Palpitations     Squamous cell carcinoma of skin      Past Surgical History:   Procedure Laterality Date    APPENDECTOMY  01/2011    BREAST LUMPECTOMY      CHEILECTOMY Right     Resolved:  2006, Bunion correction    COLONOSCOPY  07/2010    Diverticula; recheck 5 yrs    CT BONE MARROW BIOPSY AND ASPIRATION  7/9/2019    DILATION AND CURETTAGE, DIAGNOSTIC / THERAPEUTIC      IR IMAGE GUIDED BIOPSY/ASPIRATION BONE  12/18/2018    MASTECTOMY Right 2000    OR LAPAROSCOPY W TOT HYSTERECTUTERUS <=250 GRAM  W TUBE/OVARY N/A 4/4/2019    Procedure: ROBOTIC TOTAL LAPAROSCOPIC HYSTERECTOMY, BILATERAL SALPINGO-OOPHORECTOMY;  Surgeon: Fe Dunne MD PhD;  [facility];  Service: Gynecology Oncology       Social History   Social History     Substance and Sexual Activity   Alcohol Use Yes    Alcohol/week: 5 0 standard drinks    Types: 5 Glasses of wine per week    Frequency: 4 or more times a week    Comment: Social   5  glasses weekl;y     Social History     Substance and Sexual Activity   Drug Use No     Social History     Tobacco Use   Smoking Status Never Smoker   Smokeless Tobacco Never Used         Meds/Allergies     Current Outpatient Medications:     alendronate (FOSAMAX) 70 mg tablet, TAKE 1 TABLET BY MOUTH EVERY 7 DAYS, Disp: 12 tablet, Rfl: 3    Calcium Citrate-Vitamin D (CALCIUM CITRATE + D PO), Take 1 tablet by mouth 2 (two) times a day  , Disp: , Rfl:     escitalopram (LEXAPRO) 10 mg tablet, Take 10 mg by mouth daily  , Disp: , Rfl:     metoprolol tartrate (LOPRESSOR) 50 mg tablet, Take 1 5 tablets (75 mg total) by mouth 2 (two) times a day, Disp: 270 tablet, Rfl: 3  Allergies   Allergen Reactions    Ibuprofen Abdominal Pain and Tachycardia     Reaction Date: [id number];          Review of Systems    Constitutional: Positive for fatigue (not quite normal)  HENT: Negative  Eyes: Negative  Respiratory: Negative  Cardiovascular: Negative  Gastrointestinal: Positive for diarrhea (bouts of diarrhea - not new)  Endocrine: Negative  Genitourinary: Negative  Musculoskeletal: Positive for back pain (occ slight pain or twitch - none currently)  Skin: Negative  Allergic/Immunologic: Negative  Neurological: Negative  Hematological: Negative  Psychiatric/Behavioral: Negative        OBJECTIVE:   /84 (BP Location: Left arm, Patient Position: Sitting, Cuff Size: Standard)   Pulse 70   Resp 16   Ht 5' (1 524 m)   Wt 60 6 kg (133 lb 9 6 oz)   BMI 26 09 kg/m²   Pain Assessment:  0  ECOG/Zubrod/WHO: 0 - Asymptomatic    Physical Exam   Constitutional: She appears well-developed  HENT:   Head: Normocephalic  Hair is normal    Mouth/Throat: Oropharynx is clear and moist    Eyes: EOM are normal  No scleral icterus  Neck: Neck supple  No spinous process tenderness present  No edema present  Cardiovascular: Normal rate and regular rhythm  Pulmonary/Chest: Effort normal and breath sounds normal  No respiratory distress  She has no wheezes  She exhibits no tenderness  No breast tenderness  Abdominal: Soft  Bowel sounds are normal  She exhibits no distension, no ascites and no mass  There is no hepatosplenomegaly  There is no tenderness   There is no rebound  Genitourinary: No breast tenderness  Musculoskeletal: Normal range of motion  She exhibits no edema or tenderness  No spinal tenderness to palpation she is ambulating independently   Lymphadenopathy:     She has no cervical adenopathy  She has no axillary adenopathy  Neurological: She is alert  She has normal strength  No cranial nerve deficit  Coordination and gait normal    Skin: Skin is intact  Skin in the radiated field is in good condition  No hyperpigmentation or erythema   Psychiatric: She has a normal mood and affect  Her speech is normal and behavior is normal    Vitals reviewed  RESULTS    Lab Results: No results found for this or any previous visit (from the past 672 hour(s))  Imaging Studies:No results found  Assessment/Plan:  No orders of the defined types were placed in this encounter  Jocelynn Wahl is a 81y o  year old female who is 5 months status post radiation therapy to L3 through 5 for presumed plasmacytoma at the time  Since she was last seen she has undergone bone marrow biopsy in diagnosed with multiple myeloma  This is currently being observed  She will be seeking opinion by Dr Tristen Denney  She did undergo MRI of the lumbar spine which was without any significant disease  Clinically she has had complete relief of her pain  She will return to our office in 6 months for follow-up      Willa Lucero MD  8/15/2019,12:07 PM    Portions of the record may have been created with voice recognition software   Occasional wrong word or "sound a like" substitutions may have occurred due to the inherent limitations of voice recognition software   Read the chart carefully and recognize, using context, where substitutions have occurred

## 2019-08-21 ENCOUNTER — ANNUAL EXAM (OUTPATIENT)
Dept: OBGYN CLINIC | Facility: MEDICAL CENTER | Age: 69
End: 2019-08-21
Payer: MEDICARE

## 2019-08-21 VITALS
WEIGHT: 133.4 LBS | SYSTOLIC BLOOD PRESSURE: 138 MMHG | BODY MASS INDEX: 26.19 KG/M2 | HEIGHT: 60 IN | DIASTOLIC BLOOD PRESSURE: 82 MMHG

## 2019-08-21 DIAGNOSIS — Z01.419 ENCOUNTER FOR GYNECOLOGICAL EXAMINATION (GENERAL) (ROUTINE) WITHOUT ABNORMAL FINDINGS: ICD-10-CM

## 2019-08-21 DIAGNOSIS — Z12.31 ENCOUNTER FOR SCREENING MAMMOGRAM FOR MALIGNANT NEOPLASM OF BREAST: Primary | ICD-10-CM

## 2019-08-21 PROCEDURE — G0145 SCR C/V CYTO,THINLAYER,RESCR: HCPCS | Performed by: OBSTETRICS & GYNECOLOGY

## 2019-08-21 PROCEDURE — G0101 CA SCREEN;PELVIC/BREAST EXAM: HCPCS | Performed by: OBSTETRICS & GYNECOLOGY

## 2019-08-21 NOTE — PROGRESS NOTES
Assessment/Plan: This 73-year-old patient is seen for her gyn evaluation at this time  In March 2019 she had a pelvic ultrasound which showed a solid and cystic right ovary  She she has since had a laparoscopic hysterectomy and bilateral salping     Subjective:      Patient ID: Huang Saldaña is a 81 y o  female  This 73-year-old patient has had no vaginal bleeding or episodes of vaginitis over the past year  She does use a lubricant with intercourse and is comfortable  She had a hysterectomy laparoscopically and BSO this year due to a suspicious right ovarian tumor  The tissues proved to be benign  She has no chronic headaches or fainting spells  Her bowel and bladder function are normal   She has had no urinary tract infections over the past year  At time she may wear a liner  Her weight is 133 lb and blood pressure 138/82  Review of Systems   Constitutional: Negative  HENT: Negative  Eyes: Negative  Respiratory: Negative  Cardiovascular: Negative  Gastrointestinal: Negative  Endocrine: Negative  Genitourinary: Negative  Musculoskeletal: Negative  Skin: Negative  Allergic/Immunologic: Negative  Neurological: Negative  Hematological: Negative  Psychiatric/Behavioral: Negative  Objective:      /82 (BP Location: Left arm, Patient Position: Sitting, Cuff Size: Standard)   Ht 5' (1 524 m)   Wt 60 5 kg (133 lb 6 4 oz)   BMI 26 05 kg/m²          Physical Exam   Constitutional: She is oriented to person, place, and time  She appears well-developed and well-nourished  HENT:   Head: Normocephalic  Neck: Normal range of motion  Neck supple  Cardiovascular: Normal rate, regular rhythm and intact distal pulses  Murmur heard  Pulmonary/Chest: Effort normal and breath sounds normal    Abdominal: Soft  Bowel sounds are normal    Genitourinary:   Genitourinary Comments: There is no uterus due to previous hysterectomy     Musculoskeletal: Normal range of motion  Neurological: She is alert and oriented to person, place, and time  Skin: Skin is warm and dry  Psychiatric: She has a normal mood and affect  Nursing note and vitals reviewed  the uterus is absent there are no pelvic masses present  There is no blood or discharge in the vagina  The vaginal cuff is well supported  The vulva is normal   Rectal exam shows no masses or blood in the rectum and no nodularity in the cul-de-sac  Left   Breast exam is normal   The right breast is absent due to previous mastectomy

## 2019-08-23 LAB
LAB AP GYN PRIMARY INTERPRETATION: NORMAL
Lab: NORMAL

## 2019-10-13 DIAGNOSIS — F41.9 ANXIETY: Primary | ICD-10-CM

## 2019-10-14 RX ORDER — ESCITALOPRAM OXALATE 10 MG/1
TABLET ORAL
Qty: 90 TABLET | Refills: 3 | Status: SHIPPED | OUTPATIENT
Start: 2019-10-14 | End: 2020-09-29

## 2019-10-21 ENCOUNTER — APPOINTMENT (OUTPATIENT)
Dept: LAB | Facility: CLINIC | Age: 69
End: 2019-10-21
Payer: MEDICARE

## 2019-10-21 DIAGNOSIS — C50.011 MALIGNANT NEOPLASM OF NIPPLE OF RIGHT BREAST IN FEMALE, UNSPECIFIED ESTROGEN RECEPTOR STATUS (HCC): ICD-10-CM

## 2019-10-21 DIAGNOSIS — C90.00 MULTIPLE MYELOMA NOT HAVING ACHIEVED REMISSION (HCC): ICD-10-CM

## 2019-10-21 LAB
ALBUMIN SERPL BCP-MCNC: 3.9 G/DL (ref 3.5–5)
ALP SERPL-CCNC: 69 U/L (ref 46–116)
ALT SERPL W P-5'-P-CCNC: 20 U/L (ref 12–78)
ANION GAP SERPL CALCULATED.3IONS-SCNC: 7 MMOL/L (ref 4–13)
AST SERPL W P-5'-P-CCNC: 14 U/L (ref 5–45)
BASOPHILS # BLD AUTO: 0.01 THOUSANDS/ΜL (ref 0–0.1)
BASOPHILS NFR BLD AUTO: 0 % (ref 0–1)
BILIRUB SERPL-MCNC: 0.51 MG/DL (ref 0.2–1)
BUN SERPL-MCNC: 15 MG/DL (ref 5–25)
CALCIUM SERPL-MCNC: 9.1 MG/DL (ref 8.3–10.1)
CANCER AG27-29 SERPL-ACNC: 9.7 U/ML (ref 0–42.3)
CHLORIDE SERPL-SCNC: 105 MMOL/L (ref 100–108)
CO2 SERPL-SCNC: 25 MMOL/L (ref 21–32)
CREAT SERPL-MCNC: 0.7 MG/DL (ref 0.6–1.3)
EOSINOPHIL # BLD AUTO: 0.07 THOUSAND/ΜL (ref 0–0.61)
EOSINOPHIL NFR BLD AUTO: 2 % (ref 0–6)
ERYTHROCYTE [DISTWIDTH] IN BLOOD BY AUTOMATED COUNT: 13.6 % (ref 11.6–15.1)
GFR SERPL CREATININE-BSD FRML MDRD: 90 ML/MIN/1.73SQ M
GLUCOSE P FAST SERPL-MCNC: 98 MG/DL (ref 65–99)
HCT VFR BLD AUTO: 37.5 % (ref 34.8–46.1)
HGB BLD-MCNC: 12.6 G/DL (ref 11.5–15.4)
IGA SERPL-MCNC: 67 MG/DL (ref 70–400)
IGG SERPL-MCNC: 1620 MG/DL (ref 700–1600)
IGM SERPL-MCNC: 95 MG/DL (ref 40–230)
IMM GRANULOCYTES # BLD AUTO: 0.01 THOUSAND/UL (ref 0–0.2)
IMM GRANULOCYTES NFR BLD AUTO: 0 % (ref 0–2)
LDH SERPL-CCNC: 82 U/L (ref 81–234)
LYMPHOCYTES # BLD AUTO: 1.4 THOUSANDS/ΜL (ref 0.6–4.47)
LYMPHOCYTES NFR BLD AUTO: 30 % (ref 14–44)
MCH RBC QN AUTO: 31.8 PG (ref 26.8–34.3)
MCHC RBC AUTO-ENTMCNC: 33.6 G/DL (ref 31.4–37.4)
MCV RBC AUTO: 95 FL (ref 82–98)
MONOCYTES # BLD AUTO: 0.74 THOUSAND/ΜL (ref 0.17–1.22)
MONOCYTES NFR BLD AUTO: 16 % (ref 4–12)
NEUTROPHILS # BLD AUTO: 2.4 THOUSANDS/ΜL (ref 1.85–7.62)
NEUTS SEG NFR BLD AUTO: 52 % (ref 43–75)
NRBC BLD AUTO-RTO: 0 /100 WBCS
PLATELET # BLD AUTO: 326 THOUSANDS/UL (ref 149–390)
PMV BLD AUTO: 9.8 FL (ref 8.9–12.7)
POTASSIUM SERPL-SCNC: 3.7 MMOL/L (ref 3.5–5.3)
PROT SERPL-MCNC: 8.1 G/DL (ref 6.4–8.2)
RBC # BLD AUTO: 3.96 MILLION/UL (ref 3.81–5.12)
SODIUM SERPL-SCNC: 137 MMOL/L (ref 136–145)
WBC # BLD AUTO: 4.63 THOUSAND/UL (ref 4.31–10.16)

## 2019-10-21 PROCEDURE — 84165 PROTEIN E-PHORESIS SERUM: CPT | Performed by: PATHOLOGY

## 2019-10-21 PROCEDURE — 83615 LACTATE (LD) (LDH) ENZYME: CPT

## 2019-10-21 PROCEDURE — 83883 ASSAY NEPHELOMETRY NOT SPEC: CPT

## 2019-10-21 PROCEDURE — 82232 ASSAY OF BETA-2 PROTEIN: CPT

## 2019-10-21 PROCEDURE — 85025 COMPLETE CBC W/AUTO DIFF WBC: CPT

## 2019-10-21 PROCEDURE — 86300 IMMUNOASSAY TUMOR CA 15-3: CPT

## 2019-10-21 PROCEDURE — 84165 PROTEIN E-PHORESIS SERUM: CPT

## 2019-10-21 PROCEDURE — 80053 COMPREHEN METABOLIC PANEL: CPT

## 2019-10-21 PROCEDURE — 82784 ASSAY IGA/IGD/IGG/IGM EACH: CPT

## 2019-10-21 PROCEDURE — 36415 COLL VENOUS BLD VENIPUNCTURE: CPT

## 2019-10-22 LAB
KAPPA LC FREE SER-MCNC: 10.6 MG/L (ref 3.3–19.4)
KAPPA LC FREE/LAMBDA FREE SER: 0.06 {RATIO} (ref 0.26–1.65)
LAMBDA LC FREE SERPL-MCNC: 173 MG/L (ref 5.7–26.3)

## 2019-10-23 LAB
ALBUMIN SERPL ELPH-MCNC: 4.35 G/DL (ref 3.5–5)
ALBUMIN SERPL ELPH-MCNC: 56.5 % (ref 52–65)
ALPHA1 GLOB SERPL ELPH-MCNC: 0.32 G/DL (ref 0.1–0.4)
ALPHA1 GLOB SERPL ELPH-MCNC: 4.1 % (ref 2.5–5)
ALPHA2 GLOB SERPL ELPH-MCNC: 0.72 G/DL (ref 0.4–1.2)
ALPHA2 GLOB SERPL ELPH-MCNC: 9.3 % (ref 7–13)
B2 MICROGLOB SERPL-MCNC: 1.7 MG/L (ref 0.6–2.4)
BETA GLOB ABNORMAL SERPL ELPH-MCNC: 0.38 G/DL (ref 0.4–0.8)
BETA1 GLOB SERPL ELPH-MCNC: 4.9 % (ref 5–13)
BETA2 GLOB SERPL ELPH-MCNC: 3.2 % (ref 2–8)
BETA2+GAMMA GLOB SERPL ELPH-MCNC: 0.25 G/DL (ref 0.2–0.5)
GAMMA GLOB ABNORMAL SERPL ELPH-MCNC: 1.69 G/DL (ref 0.5–1.6)
GAMMA GLOB SERPL ELPH-MCNC: 22 % (ref 12–22)
IGG/ALB SER: 1.3 {RATIO} (ref 1.1–1.8)
M PROTEIN 1 MFR SERPL ELPH: 13.9 %
M PROTEIN 1 SERPL ELPH-MCNC: 1.07 G/DL
PROT PATTERN SERPL ELPH-IMP: ABNORMAL
PROT SERPL-MCNC: 7.7 G/DL (ref 6.4–8.2)

## 2019-10-28 ENCOUNTER — HOSPITAL ENCOUNTER (OUTPATIENT)
Dept: RADIOLOGY | Age: 69
Discharge: HOME | End: 2019-10-28
Payer: MEDICARE

## 2019-10-28 DIAGNOSIS — C90.00 MULTIPLE MYELOMA NOT HAVING ACHIEVED REMISSION (HCC): ICD-10-CM

## 2019-10-28 LAB — GLUCOSE SERPL-MCNC: 110 MG/DL (ref 65–140)

## 2019-10-28 PROCEDURE — 78815 PET IMAGE W/CT SKULL-THIGH: CPT

## 2019-10-28 PROCEDURE — A9552 F18 FDG: HCPCS

## 2019-10-28 PROCEDURE — 82948 REAGENT STRIP/BLOOD GLUCOSE: CPT

## 2019-10-29 ENCOUNTER — OFFICE VISIT (OUTPATIENT)
Dept: CARDIOLOGY CLINIC | Facility: CLINIC | Age: 69
End: 2019-10-29
Payer: MEDICARE

## 2019-10-29 VITALS
DIASTOLIC BLOOD PRESSURE: 80 MMHG | HEART RATE: 75 BPM | WEIGHT: 133.8 LBS | SYSTOLIC BLOOD PRESSURE: 130 MMHG | RESPIRATION RATE: 18 BRPM | BODY MASS INDEX: 26.27 KG/M2 | HEIGHT: 60 IN

## 2019-10-29 DIAGNOSIS — I35.1 NONRHEUMATIC AORTIC VALVE INSUFFICIENCY: Primary | ICD-10-CM

## 2019-10-29 DIAGNOSIS — I10 ESSENTIAL HYPERTENSION: ICD-10-CM

## 2019-10-29 PROCEDURE — 99213 OFFICE O/P EST LOW 20 MIN: CPT | Performed by: INTERNAL MEDICINE

## 2019-10-29 NOTE — LETTER
October 29, 2019     Yves Quach MD  [address] [facility]  [id number]    Patient: Shy Meade   YOB: 1950   Date of Visit: 10/29/2019       Dear Dr Catherine Chu: Thank you for referring Bella White to me for evaluation  Below are my notes for this consultation  If you have questions, please do not hesitate to call me  I look forward to following your patient along with you  Sincerely,        Ryland Woody MD        CC: No Recipients  Ryland Woody MD  10/29/2019  2:26 PM  Sign at close encounter  Assessment/Plan:    Nonrheumatic aortic valve insufficiency  Hypertension, stable and adequately controlled  Essential hypertension  Moderate aortic insufficiency, stable  We will continue observation  Diagnoses and all orders for this visit:    Nonrheumatic aortic valve insufficiency    Essential hypertension          Subjective:  Feels well from the cardiac standpoint  Patient ID: Shy Meade is a 81 y o  female  Patient presented to this office for the purpose of cardiac follow-up  She has a history of moderate aortic insufficiency as well as hypertension  The patient has been diagnosed recently multiple myeloma and she is undergoing testing  She has not started any treatment at this point  The patient denies any symptoms of chest pain, shortness of breath, palpitation, dizziness or lightheadedness  She has no leg edema  The following portions of the patient's history were reviewed and updated as appropriate: allergies, current medications, past family history, past medical history, past social history, past surgical history and problem list     Review of Systems   Respiratory: Negative for apnea, cough, chest tightness, shortness of breath and wheezing  Cardiovascular: Negative for chest pain, palpitations and leg swelling  Gastrointestinal: Negative for abdominal pain  Neurological: Negative for dizziness and light-headedness           Objective: Stable cardiac-wise  /80 (BP Location: Right arm, Patient Position: Sitting)   Pulse 75   Resp 18   Ht 5' (1 524 m)   Wt 60 7 kg (133 lb 12 8 oz)   BMI 26 13 kg/m²           Physical Exam   Constitutional: She is oriented to person, place, and time  She appears well-developed and well-nourished  No distress  HENT:   Head: Normocephalic and atraumatic  Neck: Normal range of motion  Neck supple  No JVD present  No thyromegaly present  Cardiovascular: Normal rate, regular rhythm, S1 normal and S2 normal  Exam reveals no gallop and no friction rub  Murmur heard  Systolic murmur is present with a grade of 2/6  Diastolic murmur is present with a grade of 1/6  Pulmonary/Chest: Effort normal  No respiratory distress  She has no wheezes  She has no rales  She exhibits no tenderness  Abdominal: Soft  Musculoskeletal: She exhibits no edema  Neurological: She is oriented to person, place, and time  Skin: Skin is warm and dry  She is not diaphoretic  Psychiatric: She has a normal mood and affect  Vitals reviewed

## 2019-10-29 NOTE — PROGRESS NOTES
Assessment/Plan:    Nonrheumatic aortic valve insufficiency  Hypertension, stable and adequately controlled  Essential hypertension  Moderate aortic insufficiency, stable  We will continue observation  Diagnoses and all orders for this visit:    Nonrheumatic aortic valve insufficiency    Essential hypertension          Subjective:  Feels well from the cardiac standpoint  Patient ID: Ariadna Alvarado is a 81 y o  female  Patient presented to this office for the purpose of cardiac follow-up  She has a history of moderate aortic insufficiency as well as hypertension  The patient has been diagnosed recently multiple myeloma and she is undergoing testing  She has not started any treatment at this point  The patient denies any symptoms of chest pain, shortness of breath, palpitation, dizziness or lightheadedness  She has no leg edema  The following portions of the patient's history were reviewed and updated as appropriate: allergies, current medications, past family history, past medical history, past social history, past surgical history and problem list     Review of Systems   Respiratory: Negative for apnea, cough, chest tightness, shortness of breath and wheezing  Cardiovascular: Negative for chest pain, palpitations and leg swelling  Gastrointestinal: Negative for abdominal pain  Neurological: Negative for dizziness and light-headedness  Objective:  Stable cardiac-wise  /80 (BP Location: Right arm, Patient Position: Sitting)   Pulse 75   Resp 18   Ht 5' (1 524 m)   Wt 60 7 kg (133 lb 12 8 oz)   BMI 26 13 kg/m²          Physical Exam   Constitutional: She is oriented to person, place, and time  She appears well-developed and well-nourished  No distress  HENT:   Head: Normocephalic and atraumatic  Neck: Normal range of motion  Neck supple  No JVD present  No thyromegaly present     Cardiovascular: Normal rate, regular rhythm, S1 normal and S2 normal  Exam reveals no gallop and no friction rub  Murmur heard  Systolic murmur is present with a grade of 2/6  Diastolic murmur is present with a grade of 1/6  Pulmonary/Chest: Effort normal  No respiratory distress  She has no wheezes  She has no rales  She exhibits no tenderness  Abdominal: Soft  Musculoskeletal: She exhibits no edema  Neurological: She is oriented to person, place, and time  Skin: Skin is warm and dry  She is not diaphoretic  Psychiatric: She has a normal mood and affect  Vitals reviewed

## 2019-11-20 ENCOUNTER — HOSPITAL ENCOUNTER (OUTPATIENT)
Dept: RADIOLOGY | Age: 69
Discharge: HOME | End: 2019-11-20
Payer: MEDICARE

## 2019-11-20 VITALS — HEIGHT: 60 IN | WEIGHT: 134 LBS | BODY MASS INDEX: 26.31 KG/M2

## 2019-11-20 DIAGNOSIS — Z12.31 ENCOUNTER FOR SCREENING MAMMOGRAM FOR MALIGNANT NEOPLASM OF BREAST: ICD-10-CM

## 2019-11-20 PROCEDURE — 77067 SCR MAMMO BI INCL CAD: CPT

## 2019-11-20 PROCEDURE — 77063 BREAST TOMOSYNTHESIS BI: CPT

## 2019-11-21 ENCOUNTER — OFFICE VISIT (OUTPATIENT)
Dept: INTERNAL MEDICINE CLINIC | Facility: CLINIC | Age: 69
End: 2019-11-21
Payer: MEDICARE

## 2019-11-21 VITALS
OXYGEN SATURATION: 98 % | BODY MASS INDEX: 26.01 KG/M2 | WEIGHT: 132.5 LBS | SYSTOLIC BLOOD PRESSURE: 144 MMHG | DIASTOLIC BLOOD PRESSURE: 78 MMHG | HEIGHT: 60 IN | HEART RATE: 76 BPM

## 2019-11-21 DIAGNOSIS — I10 ESSENTIAL HYPERTENSION: ICD-10-CM

## 2019-11-21 DIAGNOSIS — Z00.00 MEDICARE ANNUAL WELLNESS VISIT, SUBSEQUENT: ICD-10-CM

## 2019-11-21 DIAGNOSIS — C50.011 MALIGNANT NEOPLASM OF NIPPLE OF RIGHT BREAST IN FEMALE, UNSPECIFIED ESTROGEN RECEPTOR STATUS (HCC): ICD-10-CM

## 2019-11-21 DIAGNOSIS — M85.89 OSTEOPENIA OF MULTIPLE SITES: ICD-10-CM

## 2019-11-21 DIAGNOSIS — Z23 NEED FOR INFLUENZA VACCINATION: Primary | ICD-10-CM

## 2019-11-21 DIAGNOSIS — E78.5 DYSLIPIDEMIA: ICD-10-CM

## 2019-11-21 DIAGNOSIS — C90.00 MULTIPLE MYELOMA NOT HAVING ACHIEVED REMISSION (HCC): ICD-10-CM

## 2019-11-21 DIAGNOSIS — S32.040D CLOSED COMPRESSION FRACTURE OF L4 LUMBAR VERTEBRA WITH ROUTINE HEALING, SUBSEQUENT ENCOUNTER: ICD-10-CM

## 2019-11-21 PROBLEM — M54.9 BACK PAIN: Status: RESOLVED | Noted: 2018-03-16 | Resolved: 2019-11-21

## 2019-11-21 PROBLEM — M54.9 MID BACK PAIN: Status: RESOLVED | Noted: 2017-11-17 | Resolved: 2019-11-21

## 2019-11-21 PROBLEM — M54.50 ACUTE MIDLINE LOW BACK PAIN WITHOUT SCIATICA: Status: RESOLVED | Noted: 2019-05-02 | Resolved: 2019-11-21

## 2019-11-21 PROCEDURE — G0008 ADMIN INFLUENZA VIRUS VAC: HCPCS

## 2019-11-21 PROCEDURE — 90662 IIV NO PRSV INCREASED AG IM: CPT

## 2019-11-21 PROCEDURE — G0439 PPPS, SUBSEQ VISIT: HCPCS | Performed by: INTERNAL MEDICINE

## 2019-11-21 RX ORDER — METOPROLOL TARTRATE 50 MG/1
TABLET, FILM COATED ORAL
Qty: 270 TABLET | Refills: 3
Start: 2019-11-21 | End: 2020-03-06

## 2019-11-21 NOTE — PROGRESS NOTES
Assessment and Plan:   Osteopenia with compression fractures in the spine-new? lumbar compression deformities  She is due for  A DXA scan  She has been on Fosamax for about a year and a half  Consider alternative treatment  F/U with Dr Alla Toussaint as scheduled      Problem List Items Addressed This Visit        Cardiovascular and Mediastinum    Essential hypertension    Relevant Medications    metoprolol tartrate (LOPRESSOR) 50 mg tablet    Other Relevant Orders    Lipid panel       Musculoskeletal and Integument    Osteopenia    Relevant Orders    DXA bone density spine hip and pelvis    Closed compression fracture of fourth lumbar vertebra (HCC)    Relevant Orders    DXA bone density spine hip and pelvis       Other    Malignant neoplasm of right breast ([facility] [address] )    Multiple myeloma not having achieved remission ([facility] [address] )      Other Visit Diagnoses     Need for influenza vaccination    -  Primary    Relevant Orders    influenza vaccine, 4743-4016, high-dose, PF 0 5 mL (FLUZONE HIGH-DOSE) (Completed)    Medicare annual wellness visit, subsequent        Dyslipidemia        Relevant Orders    Lipid panel        BMI Counseling: Body mass index is 25 88 kg/m²  The BMI is above normal  Nutrition recommendations include encouraging healthy choices of fruits and vegetables, consuming healthier snacks and moderation in carbohydrate intake  Preventive health issues were discussed with patient, and age appropriate screening tests were ordered as noted in patient's After Visit Summary  Personalized health advice and appropriate referrals for health education or preventive services given if needed, as noted in patient's After Visit Summary  History of Present Illness:     Patient presents for Welcome to Medicare visit       Patient Care Team:  Heather Ponce MD as PCP - General Sisto Siemens, PA-C (Hematology and Oncology)  Mandy Cormier MD (Radiation Oncology)  Charli Benedict MD (Hematology and Oncology)  Bay Lino MD (Obstetrics and Gynecology)  Lucille Zapata MD (Cardiology)     Review of Systems:     Review of Systems   Constitutional: Positive for fatigue  Negative for fever and unexpected weight change  HENT: Negative for congestion, rhinorrhea, sinus pressure and sore throat  Respiratory: Negative for cough, shortness of breath and wheezing  Cardiovascular: Negative for chest pain, palpitations and leg swelling  Gastrointestinal: Negative for abdominal pain, constipation, diarrhea, nausea and vomiting  Genitourinary: Negative for difficulty urinating and dysuria  Musculoskeletal: Positive for back pain  Negative for arthralgias and myalgias  Neurological: Positive for dizziness (vertigo at times when she turns in bed)  Negative for headaches  Psychiatric/Behavioral: The patient is nervous/anxious         Problem List:     Patient Active Problem List   Diagnosis    Osteopenia    Malignant neoplasm of right breast ([facility] [address] )    Closed compression fracture of thoracic vertebra (HCC)    Closed compression fracture of fourth lumbar vertebra ([facility] [address] )    Mult fractures of thoracic spine, closed ([facility] [address] )    Nonrheumatic aortic valve insufficiency    Tachycardia    Essential hypertension    Plasmacytosis    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[address] )    S/P hysterectomy with oophorectomy    Multiple myeloma not having achieved remission [facility])      Past Medical and Surgical History:     Past Medical History:   Diagnosis Date    Anxiety     Aortic valve disorder     Breast CA ([facility] [address] )     2000-R mastectomy-took tamoxifen/femira    Cardiac dysrhythmia     Depression     History of spinal fracture     8 since 2016-1 fall related  spinal bone marrow bx today 12/18/2018    Hypertension     Osteoporosis     Palpitations     Squamous cell carcinoma of skin      Past Surgical History:   Procedure Laterality Date    APPENDECTOMY  01/2011    BREAST LUMPECTOMY      CHEILECTOMY Right     Resolved:  2006, Bunion correction    COLONOSCOPY 07/2010    Diverticula; recheck 5 yrs    CT BONE MARROW BIOPSY AND ASPIRATION  7/9/2019    DILATION AND CURETTAGE, DIAGNOSTIC / THERAPEUTIC      HYSTERECTOMY  2019    IR IMAGE GUIDED BIOPSY/ASPIRATION BONE  12/18/2018    MASTECTOMY Right 2000    OOPHORECTOMY Bilateral 2019    CA LAPAROSCOPY W TOT HYSTERECTUTERUS <=250 GRAM  W TUBE/OVARY N/A 4/4/2019    Procedure: ROBOTIC TOTAL LAPAROSCOPIC HYSTERECTOMY, BILATERAL SALPINGO-OOPHORECTOMY;  Surgeon: Rex Holman MD PhD;  [facility];  Service: Gynecology Oncology      Family History:     Family History   Problem Relation Age of Onset    Diabetes Mother     Osteoporosis Mother     Heart disease Father     Breast cancer Sister 64    BRCA1 Negative Sister     Heart attack Family     Other Family         Benign brain tumor    Club foot Family     Breast cancer Sister 77    No Known Problems Maternal Grandmother     No Known Problems Maternal Grandfather     No Known Problems Paternal Grandmother     No Known Problems Paternal Grandfather       Social History:     Social History     Socioeconomic History    Marital status: /Civil Union     Spouse name: Clement Taylor Number of children: 1    Years of education: None    Highest education level: None   Occupational History    None   Social Needs    Financial resource strain: None    Food insecurity:     Worry: None     Inability: None    Transportation needs:     Medical: None     Non-medical: None   Tobacco Use    Smoking status: Never Smoker    Smokeless tobacco: Never Used   Substance and Sexual Activity    Alcohol use:  Yes     Alcohol/week: 5 0 standard drinks     Types: 5 Glasses of wine per week     Frequency: 4 or more times a week     Comment: Social   5  glasses weekl;y    Drug use: No    Sexual activity: Yes     Partners: Male     Birth control/protection: Other     Comment: hysterectomy   Lifestyle    Physical activity:     Days per week: None     Minutes per session: None    Stress: None   Relationships    Social connections:     Talks on phone: None     Gets together: None     Attends [employer] service: None     Active member of club or organization: None     Attends meetings of clubs or organizations: None     Relationship status: None    Intimate partner violence:     Fear of current or ex partner: None     Emotionally abused: None     Physically abused: None     Forced sexual activity: None   Other Topics Concern    None   Social History Narrative    Caffeine use      Medications and Allergies:     Current Outpatient Medications   Medication Sig Dispense Refill    alendronate (FOSAMAX) 70 mg tablet TAKE 1 TABLET BY MOUTH EVERY 7 DAYS 12 tablet 3    Calcium Citrate-Vitamin D (CALCIUM CITRATE + D PO) Take 1 tablet by mouth 2 (two) times a day        escitalopram (LEXAPRO) 10 mg tablet TAKE 1 TABLET BY MOUTH EVERY DAY 90 tablet 3    metoprolol tartrate (LOPRESSOR) 50 mg tablet 1 in the morning and 1 5 at night 270 tablet 3     No current facility-administered medications for this visit  Allergies   Allergen Reactions    Ibuprofen Abdominal Pain and Tachycardia     Reaction Date: [id number];       Immunizations:     Immunization History   Administered Date(s) Administered    Influenza, high dose seasonal 0 5 mL 11/19/2018, 11/21/2019    Pneumococcal Conjugate 13-Valent 11/17/2017    Pneumococcal Polysaccharide PPV23 11/19/2018      Health Maintenance:         Topic Date Due    CRC Screening: Colonoscopy  07/26/2020    Hepatitis C Screening  Completed         Topic Date Due    Influenza Vaccine  07/01/2019      Medicare Screening Tests and Risk Assessments:     Nasim Segal is here for her Subsequent Wellness visit  Health Risk Assessment:   Patient rates overall health as good  Patient feels that their physical health rating is much worse  Eyesight was rated as same  Hearing was rated as same  Patient feels that their emotional and mental health rating is same  Pain experienced in the last 7 days has been none  Patient states that she has experienced no weight loss or gain in last 6 months  Fall Risk Screening: In the past year, patient has experienced: no history of falling in past year      Urinary Incontinence Screening:   Patient has not leaked urine accidently in the last six months  Home Safety:  Patient has trouble with stairs inside or outside of their home  Patient has working smoke alarms and has working carbon monoxide detector  Nutrition:   Current diet is Regular  Medications:   Patient is currently taking over-the-counter supplements  OTC medications include: see medication list  Patient is able to manage medications  Activities of Daily Living (ADLs)/Instrumental Activities of Daily Living (IADLs):   Walk and transfer into and out of bed and chair?: Yes  Dress and groom yourself?: Yes    Bathe or shower yourself?: Yes    Feed yourself? Yes  Do your laundry/housekeeping?: Yes  Manage your money, pay your bills and track your expenses?: Yes  Make your own meals?: Yes    Do your own shopping?: Yes    Previous Hospitalizations:   Any hospitalizations or ED visits within the last 12 months?: Yes    How many hospitalizations have you had in the last year?: more than 4    Hospitalization Comments: Hysterectomy    Advance Care Planning:   Living will: Yes    Durable POA for healthcare:  Yes    Advanced directive: Yes      Cognitive Screening:   Provider or family/friend/caregiver concerned regarding cognition?: No    PREVENTIVE SCREENINGS      Cardiovascular Screening:      Due for: Lipid Panel      Diabetes Screening:     General: Screening Current      Colorectal Cancer Screening:     General: Screening Current      Breast Cancer Screening:     General: History Breast Cancer      Cervical Cancer Screening:    General: Screening Not Indicated      Osteoporosis Screening:      Due for: DXA Axial and DXA Appendicular      Abdominal Aortic Aneurysm (AAA) Screening:        General: Screening Not Indicated      Lung Cancer Screening:     General: Screening Not Indicated      Hepatitis C Screening:    General: Screening Current    No exam data present     Physical Exam:     /78   Pulse 76   Ht 5' (1 524 m)   Wt 60 1 kg (132 lb 8 oz)   SpO2 98%   BMI 25 88 kg/m²     Physical Exam   Constitutional: She is oriented to person, place, and time  She appears well-developed and well-nourished  HENT:   Head: Normocephalic and atraumatic  Right Ear: External ear normal    Left Ear: External ear normal    Mouth/Throat: Oropharynx is clear and moist    Eyes: Conjunctivae are normal    Neck: Neck supple  Cardiovascular: Normal rate, regular rhythm and normal heart sounds  No murmur heard  Pulmonary/Chest: Effort normal and breath sounds normal  No respiratory distress  She has no wheezes  She has no rales  Abdominal: Soft  She exhibits no distension and no mass  There is no tenderness  There is no rebound and no guarding  Neurological: She is alert and oriented to person, place, and time  Skin: Skin is warm and dry  Psychiatric: She has a normal mood and affect   Her behavior is normal  Judgment and thought content normal        Brian Hope MD

## 2019-11-26 ENCOUNTER — OFFICE VISIT (OUTPATIENT)
Dept: HEMATOLOGY ONCOLOGY | Facility: CLINIC | Age: 69
End: 2019-11-26
Payer: MEDICARE

## 2019-11-26 VITALS
HEIGHT: 60 IN | HEART RATE: 81 BPM | WEIGHT: 133 LBS | DIASTOLIC BLOOD PRESSURE: 72 MMHG | SYSTOLIC BLOOD PRESSURE: 138 MMHG | OXYGEN SATURATION: 97 % | BODY MASS INDEX: 26.11 KG/M2 | RESPIRATION RATE: 16 BRPM | TEMPERATURE: 97.3 F

## 2019-11-26 DIAGNOSIS — C90.00 MULTIPLE MYELOMA NOT HAVING ACHIEVED REMISSION (HCC): Primary | ICD-10-CM

## 2019-11-26 DIAGNOSIS — Z17.0 MALIGNANT NEOPLASM OF RIGHT BREAST IN FEMALE, ESTROGEN RECEPTOR POSITIVE, UNSPECIFIED SITE OF BREAST (HCC): ICD-10-CM

## 2019-11-26 DIAGNOSIS — C50.911 MALIGNANT NEOPLASM OF RIGHT BREAST IN FEMALE, ESTROGEN RECEPTOR POSITIVE, UNSPECIFIED SITE OF BREAST (HCC): ICD-10-CM

## 2019-11-26 DIAGNOSIS — C90.30 PLASMACYTOMA, UNSPECIFIED PLASMACYTOMA TYPE, UNSPECIFIED WHETHER REMISSION ACHIEVED (HCC): ICD-10-CM

## 2019-11-26 PROCEDURE — 99214 OFFICE O/P EST MOD 30 MIN: CPT | Performed by: INTERNAL MEDICINE

## 2019-11-26 NOTE — PATIENT INSTRUCTIONS
Please call me after your visit at [facility]    Blood and urine tests prior to next visit in 4 months

## 2019-11-26 NOTE — PROGRESS NOTES
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 and patient received radiation to this area  Last radiation treatment was on 03/07/19  She had presented with compression fracture of this area  Pain has diminished from this area  She had bone marrow test  that showed 15% plasma cells, scattered and in small clusters  The SPEP showed a monoclonal peak in the gamma region  Previous immunofixation 1/7/19 identified a monoclonal band as IgG lambda  M spike 1070 mg  Beta 2 microglobulin 1 7  Normal blood counts  Normal creatinine and calcium  Albumin 3 9  IgA 67  IgG 1620 and IgM 95  Free light chain ratio 0 06  No spike on urine protein electrophoresis   See PET-CT scan report ;   1  New mild linear FDG uptake at the L3 and L5 vertebral bodies  These could represent osteoporotic insufficiency fractures rather than related to underlying osseous lesions  Otherwise no significant FDG uptake in the previously seen vertebral bodies  2  No FDG avid soft tissue lesions         Workstation performed: [id number]      Imaging     NM PET CT skull base to mid thigh (Order: [id number]) - 10/28/2019     Patient has some tiredness  She is not complaining of back pain anymore       Patient has remote past history of stage I, grade 1, 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  Positive hormone receptors and negative her 2   Patient had right mastectomy   She had adjuvant tamoxifen for 5 years                       Current Outpatient Medications:     alendronate (FOSAMAX) 70 mg tablet, TAKE 1 TABLET BY MOUTH EVERY 7 DAYS, Disp: 12 tablet, Rfl: 3    Calcium Citrate-Vitamin D (CALCIUM CITRATE + D PO), Take 1 tablet by mouth 2 (two) times a day  , Disp: , Rfl:     escitalopram (LEXAPRO) 10 mg tablet, TAKE 1 TABLET BY MOUTH EVERY DAY, Disp: 90 tablet, Rfl: 3    metoprolol tartrate (LOPRESSOR) 50 mg tablet, 1 in the morning and 1 5 at night, Disp: 270 tablet, Rfl: 3    Allergies   Allergen Reactions    Ibuprofen Abdominal Pain and Tachycardia     Reaction Date: [id number];           Malignant neoplasm of right breast ([facility] )    9/2000 Surgery     Right breast mastectomy      8/13/2012 Initial Diagnosis     Malignant neoplasm of right breast [facility])       Chemotherapy     Tamoxifen for 5 years        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[facility] )    12/18/2018 Initial Diagnosis     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[facility] )      12/18/2018 Biopsy     Final Diagnosis   A  Bone, L-4, core biopsy:  - Fragments of trabecular bone with changes compatible with prior fracture site  - 10% lambda predominant plasmacytosis  See note  - Hematopoietic marrow with trilineage hematopoiesis  - No epithelial elements identified, confirmed with negative keratin AE1/AE3 stains  2/1/2019 - 3/7/2019 Radiation     Plan ID Energy Fractions Dose per Fraction (cGy) Dose Correction (cGy) Total Dose Delivered (cGy) Elapsed Days   L3_L5 Spine 10X/6X 25 / 25 180 0 4,500 34              ROS:  11/26/19 Reviewed 13 systems:  Presently no headaches, seizures, dizziness, diplopia, dysphagia, hoarseness, chest pain, palpitations, shortness of breath, cough, hemoptysis, abdominal pain, nausea, vomiting, change in bowel habits, melena, hematuria, fever, chills, bleeding, bone pains, skin rash, weight loss, arthritic symptoms,   weakness, numbness,  claudication and gait problem  No frequent infections  Not unusually sensitive to heat or cold  No swelling of the ankles  No swollen glands  Patient is anxious   Other symptoms are in HPI        /72 (BP Location: Left arm, Patient Position: Sitting, Cuff Size: Adult)   Pulse 81   Temp (!) 97 3 °F (36 3 °C)   Resp 16   Ht 5' (1 524 m)   Wt 60 3 kg (133 lb)   SpO2 97%   BMI 25 97 kg/m²     Physical Exam:  Alert, oriented, not in distress, no icterus, no oral thrush, no palpable neck mass, clear lung fields, regular heart rate, abdomen  soft and non tender, no palpable abdominal mass, no ascites, no edema of ankles, no calf tenderness, no focal neurological deficit, no skin rash, no palpable lymphadenopathy in the neck and axillary areas, good arterial pulses, no clubbing  Patient is anxious  Performance status 1  Right mastectomy scar  No lymphedema  IMAGING:  She PET-CT scan report in [facility]    LABS:  Results for orders placed or performed during the hospital encounter of 10/28/19   Fingerstick Glucose (POCT)   Result Value Ref Range    POC Glucose 110 65 - 140 mg/dl     Labs, Imaging, & Other studies:   All pertinent labs and imaging studies were personally reviewed    Lab Results   Component Value Date     11/28/2017    K 3 7 10/21/2019     10/21/2019    CO2 25 10/21/2019    ANIONGAP 9 09/02/2014    BUN 15 10/21/2019    CREATININE 0 70 10/21/2019    GLUCOSE 93 09/02/2014    GLUF 98 10/21/2019    CALCIUM 9 1 10/21/2019    AST 14 10/21/2019    ALT 20 10/21/2019    ALKPHOS 69 10/21/2019    PROT 8 0 09/02/2014    BILITOT 0 32 09/02/2014    EGFR 90 10/21/2019     Lab Results   Component Value Date    WBC 4 63 10/21/2019    HGB 12 6 10/21/2019    HCT 37 5 10/21/2019    MCV 95 10/21/2019     10/21/2019    See results of blood tests in [facility]    Reviewed and discussed with patient  Assessment and plan: Follow-up visit for plasmacytoma/multiple myeloma at isolated L4 area and patient received radiation to this area  Last radiation treatment was on 03/07/19  She had presented with compression fracture of this area  Pain has diminished from this area  She had bone marrow test  that showed 15% plasma cells, scattered and in small clusters  The SPEP showed a monoclonal peak in the gamma region  Previous immunofixation 1/7/19 identified a monoclonal band as IgG lambda  M spike 1070 mg  Beta 2 microglobulin 1 7  Normal blood counts  Normal creatinine and calcium  Albumin 3 9  IgA 67  IgG 1620 and IgM 95  Free light chain ratio 0 06  No spike on urine protein electrophoresis     See PET-CT scan report ;   1  New mild linear FDG uptake at the L3 and L5 vertebral bodies  These could represent osteoporotic insufficiency fractures rather than related to underlying osseous lesions  Otherwise no significant FDG uptake in the previously seen vertebral bodies  2  No FDG avid soft tissue lesions         Workstation performed: [id number]      Imaging     NM PET CT skull base to mid thigh (Order: [id number]) - 10/28/2019     Patient has some tiredness  She is not complaining of back pain anymore       Patient has remote past history of stage I, grade 1, 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  Positive hormone receptors and negative her 2  Patient had right mastectomy   She had adjuvant tamoxifen for 5 years         Physical examination and test results are as recorded and discussed in detail especially blood tests, bone marrow results and PET-CT scan results  Patient has indolent IgG lambda multiple myeloma and had presented with plasmacytoma at L4  She is at high risk of  evolution into symptomatic multiple myeloma  The question is treatment now or later  Discussed clinical trial with [facility] in this setting  We do not have that clinical trial open at our institute  Patient has appointment at [facility] had 01/02/2020 and she will discuss her options including clinical trial with specialist at [facility] and call me soon after that to give me an update and recommendations made at [facility]  After that patient will be away until early March 2019 and she will come to our office after her vacations in March 2019 with myeloma related blood and urine tests    Patient is comfortable with this approach         All discussed in very much detail   Questions answered  Mike Duran discussed the importance of self-breast examination, eating healthy foods, staying active and health screening tests   Patient is capable of self-care         She follows with her gynecologist for examination of breast and imaging studies  1  Multiple myeloma not having achieved remission (HCC)    - Beta 2 microglobulin, serum; Future  - CBC and differential; Future  - Comprehensive metabolic panel; Future  - IgG, IgA, IgM; Future  - Immunoglobulin free LT chains blood; Future  - LD,Blood; Future  - Protein electrophoresis, serum; Future  - Protein electrophoresis, urine; Future    2  Plasmacytoma, unspecified plasmacytoma type, unspecified whether remission achieved (HCC)    - Beta 2 microglobulin, serum; Future  - CBC and differential; Future  - Comprehensive metabolic panel; Future  - IgG, IgA, IgM; Future  - Immunoglobulin free LT chains blood; Future  - LD,Blood; Future  - Protein electrophoresis, serum; Future  - Protein electrophoresis, urine; Future    3  Malignant neoplasm of right breast in female, estrogen receptor positive, unspecified site of breast [facility])          Patient voiced understanding and agreement in the discussion  Counseling / Coordination of Care   Greater than 50% of total time was spent with the patient and / or family counseling and / or coordination of care

## 2019-12-06 ENCOUNTER — APPOINTMENT (OUTPATIENT)
Dept: LAB | Facility: CLINIC | Age: 69
End: 2019-12-06
Payer: MEDICARE

## 2019-12-06 LAB
CHOLEST SERPL-MCNC: 175 MG/DL (ref 50–200)
HDLC SERPL-MCNC: 40 MG/DL
LDLC SERPL CALC-MCNC: 105 MG/DL (ref 0–100)
NONHDLC SERPL-MCNC: 135 MG/DL
TRIGL SERPL-MCNC: 152 MG/DL

## 2019-12-06 PROCEDURE — 80061 LIPID PANEL: CPT | Performed by: INTERNAL MEDICINE

## 2019-12-06 PROCEDURE — 36415 COLL VENOUS BLD VENIPUNCTURE: CPT | Performed by: INTERNAL MEDICINE

## 2019-12-08 DIAGNOSIS — E78.5 DYSLIPIDEMIA: Primary | ICD-10-CM

## 2020-01-03 ENCOUNTER — TELEPHONE (OUTPATIENT)
Dept: HEMATOLOGY ONCOLOGY | Facility: CLINIC | Age: 70
End: 2020-01-03

## 2020-01-04 ENCOUNTER — APPOINTMENT (OUTPATIENT)
Dept: RADIOLOGY | Age: 70
End: 2020-01-04
Payer: MEDICARE

## 2020-01-04 ENCOUNTER — TRANSCRIBE ORDERS (OUTPATIENT)
Dept: ADMINISTRATIVE | Age: 70
End: 2020-01-04

## 2020-01-04 DIAGNOSIS — C90.00 MYELOMA ASSOCIATED AMYLOIDOSIS (HCC): ICD-10-CM

## 2020-01-04 DIAGNOSIS — E85.9 MYELOMA ASSOCIATED AMYLOIDOSIS (HCC): ICD-10-CM

## 2020-01-04 DIAGNOSIS — C43.9 MALIGNANT MELANOMA, UNSPECIFIED SITE (HCC): Primary | ICD-10-CM

## 2020-01-04 PROCEDURE — 71046 X-RAY EXAM CHEST 2 VIEWS: CPT

## 2020-01-04 PROCEDURE — 77075 RADEX OSSEOUS SURVEY COMPL: CPT

## 2020-01-06 ENCOUNTER — DOCUMENTATION (OUTPATIENT)
Dept: HEMATOLOGY ONCOLOGY | Facility: CLINIC | Age: 70
End: 2020-01-06

## 2020-01-06 ENCOUNTER — OFFICE VISIT (OUTPATIENT)
Dept: HEMATOLOGY ONCOLOGY | Facility: CLINIC | Age: 70
End: 2020-01-06
Payer: MEDICARE

## 2020-01-06 VITALS
HEART RATE: 76 BPM | OXYGEN SATURATION: 97 % | HEIGHT: 60 IN | DIASTOLIC BLOOD PRESSURE: 76 MMHG | WEIGHT: 135 LBS | TEMPERATURE: 97.8 F | SYSTOLIC BLOOD PRESSURE: 122 MMHG | RESPIRATION RATE: 16 BRPM | BODY MASS INDEX: 26.5 KG/M2

## 2020-01-06 DIAGNOSIS — C90.00 MULTIPLE MYELOMA NOT HAVING ACHIEVED REMISSION (HCC): Primary | ICD-10-CM

## 2020-01-06 DIAGNOSIS — S32.040D CLOSED COMPRESSION FRACTURE OF L4 LUMBAR VERTEBRA WITH ROUTINE HEALING, SUBSEQUENT ENCOUNTER: ICD-10-CM

## 2020-01-06 DIAGNOSIS — D72.822 PLASMACYTOSIS: ICD-10-CM

## 2020-01-06 DIAGNOSIS — C50.911 MALIGNANT NEOPLASM OF RIGHT BREAST IN FEMALE, ESTROGEN RECEPTOR POSITIVE, UNSPECIFIED SITE OF BREAST (HCC): ICD-10-CM

## 2020-01-06 DIAGNOSIS — I10 ESSENTIAL HYPERTENSION: ICD-10-CM

## 2020-01-06 DIAGNOSIS — Z17.0 MALIGNANT NEOPLASM OF RIGHT BREAST IN FEMALE, ESTROGEN RECEPTOR POSITIVE, UNSPECIFIED SITE OF BREAST (HCC): ICD-10-CM

## 2020-01-06 DIAGNOSIS — M85.89 OSTEOPENIA OF MULTIPLE SITES: ICD-10-CM

## 2020-01-06 PROCEDURE — 99215 OFFICE O/P EST HI 40 MIN: CPT | Performed by: INTERNAL MEDICINE

## 2020-01-06 RX ORDER — SODIUM CHLORIDE 9 MG/ML
20 INJECTION, SOLUTION INTRAVENOUS ONCE
Status: CANCELLED | OUTPATIENT
Start: 2020-01-14

## 2020-01-06 NOTE — TELEPHONE ENCOUNTER
Spoke to patient on Friday 1/3/20, I stated I will relay information to Dr Elliott Briscoe on Monday (today) with more information  Dr Elliott Briscoe is currently reviewing and I will update patient with more information promptly

## 2020-01-06 NOTE — LETTER
January 6, 2020     Isadora English MD  [address] [address]  [address] [place] [address]    Patient: Mayi Roth   YOB: 1950   Date of Visit: 1/6/2020       Dear Dr Yuly Lmoax: Thank you for referring Laura Jones to me for evaluation  Below are my notes for this consultation  If you have questions, please do not hesitate to call me  I look forward to following your patient along with you  Sincerely,        Darion Massey MD        CC: MD Darion Stubbs MD  1/6/2020  6:25 PM  Sign at close encounter    HPI:  Patient is here with her  with plasmacytoma/IgG lambda multiple myeloma and also previous history of stage I right breast cancer in year 2000  Plasmacytoma/multiple myeloma at isolated L4 area was diagnosed in early 2019 and patient received radiation to this area  Last radiation treatment was on 03/07/19  She had presented with compression fracture of this area  She had bone marrow test  that showed 15% plasma cells, scattered and in small clusters  The SPEP showed a monoclonal peak in the gamma region, IgG lambda  Patient's condition was being observed and the question was when to treat her for multiple myeloma  Patient was referred to [facility] for an opinion and clinical trial    Patient saw myeloma expert at [facility] on 01/02/2020  Final notes are not in the epic  Patient tells me that he recommended Zometa and VRD  She mentioned about trimming the dose of chemotherapy  Patient had skeletal survey and results are pending  She had blood tests at [facility]  She also mentioned about 24 hour urine test   Patient has some tiredness  Much less low back discomfort          Patient has some tiredness         Patient has remote past history of stage I, grade 1, 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  Positive hormone receptors and negative her 2   Patient had right mastectomy   She had adjuvant tamoxifen for 5 years                            Current Outpatient Medications:     alendronate (FOSAMAX) 70 mg tablet, TAKE 1 TABLET BY MOUTH EVERY 7 DAYS, Disp: 12 tablet, Rfl: 3    Calcium Citrate-Vitamin D (CALCIUM CITRATE + D PO), Take 1 tablet by mouth 2 (two) times a day  , Disp: , Rfl:     escitalopram (LEXAPRO) 10 mg tablet, TAKE 1 TABLET BY MOUTH EVERY DAY, Disp: 90 tablet, Rfl: 3    metoprolol tartrate (LOPRESSOR) 50 mg tablet, 1 in the morning and 1 5 at night, Disp: 270 tablet, Rfl: 3    Allergies   Allergen Reactions    Ibuprofen Abdominal Pain and Tachycardia     Reaction Date: [id number];           Malignant neoplasm of right breast ([facility] )    9/2000 Surgery     Right breast mastectomy      8/13/2012 Initial Diagnosis     Malignant neoplasm of right breast [facility])       Chemotherapy     Tamoxifen for 5 years        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[facility] )    12/18/2018 Initial Diagnosis     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[place] [address] )      12/18/2018 Biopsy     Final Diagnosis   A  Bone, L-4, core biopsy:  - Fragments of trabecular bone with changes compatible with prior fracture site  - 10% lambda predominant plasmacytosis  See note  - Hematopoietic marrow with trilineage hematopoiesis  - No epithelial elements identified, confirmed with negative keratin AE1/AE3 stains  2/1/2019 - 3/7/2019 Radiation     Plan ID Energy Fractions Dose per Fraction (cGy) Dose Correction (cGy) Total Dose Delivered (cGy) Elapsed Days   L3_L5 Spine 10X/6X 25 / 25 180 0 4,500 34              ROS:  01/06/20 Reviewed 13 systems:  Presently no  neurological, cardiac, pulmonary, GI and  symptoms  No other symptoms like   fever, chills, bleeding,  skin rash, weight loss, arthritic symptoms,  weakness, numbness,  claudication and gait problem  No frequent infections  Not unusually sensitive to heat or cold  No swelling of the ankles  No swollen glands  Patient is anxious   Other symptoms are in HPI        /76 (BP Location: Left arm, Patient Position: Sitting, Cuff Size: Adult)   Pulse 76   Temp 97 8 °F (36 6 °C)   Resp 16   Ht 5' (1 524 m)   Wt 61 2 kg (135 lb)   SpO2 97%   BMI 26 37 kg/m²      Physical Exam:  Alert, oriented, not in distress, no icterus, no oral thrush, no palpable neck mass, clear lung fields, regular heart rate, abdomen  soft and non tender, no palpable abdominal mass, no ascites, no edema of ankles, no calf tenderness, no focal neurological deficit, no skin rash, no palpable lymphadenopathy in the neck and axillary areas, good arterial pulses, no clubbing  Patient is anxious  Performance status 1  IMAGING:      LABS:  Results for orders placed or performed in visit on 11/21/19   Lipid panel   Result Value Ref Range    Cholesterol 175 50 - 200 mg/dL    Triglycerides 152 (H) <=150 mg/dL    HDL, Direct 40 >=40 mg/dL    LDL Calculated 105 (H) 0 - 100 mg/dL    Non-HDL-Chol (CHOL-HDL) 135 mg/dl     Labs, Imaging, & Other studies:   All pertinent labs and imaging studies were personally reviewed    Lab Results   Component Value Date     11/28/2017    K 3 7 10/21/2019     10/21/2019    CO2 25 10/21/2019    ANIONGAP 9 09/02/2014    BUN 15 10/21/2019    CREATININE 0 70 10/21/2019    GLUCOSE 93 09/02/2014    GLUF 98 10/21/2019    CALCIUM 9 1 10/21/2019    AST 14 10/21/2019    ALT 20 10/21/2019    ALKPHOS 69 10/21/2019    PROT 8 0 09/02/2014    BILITOT 0 32 09/02/2014    EGFR 90 10/21/2019     Lab Results   Component Value Date    WBC 4 63 10/21/2019    HGB 12 6 10/21/2019    HCT 37 5 10/21/2019    MCV 95 10/21/2019     10/21/2019   Patient had blood tests at [facility] on 01/02/2019  Normal blood counts  Normal chemistry profile, LDH uric acid and beta 2 microglobulin  The light chain ratio 0 051  SPEP showed monoclonal spike of 1200 mg of IgG lambda in the back ground of polyclonal immunoglobulins  Reviewed and discussed with patient      Assessment and plan:  Patient is here with her  with plasmacytoma/IgG lambda multiple myeloma and also previous history of stage I right breast cancer in year 2000  Plasmacytoma/multiple myeloma at isolated L4 area was diagnosed in early 2019 and patient received radiation to this area  Last radiation treatment was on 03/07/19  She had presented with compression fracture of this area  She had bone marrow test  that showed 15% plasma cells, scattered and in small clusters  The SPEP showed a monoclonal peak in the gamma region, IgG lambda  Patient's condition was being observed and the question was when to treat her for multiple myeloma  Patient was referred to [facility] for an opinion and clinical trial    Patient saw myeloma expert at [facility] on 01/02/2020  Final notes are not in the epic  Patient tells me that he recommended Zometa and VRD  She mentioned about trimming the dose of chemotherapy  Patient had skeletal survey and results are pending  She had blood tests at [facility]  She also mentioned about 24 hour urine test   Patient has some tiredness  Much less low back discomfort          Patient has some tiredness        Patient has remote past history of stage I, grade 1, 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  Positive hormone receptors and negative her 2  Patient had right mastectomy   She had adjuvant tamoxifen for 5 years       Physical examination and test results are as recorded and discussed  Waiting for Fito notes in epic  To find out the frequency of Zometa  To find out adjusted doses of chemotherapy  Discuss this with patient and her   Discussed Velcade, Revlimid and dexamethasone and Zometa in detail, benefits and side effects and information given verbally and in printed form  Patient signed informed consent for VRD and Zometa  She will start Zometa now  She will like to start chemotherapy in March after coming back from [place]  She does not have any problem with her teeth for Zometa  She is already taking calcium and vitamin-D    She knows she will be needing acyclovir, baby aspirin and allopurinol prior to starting chemotherapy in March 2020  Patient will stop taking Fosamax  Ordered blood and urine tests  All discussed in detail  Questions answered  Discussed the importance of self-breast examination, eating healthy foods, staying active and health screening tests   Patient is capable of self-care         She follows with her gynecologist for examination of breast and imaging studies  1  Multiple myeloma not having achieved remission (HCC)    - Beta 2 microglobulin, serum; Standing  - CBC and differential; Standing  - Comprehensive metabolic panel; Standing  - IgG, IgA, IgM; Standing  - Immunoglobulin free LT chains blood; Standing  - LD,Blood; Standing  - Protein electrophoresis, serum; Standing  - Protein electrophoresis, urine; Standing  - Protein, urine, 24 hour; Standing  - Sedimentation rate, automated; Standing  - Uric acid; Standing  - Beta 2 microglobulin, serum  - CBC and differential  - Comprehensive metabolic panel  - IgG, IgA, IgM  - Immunoglobulin free LT chains blood  - LD,Blood  - Protein electrophoresis, serum  - Sedimentation rate, automated  - Uric acid    2  Plasmacytoma      3  Malignant neoplasm of right breast in female, estrogen receptor positive, unspecified site of breast ([facility] )      4  Closed compression fracture of L4 lumbar vertebra with routine healing, subsequent encounter      5  Osteopenia of multiple sites      6  Essential hypertension            Patient voiced understanding and agreement in the discussion  Counseling / Coordination of Care   Greater than 50% of total time was spent with the patient and / or family counseling and / or coordination of care

## 2020-01-06 NOTE — PROGRESS NOTES
Completed nursing teach with patient and her  on Zometa, Velcade, Revlimid, and Dexamethasone for multiple myeloma  Reviewed what to expect at the infusion center, each drugs mechanism of action, possible side effects and symptom management, when to call your doctor versus when to go to the ER, etc  All questions answered  Dr Keo Mendez is still waiting on specifics of patient regime from [facility] regarding doses of Velcade, and Revlimid  Patient will start this regime in March tentatively  Patient will start Zometa next week per Dr Keo Mendez  Consents signed and scanned into patient chart  Patient enrolled in the [address] [place] [address],[address] program and patient form scanned into media section  Email will be sent for new oral chemo start for Revlimid once Dr Keo Mendez confirms the dose with [facility]  Patient checked out with IG and was scheduled for Zometa  Patient will not be scheduled for VRD until March appt with Dr Keo Mendez

## 2020-01-06 NOTE — TELEPHONE ENCOUNTER
Spoke to Nitza Tai, Dr Jelena Toussaint will see her today 1/6/20 at 2:20 pm  Patient stated that appt time is fine

## 2020-01-06 NOTE — PATIENT INSTRUCTIONS
Please stop Fosamax  Starting Zometa  Starting Velcade, Revlimid and dexamethasone, daily baby aspirin and acyclovir  Also allopurinol once daily for 2 months  Some at of will start now  Chemotherapy and related medications will start in March after she is back from [place]

## 2020-01-06 NOTE — PROGRESS NOTES
HPI:  Patient is here with her  with plasmacytoma/IgG lambda multiple myeloma and also previous history of stage I right breast cancer in year 2000  Plasmacytoma/multiple myeloma at isolated L4 area was diagnosed in early 2019 and patient received radiation to this area  Last radiation treatment was on 03/07/19  She had presented with compression fracture of this area  She had bone marrow test  that showed 15% plasma cells, scattered and in small clusters  The SPEP showed a monoclonal peak in the gamma region, IgG lambda  Patient's condition was being observed and the question was when to treat her for multiple myeloma  Patient was referred to [facility] for an opinion and clinical trial    Patient saw myeloma expert at [facility] on 01/02/2020  Final notes are not in the epic  Patient tells me that he recommended Zometa and VRD  She mentioned about trimming the dose of chemotherapy  Patient had skeletal survey and results are pending  She had blood tests at [facility]  She also mentioned about 24 hour urine test   Patient has some tiredness  Much less low back discomfort          Patient has some tiredness         Patient has remote past history of stage I, grade 1, 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  Positive hormone receptors and negative her 2   Patient had right mastectomy   She had adjuvant tamoxifen for 5 years                            Current Outpatient Medications:     alendronate (FOSAMAX) 70 mg tablet, TAKE 1 TABLET BY MOUTH EVERY 7 DAYS, Disp: 12 tablet, Rfl: 3    Calcium Citrate-Vitamin D (CALCIUM CITRATE + D PO), Take 1 tablet by mouth 2 (two) times a day  , Disp: , Rfl:     escitalopram (LEXAPRO) 10 mg tablet, TAKE 1 TABLET BY MOUTH EVERY DAY, Disp: 90 tablet, Rfl: 3    metoprolol tartrate (LOPRESSOR) 50 mg tablet, 1 in the morning and 1 5 at night, Disp: 270 tablet, Rfl: 3    Allergies   Allergen Reactions    Ibuprofen Abdominal Pain and Tachycardia     Reaction Date: [id number];           Malignant neoplasm of right breast ([facility] )    9/2000 Surgery     Right breast mastectomy      8/13/2012 Initial Diagnosis     Malignant neoplasm of right breast [facility])       Chemotherapy     Tamoxifen for 5 years        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[facility] )    12/18/2018 Initial Diagnosis     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 )      12/18/2018 Biopsy     Final Diagnosis   A  Bone, L-4, core biopsy:  - Fragments of trabecular bone with changes compatible with prior fracture site  - 10% lambda predominant plasmacytosis  See note  - Hematopoietic marrow with trilineage hematopoiesis  - No epithelial elements identified, confirmed with negative keratin AE1/AE3 stains  2/1/2019 - 3/7/2019 Radiation     Plan ID Energy Fractions Dose per Fraction (cGy) Dose Correction (cGy) Total Dose Delivered (cGy) Elapsed Days   L3_L5 Spine 10X/6X 25 / 25 180 0 4,500 34              ROS:  01/06/20 Reviewed 13 systems:  Presently no  neurological, cardiac, pulmonary, GI and  symptoms  No other symptoms like   fever, chills, bleeding,  skin rash, weight loss, arthritic symptoms,  weakness, numbness,  claudication and gait problem  No frequent infections  Not unusually sensitive to heat or cold  No swelling of the ankles  No swollen glands  Patient is anxious  Other symptoms are in HPI        /76 (BP Location: Left arm, Patient Position: Sitting, Cuff Size: Adult)   Pulse 76   Temp 97 8 °F (36 6 °C)   Resp 16   Ht 5' (1 524 m)   Wt 61 2 kg (135 lb)   SpO2 97%   BMI 26 37 kg/m²     Physical Exam:  Alert, oriented, not in distress, no icterus, no oral thrush, no palpable neck mass, clear lung fields, regular heart rate, abdomen  soft and non tender, no palpable abdominal mass, no ascites, no edema of ankles, no calf tenderness, no focal neurological deficit, no skin rash, no palpable lymphadenopathy in the neck and axillary areas, good arterial pulses, no clubbing  Patient is anxious    Performance status 1  IMAGING:      LABS:  Results for orders placed or performed in visit on 11/21/19   Lipid panel   Result Value Ref Range    Cholesterol 175 50 - 200 mg/dL    Triglycerides 152 (H) <=150 mg/dL    HDL, Direct 40 >=40 mg/dL    LDL Calculated 105 (H) 0 - 100 mg/dL    Non-HDL-Chol (CHOL-HDL) 135 mg/dl     Labs, Imaging, & Other studies:   All pertinent labs and imaging studies were personally reviewed    Lab Results   Component Value Date     11/28/2017    K 3 7 10/21/2019     10/21/2019    CO2 25 10/21/2019    ANIONGAP 9 09/02/2014    BUN 15 10/21/2019    CREATININE 0 70 10/21/2019    GLUCOSE 93 09/02/2014    GLUF 98 10/21/2019    CALCIUM 9 1 10/21/2019    AST 14 10/21/2019    ALT 20 10/21/2019    ALKPHOS 69 10/21/2019    PROT 8 0 09/02/2014    BILITOT 0 32 09/02/2014    EGFR 90 10/21/2019     Lab Results   Component Value Date    WBC 4 63 10/21/2019    HGB 12 6 10/21/2019    HCT 37 5 10/21/2019    MCV 95 10/21/2019     10/21/2019   Patient had blood tests at [facility] on 01/02/2019  Normal blood counts  Normal chemistry profile, LDH uric acid and beta 2 microglobulin  The light chain ratio 0 051  SPEP showed monoclonal spike of 1200 mg of IgG lambda in the back ground of polyclonal immunoglobulins  Reviewed and discussed with patient  Assessment and plan:  Patient is here with her  with plasmacytoma/IgG lambda multiple myeloma and also previous history of stage I right breast cancer in year 2000  Plasmacytoma/multiple myeloma at isolated L4 area was diagnosed in early 2019 and patient received radiation to this area  Last radiation treatment was on 03/07/19  She had presented with compression fracture of this area  She had bone marrow test  that showed 15% plasma cells, scattered and in small clusters  The SPEP showed a monoclonal peak in the gamma region, IgG lambda  Patient's condition was being observed and the question was when to treat her for multiple myeloma    Patient was referred to [facility] for an opinion and clinical trial    Patient saw myeloma expert at [facility] on 01/02/2020  Final notes are not in the epic  Patient tells me that he recommended Zometa and VRD  She mentioned about trimming the dose of chemotherapy  Patient had skeletal survey and results are pending  She had blood tests at [facility]  She also mentioned about 24 hour urine test   Patient has some tiredness  Much less low back discomfort          Patient has some tiredness        Patient has remote past history of stage I, grade 1, 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  Positive hormone receptors and negative her 2  Patient had right mastectomy   She had adjuvant tamoxifen for 5 years       Physical examination and test results are as recorded and discussed  Waiting for Fito notes in epic  To find out the frequency of Zometa  To find out adjusted doses of chemotherapy  Discuss this with patient and her   Discussed Velcade, Revlimid and dexamethasone and Zometa in detail, benefits and side effects and information given verbally and in printed form  Patient signed informed consent for VRD and Zometa  She will start Zometa now  She will like to start chemotherapy in March after coming back from [place]  She does not have any problem with her teeth for Zometa  She is already taking calcium and vitamin-D  She knows she will be needing acyclovir, baby aspirin and allopurinol prior to starting chemotherapy in March 2020  Patient will stop taking Fosamax  Ordered blood and urine tests  All discussed in detail  Questions answered  Discussed the importance of self-breast examination, eating healthy foods, staying active and health screening tests   Patient is capable of self-care         She follows with her gynecologist for examination of breast and imaging studies    1  Multiple myeloma not having achieved remission (HCC)    - Beta 2 microglobulin, serum; Standing  - CBC and differential; Standing  - Comprehensive metabolic panel; Standing  - IgG, IgA, IgM; Standing  - Immunoglobulin free LT chains blood; Standing  - LD,Blood; Standing  - Protein electrophoresis, serum; Standing  - Protein electrophoresis, urine; Standing  - Protein, urine, 24 hour; Standing  - Sedimentation rate, automated; Standing  - Uric acid; Standing  - Beta 2 microglobulin, serum  - CBC and differential  - Comprehensive metabolic panel  - IgG, IgA, IgM  - Immunoglobulin free LT chains blood  - LD,Blood  - Protein electrophoresis, serum  - Sedimentation rate, automated  - Uric acid    2  Plasmacytoma      3  Malignant neoplasm of right breast in female, estrogen receptor positive, unspecified site of breast ([facility] [address] )      4  Closed compression fracture of L4 lumbar vertebra with routine healing, subsequent encounter      5  Osteopenia of multiple sites      6  Essential hypertension            Patient voiced understanding and agreement in the discussion  Counseling / Coordination of Care   Greater than 50% of total time was spent with the patient and / or family counseling and / or coordination of care

## 2020-01-07 ENCOUNTER — TELEPHONE (OUTPATIENT)
Dept: HEMATOLOGY ONCOLOGY | Facility: CLINIC | Age: 70
End: 2020-01-07

## 2020-01-07 NOTE — TELEPHONE ENCOUNTER
Called patient to review as I stated I would Dr Urszula Sarabia reduced VRD plan of Velcade 1 3mg/m2 SQ weekly, Revlimid 15mg PO Days 1-21 Q 28, and Dexamethasone 40mg PO weekly  Patient was appreciative  Email sent to oral chemo team reviewing Revlimid start for March  Additionally, patient reported she wanted Dr Maggie Cuba to know she will be getting her shingles shot by the end of the month  Stated I will review with provider

## 2020-01-09 ENCOUNTER — DOCUMENTATION (OUTPATIENT)
Dept: HEMATOLOGY ONCOLOGY | Facility: CLINIC | Age: 70
End: 2020-01-09

## 2020-01-09 NOTE — PROGRESS NOTES
1/8/2020 received notification from clinical pt will be starting revlimid 15 mg q d x 21 d followed by 7 days off    Pt has [facility]  ID # [id number]  BIN #[id number]  PCN #[id number]  GRP # [id number]    Submitted for auth through cover my meds  1/9/2020  Received approval letter from [facility]  Per the letter Marleen Moore is valid from 1/8/2020 through 1/7/2021    Notified clinical, [facility], and finance  Per [facility], they are unable to supply   Will forward the pt information once we have copay amt and funding in place if need be     1/10/2019 per finance; Patient has been enrolled with [facility]) ID#  [id number]  CARD#  [id number]  PCN#  [id number]  BIN#  [id number]  GRP# [id number]  AMT $ 10,000  Eff 1-10-20    Clinical was informed as well  Sent everything over to [place] [facility]   Requested the rx be sent there as well

## 2020-01-10 ENCOUNTER — DOCUMENTATION (OUTPATIENT)
Dept: HEMATOLOGY ONCOLOGY | Facility: CLINIC | Age: 70
End: 2020-01-10

## 2020-01-10 NOTE — PROGRESS NOTES
For patient Jeanmarie Manuel  [birth year]    Patient has been enrolled with [facility] ID#  [id number]  CARD#  [id number]  PCN#  [id number]  BIN#  [id number]  GRP# [id number]  AMT $ 10,000  Eff 1-10-20    Dallas Castillo please forward to filling pharmacy      Email to team, Patient notified EPIC Noted

## 2020-01-13 ENCOUNTER — APPOINTMENT (OUTPATIENT)
Dept: LAB | Facility: CLINIC | Age: 70
End: 2020-01-13
Payer: MEDICARE

## 2020-01-13 DIAGNOSIS — C90.00 MULTIPLE MYELOMA NOT HAVING ACHIEVED REMISSION (HCC): ICD-10-CM

## 2020-01-13 LAB
ALBUMIN SERPL BCP-MCNC: 4 G/DL (ref 3.5–5)
ALP SERPL-CCNC: 59 U/L (ref 46–116)
ALT SERPL W P-5'-P-CCNC: 19 U/L (ref 12–78)
ANION GAP SERPL CALCULATED.3IONS-SCNC: 6 MMOL/L (ref 4–13)
AST SERPL W P-5'-P-CCNC: 12 U/L (ref 5–45)
BASOPHILS # BLD AUTO: 0.01 THOUSANDS/ΜL (ref 0–0.1)
BASOPHILS NFR BLD AUTO: 0 % (ref 0–1)
BILIRUB SERPL-MCNC: 0.56 MG/DL (ref 0.2–1)
BUN SERPL-MCNC: 14 MG/DL (ref 5–25)
CALCIUM SERPL-MCNC: 8.9 MG/DL (ref 8.3–10.1)
CHLORIDE SERPL-SCNC: 110 MMOL/L (ref 100–108)
CO2 SERPL-SCNC: 26 MMOL/L (ref 21–32)
CREAT SERPL-MCNC: 0.69 MG/DL (ref 0.6–1.3)
EOSINOPHIL # BLD AUTO: 0.05 THOUSAND/ΜL (ref 0–0.61)
EOSINOPHIL NFR BLD AUTO: 1 % (ref 0–6)
ERYTHROCYTE [DISTWIDTH] IN BLOOD BY AUTOMATED COUNT: 12.8 % (ref 11.6–15.1)
ERYTHROCYTE [SEDIMENTATION RATE] IN BLOOD: 19 MM/HOUR (ref 0–20)
GFR SERPL CREATININE-BSD FRML MDRD: 90 ML/MIN/1.73SQ M
GLUCOSE P FAST SERPL-MCNC: 95 MG/DL (ref 65–99)
HCT VFR BLD AUTO: 37.2 % (ref 34.8–46.1)
HGB BLD-MCNC: 12.4 G/DL (ref 11.5–15.4)
IGA SERPL-MCNC: 56 MG/DL (ref 70–400)
IGG SERPL-MCNC: 1530 MG/DL (ref 700–1600)
IGM SERPL-MCNC: 89 MG/DL (ref 40–230)
IMM GRANULOCYTES # BLD AUTO: 0.01 THOUSAND/UL (ref 0–0.2)
IMM GRANULOCYTES NFR BLD AUTO: 0 % (ref 0–2)
LDH SERPL-CCNC: 87 U/L (ref 81–234)
LYMPHOCYTES # BLD AUTO: 1.11 THOUSANDS/ΜL (ref 0.6–4.47)
LYMPHOCYTES NFR BLD AUTO: 32 % (ref 14–44)
MCH RBC QN AUTO: 31.3 PG (ref 26.8–34.3)
MCHC RBC AUTO-ENTMCNC: 33.3 G/DL (ref 31.4–37.4)
MCV RBC AUTO: 94 FL (ref 82–98)
MONOCYTES # BLD AUTO: 0.54 THOUSAND/ΜL (ref 0.17–1.22)
MONOCYTES NFR BLD AUTO: 16 % (ref 4–12)
NEUTROPHILS # BLD AUTO: 1.75 THOUSANDS/ΜL (ref 1.85–7.62)
NEUTS SEG NFR BLD AUTO: 51 % (ref 43–75)
NRBC BLD AUTO-RTO: 0 /100 WBCS
PLATELET # BLD AUTO: 283 THOUSANDS/UL (ref 149–390)
PMV BLD AUTO: 9.8 FL (ref 8.9–12.7)
POTASSIUM SERPL-SCNC: 3.9 MMOL/L (ref 3.5–5.3)
PROT SERPL-MCNC: 7.6 G/DL (ref 6.4–8.2)
RBC # BLD AUTO: 3.96 MILLION/UL (ref 3.81–5.12)
SODIUM SERPL-SCNC: 142 MMOL/L (ref 136–145)
URATE SERPL-MCNC: 3.6 MG/DL (ref 2–6.8)
WBC # BLD AUTO: 3.47 THOUSAND/UL (ref 4.31–10.16)

## 2020-01-13 PROCEDURE — 83615 LACTATE (LD) (LDH) ENZYME: CPT | Performed by: INTERNAL MEDICINE

## 2020-01-13 PROCEDURE — 84166 PROTEIN E-PHORESIS/URINE/CSF: CPT | Performed by: PATHOLOGY

## 2020-01-13 PROCEDURE — 82784 ASSAY IGA/IGD/IGG/IGM EACH: CPT | Performed by: INTERNAL MEDICINE

## 2020-01-13 PROCEDURE — 84550 ASSAY OF BLOOD/URIC ACID: CPT | Performed by: INTERNAL MEDICINE

## 2020-01-13 PROCEDURE — 85025 COMPLETE CBC W/AUTO DIFF WBC: CPT | Performed by: INTERNAL MEDICINE

## 2020-01-13 PROCEDURE — 85652 RBC SED RATE AUTOMATED: CPT | Performed by: INTERNAL MEDICINE

## 2020-01-13 PROCEDURE — 86334 IMMUNOFIX E-PHORESIS SERUM: CPT | Performed by: INTERNAL MEDICINE

## 2020-01-13 PROCEDURE — 80053 COMPREHEN METABOLIC PANEL: CPT | Performed by: INTERNAL MEDICINE

## 2020-01-13 PROCEDURE — 82232 ASSAY OF BETA-2 PROTEIN: CPT | Performed by: INTERNAL MEDICINE

## 2020-01-13 PROCEDURE — 36415 COLL VENOUS BLD VENIPUNCTURE: CPT | Performed by: INTERNAL MEDICINE

## 2020-01-13 PROCEDURE — 84165 PROTEIN E-PHORESIS SERUM: CPT | Performed by: INTERNAL MEDICINE

## 2020-01-13 PROCEDURE — 84165 PROTEIN E-PHORESIS SERUM: CPT | Performed by: PATHOLOGY

## 2020-01-13 PROCEDURE — 86334 IMMUNOFIX E-PHORESIS SERUM: CPT | Performed by: PATHOLOGY

## 2020-01-13 PROCEDURE — 84166 PROTEIN E-PHORESIS/URINE/CSF: CPT

## 2020-01-13 PROCEDURE — 83883 ASSAY NEPHELOMETRY NOT SPEC: CPT | Performed by: INTERNAL MEDICINE

## 2020-01-14 ENCOUNTER — HOSPITAL ENCOUNTER (OUTPATIENT)
Dept: INFUSION CENTER | Facility: CLINIC | Age: 70
Discharge: HOME | End: 2020-01-14
Payer: MEDICARE

## 2020-01-14 VITALS
SYSTOLIC BLOOD PRESSURE: 138 MMHG | RESPIRATION RATE: 16 BRPM | WEIGHT: 134.92 LBS | HEART RATE: 76 BPM | HEIGHT: 60 IN | OXYGEN SATURATION: 98 % | TEMPERATURE: 98.5 F | BODY MASS INDEX: 26.49 KG/M2 | DIASTOLIC BLOOD PRESSURE: 70 MMHG

## 2020-01-14 DIAGNOSIS — C90.00 MULTIPLE MYELOMA NOT HAVING ACHIEVED REMISSION (HCC): Primary | ICD-10-CM

## 2020-01-14 LAB
ALBUMIN SERPL ELPH-MCNC: 4.35 G/DL (ref 3.5–5)
ALBUMIN SERPL ELPH-MCNC: 57.2 % (ref 52–65)
ALBUMIN UR ELPH-MCNC: 100 %
ALPHA1 GLOB MFR UR ELPH: 0 %
ALPHA1 GLOB SERPL ELPH-MCNC: 0.31 G/DL (ref 0.1–0.4)
ALPHA1 GLOB SERPL ELPH-MCNC: 4.1 % (ref 2.5–5)
ALPHA2 GLOB MFR UR ELPH: 0 %
ALPHA2 GLOB SERPL ELPH-MCNC: 0.66 G/DL (ref 0.4–1.2)
ALPHA2 GLOB SERPL ELPH-MCNC: 8.7 % (ref 7–13)
B-GLOBULIN MFR UR ELPH: 0 %
BETA GLOB ABNORMAL SERPL ELPH-MCNC: 0.37 G/DL (ref 0.4–0.8)
BETA1 GLOB SERPL ELPH-MCNC: 4.9 % (ref 5–13)
BETA2 GLOB SERPL ELPH-MCNC: 3.3 % (ref 2–8)
BETA2+GAMMA GLOB SERPL ELPH-MCNC: 0.25 G/DL (ref 0.2–0.5)
GAMMA GLOB ABNORMAL SERPL ELPH-MCNC: 1.66 G/DL (ref 0.5–1.6)
GAMMA GLOB MFR UR ELPH: 0 %
GAMMA GLOB SERPL ELPH-MCNC: 21.8 % (ref 12–22)
IGG/ALB SER: 1.34 {RATIO} (ref 1.1–1.8)
INTERPRETATION UR IFE-IMP: NORMAL
KAPPA LC FREE SER-MCNC: 10.2 MG/L (ref 3.3–19.4)
KAPPA LC FREE/LAMBDA FREE SER: 0.04 {RATIO} (ref 0.26–1.65)
LAMBDA LC FREE SERPL-MCNC: 227.7 MG/L (ref 5.7–26.3)
M PROTEIN 1 MFR SERPL ELPH: 13 %
M PROTEIN 1 SERPL ELPH-MCNC: 0.99 G/DL
PROT PATTERN SERPL ELPH-IMP: ABNORMAL
PROT PATTERN UR ELPH-IMP: NORMAL
PROT SERPL-MCNC: 7.6 G/DL (ref 6.4–8.2)
PROT UR-MCNC: 16 MG/DL

## 2020-01-14 PROCEDURE — 96365 THER/PROPH/DIAG IV INF INIT: CPT

## 2020-01-14 RX ORDER — SODIUM CHLORIDE 9 MG/ML
20 INJECTION, SOLUTION INTRAVENOUS ONCE
Status: CANCELLED | OUTPATIENT
Start: 2020-04-06

## 2020-01-14 RX ORDER — SODIUM CHLORIDE 9 MG/ML
20 INJECTION, SOLUTION INTRAVENOUS ONCE
Status: COMPLETED | OUTPATIENT
Start: 2020-01-14 | End: 2020-01-14

## 2020-01-14 RX ADMIN — ZOLEDRONIC ACID 3.5 MG: 4 INJECTION, SOLUTION, CONCENTRATE INTRAVENOUS at 14:46

## 2020-01-14 RX ADMIN — SODIUM CHLORIDE 20 ML/HR: 0.9 INJECTION, SOLUTION INTRAVENOUS at 14:10

## 2020-01-14 NOTE — PROGRESS NOTES
Patient here for zometa  Labs reviewed, Ca 8 9  Patient resting with no complaints  Vitals stable  Will continue to monitor

## 2020-01-15 LAB — B2 MICROGLOB SERPL-MCNC: 1.7 MG/L (ref 0.6–2.4)

## 2020-01-16 ENCOUNTER — APPOINTMENT (OUTPATIENT)
Dept: LAB | Facility: CLINIC | Age: 70
End: 2020-01-16
Payer: MEDICARE

## 2020-01-16 DIAGNOSIS — C90.00 MULTIPLE MYELOMA NOT HAVING ACHIEVED REMISSION (HCC): ICD-10-CM

## 2020-01-16 LAB
PROT 24H UR-MCNC: <96 MG/24 HRS (ref 40–150)
SPECIMEN VOL UR: 1600 ML

## 2020-01-16 PROCEDURE — 84156 ASSAY OF PROTEIN URINE: CPT

## 2020-01-23 ENCOUNTER — HOSPITAL ENCOUNTER (OUTPATIENT)
Dept: RADIOLOGY | Age: 70
Discharge: HOME | End: 2020-01-23
Payer: MEDICARE

## 2020-01-23 DIAGNOSIS — M85.89 OSTEOPENIA OF MULTIPLE SITES: ICD-10-CM

## 2020-01-23 DIAGNOSIS — S32.040D CLOSED COMPRESSION FRACTURE OF L4 LUMBAR VERTEBRA WITH ROUTINE HEALING, SUBSEQUENT ENCOUNTER: ICD-10-CM

## 2020-01-23 PROCEDURE — 77080 DXA BONE DENSITY AXIAL: CPT

## 2020-03-03 DIAGNOSIS — C90.00 MULTIPLE MYELOMA NOT HAVING ACHIEVED REMISSION (HCC): Primary | ICD-10-CM

## 2020-03-06 ENCOUNTER — APPOINTMENT (OUTPATIENT)
Dept: LAB | Facility: CLINIC | Age: 70
End: 2020-03-06
Payer: MEDICARE

## 2020-03-06 DIAGNOSIS — C90.00 MULTIPLE MYELOMA NOT HAVING ACHIEVED REMISSION (HCC): ICD-10-CM

## 2020-03-06 DIAGNOSIS — C90.30 PLASMACYTOMA, UNSPECIFIED PLASMACYTOMA TYPE, UNSPECIFIED WHETHER REMISSION ACHIEVED (HCC): ICD-10-CM

## 2020-03-06 DIAGNOSIS — I10 ESSENTIAL HYPERTENSION: ICD-10-CM

## 2020-03-06 LAB
ALBUMIN SERPL BCP-MCNC: 3.7 G/DL (ref 3.5–5)
ALP SERPL-CCNC: 67 U/L (ref 46–116)
ALT SERPL W P-5'-P-CCNC: 21 U/L (ref 12–78)
ANION GAP SERPL CALCULATED.3IONS-SCNC: 6 MMOL/L (ref 4–13)
AST SERPL W P-5'-P-CCNC: 13 U/L (ref 5–45)
BASOPHILS # BLD AUTO: 0.01 THOUSANDS/ΜL (ref 0–0.1)
BASOPHILS NFR BLD AUTO: 0 % (ref 0–1)
BILIRUB SERPL-MCNC: 0.47 MG/DL (ref 0.2–1)
BUN SERPL-MCNC: 17 MG/DL (ref 5–25)
CALCIUM SERPL-MCNC: 9.2 MG/DL (ref 8.3–10.1)
CHLORIDE SERPL-SCNC: 107 MMOL/L (ref 100–108)
CO2 SERPL-SCNC: 25 MMOL/L (ref 21–32)
CREAT SERPL-MCNC: 0.71 MG/DL (ref 0.6–1.3)
EOSINOPHIL # BLD AUTO: 0.05 THOUSAND/ΜL (ref 0–0.61)
EOSINOPHIL NFR BLD AUTO: 1 % (ref 0–6)
ERYTHROCYTE [DISTWIDTH] IN BLOOD BY AUTOMATED COUNT: 13.5 % (ref 11.6–15.1)
GFR SERPL CREATININE-BSD FRML MDRD: 88 ML/MIN/1.73SQ M
GLUCOSE SERPL-MCNC: 115 MG/DL (ref 65–140)
HCT VFR BLD AUTO: 35.1 % (ref 34.8–46.1)
HGB BLD-MCNC: 11.9 G/DL (ref 11.5–15.4)
IGA SERPL-MCNC: 53 MG/DL (ref 70–400)
IGG SERPL-MCNC: 1550 MG/DL (ref 700–1600)
IGM SERPL-MCNC: 88 MG/DL (ref 40–230)
IMM GRANULOCYTES # BLD AUTO: 0.01 THOUSAND/UL (ref 0–0.2)
IMM GRANULOCYTES NFR BLD AUTO: 0 % (ref 0–2)
LDH SERPL-CCNC: 80 U/L (ref 81–234)
LYMPHOCYTES # BLD AUTO: 1.17 THOUSANDS/ΜL (ref 0.6–4.47)
LYMPHOCYTES NFR BLD AUTO: 31 % (ref 14–44)
MCH RBC QN AUTO: 31.7 PG (ref 26.8–34.3)
MCHC RBC AUTO-ENTMCNC: 33.9 G/DL (ref 31.4–37.4)
MCV RBC AUTO: 94 FL (ref 82–98)
MONOCYTES # BLD AUTO: 0.58 THOUSAND/ΜL (ref 0.17–1.22)
MONOCYTES NFR BLD AUTO: 15 % (ref 4–12)
NEUTROPHILS # BLD AUTO: 1.95 THOUSANDS/ΜL (ref 1.85–7.62)
NEUTS SEG NFR BLD AUTO: 53 % (ref 43–75)
NRBC BLD AUTO-RTO: 0 /100 WBCS
PLATELET # BLD AUTO: 305 THOUSANDS/UL (ref 149–390)
PMV BLD AUTO: 10.1 FL (ref 8.9–12.7)
POTASSIUM SERPL-SCNC: 3.9 MMOL/L (ref 3.5–5.3)
PROT SERPL-MCNC: 7.6 G/DL (ref 6.4–8.2)
RBC # BLD AUTO: 3.75 MILLION/UL (ref 3.81–5.12)
SODIUM SERPL-SCNC: 138 MMOL/L (ref 136–145)
WBC # BLD AUTO: 3.77 THOUSAND/UL (ref 4.31–10.16)

## 2020-03-06 PROCEDURE — 36415 COLL VENOUS BLD VENIPUNCTURE: CPT

## 2020-03-06 PROCEDURE — 83615 LACTATE (LD) (LDH) ENZYME: CPT

## 2020-03-06 PROCEDURE — 82784 ASSAY IGA/IGD/IGG/IGM EACH: CPT

## 2020-03-06 PROCEDURE — 80053 COMPREHEN METABOLIC PANEL: CPT

## 2020-03-06 PROCEDURE — 83883 ASSAY NEPHELOMETRY NOT SPEC: CPT

## 2020-03-06 PROCEDURE — 82232 ASSAY OF BETA-2 PROTEIN: CPT

## 2020-03-06 PROCEDURE — 84166 PROTEIN E-PHORESIS/URINE/CSF: CPT | Performed by: PATHOLOGY

## 2020-03-06 PROCEDURE — 84165 PROTEIN E-PHORESIS SERUM: CPT

## 2020-03-06 PROCEDURE — 85025 COMPLETE CBC W/AUTO DIFF WBC: CPT

## 2020-03-06 PROCEDURE — 84165 PROTEIN E-PHORESIS SERUM: CPT | Performed by: PATHOLOGY

## 2020-03-06 PROCEDURE — 84166 PROTEIN E-PHORESIS/URINE/CSF: CPT

## 2020-03-06 RX ORDER — METOPROLOL TARTRATE 50 MG/1
TABLET, FILM COATED ORAL
Qty: 270 TABLET | Refills: 3 | Status: SHIPPED | OUTPATIENT
Start: 2020-03-06 | End: 2021-03-23

## 2020-03-07 LAB
KAPPA LC FREE SER-MCNC: 10.2 MG/L (ref 3.3–19.4)
KAPPA LC FREE/LAMBDA FREE SER: 0.06 {RATIO} (ref 0.26–1.65)
LAMBDA LC FREE SERPL-MCNC: 184.9 MG/L (ref 5.7–26.3)

## 2020-03-09 ENCOUNTER — TELEPHONE (OUTPATIENT)
Dept: HEMATOLOGY ONCOLOGY | Facility: CLINIC | Age: 70
End: 2020-03-09

## 2020-03-09 ENCOUNTER — OFFICE VISIT (OUTPATIENT)
Dept: HEMATOLOGY ONCOLOGY | Facility: CLINIC | Age: 70
End: 2020-03-09
Payer: MEDICARE

## 2020-03-09 VITALS
BODY MASS INDEX: 26.35 KG/M2 | SYSTOLIC BLOOD PRESSURE: 136 MMHG | RESPIRATION RATE: 17 BRPM | DIASTOLIC BLOOD PRESSURE: 80 MMHG | HEART RATE: 81 BPM | HEIGHT: 60 IN | OXYGEN SATURATION: 99 % | TEMPERATURE: 96.1 F | WEIGHT: 134.2 LBS

## 2020-03-09 DIAGNOSIS — T45.1X5A CHEMOTHERAPY-INDUCED NAUSEA: ICD-10-CM

## 2020-03-09 DIAGNOSIS — M85.89 OSTEOPENIA OF MULTIPLE SITES: ICD-10-CM

## 2020-03-09 DIAGNOSIS — C90.00 MULTIPLE MYELOMA NOT HAVING ACHIEVED REMISSION (HCC): Primary | ICD-10-CM

## 2020-03-09 DIAGNOSIS — C50.911 MALIGNANT NEOPLASM OF RIGHT BREAST IN FEMALE, ESTROGEN RECEPTOR POSITIVE, UNSPECIFIED SITE OF BREAST (HCC): ICD-10-CM

## 2020-03-09 DIAGNOSIS — R11.0 CHEMOTHERAPY-INDUCED NAUSEA: ICD-10-CM

## 2020-03-09 DIAGNOSIS — C90.30 PLASMACYTOMA, UNSPECIFIED PLASMACYTOMA TYPE, UNSPECIFIED WHETHER REMISSION ACHIEVED (HCC): ICD-10-CM

## 2020-03-09 DIAGNOSIS — Z17.0 MALIGNANT NEOPLASM OF RIGHT BREAST IN FEMALE, ESTROGEN RECEPTOR POSITIVE, UNSPECIFIED SITE OF BREAST (HCC): ICD-10-CM

## 2020-03-09 DIAGNOSIS — I10 ESSENTIAL HYPERTENSION: ICD-10-CM

## 2020-03-09 DIAGNOSIS — S32.040D CLOSED COMPRESSION FRACTURE OF L4 LUMBAR VERTEBRA WITH ROUTINE HEALING, SUBSEQUENT ENCOUNTER: ICD-10-CM

## 2020-03-09 LAB
ALBUMIN SERPL ELPH-MCNC: 4.31 G/DL (ref 3.5–5)
ALBUMIN SERPL ELPH-MCNC: 58.3 % (ref 52–65)
ALBUMIN UR ELPH-MCNC: 100 %
ALPHA1 GLOB MFR UR ELPH: 0 %
ALPHA1 GLOB SERPL ELPH-MCNC: 0.27 G/DL (ref 0.1–0.4)
ALPHA1 GLOB SERPL ELPH-MCNC: 3.6 % (ref 2.5–5)
ALPHA2 GLOB MFR UR ELPH: 0 %
ALPHA2 GLOB SERPL ELPH-MCNC: 0.64 G/DL (ref 0.4–1.2)
ALPHA2 GLOB SERPL ELPH-MCNC: 8.6 % (ref 7–13)
B-GLOBULIN MFR UR ELPH: 0 %
B2 MICROGLOB SERPL-MCNC: 1.6 MG/L (ref 0.6–2.4)
BETA GLOB ABNORMAL SERPL ELPH-MCNC: 0.36 G/DL (ref 0.4–0.8)
BETA1 GLOB SERPL ELPH-MCNC: 4.9 % (ref 5–13)
BETA2 GLOB SERPL ELPH-MCNC: 3 % (ref 2–8)
BETA2+GAMMA GLOB SERPL ELPH-MCNC: 0.22 G/DL (ref 0.2–0.5)
GAMMA GLOB ABNORMAL SERPL ELPH-MCNC: 1.6 G/DL (ref 0.5–1.6)
GAMMA GLOB MFR UR ELPH: 0 %
GAMMA GLOB SERPL ELPH-MCNC: 21.6 % (ref 12–22)
IGG/ALB SER: 1.4 {RATIO} (ref 1.1–1.8)
M PROTEIN 1 MFR SERPL ELPH: 12.5 %
M PROTEIN 1 SERPL ELPH-MCNC: 0.93 G/DL
PROT PATTERN SERPL ELPH-IMP: ABNORMAL
PROT PATTERN UR ELPH-IMP: NORMAL
PROT SERPL-MCNC: 7.4 G/DL (ref 6.4–8.2)
PROT UR-MCNC: 6 MG/DL

## 2020-03-09 PROCEDURE — 99215 OFFICE O/P EST HI 40 MIN: CPT | Performed by: INTERNAL MEDICINE

## 2020-03-09 PROCEDURE — 3079F DIAST BP 80-89 MM HG: CPT | Performed by: INTERNAL MEDICINE

## 2020-03-09 PROCEDURE — 3075F SYST BP GE 130 - 139MM HG: CPT | Performed by: INTERNAL MEDICINE

## 2020-03-09 PROCEDURE — 3008F BODY MASS INDEX DOCD: CPT | Performed by: INTERNAL MEDICINE

## 2020-03-09 PROCEDURE — 1160F RVW MEDS BY RX/DR IN RCRD: CPT | Performed by: INTERNAL MEDICINE

## 2020-03-09 PROCEDURE — 1036F TOBACCO NON-USER: CPT | Performed by: INTERNAL MEDICINE

## 2020-03-09 PROCEDURE — 4040F PNEUMOC VAC/ADMIN/RCVD: CPT | Performed by: INTERNAL MEDICINE

## 2020-03-09 RX ORDER — ACYCLOVIR 400 MG/1
400 TABLET ORAL 2 TIMES DAILY
Qty: 180 TABLET | Refills: 2 | Status: SHIPPED | OUTPATIENT
Start: 2020-03-09 | End: 2021-11-24 | Stop reason: ALTCHOICE

## 2020-03-09 RX ORDER — LENALIDOMIDE 15 MG/1
CAPSULE ORAL
Qty: 21 CAPSULE | Refills: 0 | Status: SHIPPED | OUTPATIENT
Start: 2020-03-09 | End: 2020-04-03 | Stop reason: SDUPTHER

## 2020-03-09 RX ORDER — DEXAMETHASONE 4 MG/1
TABLET ORAL
Qty: 40 TABLET | Refills: 4 | Status: SHIPPED | OUTPATIENT
Start: 2020-03-09 | End: 2020-11-24 | Stop reason: ALTCHOICE

## 2020-03-09 RX ORDER — ALLOPURINOL 300 MG/1
300 TABLET ORAL DAILY
Qty: 30 TABLET | Refills: 1 | Status: SHIPPED | OUTPATIENT
Start: 2020-03-09 | End: 2020-11-24 | Stop reason: ALTCHOICE

## 2020-03-09 RX ORDER — ONDANSETRON 4 MG/1
4 TABLET, FILM COATED ORAL 4 TIMES DAILY PRN
Qty: 30 TABLET | Refills: 1 | Status: SHIPPED | OUTPATIENT
Start: 2020-03-09 | End: 2020-11-24 | Stop reason: ALTCHOICE

## 2020-03-09 NOTE — PROGRESS NOTES
HPI:   Patient is here with her    Follow-up visit for  plasmacytoma/IgG lambda multiple myeloma and patient will be starting RVd systemic therapy and also aspirin, acyclovir and allopurinol  Zometa also     In early 2019 patient was diagnosed to have Plasmacytoma/multiple myeloma at isolated L4 area with compression fracture  Patient received radiation to this area   Last radiation treatment was on 03/07/19       She had bone marrow test  that showed 15% plasma cells, scattered and in small clusters  The SPEP showed a monoclonal peak in the gamma region, IgG lambda  Patient's condition was being observed and the question was when to treat her for multiple myeloma  Patient was referred to [facility] for an opinion and clinical trial    Patient saw myeloma expert at [facility] on 01/02/2020  Magda Powell He advised RVD  Patient has some tiredness     Not much back discomfort        Patient has remote past history of stage I, grade 1, 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  Positive hormone receptors and negative her 2   Patient had right mastectomy   She had adjuvant tamoxifen for 5 years               Current Outpatient Medications:     Calcium Citrate-Vitamin D (CALCIUM CITRATE + D PO), Take 1 tablet by mouth 2 (two) times a day  , Disp: , Rfl:     escitalopram (LEXAPRO) 10 mg tablet, TAKE 1 TABLET BY MOUTH EVERY DAY, Disp: 90 tablet, Rfl: 3    metoprolol tartrate (LOPRESSOR) 50 mg tablet, TAKE 1 AND 1/2 TABLETS BY MOUTH TWO TIMES DAILY, Disp: 270 tablet, Rfl: 3    Allergies   Allergen Reactions    Ibuprofen Abdominal Pain and Tachycardia     Reaction Date: [id number];           Malign[facility] ([facility] )    9/2000 Surgery     Right breast mastectomy      8/13/2012 Initial Diagnosis     Malignant neoplasm of right breast [facility])       Chemotherapy     Tamoxifen for 5 years        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[facility] )    12/18/2018 Initial Diagnosis     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[facility] )      12/18/2018 Biopsy Final Diagnosis   A  Bone, L-4, core biopsy:  - Fragments of trabecular bone with changes compatible with prior fracture site  - 10% lambda predominant plasmacytosis  See note  - Hematopoietic marrow with trilineage hematopoiesis  - No epithelial elements identified, confirmed with negative keratin AE1/AE3 stains  2/1/2019 - 3/7/2019 Radiation     Plan ID Energy Fractions Dose per Fraction (cGy) Dose Correction (cGy) Total Dose Delivered (cGy) Elapsed Days   [id number] Spine 10X/6X 25 / 25 180 0 4,500 34             Multiple myeloma not having achieved remission ([facility] )    7/23/2019 Initial Diagnosis     Multiple myeloma not having achieved remission ([facility] )      3/16/2020 -  Chemotherapy     bortezomib (VELCADE) subcutaneous injection 2 1 mg, 1 3 mg/m2, Subcutaneous, Once, 0 of 6 cycles         ROS:  03/09/20 Reviewed 13 systems:  Presently no headaches, seizures, dizziness, diplopia, dysphagia, hoarseness, chest pain, palpitations, shortness of breath, cough, hemoptysis, abdominal pain, nausea, vomiting, change in bowel habits, melena, hematuria, fever, chills, bleeding,  skin rash, weight loss,  weakness, numbness,  claudication and gait problem  No frequent infections  Not unusually sensitive to heat or cold  No swelling of the ankles  No swollen glands  Patient is anxious  Other symptoms are in HPI        /80 (BP Location: Left arm, Patient Position: Sitting, Cuff Size: Adult)   Pulse 81   Temp (!) 96 1 °F (35 6 °C)   Resp 17   Ht 5' (1 524 m)   Wt 60 9 kg (134 lb 3 2 oz)   SpO2 99%   BMI 26 21 kg/m²     Physical Exam:  Alert, oriented, not in distress, no icterus, no oral thrush, no palpable neck mass, clear lung fields, regular heart rate, abdomen  soft and non tender, no palpable abdominal mass, no ascites, no edema of ankles, no calf tenderness, no focal neurological deficit, no skin rash, no palpable lymphadenopathy in the neck and axillary areas, good arterial pulses, no clubbing  Patient is anxious  Performance status 1      IMAGING:      LABS:  Results for orders placed or performed in visit on 03/06/20   CBC and differential   Result Value Ref Range    WBC 3 77 (L) 4 31 - 10 16 Thousand/uL    RBC 3 75 (L) 3 81 - 5 12 Million/uL    Hemoglobin 11 9 11 5 - 15 4 g/dL    Hematocrit 35 1 34 8 - 46 1 %    MCV 94 82 - 98 fL    MCH 31 7 26 8 - 34 3 pg    MCHC 33 9 31 4 - 37 4 g/dL    RDW 13 5 11 6 - 15 1 %    MPV 10 1 8 9 - 12 7 fL    Platelets 504 684 - 523 Thousands/uL    nRBC 0 /100 WBCs    Neutrophils Relative 53 43 - 75 %    Immat GRANS % 0 0 - 2 %    Lymphocytes Relative 31 14 - 44 %    Monocytes Relative 15 (H) 4 - 12 %    Eosinophils Relative 1 0 - 6 %    Basophils Relative 0 0 - 1 %    Neutrophils Absolute 1 95 1 85 - 7 62 Thousands/µL    Immature Grans Absolute 0 01 0 00 - 0 20 Thousand/uL    Lymphocytes Absolute 1 17 0 60 - 4 47 Thousands/µL    Monocytes Absolute 0 58 0 17 - 1 22 Thousand/µL    Eosinophils Absolute 0 05 0 00 - 0 61 Thousand/µL    Basophils Absolute 0 01 0 00 - 0 10 Thousands/µL   Comprehensive metabolic panel   Result Value Ref Range    Sodium 138 136 - 145 mmol/L    Potassium 3 9 3 5 - 5 3 mmol/L    Chloride 107 100 - 108 mmol/L    CO2 25 21 - 32 mmol/L    ANION GAP 6 4 - 13 mmol/L    BUN 17 5 - 25 mg/dL    Creatinine 0 71 0 60 - 1 30 mg/dL    Glucose 115 65 - 140 mg/dL    Calcium 9 2 8 3 - 10 1 mg/dL    AST 13 5 - 45 U/L    ALT 21 12 - 78 U/L    Alkaline Phosphatase 67 46 - 116 U/L    Total Protein 7 6 6 4 - 8 2 g/dL    Albumin 3 7 3 5 - 5 0 g/dL    Total Bilirubin 0 47 0 20 - 1 00 mg/dL    eGFR 88 ml/min/1 73sq m   IgG, IgA, IgM   Result Value Ref Range    IGA 53 0 (L) 70 0 - 400 0 mg/dL    IGG 1,550 0 700 0-1,600 0 mg/dL    IGM 88 0 40 0 - 230 0 mg/dL   Immunoglobulin free LT chains blood   Result Value Ref Range    Ig Kappa Free Light Chain 10 2 3 3 - 19 4 mg/L    Ig Lambda Free Light Chain 184 9 (H) 5 7 - 26 3 mg/L    Kappa/Lambda FluidC Ratio 0 06 (L) 0 26 - 1 65 LD,Blood   Result Value Ref Range    LD 80 (L) 81 - 234 U/L     Labs, Imaging, & Other studies:   All pertinent labs and imaging studies were personally reviewed    Lab Results   Component Value Date     11/28/2017    K 3 9 03/06/2020     03/06/2020    CO2 25 03/06/2020    ANIONGAP 9 09/02/2014    BUN 17 03/06/2020    CREATININE 0 71 03/06/2020    GLUCOSE 93 09/02/2014    GLUF 95 01/13/2020    CALCIUM 9 2 03/06/2020    AST 13 03/06/2020    ALT 21 03/06/2020    ALKPHOS 67 03/06/2020    PROT 8 0 09/02/2014    BILITOT 0 32 09/02/2014    EGFR 88 03/06/2020     Lab Results   Component Value Date    WBC 3 77 (L) 03/06/2020    HGB 11 9 03/06/2020    HCT 35 1 03/06/2020    MCV 94 03/06/2020     03/06/2020       Reviewed and discussed with patient  Assessment and plan:  Patient is here with her    Follow-up visit for  plasmacytoma/IgG lambda multiple myeloma and patient will be starting RVd systemic therapy and also aspirin, acyclovir and allopurinol  Zometa also     In early 2019 patient was diagnosed to have Plasmacytoma/multiple myeloma at isolated L4 area with compression fracture  Patient received radiation to this area   Last radiation treatment was on 03/07/19       She had bone marrow test  that showed 15% plasma cells, scattered and in small clusters  The SPEP showed a monoclonal peak in the gamma region, IgG lambda  Patient's condition was being observed and the question was when to treat her for multiple myeloma  Patient was referred to [facility] for an opinion and clinical trial    Patient saw myeloma expert at [facility] on 01/02/2020  Rui Penny He advised RVD  Patient has some tiredness     Not much back discomfort        Patient has remote past history of stage I, grade 1, 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  Positive hormone receptors and negative her 2   Patient had right mastectomy   She had adjuvant tamoxifen for 5 years       Physical examination and test results are as recorded and discussed  Patient will be started on treatments next week  I went over RVd protocol with her in detail and discussed the benefits and side effects of these drugs individually and collectively including risks of DVT/pulmonary embolism and thromboembolic problem secondary to Revlimid and herpes zoster secondary to Velcade  Side effects to high dose Decadron  She will take Decadron with food and she will take Pepcid for 1 or 2 days post Decadron every time  Patient signed informed consent for systemic therapy medications  She does not have problem with her teeth for Zometa  She is already taking calcium and vitamin-D  All discussed in detail  Questions answered  Discussed the importance of self-breast examination, eating healthy foods, staying active and health screening tests   Patient is capable of self-care         She follows with her gynecologist for examination of breast and imaging studies  1  Multiple myeloma not having achieved remission (HCC)    - Protein, urine, 24 hour; Future  - CBC and differential; Standing  - CBC and differential  - Beta 2 microglobulin, serum; Standing  - Comprehensive metabolic panel; Standing  - IgG, IgA, IgM; Standing  - Immunoglobulin free LT chains blood; Standing  - LD,Blood; Standing  - Protein electrophoresis, serum; Standing  - Protein electrophoresis, urine; Standing  - Uric acid; Standing  - Beta 2 microglobulin, serum  - Comprehensive metabolic panel  - IgG, IgA, IgM  - Immunoglobulin free LT chains blood  - LD,Blood  - Protein electrophoresis, serum  - Uric acid  - acyclovir (ZOVIRAX) 400 MG tablet; Take 1 tablet (400 mg total) by mouth 2 (two) times a day  Dispense: 180 tablet; Refill: 2  - dexamethasone (DECADRON) 4 mg tablet; 10 tablets by mouth 1 day a week, day of chemotherapy  Please take with food  Dispense: 40 tablet; Refill: 4  - allopurinol (ZYLOPRIM) 300 mg tablet;  Take 1 tablet (300 mg total) by mouth daily Stop if rash and call our office  Dispense: 30 tablet; Refill: 1  - Infusion Calculated Appointment Request; Future  - CBC and differential; Future  - Comprehensive metabolic panel; Future  - Infusion Calculated Appointment Request; Future  - CBC and differential; Future  - Comprehensive metabolic panel; Future  - Infusion Calculated Appointment Request; Future  - CBC and differential; Future  - Comprehensive metabolic panel; Future  - Infusion Calculated Appointment Request; Future  - CBC and differential; Future  - Comprehensive metabolic panel; Future    2  Plasmacytoma, unspecified plasmacytoma type, unspecified whether remission achieved [facility] )      3  Malignant neoplasm of right breast in female, estrogen receptor positive, unspecified site of breast ([facility] )      4  Closed compression fracture of L4 lumbar vertebra with routine healing, subsequent encounter      5  Osteopenia of multiple sites      6  Essential hypertension      7  Chemotherapy-induced nausea    - ondansetron (ZOFRAN) 4 mg tablet; Take 1 tablet (4 mg total) by mouth 4 (four) times a day as needed for nausea or vomiting  Dispense: 30 tablet; Refill: 1        Patient voiced understanding and agreement in the discussion  Counseling / Coordination of Care   Greater than 50% of total time was spent with the patient and / or family counseling and / or coordination of care

## 2020-03-09 NOTE — PATIENT INSTRUCTIONS
Blood and urine tests  Consent for RVd  Starting RVd  Also acyclovir, allopurinol and aspirin with food  Allopurinol for a month

## 2020-03-11 ENCOUNTER — TELEPHONE (OUTPATIENT)
Dept: HEMATOLOGY ONCOLOGY | Facility: CLINIC | Age: 70
End: 2020-03-11

## 2020-03-11 DIAGNOSIS — C90.00 MULTIPLE MYELOMA NOT HAVING ACHIEVED REMISSION (HCC): ICD-10-CM

## 2020-03-11 DIAGNOSIS — F41.9 ANXIETY: Primary | ICD-10-CM

## 2020-03-11 RX ORDER — ALPRAZOLAM 0.25 MG/1
0.25 TABLET ORAL 2 TIMES DAILY PRN
Qty: 30 TABLET | Refills: 0 | Status: SHIPPED | OUTPATIENT
Start: 2020-03-11 | End: 2021-11-29 | Stop reason: ALTCHOICE

## 2020-03-11 NOTE — TELEPHONE ENCOUNTER
Reviewed with Dr Bety Pete and he stated we can give patient Alprazolam  25mg BID PRN for anxiety and panic attacks  Called patient to review RX pended to Dr Bety Pete for signing

## 2020-03-11 NOTE — TELEPHONE ENCOUNTER
Telephone Triage-Symptom Call      Azul Saba  1950  [phone]    Caller:     Chief Complaint:  Anxiety   Current Treatment patient:  Yes  Name of treatment:  (Bortezomib  Lenalidomide  Dexamethasone  Date of last treatment:  Upcoming on 3/15   Recent illness or Surgery:   Denies       Description of symptoms per [facility] Guidelines review (charting by exception):  Spouse calling stating patient is having symptoms of  "anxiety attack", which she has had in the past    Positive history for anxiety  Had taken (Alprazolam  25mg) in the past and it has worked well for her  Medication  several years ago and   is asking if Dr Lon Yarbrough would be willing to prescribe something to help with her anxiety  Also taking Lexapro 10 mg by Dr Claudia Levine (Cardiology), however  feels that Alprazolam would help her  Saw Dr Lon Yarbrough 3/9/20  On Monday  Guidelines followed: Yes    Disposition: Informed  that I wll reach out to Dr Lon Yarbrough and request if he would prescribe this medication for her anxiety  Instructed to call back if any changes or to seek care in the meantime  NEXT STEPS REQUIRED:  As above, please discuss with Dr Lon Yarbrough, can use [facility] pharm on file       Patient verbalizes understanding and is in agreement with plan: YES    Verified that guidelines were completed and documented: YES

## 2020-03-12 ENCOUNTER — CLINICAL SUPPORT (OUTPATIENT)
Dept: RADIATION ONCOLOGY | Facility: HOSPITAL | Age: 70
End: 2020-03-12
Attending: RADIOLOGY
Payer: MEDICARE

## 2020-03-12 ENCOUNTER — APPOINTMENT (OUTPATIENT)
Dept: LAB | Facility: CLINIC | Age: 70
End: 2020-03-12
Payer: MEDICARE

## 2020-03-12 VITALS
BODY MASS INDEX: 26.42 KG/M2 | RESPIRATION RATE: 18 BRPM | HEIGHT: 60 IN | TEMPERATURE: 96.5 F | WEIGHT: 134.6 LBS | SYSTOLIC BLOOD PRESSURE: 136 MMHG | DIASTOLIC BLOOD PRESSURE: 72 MMHG | HEART RATE: 84 BPM

## 2020-03-12 DIAGNOSIS — C90.30 PLASMACYTOMA, UNSPECIFIED PLASMACYTOMA TYPE, UNSPECIFIED WHETHER REMISSION ACHIEVED (HCC): Primary | ICD-10-CM

## 2020-03-12 DIAGNOSIS — C90.00 MULTIPLE MYELOMA NOT HAVING ACHIEVED REMISSION (HCC): ICD-10-CM

## 2020-03-12 DIAGNOSIS — C50.911 MALIGNANT NEOPLASM OF RIGHT BREAST IN FEMALE, ESTROGEN RECEPTOR POSITIVE, UNSPECIFIED SITE OF BREAST (HCC): Primary | ICD-10-CM

## 2020-03-12 DIAGNOSIS — Z17.0 MALIGNANT NEOPLASM OF RIGHT BREAST IN FEMALE, ESTROGEN RECEPTOR POSITIVE, UNSPECIFIED SITE OF BREAST (HCC): Primary | ICD-10-CM

## 2020-03-12 LAB
ALBUMIN SERPL BCP-MCNC: 3.9 G/DL (ref 3.5–5)
ALP SERPL-CCNC: 64 U/L (ref 46–116)
ALT SERPL W P-5'-P-CCNC: 24 U/L (ref 12–78)
ANION GAP SERPL CALCULATED.3IONS-SCNC: 8 MMOL/L (ref 4–13)
AST SERPL W P-5'-P-CCNC: 15 U/L (ref 5–45)
BILIRUB SERPL-MCNC: 0.33 MG/DL (ref 0.2–1)
BUN SERPL-MCNC: 11 MG/DL (ref 5–25)
CALCIUM SERPL-MCNC: 9 MG/DL (ref 8.3–10.1)
CHLORIDE SERPL-SCNC: 104 MMOL/L (ref 100–108)
CO2 SERPL-SCNC: 27 MMOL/L (ref 21–32)
CREAT SERPL-MCNC: 0.68 MG/DL (ref 0.6–1.3)
GFR SERPL CREATININE-BSD FRML MDRD: 91 ML/MIN/1.73SQ M
GLUCOSE SERPL-MCNC: 94 MG/DL (ref 65–140)
IGA SERPL-MCNC: 57 MG/DL (ref 70–400)
IGG SERPL-MCNC: 1570 MG/DL (ref 700–1600)
IGM SERPL-MCNC: 96 MG/DL (ref 40–230)
LDH SERPL-CCNC: 135 U/L (ref 81–234)
POTASSIUM SERPL-SCNC: 3.9 MMOL/L (ref 3.5–5.3)
PROT 24H UR-MCNC: 99.2 MG/24 HRS (ref 40–150)
PROT SERPL-MCNC: 8.1 G/DL (ref 6.4–8.2)
SODIUM SERPL-SCNC: 139 MMOL/L (ref 136–145)
SPECIMEN VOL UR: 1600 ML
URATE SERPL-MCNC: 3.4 MG/DL (ref 2–6.8)

## 2020-03-12 PROCEDURE — 82784 ASSAY IGA/IGD/IGG/IGM EACH: CPT | Performed by: INTERNAL MEDICINE

## 2020-03-12 PROCEDURE — 82232 ASSAY OF BETA-2 PROTEIN: CPT | Performed by: INTERNAL MEDICINE

## 2020-03-12 PROCEDURE — 84156 ASSAY OF PROTEIN URINE: CPT

## 2020-03-12 PROCEDURE — 83883 ASSAY NEPHELOMETRY NOT SPEC: CPT | Performed by: INTERNAL MEDICINE

## 2020-03-12 PROCEDURE — 83615 LACTATE (LD) (LDH) ENZYME: CPT | Performed by: INTERNAL MEDICINE

## 2020-03-12 PROCEDURE — 36415 COLL VENOUS BLD VENIPUNCTURE: CPT | Performed by: INTERNAL MEDICINE

## 2020-03-12 PROCEDURE — 84550 ASSAY OF BLOOD/URIC ACID: CPT | Performed by: INTERNAL MEDICINE

## 2020-03-12 PROCEDURE — 80053 COMPREHEN METABOLIC PANEL: CPT | Performed by: INTERNAL MEDICINE

## 2020-03-12 PROCEDURE — 99211 OFF/OP EST MAY X REQ PHY/QHP: CPT | Performed by: RADIOLOGY

## 2020-03-12 NOTE — PROGRESS NOTES
Follow-up - Radiation Oncology   Trena Enamorado 1950 81 y o  female [id number]      History of Present Illness   Cancer Staging  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[facility] [address] )  Staging form: Plasma Cell Myeloma and Disorders, AJCC 8th Edition  - Clinical: No stage assigned - Unsigned          Interval History:  Patient returns today for routine scheduled follow-up after completing L3-L5 spine radiation for plasmacytoma on 3/7/19  Last seen 8/15/19      Patient also has history of stage I right breast cancer in 2000, s/p mastectomy  Received Tamoxifen for 5 years       10/28/19 PET CT  IMPRESSION:   1   New mild linear FDG uptake at the L3 and L5 vertebral bodies   These could represent osteoporotic insufficiency fractures rather than related to underlying osseous lesions   Otherwise no significant FDG uptake in the previously seen vertebral bodies  2  No FDG avid soft tissue lesions      11/20/19 Left screening 3D mammogram  BI-RADS: Overall: 1 - Negative     She was referred to [facility] and seen by a myeloma expert on 1/2/20, VRD was recommended         1/4/20 Bone survey  IMPRESSION:   Multiple compression fractures of the thoracolumbar spine  However, when comparing to previous PET/CT report from prior MRI, the compression deformities have been previously described  If further evaluation for a subtle new compression deformity is clinically indicated, MRI of the thoracic and lumbar spine may be helpful to determine if there is any new bone marrow edema    No osteolytic foci are demonstrated      1/23/20 DXA scan     3/9/20 Dr Keo DAWKINS  - discussed VRD treatment     3/16/20 Infusion  4/1/20-Dr Layo hernández  4/3/20 Medical Onc FU         Historical Information      Malignant neoplasm of right breast ([facility] [address] )    9/2000 Surgery     Right breast mastectomy      8/13/2012 Initial Diagnosis     Malignant neoplasm of right breast [facility])       Chemotherapy     Tamoxifen for 5 years        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[facility] [address] )    12/18/2018 Initial Diagnosis 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[facility] )      12/18/2018 Biopsy     Final Diagnosis   A  Bone, L-4, core biopsy:  - Fragments of trabecular bone with changes compatible with prior fracture site  - 10% lambda predominant plasmacytosis  See note  - Hematopoietic marrow with trilineage hematopoiesis  - No epithelial elements identified, confirmed with negative keratin AE1/AE3 stains              2/1/2019 - 3/7/2019 Radiation     Plan ID Energy Fractions Dose per Fraction (cGy) Dose Correction (cGy) Total Dose Delivered (cGy) Elapsed Days   L3_L5 Spine 10X/6X 25 / 25 180 0 4,500 34             Multiple myeloma not having achieved remission ([facility] [address] )    7/23/2019 Initial Diagnosis     Multiple myeloma not having achieved remission ([facility] )      3/16/2020 -  Chemotherapy     bortezomib (VELCADE) subcutaneous injection 2 1 mg, 1 3 mg/m2 = 2 1 mg, Subcutaneous, Once, 0 of 6 cycles         Past Medical History:   Diagnosis Date    Anxiety     Aortic valve disorder     Breast CA ([facility] [address] )     2000-R mastectomy-took tamoxifen/femira    Cardiac dysrhythmia     Depression     History of spinal fracture     8 since 2016-1 fall related  spinal bone marrow bx today 12/18/2018    Hypertension     Osteoporosis     Palpitations     Squamous cell carcinoma of skin      Past Surgical History:   Procedure Laterality Date    APPENDECTOMY  01/2011    BREAST LUMPECTOMY      CHEILECTOMY Right     Resolved:  2006, Bunion correction    COLONOSCOPY  07/2010    Diverticula; recheck 5 yrs    CT BONE MARROW BIOPSY AND ASPIRATION  7/9/2019    DILATION AND CURETTAGE, DIAGNOSTIC / THERAPEUTIC      HYSTERECTOMY  2019    IR IMAGE GUIDED BIOPSY/ASPIRATION BONE  12/18/2018    MASTECTOMY Right 2000    OOPHORECTOMY Bilateral 2019    [place] LAPAROSCOPY W TOT HYSTERECTUTERUS <=250 GRAM  W TUBE/OVARY N/A 4/4/2019    Procedure: ROBOTIC TOTAL LAPAROSCOPIC HYSTERECTOMY, BILATERAL SALPINGO-OOPHORECTOMY;  Surgeon: Dafne Reis MD PhD;  [facility];  Service: Gynecology Oncology       Social History   Social History     Substance and Sexual Activity   Alcohol Use Yes    Alcohol/week: 5 0 standard drinks    Types: 5 Glasses of wine per week    Frequency: 4 or more times a week    Comment: Social   5  glasses weekl;y     Social History     Substance and Sexual Activity   Drug Use No     Social History     Tobacco Use   Smoking Status Never Smoker   Smokeless Tobacco Never Used         Meds/Allergies     Current Outpatient Medications:     acyclovir (ZOVIRAX) 400 MG tablet, Take 1 tablet (400 mg total) by mouth 2 (two) times a day, Disp: 180 tablet, Rfl: 2    allopurinol (ZYLOPRIM) 300 mg tablet, Take 1 tablet (300 mg total) by mouth daily Stop if rash and call our office, Disp: 30 tablet, Rfl: 1    ALPRAZolam (XANAX) 0 25 mg tablet, Take 1 tablet (0 25 mg total) by mouth 2 (two) times a day as needed for anxiety, Disp: 30 tablet, Rfl: 0    Calcium Citrate-Vitamin D (CALCIUM CITRATE + D PO), Take 1 tablet by mouth 2 (two) times a day  , Disp: , Rfl:     dexamethasone (DECADRON) 4 mg tablet, 10 tablets by mouth 1 day a week, day of chemotherapy  Please take with food, Disp: 40 tablet, Rfl: 4    escitalopram (LEXAPRO) 10 mg tablet, TAKE 1 TABLET BY MOUTH EVERY DAY, Disp: 90 tablet, Rfl: 3    lenalidomide (REVLIMID) 15 MG CAPS, Take 1 capsule (15 mg) PO days 1 through 21 Q 28 days, Disp: 21 capsule, Rfl: 0    metoprolol tartrate (LOPRESSOR) 50 mg tablet, TAKE 1 AND 1/2 TABLETS BY MOUTH TWO TIMES DAILY, Disp: 270 tablet, Rfl: 3    ondansetron (ZOFRAN) 4 mg tablet, Take 1 tablet (4 mg total) by mouth 4 (four) times a day as needed for nausea or vomiting, Disp: 30 tablet, Rfl: 1  Allergies   Allergen Reactions    Ibuprofen Abdominal Pain and Tachycardia     Reaction Date: [id number];          Review of Systems   Constitutional: Negative  HENT: Negative  Eyes: Negative  Respiratory: Negative  Cardiovascular: Negative  Gastrointestinal: Negative  Endocrine: Negative  Genitourinary: Negative  Musculoskeletal: Negative  Skin: Negative  Allergic/Immunologic: Negative  Neurological: Negative  Hematological: Negative  Psychiatric/Behavioral: Negative            OBJECTIVE:   /72 (BP Location: Left arm, Patient Position: Sitting)   Pulse 84   Temp (!) 96 5 °F (35 8 °C) (Temporal)   Resp 18   Ht 5' (1 524 m)   Wt 61 1 kg (134 lb 9 6 oz)   BMI 26 29 kg/m²   Pain Assessment:  0  ECOG/Zubrod/WHO: 0 - Asymptomatic    Physical Exam   Constitutional: She appears well-developed  HENT:   Head: Normocephalic  Hair is normal    Mouth/Throat: Oropharynx is clear and moist    Eyes: EOM are normal  No scleral icterus  Neck: Neck supple  No spinous process tenderness present  No edema present  Cardiovascular: Normal rate and regular rhythm  Pulmonary/Chest: Effort normal and breath sounds normal  No respiratory distress  She has no wheezes  She exhibits no tenderness  No breast tenderness  Abdominal: Soft  Bowel sounds are normal  She exhibits no distension, no ascites and no mass  There is no hepatosplenomegaly  There is no tenderness  There is no rebound  Genitourinary: No breast tenderness  Musculoskeletal: Normal range of motion  She exhibits no edema or tenderness  Lymphadenopathy:     She has no cervical adenopathy  She has no axillary adenopathy  Neurological: She is alert  She has normal strength  No cranial nerve deficit  Coordination and gait normal    Skin: Skin is intact  Skin in the radiated field is in good condition  No hyperpigmentation   Psychiatric: She has a normal mood and affect  Her speech is normal and behavior is normal    Vitals reviewed             RESULTS    Lab Results:   Recent Results (from the past 672 hour(s))   Beta 2 microglobulin, serum    Collection Time: 03/06/20 12:41 PM   Result Value Ref Range    Beta-2 Microglobulin 1 6 0 6 - 2 4 mg/L   CBC and differential    Collection Time: 03/06/20 12:41 PM   Result Value Ref Range    WBC 3 77 (L) 4 31 - 10 16 Thousand/uL    RBC 3 75 (L) 3 81 - 5 12 Million/uL    Hemoglobin 11 9 11 5 - 15 4 g/dL    Hematocrit 35 1 34 8 - 46 1 %    MCV 94 82 - 98 fL    MCH 31 7 26 8 - 34 3 pg    MCHC 33 9 31 4 - 37 4 g/dL    RDW 13 5 11 6 - 15 1 %    MPV 10 1 8 9 - 12 7 fL    Platelets 540 269 - 094 Thousands/uL    nRBC 0 /100 WBCs    Neutrophils Relative 53 43 - 75 %    Immat GRANS % 0 0 - 2 %    Lymphocytes Relative 31 14 - 44 %    Monocytes Relative 15 (H) 4 - 12 %    Eosinophils Relative 1 0 - 6 %    Basophils Relative 0 0 - 1 %    Neutrophils Absolute 1 95 1 85 - 7 62 Thousands/µL    Immature Grans Absolute 0 01 0 00 - 0 20 Thousand/uL    Lymphocytes Absolute 1 17 0 60 - 4 47 Thousands/µL    Monocytes Absolute 0 58 0 17 - 1 22 Thousand/µL    Eosinophils Absolute 0 05 0 00 - 0 61 Thousand/µL    Basophils Absolute 0 01 0 00 - 0 10 Thousands/µL   Comprehensive metabolic panel    Collection Time: 03/06/20 12:41 PM   Result Value Ref Range    Sodium 138 136 - 145 mmol/L    Potassium 3 9 3 5 - 5 3 mmol/L    Chloride 107 100 - 108 mmol/L    CO2 25 21 - 32 mmol/L    ANION GAP 6 4 - 13 mmol/L    BUN 17 5 - 25 mg/dL    Creatinine 0 71 0 60 - 1 30 mg/dL    Glucose 115 65 - 140 mg/dL    Calcium 9 2 8 3 - 10 1 mg/dL    AST 13 5 - 45 U/L    ALT 21 12 - 78 U/L    Alkaline Phosphatase 67 46 - 116 U/L    Total Protein 7 6 6 4 - 8 2 g/dL    Albumin 3 7 3 5 - 5 0 g/dL    Total Bilirubin 0 47 0 20 - 1 00 mg/dL    eGFR 88 ml/min/1 73sq m   IgG, IgA, IgM    Collection Time: 03/06/20 12:41 PM   Result Value Ref Range    IGA 53 0 (L) 70 0 - 400 0 mg/dL    IGG 1,550 0 700 0-1,600 0 mg/dL    IGM 88 0 40 0 - 230 0 mg/dL   Immunoglobulin free LT chains blood    Collection Time: 03/06/20 12:41 PM   Result Value Ref Range    Ig Kappa Free Light Chain 10 2 3 3 - 19 4 mg/L    Ig Lambda Free Light Chain 184 9 (H) 5 7 - 26 3 mg/L    Kappa/Lambda FluidC Ratio 0 06 (L) 0 26 - 1 65   LD,Blood Collection Time: 03/06/20 12:41 PM   Result Value Ref Range    LD 80 (L) 81 - 234 U/L   Protein electrophoresis, serum    Collection Time: 03/06/20 12:41 PM   Result Value Ref Range    A/G Ratio 1 40 1 10 - 1 80    Albumin Electrophoresis 58 3 52 0 - 65 0 %    Albumin CONC 4 31 3 50 - 5 00 g/dl    Alpha 1 3 6 2 5 - 5 0 %    ALPHA 1 CONC 0 27 0 10 - 0 40 g/dL    Alpha 2 8 6 7 0 - 13 0 %    ALPHA 2 CONC 0 64 0 40 - 1 20 g/dL    Beta-1 4 9 (L) 5 0 - 13 0 %    BETA 1 CONC 0 36 (L) 0 40 - 0 80 g/dL    Beta-2 3 0 2 0 - 8 0 %    BETA 2 CONC 0 22 0 20 - 0 50 g/dL    Gamma Globulin 21 6 12 0 - 22 0 %    GAMMA CONC 1 60 0 50 - 1 60 g/dL    M Peak ID 1 12 50 %    M PEAK 1 CONC 0 93 g/dL    Total Protein 7 4 6 4 - 8 2 g/dL    SPEP Interpretation       The SPEP shows a monoclonal peak in the gamma region  Previous immunofixation 01/13/2020 identified a monoclonal gammopathy as IgG lambda  Reviewed by: Marlene Das MD  **Electronic Signature**   Protein electrophoresis, urine    Collection Time: 03/06/20 12:41 PM   Result Value Ref Range    Urine Protein 6 0 2 0 - 17 5 mg/dL    Albumin ELP, Urine 100 0 %    Alpha 1, Urine 0 0 %    Alpha 2, Urine 0 0 %    Beta, Urine 0 0 %    Gamma Globulin, Urine 0 0 %    UPEP Interp       No monoclonal bands noted   Reviewed by: Marlene Das MD  **Electronic Signature**   Comprehensive metabolic panel    Collection Time: 03/12/20 12:32 PM   Result Value Ref Range    Sodium 139 136 - 145 mmol/L    Potassium 3 9 3 5 - 5 3 mmol/L    Chloride 104 100 - 108 mmol/L    CO2 27 21 - 32 mmol/L    ANION GAP 8 4 - 13 mmol/L    BUN 11 5 - 25 mg/dL    Creatinine 0 68 0 60 - 1 30 mg/dL    Glucose 94 65 - 140 mg/dL    Calcium 9 0 8 3 - 10 1 mg/dL    AST 15 5 - 45 U/L    ALT 24 12 - 78 U/L    Alkaline Phosphatase 64 46 - 116 U/L    Total Protein 8 1 6 4 - 8 2 g/dL    Albumin 3 9 3 5 - 5 0 g/dL    Total Bilirubin 0 33 0 20 - 1 00 mg/dL    eGFR 91 ml/min/1 73sq m   LD,Blood    Collection Time: 03/12/20 12:32 PM   Result Value Ref Range     81 - 234 U/L   Uric acid    Collection Time: 03/12/20 12:32 PM   Result Value Ref Range    Uric Acid 3 4 2 0 - 6 8 mg/dL   Protein, urine, 24 hour    Collection Time: 03/12/20 12:32 PM   Result Value Ref Range    24H Urine Volume 1,600 mL    Protein, 24H Urine 99 2 40 - 150 mg/24 hrs       Imaging Studies:No results found  Assessment/Plan:  No orders of the defined types were placed in this encounter  Liz Zurita is a 81y o  year old female with who is 1 year post radiation therapy for plasmacytoma  She has had complete pain relief at this site  Post treatment bone marrow revealed some plasmacytosis  She has undergone PET-CT scan in bone survey which shows multiple compression fractures and some new uptake in L3 and L5  She was seen for 2nd opinion at [place] in currently felt to have myeloma  She is initiating systemic therapy with Revlimid and Velcade and steroids  Symptomatic Thi she is doing well  She will return for follow-up visit in 6 months  Steffany Daniels MD  3/12/2020,2:28 PM    Portions of the record may have been created with voice recognition software   Occasional wrong word or "sound a like" substitutions may have occurred due to the inherent limitations of voice recognition software   Read the chart carefully and recognize, using context, where substitutions have occurred

## 2020-03-12 NOTE — PROGRESS NOTES
Cassia Richmond 1950 is a 81 y o  female       Follow up visit   Patient returns today for routine scheduled follow-up after completing L3-L5 spine radiation for plasmacytoma on 3/7/19  Last seen 8/15/19  Patient also has history of stage I right breast cancer in 2000, s/p mastectomy  Received Tamoxifen for 5 years  10/28/19 PET CT  IMPRESSION:   1   New mild linear FDG uptake at the L3 and L5 vertebral bodies  These could represent osteoporotic insufficiency fractures rather than related to underlying osseous lesions  Otherwise no significant FDG uptake in the previously seen vertebral bodies  2  No FDG avid soft tissue lesions  11/20/19 Left screening 3D mammogram  BI-RADS: Overall: 1 - Negative    She was referred to [facility] and seen by a myeloma expert on 1/2/20, VRD was recommended  1/4/20 Bone survey  IMPRESSION:   Multiple compression fractures of the thoracolumbar spine  However, when comparing to previous PET/CT report from prior MRI, the compression deformities have been previously described  If further evaluation for a subtle new compression deformity is clinically indicated, MRI of the thoracic and lumbar spine may be helpful to determine if there is any new bone marrow edema  No osteolytic foci are demonstrated  1/23/20 DXA scan    3/9/20 Dr Chai Quevedo  - discussed VRD treatment    3/16/20 Infusion  4/1/20-Dr Gisella hernández  4/3/20 Medical Onc FU           Malignant neoplasm of right breast ([facility] [address] )    9/2000 Surgery     Right breast mastectomy      8/13/2012 Initial Diagnosis     Malignant neoplasm of right breast [facility])       Chemotherapy     Tamoxifen for 5 years        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[facility] [address] )    12/18/2018 Initial Diagnosis     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[facility] [address] )      12/18/2018 Biopsy     Final Diagnosis   A  Bone, L-4, core biopsy:  - Fragments of trabecular bone with changes compatible with prior fracture site  - 10% lambda predominant plasmacytosis  See note    - Hematopoietic marrow with trilineage hematopoiesis  - No epithelial elements identified, confirmed with negative keratin AE1/AE3 stains              2/1/2019 - 3/7/2019 Radiation     Plan ID Energy Fractions Dose per Fraction (cGy) Dose Correction (cGy) Total Dose Delivered (cGy) Elapsed Days   L3_L5 Spine 10X/6X 25 / 25 180 0 4,500 34             Multiple myeloma not having achieved remission ([address] )    7/23/2019 Initial Diagnosis     Multiple myeloma not having achieved remission ([address] )      3/16/2020 -  Chemotherapy     bortezomib (VELCADE) subcutaneous injection 2 1 mg, 1 3 mg/m2 = 2 1 mg, Subcutaneous, Once, 0 of 6 cycles         Clinical Trial: no      Health Maintenance   Topic Date Due    DTaP,Tdap,and Td Vaccines (1 - Tdap) 11/21/2020 (Originally 6/9/1961)    CRC Screening: Colonoscopy  07/26/2020    Depression Screening PHQ  08/15/2020    Fall Risk  11/21/2020    Medicare Annual Wellness Visit (AWV)  11/21/2020    BMI: Followup Plan  11/21/2020    BMI: Adult  03/09/2021    Hepatitis C Screening  Completed    Influenza Vaccine  Completed    Pneumococcal Vaccine: 65+ Years  Completed    Pneumococcal Vaccine: Pediatrics (0 to 5 Years) and At-Risk Patients (6 to 59 Years)  Aged Out    HIB Vaccine  Aged Out    Hepatitis B Vaccine  Aged Out    IPV Vaccine  Aged Out    Hepatitis A Vaccine  Aged Out    Meningococcal ACWY Vaccine  Aged Out    HPV Vaccine  Aged Out       Patient Active Problem List   Diagnosis    Osteopenia    Malignant neoplasm of right breast ([address] )    Closed compression fracture of thoracic vertebra ([address] )    Closed compression fracture of fourth lumbar vertebra ([address]    Mult fractures of thoracic spine, closed ([address] )    Nonrheumatic aortic valve insufficiency    Tachycardia    Essential hypertension    Plasmacytosis    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[address] )    S/P hysterectomy with oophorectomy    Multiple myeloma not having achieved remission ([address] )     Past Medical History:   Diagnosis Date    Anxiety     Aortic valve disorder     Breast CA (HCC)     2000-R mastectomy-took tamoxifen/femira    Cardiac dysrhythmia     Depression     History of spinal fracture     8 since 2016-1 fall related  spinal bone marrow bx today 12/18/2018    Hypertension     Osteoporosis     Palpitations     Squamous cell carcinoma of skin      Past Surgical History:   Procedure Laterality Date    APPENDECTOMY  01/2011    BREAST LUMPECTOMY      CHEILECTOMY Right     Resolved:  2006, Bunion correction    COLONOSCOPY  07/2010    Diverticula; recheck 5 yrs    CT BONE MARROW BIOPSY AND ASPIRATION  7/9/2019    DILATION AND CURETTAGE, DIAGNOSTIC / THERAPEUTIC      HYSTERECTOMY  2019    IR IMAGE GUIDED BIOPSY/ASPIRATION BONE  12/18/2018    MASTECTOMY Right 2000    OOPHORECTOMY Bilateral 2019    [place] LAPAROSCOPY W TOT HYSTERECTUTERUS <=250 GRAM  W TUBE/OVARY N/A 4/4/2019    Procedure: ROBOTIC TOTAL LAPAROSCOPIC HYSTERECTOMY, BILATERAL SALPINGO-OOPHORECTOMY;  Surgeon: Diane Felipe MD PhD;  [facility];  Service: Gynecology Oncology     Family History   Problem Relation Age of Onset    Diabetes Mother     Osteoporosis Mother     Heart disease Father     Breast cancer Sister 64    BRCA1 Negative Sister     Heart attack Family     Other Family         Benign brain tumor    Club foot Family     Breast cancer Sister 77    No Known Problems Maternal Grandmother     No Known Problems Maternal Grandfather     No Known Problems Paternal Grandmother     No Known Problems Paternal Grandfather      Social History     Socioeconomic History    Marital status: /Civil Union     Spouse name: Mimi Mooney Number of children: 1    Years of education: Not on file    Highest education level: Not on file   Occupational History    Not on file   Social Needs    Financial resource strain: Not on file    Food insecurity:     Worry: Not on file     Inability: Not on file    Transportation needs:     Medical: Not on file Non-medical: Not on file   Tobacco Use    Smoking status: Never Smoker    Smokeless tobacco: Never Used   Substance and Sexual Activity    Alcohol use: Yes     Alcohol/week: 5 0 standard drinks     Types: 5 Glasses of wine per week     Frequency: 4 or more times a week     Comment: Social   5  glasses weekl;y    Drug use: No    Sexual activity: Yes     Partners: Male     Birth control/protection: Other     Comment: hysterectomy   Lifestyle    Physical activity:     Days per week: Not on file     Minutes per session: Not on file    Stress: Not on file   Relationships    Social connections:     Talks on phone: Not on file     Gets together: Not on file     Attends [employer] service: Not on file     Active member of club or organization: Not on file     Attends meetings of clubs or organizations: Not on file     Relationship status: Not on file    Intimate partner violence:     Fear of current or ex partner: Not on file     Emotionally abused: Not on file     Physically abused: Not on file     Forced sexual activity: Not on file   Other Topics Concern    Not on file   Social History Narrative    Caffeine use       Current Outpatient Medications:     acyclovir (ZOVIRAX) 400 MG tablet, Take 1 tablet (400 mg total) by mouth 2 (two) times a day, Disp: 180 tablet, Rfl: 2    allopurinol (ZYLOPRIM) 300 mg tablet, Take 1 tablet (300 mg total) by mouth daily Stop if rash and call our office, Disp: 30 tablet, Rfl: 1    ALPRAZolam (XANAX) 0 25 mg tablet, Take 1 tablet (0 25 mg total) by mouth 2 (two) times a day as needed for anxiety, Disp: 30 tablet, Rfl: 0    Calcium Citrate-Vitamin D (CALCIUM CITRATE + D PO), Take 1 tablet by mouth 2 (two) times a day  , Disp: , Rfl:     dexamethasone (DECADRON) 4 mg tablet, 10 tablets by mouth 1 day a week, day of chemotherapy    Please take with food, Disp: 40 tablet, Rfl: 4    escitalopram (LEXAPRO) 10 mg tablet, TAKE 1 TABLET BY MOUTH EVERY DAY, Disp: 90 tablet, Rfl: 3   lenalidomide (REVLIMID) 15 MG CAPS, Take 1 capsule (15 mg) PO days 1 through 21 Q 28 days, Disp: 21 capsule, Rfl: 0    metoprolol tartrate (LOPRESSOR) 50 mg tablet, TAKE 1 AND 1/2 TABLETS BY MOUTH TWO TIMES DAILY, Disp: 270 tablet, Rfl: 3    ondansetron (ZOFRAN) 4 mg tablet, Take 1 tablet (4 mg total) by mouth 4 (four) times a day as needed for nausea or vomiting, Disp: 30 tablet, Rfl: 1  Allergies   Allergen Reactions    Ibuprofen Abdominal Pain and Tachycardia     Reaction Date: [id number];        Review of Systems:  Review of Systems   Constitutional: Negative  HENT: Negative  Eyes: Negative  Respiratory: Negative  Cardiovascular: Negative  Gastrointestinal: Negative  Endocrine: Negative  Genitourinary: Negative  Musculoskeletal: Negative  Skin: Negative  Allergic/Immunologic: Negative  Neurological: Negative  Hematological: Negative  Psychiatric/Behavioral: Negative  Vitals:    03/12/20 1309   Resp: 18   Height: 5' (1 524 m)               Imaging:No results found      Teaching

## 2020-03-13 LAB
KAPPA LC FREE SER-MCNC: 10.8 MG/L (ref 3.3–19.4)
KAPPA LC FREE/LAMBDA FREE SER: 0.04 {RATIO} (ref 0.26–1.65)
LAMBDA LC FREE SERPL-MCNC: 255.5 MG/L (ref 5.7–26.3)

## 2020-03-14 LAB — B2 MICROGLOB SERPL-MCNC: 1.5 MG/L (ref 0.6–2.4)

## 2020-03-16 ENCOUNTER — HOSPITAL ENCOUNTER (OUTPATIENT)
Dept: INFUSION CENTER | Facility: CLINIC | Age: 70
Discharge: HOME | End: 2020-03-16
Payer: MEDICARE

## 2020-03-16 VITALS
RESPIRATION RATE: 18 BRPM | SYSTOLIC BLOOD PRESSURE: 136 MMHG | BODY MASS INDEX: 26.41 KG/M2 | WEIGHT: 134.5 LBS | TEMPERATURE: 98.1 F | HEART RATE: 91 BPM | HEIGHT: 60 IN | DIASTOLIC BLOOD PRESSURE: 64 MMHG | OXYGEN SATURATION: 95 %

## 2020-03-16 DIAGNOSIS — C90.00 MULTIPLE MYELOMA NOT HAVING ACHIEVED REMISSION (HCC): Primary | ICD-10-CM

## 2020-03-16 LAB
BASOPHILS # BLD AUTO: 0 THOUSANDS/ΜL (ref 0–0.1)
BASOPHILS NFR BLD AUTO: 0 % (ref 0–1)
EOSINOPHIL # BLD AUTO: 0.04 THOUSAND/ΜL (ref 0–0.61)
EOSINOPHIL NFR BLD AUTO: 1 % (ref 0–6)
ERYTHROCYTE [DISTWIDTH] IN BLOOD BY AUTOMATED COUNT: 13.5 % (ref 11.6–15.1)
HCT VFR BLD AUTO: 34.4 % (ref 34.8–46.1)
HGB BLD-MCNC: 11.8 G/DL (ref 11.5–15.4)
IMM GRANULOCYTES # BLD AUTO: 0.04 THOUSAND/UL (ref 0–0.2)
IMM GRANULOCYTES NFR BLD AUTO: 1 % (ref 0–2)
LYMPHOCYTES # BLD AUTO: 0.65 THOUSANDS/ΜL (ref 0.6–4.47)
LYMPHOCYTES NFR BLD AUTO: 13 % (ref 14–44)
MCH RBC QN AUTO: 32.5 PG (ref 26.8–34.3)
MCHC RBC AUTO-ENTMCNC: 34.3 G/DL (ref 31.4–37.4)
MCV RBC AUTO: 95 FL (ref 82–98)
MONOCYTES # BLD AUTO: 0.21 THOUSAND/ΜL (ref 0.17–1.22)
MONOCYTES NFR BLD AUTO: 4 % (ref 4–12)
NEUTROPHILS # BLD AUTO: 3.94 THOUSANDS/ΜL (ref 1.85–7.62)
NEUTS SEG NFR BLD AUTO: 81 % (ref 43–75)
NRBC BLD AUTO-RTO: 0 /100 WBCS
PLATELET # BLD AUTO: 275 THOUSANDS/UL (ref 149–390)
PMV BLD AUTO: 9.6 FL (ref 8.9–12.7)
RBC # BLD AUTO: 3.63 MILLION/UL (ref 3.81–5.12)
WBC # BLD AUTO: 4.88 THOUSAND/UL (ref 4.31–10.16)

## 2020-03-16 PROCEDURE — 85025 COMPLETE CBC W/AUTO DIFF WBC: CPT | Performed by: INTERNAL MEDICINE

## 2020-03-16 PROCEDURE — 96401 CHEMO ANTI-NEOPL SQ/IM: CPT

## 2020-03-16 RX ADMIN — BORTEZOMIB 2.1 MG: 3.5 INJECTION, POWDER, LYOPHILIZED, FOR SOLUTION INTRAVENOUS; SUBCUTANEOUS at 13:20

## 2020-03-16 NOTE — PROGRESS NOTES
Patient presents today for treatment with Velcade injection  Patient offers no complaints  VSS  Labs drawn as per order  Labs within parameters to treat  Patient confirms taking Dexamethasone and Revlimid as ordered

## 2020-03-16 NOTE — PROGRESS NOTES
Patient tolerated Velcade injection into left abdomen without incident  Patient's next appointment confirmed  AVS offered and declined

## 2020-03-20 ENCOUNTER — APPOINTMENT (OUTPATIENT)
Dept: LAB | Facility: CLINIC | Age: 70
End: 2020-03-20
Payer: MEDICARE

## 2020-03-20 DIAGNOSIS — C90.00 MULTIPLE MYELOMA NOT HAVING ACHIEVED REMISSION (HCC): ICD-10-CM

## 2020-03-20 LAB
ALBUMIN SERPL BCP-MCNC: 3.6 G/DL (ref 3.5–5)
ALP SERPL-CCNC: 66 U/L (ref 46–116)
ALT SERPL W P-5'-P-CCNC: 35 U/L (ref 12–78)
ANION GAP SERPL CALCULATED.3IONS-SCNC: 4 MMOL/L (ref 4–13)
AST SERPL W P-5'-P-CCNC: 15 U/L (ref 5–45)
BASOPHILS # BLD AUTO: 0.01 THOUSANDS/ΜL (ref 0–0.1)
BASOPHILS NFR BLD AUTO: 0 % (ref 0–1)
BILIRUB SERPL-MCNC: 0.46 MG/DL (ref 0.2–1)
BUN SERPL-MCNC: 13 MG/DL (ref 5–25)
CALCIUM SERPL-MCNC: 8.9 MG/DL (ref 8.3–10.1)
CHLORIDE SERPL-SCNC: 104 MMOL/L (ref 100–108)
CO2 SERPL-SCNC: 30 MMOL/L (ref 21–32)
CREAT SERPL-MCNC: 0.66 MG/DL (ref 0.6–1.3)
EOSINOPHIL # BLD AUTO: 0.11 THOUSAND/ΜL (ref 0–0.61)
EOSINOPHIL NFR BLD AUTO: 3 % (ref 0–6)
ERYTHROCYTE [DISTWIDTH] IN BLOOD BY AUTOMATED COUNT: 13.9 % (ref 11.6–15.1)
GFR SERPL CREATININE-BSD FRML MDRD: 92 ML/MIN/1.73SQ M
GLUCOSE P FAST SERPL-MCNC: 89 MG/DL (ref 65–99)
HCT VFR BLD AUTO: 36.4 % (ref 34.8–46.1)
HGB BLD-MCNC: 12 G/DL (ref 11.5–15.4)
IMM GRANULOCYTES # BLD AUTO: 0.04 THOUSAND/UL (ref 0–0.2)
IMM GRANULOCYTES NFR BLD AUTO: 1 % (ref 0–2)
LYMPHOCYTES # BLD AUTO: 0.98 THOUSANDS/ΜL (ref 0.6–4.47)
LYMPHOCYTES NFR BLD AUTO: 23 % (ref 14–44)
MCH RBC QN AUTO: 31.9 PG (ref 26.8–34.3)
MCHC RBC AUTO-ENTMCNC: 33 G/DL (ref 31.4–37.4)
MCV RBC AUTO: 97 FL (ref 82–98)
MONOCYTES # BLD AUTO: 0.46 THOUSAND/ΜL (ref 0.17–1.22)
MONOCYTES NFR BLD AUTO: 11 % (ref 4–12)
NEUTROPHILS # BLD AUTO: 2.72 THOUSANDS/ΜL (ref 1.85–7.62)
NEUTS SEG NFR BLD AUTO: 62 % (ref 43–75)
NRBC BLD AUTO-RTO: 0 /100 WBCS
PLATELET # BLD AUTO: 273 THOUSANDS/UL (ref 149–390)
PMV BLD AUTO: 10.3 FL (ref 8.9–12.7)
POTASSIUM SERPL-SCNC: 3.6 MMOL/L (ref 3.5–5.3)
PROT SERPL-MCNC: 7.5 G/DL (ref 6.4–8.2)
RBC # BLD AUTO: 3.76 MILLION/UL (ref 3.81–5.12)
SODIUM SERPL-SCNC: 138 MMOL/L (ref 136–145)
WBC # BLD AUTO: 4.32 THOUSAND/UL (ref 4.31–10.16)

## 2020-03-20 PROCEDURE — 85025 COMPLETE CBC W/AUTO DIFF WBC: CPT

## 2020-03-20 PROCEDURE — 36415 COLL VENOUS BLD VENIPUNCTURE: CPT

## 2020-03-20 PROCEDURE — 80053 COMPREHEN METABOLIC PANEL: CPT

## 2020-03-23 ENCOUNTER — HOSPITAL ENCOUNTER (OUTPATIENT)
Dept: INFUSION CENTER | Facility: CLINIC | Age: 70
Discharge: HOME | End: 2020-03-23
Payer: MEDICARE

## 2020-03-23 VITALS
HEIGHT: 60 IN | OXYGEN SATURATION: 95 % | DIASTOLIC BLOOD PRESSURE: 58 MMHG | TEMPERATURE: 98.1 F | WEIGHT: 134.04 LBS | SYSTOLIC BLOOD PRESSURE: 122 MMHG | HEART RATE: 78 BPM | RESPIRATION RATE: 18 BRPM | BODY MASS INDEX: 26.32 KG/M2

## 2020-03-23 DIAGNOSIS — C90.00 MULTIPLE MYELOMA NOT HAVING ACHIEVED REMISSION (HCC): Primary | ICD-10-CM

## 2020-03-23 PROCEDURE — 96402 CHEMO HORMON ANTINEOPL SQ/IM: CPT

## 2020-03-23 RX ADMIN — BORTEZOMIB 2.1 MG: 3.5 INJECTION, POWDER, LYOPHILIZED, FOR SOLUTION INTRAVENOUS; SUBCUTANEOUS at 12:46

## 2020-03-27 ENCOUNTER — APPOINTMENT (OUTPATIENT)
Dept: LAB | Facility: CLINIC | Age: 70
End: 2020-03-27
Payer: MEDICARE

## 2020-03-27 DIAGNOSIS — C90.00 MULTIPLE MYELOMA NOT HAVING ACHIEVED REMISSION (HCC): ICD-10-CM

## 2020-03-27 LAB
ALBUMIN SERPL BCP-MCNC: 3.5 G/DL (ref 3.5–5)
ALP SERPL-CCNC: 69 U/L (ref 46–116)
ALT SERPL W P-5'-P-CCNC: 37 U/L (ref 12–78)
ANION GAP SERPL CALCULATED.3IONS-SCNC: 4 MMOL/L (ref 4–13)
AST SERPL W P-5'-P-CCNC: 16 U/L (ref 5–45)
BASOPHILS # BLD AUTO: 0.01 THOUSANDS/ΜL (ref 0–0.1)
BASOPHILS NFR BLD AUTO: 0 % (ref 0–1)
BILIRUB SERPL-MCNC: 0.54 MG/DL (ref 0.2–1)
BUN SERPL-MCNC: 11 MG/DL (ref 5–25)
CALCIUM SERPL-MCNC: 8.7 MG/DL (ref 8.3–10.1)
CHLORIDE SERPL-SCNC: 105 MMOL/L (ref 100–108)
CO2 SERPL-SCNC: 28 MMOL/L (ref 21–32)
CREAT SERPL-MCNC: 0.67 MG/DL (ref 0.6–1.3)
EOSINOPHIL # BLD AUTO: 0.16 THOUSAND/ΜL (ref 0–0.61)
EOSINOPHIL NFR BLD AUTO: 4 % (ref 0–6)
ERYTHROCYTE [DISTWIDTH] IN BLOOD BY AUTOMATED COUNT: 14.1 % (ref 11.6–15.1)
GFR SERPL CREATININE-BSD FRML MDRD: 91 ML/MIN/1.73SQ M
GLUCOSE P FAST SERPL-MCNC: 88 MG/DL (ref 65–99)
HCT VFR BLD AUTO: 36.1 % (ref 34.8–46.1)
HGB BLD-MCNC: 12.1 G/DL (ref 11.5–15.4)
IMM GRANULOCYTES # BLD AUTO: 0.01 THOUSAND/UL (ref 0–0.2)
IMM GRANULOCYTES NFR BLD AUTO: 0 % (ref 0–2)
LYMPHOCYTES # BLD AUTO: 1.02 THOUSANDS/ΜL (ref 0.6–4.47)
LYMPHOCYTES NFR BLD AUTO: 24 % (ref 14–44)
MCH RBC QN AUTO: 32 PG (ref 26.8–34.3)
MCHC RBC AUTO-ENTMCNC: 33.5 G/DL (ref 31.4–37.4)
MCV RBC AUTO: 96 FL (ref 82–98)
MONOCYTES # BLD AUTO: 0.67 THOUSAND/ΜL (ref 0.17–1.22)
MONOCYTES NFR BLD AUTO: 16 % (ref 4–12)
NEUTROPHILS # BLD AUTO: 2.39 THOUSANDS/ΜL (ref 1.85–7.62)
NEUTS SEG NFR BLD AUTO: 56 % (ref 43–75)
NRBC BLD AUTO-RTO: 0 /100 WBCS
PLATELET # BLD AUTO: 216 THOUSANDS/UL (ref 149–390)
PMV BLD AUTO: 11.1 FL (ref 8.9–12.7)
POTASSIUM SERPL-SCNC: 3.3 MMOL/L (ref 3.5–5.3)
PROT SERPL-MCNC: 7.4 G/DL (ref 6.4–8.2)
RBC # BLD AUTO: 3.78 MILLION/UL (ref 3.81–5.12)
SODIUM SERPL-SCNC: 137 MMOL/L (ref 136–145)
WBC # BLD AUTO: 4.26 THOUSAND/UL (ref 4.31–10.16)

## 2020-03-27 PROCEDURE — 85025 COMPLETE CBC W/AUTO DIFF WBC: CPT

## 2020-03-27 PROCEDURE — 36415 COLL VENOUS BLD VENIPUNCTURE: CPT

## 2020-03-27 PROCEDURE — 80053 COMPREHEN METABOLIC PANEL: CPT

## 2020-03-30 ENCOUNTER — TELEPHONE (OUTPATIENT)
Dept: HEMATOLOGY ONCOLOGY | Facility: MEDICAL CENTER | Age: 70
End: 2020-03-30

## 2020-03-30 ENCOUNTER — HOSPITAL ENCOUNTER (OUTPATIENT)
Dept: INFUSION CENTER | Facility: CLINIC | Age: 70
Discharge: HOME | End: 2020-03-30
Payer: MEDICARE

## 2020-03-30 VITALS — TEMPERATURE: 98.9 F | BODY MASS INDEX: 26.6 KG/M2 | HEIGHT: 60 IN | WEIGHT: 135.5 LBS

## 2020-03-30 DIAGNOSIS — C90.00 MULTIPLE MYELOMA NOT HAVING ACHIEVED REMISSION (HCC): Primary | ICD-10-CM

## 2020-03-30 PROCEDURE — 96401 CHEMO ANTI-NEOPL SQ/IM: CPT

## 2020-03-30 RX ADMIN — BORTEZOMIB 2.1 MG: 3.5 INJECTION, POWDER, LYOPHILIZED, FOR SOLUTION INTRAVENOUS; SUBCUTANEOUS at 14:34

## 2020-03-30 NOTE — TELEPHONE ENCOUNTER
Patient  called in stated that patient has an appointment at [facility] this week and was advised for patient to wear a mask, I reached out to the office and spoke with Seth Johnson and was advised that she will have one put aside for patient

## 2020-04-03 ENCOUNTER — OFFICE VISIT (OUTPATIENT)
Dept: HEMATOLOGY ONCOLOGY | Facility: CLINIC | Age: 70
End: 2020-04-03
Payer: MEDICARE

## 2020-04-03 VITALS
HEIGHT: 60 IN | TEMPERATURE: 98 F | BODY MASS INDEX: 26.9 KG/M2 | SYSTOLIC BLOOD PRESSURE: 118 MMHG | OXYGEN SATURATION: 96 % | HEART RATE: 71 BPM | WEIGHT: 137 LBS | DIASTOLIC BLOOD PRESSURE: 64 MMHG | RESPIRATION RATE: 16 BRPM

## 2020-04-03 DIAGNOSIS — C90.00 MULTIPLE MYELOMA NOT HAVING ACHIEVED REMISSION (HCC): ICD-10-CM

## 2020-04-03 PROCEDURE — 3074F SYST BP LT 130 MM HG: CPT | Performed by: PHYSICIAN ASSISTANT

## 2020-04-03 PROCEDURE — 4040F PNEUMOC VAC/ADMIN/RCVD: CPT | Performed by: PHYSICIAN ASSISTANT

## 2020-04-03 PROCEDURE — 1160F RVW MEDS BY RX/DR IN RCRD: CPT | Performed by: PHYSICIAN ASSISTANT

## 2020-04-03 PROCEDURE — 1036F TOBACCO NON-USER: CPT | Performed by: PHYSICIAN ASSISTANT

## 2020-04-03 PROCEDURE — 3008F BODY MASS INDEX DOCD: CPT | Performed by: PHYSICIAN ASSISTANT

## 2020-04-03 PROCEDURE — 99214 OFFICE O/P EST MOD 30 MIN: CPT | Performed by: PHYSICIAN ASSISTANT

## 2020-04-03 PROCEDURE — 3078F DIAST BP <80 MM HG: CPT | Performed by: PHYSICIAN ASSISTANT

## 2020-04-03 RX ORDER — LENALIDOMIDE 15 MG/1
CAPSULE ORAL
Qty: 21 CAPSULE | Refills: 0 | Status: SHIPPED | OUTPATIENT
Start: 2020-04-03 | End: 2020-04-24 | Stop reason: SDUPTHER

## 2020-04-06 ENCOUNTER — HOSPITAL ENCOUNTER (OUTPATIENT)
Dept: INFUSION CENTER | Facility: CLINIC | Age: 70
Discharge: HOME | End: 2020-04-06
Payer: MEDICARE

## 2020-04-06 VITALS
HEIGHT: 60 IN | WEIGHT: 136 LBS | OXYGEN SATURATION: 98 % | DIASTOLIC BLOOD PRESSURE: 70 MMHG | HEART RATE: 86 BPM | TEMPERATURE: 99.3 F | SYSTOLIC BLOOD PRESSURE: 130 MMHG | BODY MASS INDEX: 26.7 KG/M2 | RESPIRATION RATE: 16 BRPM

## 2020-04-06 DIAGNOSIS — C90.00 MULTIPLE MYELOMA NOT HAVING ACHIEVED REMISSION (HCC): Primary | ICD-10-CM

## 2020-04-06 PROCEDURE — 96401 CHEMO ANTI-NEOPL SQ/IM: CPT

## 2020-04-06 PROCEDURE — 96365 THER/PROPH/DIAG IV INF INIT: CPT

## 2020-04-06 RX ORDER — SODIUM CHLORIDE 9 MG/ML
20 INJECTION, SOLUTION INTRAVENOUS ONCE
Status: CANCELLED | OUTPATIENT
Start: 2020-06-30

## 2020-04-06 RX ORDER — SODIUM CHLORIDE 9 MG/ML
20 INJECTION, SOLUTION INTRAVENOUS ONCE
Status: COMPLETED | OUTPATIENT
Start: 2020-04-06 | End: 2020-04-06

## 2020-04-06 RX ADMIN — BORTEZOMIB 2.1 MG: 3.5 INJECTION, POWDER, LYOPHILIZED, FOR SOLUTION INTRAVENOUS; SUBCUTANEOUS at 14:24

## 2020-04-06 RX ADMIN — SODIUM CHLORIDE 20 ML/HR: 0.9 INJECTION, SOLUTION INTRAVENOUS at 13:50

## 2020-04-06 RX ADMIN — ZOLEDRONIC ACID 3.5 MG: 4 INJECTION, SOLUTION, CONCENTRATE INTRAVENOUS at 13:44

## 2020-04-17 ENCOUNTER — APPOINTMENT (OUTPATIENT)
Dept: LAB | Facility: CLINIC | Age: 70
End: 2020-04-17
Payer: MEDICARE

## 2020-04-17 DIAGNOSIS — C90.00 MULTIPLE MYELOMA NOT HAVING ACHIEVED REMISSION (HCC): ICD-10-CM

## 2020-04-17 LAB
ALBUMIN SERPL BCP-MCNC: 3.4 G/DL (ref 3.5–5)
ALP SERPL-CCNC: 91 U/L (ref 46–116)
ALT SERPL W P-5'-P-CCNC: 27 U/L (ref 12–78)
ANION GAP SERPL CALCULATED.3IONS-SCNC: 6 MMOL/L (ref 4–13)
AST SERPL W P-5'-P-CCNC: 12 U/L (ref 5–45)
BILIRUB SERPL-MCNC: 0.43 MG/DL (ref 0.2–1)
BUN SERPL-MCNC: 15 MG/DL (ref 5–25)
CALCIUM SERPL-MCNC: 8.9 MG/DL (ref 8.3–10.1)
CHLORIDE SERPL-SCNC: 107 MMOL/L (ref 100–108)
CO2 SERPL-SCNC: 27 MMOL/L (ref 21–32)
CREAT SERPL-MCNC: 0.66 MG/DL (ref 0.6–1.3)
GFR SERPL CREATININE-BSD FRML MDRD: 92 ML/MIN/1.73SQ M
GLUCOSE P FAST SERPL-MCNC: 89 MG/DL (ref 65–99)
POTASSIUM SERPL-SCNC: 3.9 MMOL/L (ref 3.5–5.3)
PROT SERPL-MCNC: 7.3 G/DL (ref 6.4–8.2)
SODIUM SERPL-SCNC: 140 MMOL/L (ref 136–145)

## 2020-04-17 PROCEDURE — 80053 COMPREHEN METABOLIC PANEL: CPT

## 2020-04-20 ENCOUNTER — HOSPITAL ENCOUNTER (OUTPATIENT)
Dept: INFUSION CENTER | Facility: CLINIC | Age: 70
Discharge: HOME | End: 2020-04-20
Payer: MEDICARE

## 2020-04-20 VITALS
OXYGEN SATURATION: 98 % | DIASTOLIC BLOOD PRESSURE: 76 MMHG | WEIGHT: 137 LBS | TEMPERATURE: 97.9 F | HEIGHT: 60 IN | SYSTOLIC BLOOD PRESSURE: 124 MMHG | HEART RATE: 81 BPM | BODY MASS INDEX: 26.9 KG/M2

## 2020-04-20 DIAGNOSIS — C90.00 MULTIPLE MYELOMA NOT HAVING ACHIEVED REMISSION (HCC): Primary | ICD-10-CM

## 2020-04-20 PROCEDURE — 96401 CHEMO ANTI-NEOPL SQ/IM: CPT

## 2020-04-20 RX ADMIN — BORTEZOMIB 2.1 MG: 3.5 INJECTION, POWDER, LYOPHILIZED, FOR SOLUTION INTRAVENOUS; SUBCUTANEOUS at 14:59

## 2020-04-23 ENCOUNTER — APPOINTMENT (OUTPATIENT)
Dept: LAB | Facility: CLINIC | Age: 70
End: 2020-04-23
Payer: MEDICARE

## 2020-04-23 ENCOUNTER — TELEPHONE (OUTPATIENT)
Dept: HEMATOLOGY ONCOLOGY | Facility: CLINIC | Age: 70
End: 2020-04-23

## 2020-04-23 DIAGNOSIS — C90.00 MULTIPLE MYELOMA NOT HAVING ACHIEVED REMISSION (HCC): ICD-10-CM

## 2020-04-23 PROCEDURE — 84165 PROTEIN E-PHORESIS SERUM: CPT | Performed by: PATHOLOGY

## 2020-04-23 PROCEDURE — 84166 PROTEIN E-PHORESIS/URINE/CSF: CPT

## 2020-04-23 PROCEDURE — 84166 PROTEIN E-PHORESIS/URINE/CSF: CPT | Performed by: PATHOLOGY

## 2020-04-24 ENCOUNTER — OFFICE VISIT (OUTPATIENT)
Dept: HEMATOLOGY ONCOLOGY | Facility: CLINIC | Age: 70
End: 2020-04-24
Payer: MEDICARE

## 2020-04-24 ENCOUNTER — TELEPHONE (OUTPATIENT)
Dept: HEMATOLOGY ONCOLOGY | Facility: CLINIC | Age: 70
End: 2020-04-24

## 2020-04-24 VITALS
HEART RATE: 71 BPM | RESPIRATION RATE: 18 BRPM | BODY MASS INDEX: 27.09 KG/M2 | WEIGHT: 138 LBS | DIASTOLIC BLOOD PRESSURE: 72 MMHG | OXYGEN SATURATION: 99 % | SYSTOLIC BLOOD PRESSURE: 124 MMHG | TEMPERATURE: 97.3 F | HEIGHT: 60 IN

## 2020-04-24 DIAGNOSIS — C90.30 PLASMACYTOMA, UNSPECIFIED PLASMACYTOMA TYPE, UNSPECIFIED WHETHER REMISSION ACHIEVED (HCC): ICD-10-CM

## 2020-04-24 DIAGNOSIS — C90.00 MULTIPLE MYELOMA NOT HAVING ACHIEVED REMISSION (HCC): ICD-10-CM

## 2020-04-24 DIAGNOSIS — C50.911 MALIGNANT NEOPLASM OF RIGHT BREAST IN FEMALE, ESTROGEN RECEPTOR POSITIVE, UNSPECIFIED SITE OF BREAST (HCC): ICD-10-CM

## 2020-04-24 DIAGNOSIS — C90.00 MULTIPLE MYELOMA NOT HAVING ACHIEVED REMISSION (HCC): Primary | ICD-10-CM

## 2020-04-24 DIAGNOSIS — Z17.0 MALIGNANT NEOPLASM OF RIGHT BREAST IN FEMALE, ESTROGEN RECEPTOR POSITIVE, UNSPECIFIED SITE OF BREAST (HCC): ICD-10-CM

## 2020-04-24 LAB
ALBUMIN UR ELPH-MCNC: 100 %
ALPHA1 GLOB MFR UR ELPH: 0 %
ALPHA2 GLOB MFR UR ELPH: 0 %
B-GLOBULIN MFR UR ELPH: 0 %
GAMMA GLOB MFR UR ELPH: 0 %
PROT PATTERN UR ELPH-IMP: NORMAL
PROT UR-MCNC: 6 MG/DL

## 2020-04-24 PROCEDURE — 1036F TOBACCO NON-USER: CPT | Performed by: INTERNAL MEDICINE

## 2020-04-24 PROCEDURE — 1160F RVW MEDS BY RX/DR IN RCRD: CPT | Performed by: INTERNAL MEDICINE

## 2020-04-24 PROCEDURE — 3074F SYST BP LT 130 MM HG: CPT | Performed by: INTERNAL MEDICINE

## 2020-04-24 PROCEDURE — 3008F BODY MASS INDEX DOCD: CPT | Performed by: INTERNAL MEDICINE

## 2020-04-24 PROCEDURE — 99214 OFFICE O/P EST MOD 30 MIN: CPT | Performed by: INTERNAL MEDICINE

## 2020-04-24 PROCEDURE — 3078F DIAST BP <80 MM HG: CPT | Performed by: INTERNAL MEDICINE

## 2020-04-24 PROCEDURE — 4040F PNEUMOC VAC/ADMIN/RCVD: CPT | Performed by: INTERNAL MEDICINE

## 2020-04-24 RX ORDER — LENALIDOMIDE 15 MG/1
CAPSULE ORAL
Qty: 21 CAPSULE | Refills: 0 | Status: SHIPPED | OUTPATIENT
Start: 2020-04-24 | End: 2020-05-26

## 2020-04-27 ENCOUNTER — HOSPITAL ENCOUNTER (OUTPATIENT)
Dept: INFUSION CENTER | Facility: CLINIC | Age: 70
Discharge: HOME | End: 2020-04-27
Payer: MEDICARE

## 2020-04-27 VITALS
OXYGEN SATURATION: 96 % | HEIGHT: 60 IN | DIASTOLIC BLOOD PRESSURE: 65 MMHG | TEMPERATURE: 97.9 F | BODY MASS INDEX: 26.7 KG/M2 | WEIGHT: 136 LBS | RESPIRATION RATE: 20 BRPM | HEART RATE: 88 BPM | SYSTOLIC BLOOD PRESSURE: 130 MMHG

## 2020-04-27 DIAGNOSIS — C90.00 MULTIPLE MYELOMA NOT HAVING ACHIEVED REMISSION (HCC): Primary | ICD-10-CM

## 2020-04-27 PROCEDURE — 96401 CHEMO ANTI-NEOPL SQ/IM: CPT

## 2020-04-27 RX ADMIN — BORTEZOMIB 2.1 MG: 3.5 INJECTION, POWDER, LYOPHILIZED, FOR SOLUTION INTRAVENOUS; SUBCUTANEOUS at 15:35

## 2020-05-01 ENCOUNTER — APPOINTMENT (OUTPATIENT)
Dept: LAB | Facility: CLINIC | Age: 70
End: 2020-05-01
Payer: MEDICARE

## 2020-05-01 DIAGNOSIS — C90.00 MULTIPLE MYELOMA NOT HAVING ACHIEVED REMISSION (HCC): ICD-10-CM

## 2020-05-01 LAB
ALBUMIN SERPL BCP-MCNC: 3.4 G/DL (ref 3.5–5)
ALP SERPL-CCNC: 64 U/L (ref 46–116)
ALT SERPL W P-5'-P-CCNC: 24 U/L (ref 12–78)
ANION GAP SERPL CALCULATED.3IONS-SCNC: 6 MMOL/L (ref 4–13)
AST SERPL W P-5'-P-CCNC: 12 U/L (ref 5–45)
BASOPHILS # BLD AUTO: 0.01 THOUSANDS/ΜL (ref 0–0.1)
BASOPHILS NFR BLD AUTO: 0 % (ref 0–1)
BILIRUB SERPL-MCNC: 0.54 MG/DL (ref 0.2–1)
BUN SERPL-MCNC: 15 MG/DL (ref 5–25)
CALCIUM SERPL-MCNC: 8.6 MG/DL (ref 8.3–10.1)
CHLORIDE SERPL-SCNC: 106 MMOL/L (ref 100–108)
CO2 SERPL-SCNC: 27 MMOL/L (ref 21–32)
CREAT SERPL-MCNC: 0.7 MG/DL (ref 0.6–1.3)
EOSINOPHIL # BLD AUTO: 0.27 THOUSAND/ΜL (ref 0–0.61)
EOSINOPHIL NFR BLD AUTO: 9 % (ref 0–6)
ERYTHROCYTE [DISTWIDTH] IN BLOOD BY AUTOMATED COUNT: 13.5 % (ref 11.6–15.1)
GFR SERPL CREATININE-BSD FRML MDRD: 90 ML/MIN/1.73SQ M
GLUCOSE P FAST SERPL-MCNC: 85 MG/DL (ref 65–99)
HCT VFR BLD AUTO: 33.9 % (ref 34.8–46.1)
HGB BLD-MCNC: 11.4 G/DL (ref 11.5–15.4)
IMM GRANULOCYTES # BLD AUTO: 0.01 THOUSAND/UL (ref 0–0.2)
IMM GRANULOCYTES NFR BLD AUTO: 0 % (ref 0–2)
LYMPHOCYTES # BLD AUTO: 0.89 THOUSANDS/ΜL (ref 0.6–4.47)
LYMPHOCYTES NFR BLD AUTO: 29 % (ref 14–44)
MCH RBC QN AUTO: 31.4 PG (ref 26.8–34.3)
MCHC RBC AUTO-ENTMCNC: 33.6 G/DL (ref 31.4–37.4)
MCV RBC AUTO: 93 FL (ref 82–98)
MONOCYTES # BLD AUTO: 0.5 THOUSAND/ΜL (ref 0.17–1.22)
MONOCYTES NFR BLD AUTO: 16 % (ref 4–12)
NEUTROPHILS # BLD AUTO: 1.39 THOUSANDS/ΜL (ref 1.85–7.62)
NEUTS SEG NFR BLD AUTO: 46 % (ref 43–75)
NRBC BLD AUTO-RTO: 0 /100 WBCS
PLATELET # BLD AUTO: 178 THOUSANDS/UL (ref 149–390)
PMV BLD AUTO: 11.2 FL (ref 8.9–12.7)
POTASSIUM SERPL-SCNC: 3.4 MMOL/L (ref 3.5–5.3)
PROT SERPL-MCNC: 6.8 G/DL (ref 6.4–8.2)
RBC # BLD AUTO: 3.63 MILLION/UL (ref 3.81–5.12)
SODIUM SERPL-SCNC: 139 MMOL/L (ref 136–145)
WBC # BLD AUTO: 3.07 THOUSAND/UL (ref 4.31–10.16)

## 2020-05-01 PROCEDURE — 36415 COLL VENOUS BLD VENIPUNCTURE: CPT

## 2020-05-01 PROCEDURE — 85025 COMPLETE CBC W/AUTO DIFF WBC: CPT

## 2020-05-01 PROCEDURE — 80053 COMPREHEN METABOLIC PANEL: CPT

## 2020-05-04 ENCOUNTER — HOSPITAL ENCOUNTER (OUTPATIENT)
Dept: INFUSION CENTER | Facility: CLINIC | Age: 70
Discharge: HOME | End: 2020-05-04
Payer: MEDICARE

## 2020-05-04 VITALS
HEIGHT: 60 IN | HEART RATE: 92 BPM | DIASTOLIC BLOOD PRESSURE: 76 MMHG | TEMPERATURE: 98 F | WEIGHT: 137 LBS | SYSTOLIC BLOOD PRESSURE: 140 MMHG | BODY MASS INDEX: 26.9 KG/M2 | OXYGEN SATURATION: 97 % | RESPIRATION RATE: 16 BRPM

## 2020-05-04 DIAGNOSIS — C90.00 MULTIPLE MYELOMA NOT HAVING ACHIEVED REMISSION (HCC): Primary | ICD-10-CM

## 2020-05-04 PROCEDURE — 96401 CHEMO ANTI-NEOPL SQ/IM: CPT

## 2020-05-04 RX ADMIN — BORTEZOMIB 2.1 MG: 3.5 INJECTION, POWDER, LYOPHILIZED, FOR SOLUTION INTRAVENOUS; SUBCUTANEOUS at 15:29

## 2020-05-08 ENCOUNTER — APPOINTMENT (OUTPATIENT)
Dept: LAB | Facility: CLINIC | Age: 70
End: 2020-05-08
Payer: MEDICARE

## 2020-05-08 DIAGNOSIS — C90.00 MULTIPLE MYELOMA NOT HAVING ACHIEVED REMISSION (HCC): ICD-10-CM

## 2020-05-08 LAB
ALBUMIN SERPL BCP-MCNC: 3.6 G/DL (ref 3.5–5)
ALP SERPL-CCNC: 69 U/L (ref 46–116)
ALT SERPL W P-5'-P-CCNC: 23 U/L (ref 12–78)
ANION GAP SERPL CALCULATED.3IONS-SCNC: 4 MMOL/L (ref 4–13)
AST SERPL W P-5'-P-CCNC: 13 U/L (ref 5–45)
BASOPHILS # BLD AUTO: 0.02 THOUSANDS/ΜL (ref 0–0.1)
BASOPHILS NFR BLD AUTO: 0 % (ref 0–1)
BILIRUB SERPL-MCNC: 0.59 MG/DL (ref 0.2–1)
BUN SERPL-MCNC: 14 MG/DL (ref 5–25)
CALCIUM SERPL-MCNC: 8.7 MG/DL (ref 8.3–10.1)
CHLORIDE SERPL-SCNC: 107 MMOL/L (ref 100–108)
CO2 SERPL-SCNC: 26 MMOL/L (ref 21–32)
CREAT SERPL-MCNC: 0.69 MG/DL (ref 0.6–1.3)
EOSINOPHIL # BLD AUTO: 0.17 THOUSAND/ΜL (ref 0–0.61)
EOSINOPHIL NFR BLD AUTO: 3 % (ref 0–6)
ERYTHROCYTE [DISTWIDTH] IN BLOOD BY AUTOMATED COUNT: 13.8 % (ref 11.6–15.1)
GFR SERPL CREATININE-BSD FRML MDRD: 90 ML/MIN/1.73SQ M
GLUCOSE P FAST SERPL-MCNC: 88 MG/DL (ref 65–99)
HCT VFR BLD AUTO: 36.7 % (ref 34.8–46.1)
HGB BLD-MCNC: 12.1 G/DL (ref 11.5–15.4)
IMM GRANULOCYTES # BLD AUTO: 0.03 THOUSAND/UL (ref 0–0.2)
IMM GRANULOCYTES NFR BLD AUTO: 1 % (ref 0–2)
LYMPHOCYTES # BLD AUTO: 1.5 THOUSANDS/ΜL (ref 0.6–4.47)
LYMPHOCYTES NFR BLD AUTO: 29 % (ref 14–44)
MCH RBC QN AUTO: 31.3 PG (ref 26.8–34.3)
MCHC RBC AUTO-ENTMCNC: 33 G/DL (ref 31.4–37.4)
MCV RBC AUTO: 95 FL (ref 82–98)
MONOCYTES # BLD AUTO: 0.94 THOUSAND/ΜL (ref 0.17–1.22)
MONOCYTES NFR BLD AUTO: 18 % (ref 4–12)
NEUTROPHILS # BLD AUTO: 2.45 THOUSANDS/ΜL (ref 1.85–7.62)
NEUTS SEG NFR BLD AUTO: 49 % (ref 43–75)
NRBC BLD AUTO-RTO: 0 /100 WBCS
PLATELET # BLD AUTO: 226 THOUSANDS/UL (ref 149–390)
PMV BLD AUTO: 10.6 FL (ref 8.9–12.7)
POTASSIUM SERPL-SCNC: 3.8 MMOL/L (ref 3.5–5.3)
PROT SERPL-MCNC: 7 G/DL (ref 6.4–8.2)
RBC # BLD AUTO: 3.86 MILLION/UL (ref 3.81–5.12)
SODIUM SERPL-SCNC: 137 MMOL/L (ref 136–145)
WBC # BLD AUTO: 5.11 THOUSAND/UL (ref 4.31–10.16)

## 2020-05-08 PROCEDURE — 36415 COLL VENOUS BLD VENIPUNCTURE: CPT

## 2020-05-08 PROCEDURE — 80053 COMPREHEN METABOLIC PANEL: CPT

## 2020-05-08 PROCEDURE — 85025 COMPLETE CBC W/AUTO DIFF WBC: CPT

## 2020-05-11 ENCOUNTER — HOSPITAL ENCOUNTER (OUTPATIENT)
Dept: INFUSION CENTER | Facility: CLINIC | Age: 70
Discharge: HOME | End: 2020-05-11
Payer: MEDICARE

## 2020-05-11 VITALS
WEIGHT: 134.5 LBS | RESPIRATION RATE: 16 BRPM | BODY MASS INDEX: 26.41 KG/M2 | SYSTOLIC BLOOD PRESSURE: 147 MMHG | DIASTOLIC BLOOD PRESSURE: 67 MMHG | HEART RATE: 89 BPM | TEMPERATURE: 98 F | OXYGEN SATURATION: 94 % | HEIGHT: 60 IN

## 2020-05-11 DIAGNOSIS — C90.00 MULTIPLE MYELOMA NOT HAVING ACHIEVED REMISSION (HCC): Primary | ICD-10-CM

## 2020-05-11 PROCEDURE — 96401 CHEMO ANTI-NEOPL SQ/IM: CPT

## 2020-05-11 RX ADMIN — BORTEZOMIB 2.1 MG: 3.5 INJECTION, POWDER, LYOPHILIZED, FOR SOLUTION INTRAVENOUS; SUBCUTANEOUS at 15:24

## 2020-05-19 ENCOUNTER — APPOINTMENT (OUTPATIENT)
Dept: LAB | Facility: CLINIC | Age: 70
End: 2020-05-19
Payer: MEDICARE

## 2020-05-19 DIAGNOSIS — C90.00 MULTIPLE MYELOMA NOT HAVING ACHIEVED REMISSION (HCC): ICD-10-CM

## 2020-05-19 PROCEDURE — 84165 PROTEIN E-PHORESIS SERUM: CPT | Performed by: PATHOLOGY

## 2020-05-19 PROCEDURE — 84166 PROTEIN E-PHORESIS/URINE/CSF: CPT

## 2020-05-19 PROCEDURE — 84166 PROTEIN E-PHORESIS/URINE/CSF: CPT | Performed by: PATHOLOGY

## 2020-05-20 LAB
ALBUMIN UR ELPH-MCNC: 100 %
ALPHA1 GLOB MFR UR ELPH: 0 %
ALPHA2 GLOB MFR UR ELPH: 0 %
B-GLOBULIN MFR UR ELPH: 0 %
GAMMA GLOB MFR UR ELPH: 0 %
PROT PATTERN UR ELPH-IMP: ABNORMAL
PROT UR-MCNC: 22 MG/DL

## 2020-05-21 ENCOUNTER — OFFICE VISIT (OUTPATIENT)
Dept: HEMATOLOGY ONCOLOGY | Facility: CLINIC | Age: 70
End: 2020-05-21
Payer: MEDICARE

## 2020-05-21 VITALS
TEMPERATURE: 97.5 F | HEART RATE: 82 BPM | WEIGHT: 136 LBS | BODY MASS INDEX: 26.7 KG/M2 | RESPIRATION RATE: 18 BRPM | DIASTOLIC BLOOD PRESSURE: 78 MMHG | OXYGEN SATURATION: 97 % | HEIGHT: 60 IN | SYSTOLIC BLOOD PRESSURE: 120 MMHG

## 2020-05-21 DIAGNOSIS — C90.00 MULTIPLE MYELOMA NOT HAVING ACHIEVED REMISSION (HCC): ICD-10-CM

## 2020-05-21 PROCEDURE — 99214 OFFICE O/P EST MOD 30 MIN: CPT | Performed by: PHYSICIAN ASSISTANT

## 2020-05-21 PROCEDURE — 1160F RVW MEDS BY RX/DR IN RCRD: CPT | Performed by: PHYSICIAN ASSISTANT

## 2020-05-21 PROCEDURE — 3008F BODY MASS INDEX DOCD: CPT | Performed by: PHYSICIAN ASSISTANT

## 2020-05-21 PROCEDURE — 4040F PNEUMOC VAC/ADMIN/RCVD: CPT | Performed by: PHYSICIAN ASSISTANT

## 2020-05-21 PROCEDURE — 3078F DIAST BP <80 MM HG: CPT | Performed by: PHYSICIAN ASSISTANT

## 2020-05-21 PROCEDURE — 1036F TOBACCO NON-USER: CPT | Performed by: PHYSICIAN ASSISTANT

## 2020-05-21 PROCEDURE — 3074F SYST BP LT 130 MM HG: CPT | Performed by: PHYSICIAN ASSISTANT

## 2020-05-22 DIAGNOSIS — C90.00 MULTIPLE MYELOMA NOT HAVING ACHIEVED REMISSION (HCC): ICD-10-CM

## 2020-05-26 DIAGNOSIS — F41.9 ANXIETY: ICD-10-CM

## 2020-05-26 DIAGNOSIS — C90.00 MULTIPLE MYELOMA NOT HAVING ACHIEVED REMISSION (HCC): ICD-10-CM

## 2020-05-26 RX ORDER — LENALIDOMIDE 15 MG/1
CAPSULE ORAL
Qty: 21 CAPSULE | Refills: 0 | Status: SHIPPED | OUTPATIENT
Start: 2020-05-26 | End: 2020-06-24

## 2020-05-26 RX ORDER — LENALIDOMIDE 15 MG/1
CAPSULE ORAL
Qty: 21 CAPSULE | Refills: 0 | Status: SHIPPED | OUTPATIENT
Start: 2020-05-26 | End: 2020-05-26 | Stop reason: SDUPTHER

## 2020-06-05 NOTE — TELEPHONE ENCOUNTER
Talked to patient about appointment  Scheduled on April 3 with Abigail at [place]  Explain that was her 4 week follow up during treatment  Yes

## 2020-06-24 DIAGNOSIS — C90.00 MULTIPLE MYELOMA NOT HAVING ACHIEVED REMISSION (HCC): ICD-10-CM

## 2020-06-24 RX ORDER — LENALIDOMIDE 15 MG/1
CAPSULE ORAL
Qty: 21 CAPSULE | Refills: 0 | Status: SHIPPED | OUTPATIENT
Start: 2020-06-24 | End: 2020-12-21 | Stop reason: DRUGHIGH

## 2020-06-29 ENCOUNTER — APPOINTMENT (OUTPATIENT)
Dept: LAB | Facility: CLINIC | Age: 70
End: 2020-06-29
Payer: MEDICARE

## 2020-06-29 DIAGNOSIS — C90.00 MULTIPLE MYELOMA NOT HAVING ACHIEVED REMISSION (HCC): ICD-10-CM

## 2020-06-29 LAB
ALBUMIN SERPL BCP-MCNC: 3.5 G/DL (ref 3.5–5)
ALP SERPL-CCNC: 63 U/L (ref 46–116)
ALT SERPL W P-5'-P-CCNC: 30 U/L (ref 12–78)
ANION GAP SERPL CALCULATED.3IONS-SCNC: 6 MMOL/L (ref 4–13)
AST SERPL W P-5'-P-CCNC: 16 U/L (ref 5–45)
BASOPHILS # BLD AUTO: 0.01 THOUSANDS/ΜL (ref 0–0.1)
BASOPHILS NFR BLD AUTO: 0 % (ref 0–1)
BILIRUB SERPL-MCNC: 0.74 MG/DL (ref 0.2–1)
BUN SERPL-MCNC: 11 MG/DL (ref 5–25)
CALCIUM SERPL-MCNC: 8.9 MG/DL (ref 8.3–10.1)
CHLORIDE SERPL-SCNC: 107 MMOL/L (ref 100–108)
CO2 SERPL-SCNC: 28 MMOL/L (ref 21–32)
CREAT SERPL-MCNC: 0.73 MG/DL (ref 0.6–1.3)
EOSINOPHIL # BLD AUTO: 0.13 THOUSAND/ΜL (ref 0–0.61)
EOSINOPHIL NFR BLD AUTO: 4 % (ref 0–6)
ERYTHROCYTE [DISTWIDTH] IN BLOOD BY AUTOMATED COUNT: 14.6 % (ref 11.6–15.1)
GFR SERPL CREATININE-BSD FRML MDRD: 85 ML/MIN/1.73SQ M
GLUCOSE SERPL-MCNC: 88 MG/DL (ref 65–140)
HCT VFR BLD AUTO: 36.1 % (ref 34.8–46.1)
HGB BLD-MCNC: 11.9 G/DL (ref 11.5–15.4)
IMM GRANULOCYTES # BLD AUTO: 0.01 THOUSAND/UL (ref 0–0.2)
IMM GRANULOCYTES NFR BLD AUTO: 0 % (ref 0–2)
LYMPHOCYTES # BLD AUTO: 1.04 THOUSANDS/ΜL (ref 0.6–4.47)
LYMPHOCYTES NFR BLD AUTO: 30 % (ref 14–44)
MCH RBC QN AUTO: 31.2 PG (ref 26.8–34.3)
MCHC RBC AUTO-ENTMCNC: 33 G/DL (ref 31.4–37.4)
MCV RBC AUTO: 95 FL (ref 82–98)
MONOCYTES # BLD AUTO: 0.6 THOUSAND/ΜL (ref 0.17–1.22)
MONOCYTES NFR BLD AUTO: 17 % (ref 4–12)
NEUTROPHILS # BLD AUTO: 1.65 THOUSANDS/ΜL (ref 1.85–7.62)
NEUTS SEG NFR BLD AUTO: 49 % (ref 43–75)
NRBC BLD AUTO-RTO: 0 /100 WBCS
PLATELET # BLD AUTO: 214 THOUSANDS/UL (ref 149–390)
PMV BLD AUTO: 10.6 FL (ref 8.9–12.7)
POTASSIUM SERPL-SCNC: 3.6 MMOL/L (ref 3.5–5.3)
PROT SERPL-MCNC: 6.9 G/DL (ref 6.4–8.2)
RBC # BLD AUTO: 3.81 MILLION/UL (ref 3.81–5.12)
SODIUM SERPL-SCNC: 141 MMOL/L (ref 136–145)
WBC # BLD AUTO: 3.44 THOUSAND/UL (ref 4.31–10.16)

## 2020-06-29 PROCEDURE — 80053 COMPREHEN METABOLIC PANEL: CPT

## 2020-06-29 PROCEDURE — 85025 COMPLETE CBC W/AUTO DIFF WBC: CPT

## 2020-06-29 PROCEDURE — 36415 COLL VENOUS BLD VENIPUNCTURE: CPT

## 2020-06-30 ENCOUNTER — HOSPITAL ENCOUNTER (OUTPATIENT)
Dept: INFUSION CENTER | Facility: CLINIC | Age: 70
Discharge: HOME | End: 2020-06-30
Payer: MEDICARE

## 2020-06-30 VITALS
OXYGEN SATURATION: 97 % | BODY MASS INDEX: 26.41 KG/M2 | SYSTOLIC BLOOD PRESSURE: 154 MMHG | WEIGHT: 134.5 LBS | RESPIRATION RATE: 18 BRPM | TEMPERATURE: 98.5 F | DIASTOLIC BLOOD PRESSURE: 68 MMHG | HEART RATE: 72 BPM | HEIGHT: 60 IN

## 2020-06-30 DIAGNOSIS — C90.00 MULTIPLE MYELOMA NOT HAVING ACHIEVED REMISSION (HCC): Primary | ICD-10-CM

## 2020-06-30 PROCEDURE — 96365 THER/PROPH/DIAG IV INF INIT: CPT

## 2020-06-30 PROCEDURE — 96401 CHEMO ANTI-NEOPL SQ/IM: CPT

## 2020-06-30 RX ORDER — SODIUM CHLORIDE 9 MG/ML
20 INJECTION, SOLUTION INTRAVENOUS ONCE
Status: CANCELLED | OUTPATIENT
Start: 2020-07-10

## 2020-06-30 RX ORDER — SODIUM CHLORIDE 9 MG/ML
20 INJECTION, SOLUTION INTRAVENOUS ONCE
Status: COMPLETED | OUTPATIENT
Start: 2020-06-30 | End: 2020-06-30

## 2020-06-30 RX ADMIN — BORTEZOMIB 2.1 MG: 3.5 INJECTION, POWDER, LYOPHILIZED, FOR SOLUTION INTRAVENOUS; SUBCUTANEOUS at 15:44

## 2020-06-30 RX ADMIN — SODIUM CHLORIDE 20 ML/HR: 0.9 INJECTION, SOLUTION INTRAVENOUS at 14:25

## 2020-06-30 RX ADMIN — ZOLEDRONIC ACID 3.5 MG: 4 INJECTION, SOLUTION, CONCENTRATE INTRAVENOUS at 14:59

## 2020-06-30 NOTE — PROGRESS NOTES
Patient tolerated Zometa infusion and Velcade injection into left abdomen without incident  Peripheral IV removed  Patient's next appointment confirmed  AVS offered and declined

## 2020-06-30 NOTE — PROGRESS NOTES
Patient presents today for treatment with Zometa and Velcade  Patient offers no complaints  VSS  Labs within parameters to treat  Peripheral IV inserted without incident

## 2020-07-06 ENCOUNTER — TELEPHONE (OUTPATIENT)
Dept: INTERNAL MEDICINE CLINIC | Facility: CLINIC | Age: 70
End: 2020-07-06

## 2020-07-06 ENCOUNTER — APPOINTMENT (OUTPATIENT)
Dept: LAB | Facility: CLINIC | Age: 70
End: 2020-07-06
Payer: MEDICARE

## 2020-07-06 ENCOUNTER — TRANSCRIBE ORDERS (OUTPATIENT)
Dept: ADMINISTRATIVE | Facility: HOSPITAL | Age: 70
End: 2020-07-06

## 2020-07-06 DIAGNOSIS — Z20.828 EXPOSURE TO SARS-ASSOCIATED CORONAVIRUS: Primary | ICD-10-CM

## 2020-07-06 DIAGNOSIS — C90.00 MULTIPLE MYELOMA NOT HAVING ACHIEVED REMISSION (HCC): ICD-10-CM

## 2020-07-06 DIAGNOSIS — C90.00 MULTIPLE MYELOMA, REMISSION STATUS UNSPECIFIED (HCC): Primary | ICD-10-CM

## 2020-07-06 LAB
ALBUMIN SERPL BCP-MCNC: 3.6 G/DL (ref 3.5–5)
ALP SERPL-CCNC: 68 U/L (ref 46–116)
ALT SERPL W P-5'-P-CCNC: 24 U/L (ref 12–78)
ANION GAP SERPL CALCULATED.3IONS-SCNC: 6 MMOL/L (ref 4–13)
AST SERPL W P-5'-P-CCNC: 13 U/L (ref 5–45)
BASOPHILS # BLD AUTO: 0.01 THOUSANDS/ΜL (ref 0–0.1)
BASOPHILS NFR BLD AUTO: 0 % (ref 0–1)
BILIRUB SERPL-MCNC: 0.57 MG/DL (ref 0.2–1)
BUN SERPL-MCNC: 13 MG/DL (ref 5–25)
CALCIUM SERPL-MCNC: 9.2 MG/DL (ref 8.3–10.1)
CHLORIDE SERPL-SCNC: 108 MMOL/L (ref 100–108)
CO2 SERPL-SCNC: 26 MMOL/L (ref 21–32)
CREAT SERPL-MCNC: 0.68 MG/DL (ref 0.6–1.3)
EOSINOPHIL # BLD AUTO: 0.13 THOUSAND/ΜL (ref 0–0.61)
EOSINOPHIL NFR BLD AUTO: 4 % (ref 0–6)
ERYTHROCYTE [DISTWIDTH] IN BLOOD BY AUTOMATED COUNT: 14.9 % (ref 11.6–15.1)
GFR SERPL CREATININE-BSD FRML MDRD: 90 ML/MIN/1.73SQ M
GLUCOSE P FAST SERPL-MCNC: 94 MG/DL (ref 65–99)
HCT VFR BLD AUTO: 34.9 % (ref 34.8–46.1)
HGB BLD-MCNC: 11.8 G/DL (ref 11.5–15.4)
IMM GRANULOCYTES # BLD AUTO: 0.04 THOUSAND/UL (ref 0–0.2)
IMM GRANULOCYTES NFR BLD AUTO: 1 % (ref 0–2)
LYMPHOCYTES # BLD AUTO: 0.88 THOUSANDS/ΜL (ref 0.6–4.47)
LYMPHOCYTES NFR BLD AUTO: 25 % (ref 14–44)
MCH RBC QN AUTO: 31.6 PG (ref 26.8–34.3)
MCHC RBC AUTO-ENTMCNC: 33.8 G/DL (ref 31.4–37.4)
MCV RBC AUTO: 93 FL (ref 82–98)
MONOCYTES # BLD AUTO: 0.59 THOUSAND/ΜL (ref 0.17–1.22)
MONOCYTES NFR BLD AUTO: 17 % (ref 4–12)
NEUTROPHILS # BLD AUTO: 1.82 THOUSANDS/ΜL (ref 1.85–7.62)
NEUTS SEG NFR BLD AUTO: 53 % (ref 43–75)
NRBC BLD AUTO-RTO: 0 /100 WBCS
PLATELET # BLD AUTO: 245 THOUSANDS/UL (ref 149–390)
PMV BLD AUTO: 11.2 FL (ref 8.9–12.7)
POTASSIUM SERPL-SCNC: 3.7 MMOL/L (ref 3.5–5.3)
PROT SERPL-MCNC: 7.1 G/DL (ref 6.4–8.2)
RBC # BLD AUTO: 3.74 MILLION/UL (ref 3.81–5.12)
SODIUM SERPL-SCNC: 140 MMOL/L (ref 136–145)
WBC # BLD AUTO: 3.47 THOUSAND/UL (ref 4.31–10.16)

## 2020-07-06 PROCEDURE — 36415 COLL VENOUS BLD VENIPUNCTURE: CPT

## 2020-07-06 PROCEDURE — 85025 COMPLETE CBC W/AUTO DIFF WBC: CPT

## 2020-07-06 PROCEDURE — 80053 COMPREHEN METABOLIC PANEL: CPT

## 2020-07-06 NOTE — TELEPHONE ENCOUNTER
Patient is having a stem cell replacement and needs a pulmonary function test  It is scheduled for 7/22  She needs to have a Covid test on 7/17 in order to have the PFT and was told to call us to get an order for it  Please, advise

## 2020-07-07 ENCOUNTER — HOSPITAL ENCOUNTER (OUTPATIENT)
Dept: INFUSION CENTER | Facility: CLINIC | Age: 70
Discharge: HOME | End: 2020-07-07
Payer: MEDICARE

## 2020-07-07 VITALS
SYSTOLIC BLOOD PRESSURE: 124 MMHG | OXYGEN SATURATION: 96 % | WEIGHT: 132 LBS | DIASTOLIC BLOOD PRESSURE: 60 MMHG | TEMPERATURE: 99.1 F | BODY MASS INDEX: 25.91 KG/M2 | HEART RATE: 81 BPM | HEIGHT: 60 IN | RESPIRATION RATE: 18 BRPM

## 2020-07-07 DIAGNOSIS — C90.00 MULTIPLE MYELOMA NOT HAVING ACHIEVED REMISSION (HCC): Primary | ICD-10-CM

## 2020-07-07 PROCEDURE — 96401 CHEMO ANTI-NEOPL SQ/IM: CPT

## 2020-07-07 RX ADMIN — BORTEZOMIB 2.1 MG: 3.5 INJECTION, POWDER, LYOPHILIZED, FOR SOLUTION INTRAVENOUS; SUBCUTANEOUS at 13:10

## 2020-07-07 NOTE — PROGRESS NOTES
Patient presents today for treatment with Velcade  Patient offers no complaints  VSS  Labs within parameters to treat  Patient verified taking dexamethasone 20 MG as per order

## 2020-07-09 ENCOUNTER — HOSPITAL ENCOUNTER (OUTPATIENT)
Dept: NON INVASIVE DIAGNOSTICS | Facility: HOSPITAL | Age: 70
Discharge: HOME | End: 2020-07-09
Payer: MEDICARE

## 2020-07-09 DIAGNOSIS — C90.00 MULTIPLE MYELOMA, REMISSION STATUS UNSPECIFIED (HCC): ICD-10-CM

## 2020-07-09 PROCEDURE — 93306 TTE W/DOPPLER COMPLETE: CPT

## 2020-07-09 PROCEDURE — 93306 TTE W/DOPPLER COMPLETE: CPT | Performed by: INTERNAL MEDICINE

## 2020-07-10 ENCOUNTER — TELEPHONE (OUTPATIENT)
Dept: HEMATOLOGY ONCOLOGY | Facility: CLINIC | Age: 70
End: 2020-07-10

## 2020-07-10 ENCOUNTER — OFFICE VISIT (OUTPATIENT)
Dept: HEMATOLOGY ONCOLOGY | Facility: CLINIC | Age: 70
End: 2020-07-10
Payer: MEDICARE

## 2020-07-10 VITALS
HEART RATE: 70 BPM | RESPIRATION RATE: 16 BRPM | TEMPERATURE: 97 F | SYSTOLIC BLOOD PRESSURE: 132 MMHG | BODY MASS INDEX: 26.5 KG/M2 | OXYGEN SATURATION: 98 % | HEIGHT: 60 IN | WEIGHT: 135 LBS | DIASTOLIC BLOOD PRESSURE: 80 MMHG

## 2020-07-10 DIAGNOSIS — I10 ESSENTIAL HYPERTENSION: ICD-10-CM

## 2020-07-10 DIAGNOSIS — C90.00 MULTIPLE MYELOMA NOT HAVING ACHIEVED REMISSION (HCC): Primary | ICD-10-CM

## 2020-07-10 DIAGNOSIS — S32.040D CLOSED COMPRESSION FRACTURE OF L4 LUMBAR VERTEBRA WITH ROUTINE HEALING, SUBSEQUENT ENCOUNTER: ICD-10-CM

## 2020-07-10 DIAGNOSIS — C90.30 PLASMACYTOMA, UNSPECIFIED PLASMACYTOMA TYPE, UNSPECIFIED WHETHER REMISSION ACHIEVED (HCC): ICD-10-CM

## 2020-07-10 PROCEDURE — 1036F TOBACCO NON-USER: CPT | Performed by: INTERNAL MEDICINE

## 2020-07-10 PROCEDURE — 1160F RVW MEDS BY RX/DR IN RCRD: CPT | Performed by: INTERNAL MEDICINE

## 2020-07-10 PROCEDURE — 3079F DIAST BP 80-89 MM HG: CPT | Performed by: INTERNAL MEDICINE

## 2020-07-10 PROCEDURE — 4040F PNEUMOC VAC/ADMIN/RCVD: CPT | Performed by: INTERNAL MEDICINE

## 2020-07-10 PROCEDURE — 1124F ACP DISCUSS-NO DSCNMKR DOCD: CPT | Performed by: INTERNAL MEDICINE

## 2020-07-10 PROCEDURE — 99214 OFFICE O/P EST MOD 30 MIN: CPT | Performed by: INTERNAL MEDICINE

## 2020-07-10 PROCEDURE — 3008F BODY MASS INDEX DOCD: CPT | Performed by: INTERNAL MEDICINE

## 2020-07-10 PROCEDURE — 3075F SYST BP GE 130 - 139MM HG: CPT | Performed by: INTERNAL MEDICINE

## 2020-07-10 NOTE — TELEPHONE ENCOUNTER
Patient going to see Luly Sullivan for stem cell surgery on the 29th  If they decide to proceeded then Dr Faraz Rosado will see patient a few months after  If they do not then pateint would need to come in to see Dr Faraz Rosado within the week or so  I told the patient someone from Dr Noe Shaffer will reach out to her but that she can also give us a call after her appointment the 29th

## 2020-07-10 NOTE — PROGRESS NOTES
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  Presently patient is on   Revlimid, Velcade, dexamethasone, acyclovir, aspirin, allopurinol and Zometa  This could be her last pre stem cell harvesting cycle  She states she will have stem cell collection next month at [address]  She has appointment with Dr Lorraine Christianson at [facility] on 07/29/20 and next cycle starts after that if she had to have it  She will let me know after her visit with   at [facility]  She has been tolerating treatments without much problem  Occasionally she has low back discomfort and tiredness  No more skin rash      In early 2019 patient was diagnosed to have Plasmacytoma/multiple myeloma at isolated L4 area with compression fracture     Patient received radiation to this area   Last radiation treatment was on 03/07/19       She had bone marrow test  that showed 15% plasma cells, scattered and in small clusters  The SPEP showed a monoclonal peak in the gamma region, IgG lambda   Patient's condition was being observed and the question was when to treat her for multiple myeloma   Patient was referred to [facility] for an opinion and clinical trial    Patient saw myeloma expert at [facility] on 01/02/2020    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  Positive hormone receptors and negative her 2   Patient had right mastectomy   She had adjuvant tamoxifen for 5 years                    Current Outpatient Medications:     acyclovir (ZOVIRAX) 400 MG tablet, Take 1 tablet (400 mg total) by mouth 2 (two) times a day, Disp: 180 tablet, Rfl: 2    ALPRAZolam (XANAX) 0 25 mg tablet, Take 1 tablet (0 25 mg total) by mouth 2 (two) times a day as needed for anxiety, Disp: 30 tablet, Rfl: 0    Calcium Citrate-Vitamin D (CALCIUM CITRATE + D PO), Take 1 tablet by mouth 2 (two) times a day  , Disp: , Rfl:     dexamethasone (DECADRON) 4 mg tablet, 10 tablets by mouth 1 day a week, day of chemotherapy  Please take with food, Disp: 40 tablet, Rfl: 4    escitalopram (LEXAPRO) 10 mg tablet, TAKE 1 TABLET BY MOUTH EVERY DAY, Disp: 90 tablet, Rfl: 3    lenalidomide (Revlimid) 15 MG CAPS, TAKE 1 CAPSULE BY MOUTH EVERY DAY ON DAYS 1 THROUGH 21OF EVERY CYCLE 28 DAYS, Disp: 21 capsule, Rfl: 0    metoprolol tartrate (LOPRESSOR) 50 mg tablet, TAKE 1 AND 1/2 TABLETS BY MOUTH TWO TIMES DAILY, Disp: 270 tablet, Rfl: 3    allopurinol (ZYLOPRIM) 300 mg tablet, Take 1 tablet (300 mg total) by mouth daily Stop if rash and call our office (Patient not taking: Reported on 4/3/2020), Disp: 30 tablet, Rfl: 1    ondansetron (ZOFRAN) 4 mg tablet, Take 1 tablet (4 mg total) by mouth 4 (four) times a day as needed for nausea or vomiting (Patient not taking: Reported on 4/24/2020), Disp: 30 tablet, Rfl: 1    Allergies   Allergen Reactions    Ibuprofen Abdominal Pain and Tachycardia     Reaction Date: [id number];           Malignant neoplasm of right breast ([facility] [address] )    9/2000 Surgery     Right breast mastectomy      8/13/2012 Initial Diagnosis     Malignant neoplasm of right breast [facility])       Chemotherapy     Tamoxifen for 5 years        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[facility] [address] )    12/18/2018 Initial Diagnosis     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[facility] [address] )      12/18/2018 Biopsy     Final Diagnosis   A  Bone, L-4, core biopsy:  - Fragments of trabecular bone with changes compatible with prior fracture site  - 10% lambda predominant plasmacytosis  See note  - Hematopoietic marrow with trilineage hematopoiesis  - No epithelial elements identified, confirmed with negative keratin AE1/AE3 stains              2/1/2019 - 3/7/2019 Radiation     Plan ID Energy Fractions Dose per Fraction (cGy) Dose Correction (cGy) Total Dose Delivered (cGy) Elapsed Days   L3_L5 Spine 10X/6X 25 / 25 180 0 4,500 34             Multiple myeloma not having achieved remission ([address] ) 7/23/2019 Initial Diagnosis     Multiple myeloma not having achieved remission ([facility] [address] )      3/16/2020 -  Chemotherapy     bortezomib (VELCADE) subcutaneous injection 2 1 mg, 1 3 mg/m2 = 2 1 mg, Subcutaneous, Once, 3 of 6 cycles  Administration: 2 1 mg (3/16/2020), 2 1 mg (4/6/2020), 2 1 mg (3/23/2020), 2 1 mg (3/30/2020), 2 1 mg (4/20/2020), 2 1 mg (5/11/2020), 2 1 mg (4/27/2020), 2 1 mg (5/4/2020), 2 1 mg (6/30/2020), 2 1 mg (7/7/2020)         ROS:  07/10/20 Reviewed 13 systems: See symptoms in HPI:  Tiredness  Low back discomfort  Presently no headaches, seizures, dizziness, diplopia, dysphagia, hoarseness, chest pain, palpitations, shortness of breath, cough, hemoptysis, abdominal pain, nausea, vomiting, change in bowel habits, melena, hematuria, fever, chills, bleeding, bone pains, skin rash, weight loss,   weakness, numbness,  claudication and gait problem  No frequent infections  Not unusually sensitive to heat or cold  No swelling of the ankles  No swollen glands  Patient is anxious  No gyn symptoms  /80 (BP Location: Left arm, Patient Position: Sitting, Cuff Size: Adult)   Pulse 70   Temp (!) 97 °F (36 1 °C)   Resp 16   Ht 5' (1 524 m)   Wt 61 2 kg (135 lb)   SpO2 98%   BMI 26 37 kg/m²     Physical Exam:  Alert, oriented, not in distress, no icterus, no oral thrush, no palpable neck mass, clear lung fields, regular heart rate, abdomen  soft and non tender, no palpable abdominal mass, no ascites, no edema of ankles, no calf tenderness, no focal neurological deficit, no skin rash, no palpable lymphadenopathy in the neck and axillary areas, good arterial pulses  Patient is anxious  Performance status 1      IMAGING:      LABS:  Results for orders placed or performed in visit on 07/06/20   CBC and differential   Result Value Ref Range    WBC 3 47 (L) 4 31 - 10 16 Thousand/uL    RBC 3 74 (L) 3 81 - 5 12 Million/uL    Hemoglobin 11 8 11 5 - 15 4 g/dL    Hematocrit 34 9 34 8 - 46 1 %    MCV 93 82 - 98 fL    MCH 31 6 26 8 - 34 3 pg    MCHC 33 8 31 4 - 37 4 g/dL    RDW 14 9 11 6 - 15 1 %    MPV 11 2 8 9 - 12 7 fL    Platelets 399 559 - 510 Thousands/uL    nRBC 0 /100 WBCs    Neutrophils Relative 53 43 - 75 %    Immat GRANS % 1 0 - 2 %    Lymphocytes Relative 25 14 - 44 %    Monocytes Relative 17 (H) 4 - 12 %    Eosinophils Relative 4 0 - 6 %    Basophils Relative 0 0 - 1 %    Neutrophils Absolute 1 82 (L) 1 85 - 7 62 Thousands/µL    Immature Grans Absolute 0 04 0 00 - 0 20 Thousand/uL    Lymphocytes Absolute 0 88 0 60 - 4 47 Thousands/µL    Monocytes Absolute 0 59 0 17 - 1 22 Thousand/µL    Eosinophils Absolute 0 13 0 00 - 0 61 Thousand/µL    Basophils Absolute 0 01 0 00 - 0 10 Thousands/µL   Comprehensive metabolic panel   Result Value Ref Range    Sodium 140 136 - 145 mmol/L    Potassium 3 7 3 5 - 5 3 mmol/L    Chloride 108 100 - 108 mmol/L    CO2 26 21 - 32 mmol/L    ANION GAP 6 4 - 13 mmol/L    BUN 13 5 - 25 mg/dL    Creatinine 0 68 0 60 - 1 30 mg/dL    Glucose, Fasting 94 65 - 99 mg/dL    Calcium 9 2 8 3 - 10 1 mg/dL    AST 13 5 - 45 U/L    ALT 24 12 - 78 U/L    Alkaline Phosphatase 68 46 - 116 U/L    Total Protein 7 1 6 4 - 8 2 g/dL    Albumin 3 6 3 5 - 5 0 g/dL    Total Bilirubin 0 57 0 20 - 1 00 mg/dL    eGFR 90 ml/min/1 73sq m     Labs, Imaging, & Other studies:   All pertinent labs and imaging studies were personally reviewed    Lab Results   Component Value Date     11/28/2017    K 3 7 07/06/2020     07/06/2020    CO2 26 07/06/2020    ANIONGAP 9 09/02/2014    BUN 13 07/06/2020    CREATININE 0 68 07/06/2020    GLUCOSE 93 09/02/2014    GLUF 94 07/06/2020    CALCIUM 9 2 07/06/2020    AST 13 07/06/2020    ALT 24 07/06/2020    ALKPHOS 68 07/06/2020    PROT 8 0 09/02/2014    BILITOT 0 32 09/02/2014    EGFR 90 07/06/2020     Lab Results   Component Value Date    WBC 3 47 (L) 07/06/2020    HGB 11 8 07/06/2020    HCT 34 9 07/06/2020    MCV 93 07/06/2020     07/06/2020       Reviewed and discussed with patient  Assessment and plan:  Patient is here with her   Follow-up visit for 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  Presently patient is on   Revlimid, Velcade, dexamethasone, acyclovir, aspirin, allopurinol and Zometa  This could be her last pre stem cell harvesting cycle  She states she will have stem cell collection next month at [address]  She has appointment with Dr Marzena Coker at [facility] on 07/29/20 and next cycle starts after that if she had to have it  She will let me know after her visit with   at [facility]  She has been tolerating treatments without much problem  Occasionally she has low back discomfort and tiredness  No more skin rash      In early 2019 patient was diagnosed to have Plasmacytoma/multiple myeloma at isolated L4 area with compression fracture     Patient received radiation to this area   Last radiation treatment was on 03/07/19       She had bone marrow test  that showed 15% plasma cells, scattered and in small clusters  The SPEP showed a monoclonal peak in the gamma region, IgG lambda   Patient's condition was being observed and the question was when to treat her for multiple myeloma   Patient was referred to [facility] for an opinion and clinical trial    Patient saw myeloma expert at [facility] on 01/02/2020    [place] advised RVD          Patient has remote past history of stage I, grade 1, 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  Positive hormone receptors and negative her 2  Patient had right mastectomy   She had adjuvant tamoxifen for 5 years       Physical examination and test results are as recorded and discussed     Plan is as above   She does not have problem with her teeth for Zometa   She is already taking calcium and vitamin-D      All discussed in detail   Questions answered   Discussed the importance of self-breast examination, eating healthy foods, staying active and health screening tests   Patient is capable of self-care     Discussed precautions against coronavirus         She follows with her gynecologist for examination of breast and imaging studies  1  Multiple myeloma not having achieved remission ([facility] [address] )      2  Plasmacytoma, unspecified plasmacytoma type, unspecified whether remission achieved ([facility] [address] )      3  Closed compression fracture of L4 lumbar vertebra with routine healing, subsequent encounter      4  Essential hypertension           5  Malignant neoplasm of right breast in female, estrogen receptor positive, unspecified site of breast ([facility] [address] )                6  Osteopenia of multiple sites                  Patient voiced understanding and agreement in the discussion  Counseling / Coordination of Care   Greater than 50% of total time was spent with the patient and / or family counseling and / or coordination of care

## 2020-07-13 ENCOUNTER — APPOINTMENT (OUTPATIENT)
Dept: LAB | Facility: CLINIC | Age: 70
End: 2020-07-13
Payer: MEDICARE

## 2020-07-13 DIAGNOSIS — C90.00 MULTIPLE MYELOMA NOT HAVING ACHIEVED REMISSION (HCC): ICD-10-CM

## 2020-07-13 LAB
ALBUMIN SERPL BCP-MCNC: 3.4 G/DL (ref 3.5–5)
ALP SERPL-CCNC: 65 U/L (ref 46–116)
ALT SERPL W P-5'-P-CCNC: 25 U/L (ref 12–78)
ANION GAP SERPL CALCULATED.3IONS-SCNC: 6 MMOL/L (ref 4–13)
AST SERPL W P-5'-P-CCNC: 12 U/L (ref 5–45)
BASOPHILS # BLD AUTO: 0.01 THOUSANDS/ΜL (ref 0–0.1)
BASOPHILS NFR BLD AUTO: 0 % (ref 0–1)
BILIRUB SERPL-MCNC: 0.78 MG/DL (ref 0.2–1)
BUN SERPL-MCNC: 14 MG/DL (ref 5–25)
CALCIUM SERPL-MCNC: 8.9 MG/DL (ref 8.3–10.1)
CHLORIDE SERPL-SCNC: 106 MMOL/L (ref 100–108)
CO2 SERPL-SCNC: 26 MMOL/L (ref 21–32)
CREAT SERPL-MCNC: 0.68 MG/DL (ref 0.6–1.3)
EOSINOPHIL # BLD AUTO: 0.15 THOUSAND/ΜL (ref 0–0.61)
EOSINOPHIL NFR BLD AUTO: 4 % (ref 0–6)
ERYTHROCYTE [DISTWIDTH] IN BLOOD BY AUTOMATED COUNT: 14.9 % (ref 11.6–15.1)
GFR SERPL CREATININE-BSD FRML MDRD: 90 ML/MIN/1.73SQ M
GLUCOSE P FAST SERPL-MCNC: 87 MG/DL (ref 65–99)
HCT VFR BLD AUTO: 35.5 % (ref 34.8–46.1)
HGB BLD-MCNC: 11.8 G/DL (ref 11.5–15.4)
IMM GRANULOCYTES # BLD AUTO: 0.02 THOUSAND/UL (ref 0–0.2)
IMM GRANULOCYTES NFR BLD AUTO: 1 % (ref 0–2)
LYMPHOCYTES # BLD AUTO: 0.96 THOUSANDS/ΜL (ref 0.6–4.47)
LYMPHOCYTES NFR BLD AUTO: 22 % (ref 14–44)
MCH RBC QN AUTO: 31.1 PG (ref 26.8–34.3)
MCHC RBC AUTO-ENTMCNC: 33.2 G/DL (ref 31.4–37.4)
MCV RBC AUTO: 93 FL (ref 82–98)
MONOCYTES # BLD AUTO: 0.58 THOUSAND/ΜL (ref 0.17–1.22)
MONOCYTES NFR BLD AUTO: 13 % (ref 4–12)
NEUTROPHILS # BLD AUTO: 2.62 THOUSANDS/ΜL (ref 1.85–7.62)
NEUTS SEG NFR BLD AUTO: 60 % (ref 43–75)
NRBC BLD AUTO-RTO: 0 /100 WBCS
PLATELET # BLD AUTO: 194 THOUSANDS/UL (ref 149–390)
PMV BLD AUTO: 11.7 FL (ref 8.9–12.7)
POTASSIUM SERPL-SCNC: 3.3 MMOL/L (ref 3.5–5.3)
PROT SERPL-MCNC: 7 G/DL (ref 6.4–8.2)
RBC # BLD AUTO: 3.8 MILLION/UL (ref 3.81–5.12)
SODIUM SERPL-SCNC: 138 MMOL/L (ref 136–145)
WBC # BLD AUTO: 4.34 THOUSAND/UL (ref 4.31–10.16)

## 2020-07-13 PROCEDURE — 36415 COLL VENOUS BLD VENIPUNCTURE: CPT

## 2020-07-13 PROCEDURE — 80053 COMPREHEN METABOLIC PANEL: CPT

## 2020-07-13 PROCEDURE — 85025 COMPLETE CBC W/AUTO DIFF WBC: CPT

## 2020-07-14 ENCOUNTER — TELEPHONE (OUTPATIENT)
Dept: HEMATOLOGY ONCOLOGY | Facility: CLINIC | Age: 70
End: 2020-07-14

## 2020-07-14 ENCOUNTER — HOSPITAL ENCOUNTER (OUTPATIENT)
Dept: INFUSION CENTER | Facility: CLINIC | Age: 70
Discharge: HOME | End: 2020-07-14
Payer: MEDICARE

## 2020-07-14 ENCOUNTER — OFFICE VISIT (OUTPATIENT)
Dept: CARDIOLOGY CLINIC | Facility: CLINIC | Age: 70
End: 2020-07-14
Payer: MEDICARE

## 2020-07-14 VITALS
SYSTOLIC BLOOD PRESSURE: 140 MMHG | WEIGHT: 133 LBS | DIASTOLIC BLOOD PRESSURE: 82 MMHG | HEIGHT: 60 IN | HEART RATE: 118 BPM | BODY MASS INDEX: 26.11 KG/M2

## 2020-07-14 VITALS
TEMPERATURE: 98.5 F | BODY MASS INDEX: 26.11 KG/M2 | HEIGHT: 60 IN | RESPIRATION RATE: 18 BRPM | WEIGHT: 133 LBS | HEART RATE: 78 BPM | SYSTOLIC BLOOD PRESSURE: 122 MMHG | DIASTOLIC BLOOD PRESSURE: 57 MMHG

## 2020-07-14 DIAGNOSIS — I35.1 NONRHEUMATIC AORTIC VALVE INSUFFICIENCY: ICD-10-CM

## 2020-07-14 DIAGNOSIS — I10 ESSENTIAL HYPERTENSION: Primary | ICD-10-CM

## 2020-07-14 DIAGNOSIS — C90.00 MULTIPLE MYELOMA NOT HAVING ACHIEVED REMISSION (HCC): Primary | ICD-10-CM

## 2020-07-14 DIAGNOSIS — E87.6 HYPOKALEMIA: Primary | ICD-10-CM

## 2020-07-14 DIAGNOSIS — R00.0 TACHYCARDIA: ICD-10-CM

## 2020-07-14 PROCEDURE — 3079F DIAST BP 80-89 MM HG: CPT | Performed by: INTERNAL MEDICINE

## 2020-07-14 PROCEDURE — 93000 ELECTROCARDIOGRAM COMPLETE: CPT | Performed by: INTERNAL MEDICINE

## 2020-07-14 PROCEDURE — 99213 OFFICE O/P EST LOW 20 MIN: CPT | Performed by: INTERNAL MEDICINE

## 2020-07-14 PROCEDURE — 96401 CHEMO ANTI-NEOPL SQ/IM: CPT

## 2020-07-14 PROCEDURE — 1160F RVW MEDS BY RX/DR IN RCRD: CPT | Performed by: INTERNAL MEDICINE

## 2020-07-14 PROCEDURE — 3008F BODY MASS INDEX DOCD: CPT | Performed by: INTERNAL MEDICINE

## 2020-07-14 PROCEDURE — 3077F SYST BP >= 140 MM HG: CPT | Performed by: INTERNAL MEDICINE

## 2020-07-14 PROCEDURE — 4040F PNEUMOC VAC/ADMIN/RCVD: CPT | Performed by: INTERNAL MEDICINE

## 2020-07-14 PROCEDURE — 1036F TOBACCO NON-USER: CPT | Performed by: INTERNAL MEDICINE

## 2020-07-14 RX ORDER — ASPIRIN 81 MG/1
81 TABLET ORAL DAILY
COMMUNITY

## 2020-07-14 RX ORDER — POTASSIUM CHLORIDE 750 MG/1
10 CAPSULE, EXTENDED RELEASE ORAL 2 TIMES DAILY
Qty: 14 CAPSULE | Refills: 0 | Status: SHIPPED | OUTPATIENT
Start: 2020-07-14 | End: 2020-11-24 | Stop reason: ALTCHOICE

## 2020-07-14 RX ADMIN — BORTEZOMIB 2.1 MG: 3.5 INJECTION, POWDER, LYOPHILIZED, FOR SOLUTION INTRAVENOUS; SUBCUTANEOUS at 09:04

## 2020-07-14 NOTE — PROGRESS NOTES
Pt here for Velcade  Injection given in LLQ  Pt tolerated well  Declines AVS  Aware of future appts

## 2020-07-14 NOTE — PATIENT INSTRUCTIONS
The patient is advised to increase metoprolol to 100 mg in the morning and 50 mg in the evening  She will continue other medications

## 2020-07-14 NOTE — LETTER
July 14, 2020     Referral [address] [place] [id number]    Patient: Davi Pompa   YOB: 1950   Date of Visit: 7/14/2020       Dear Dr Jovi Antonio:    Thank you for referring Linnea Augustine to me for evaluation  Below are my notes for this consultation  If you have questions, please do not hesitate to call me  I look forward to following your patient along with you  Sincerely,        Aletha Aponte MD        CC: MD Aletha Singh MD  7/14/2020  2:48 PM  Sign at close encounter  Assessment/Plan:    Nonrheumatic aortic valve insufficiency   Patient has evidence of mild-to-moderate aortic insufficiency  This has been stable  The patient is asymptomatic in this regard  Will continue observation  Essential hypertension    Hypertension, higher than desirable  The patient appears to be somewhat anxious  I will be asking to continue present medication but we will increase metoprolol tartrate to 100 mg in the morning and 50 mg in the evening  Tachycardia    The patient has had tendency for sinus tachycardia  Today she has evidence of sinus tachycardia with heart rate in the vicinity of 104 beats per minute  This could be also aggravated by her chemotherapy including steroid use  Patient feels hyper with this medication  As mentioned above I will be increasing metoprolol to 100 mg in the morning and 50 mg in the        Diagnoses and all orders for this visit:    Essential hypertension    Nonrheumatic aortic valve insufficiency    Tachycardia  -     POCT ECG    Other orders  -     Bortezomib (VELCADE IJ); Inject as directed  -     aspirin (ECOTRIN LOW STRENGTH) 81 mg EC tablet; Take 81 mg by mouth daily          Subjective:   Mild palpitation  Patient ID: Davi Pompa is a 78 y o  female  Patient presented to this office for the purpose of cardiac follow-up  She is known to have a history of hypertension and tendency for sinus tachycardia premature beats  She also a history of multiple myeloma and  She is undergoing chemotherapy at this time  She is being considered for stem cell transplant in the near future  She is aware of some palpitation denies any chest pain or shortness of breath  She has no leg edema  Patient is also known to have mild-to-moderate aortic insufficiency which has been stable  A recent echocardiogram shows normal left ventricular systolic functions      The following portions of the patient's history were reviewed and updated as appropriate: allergies, current medications, past family history, past medical history, past social history, past surgical history and problem list     Review of Systems   Respiratory: Negative for apnea, cough, chest tightness, shortness of breath and wheezing  Cardiovascular: Positive for palpitations  Negative for chest pain and leg swelling  Gastrointestinal: Negative for abdominal pain  Neurological: Negative for dizziness and light-headedness  Hematological: Negative  Psychiatric/Behavioral: The patient is nervous/anxious  Objective:  Stable cardiac-wise but somewhat anxious  /82 (BP Location: Left arm, Patient Position: Sitting, Cuff Size: Adult)   Pulse (!) 118   Ht 5' (1 524 m)   Wt 60 3 kg (133 lb)   BMI 25 97 kg/m²           Physical Exam   Constitutional: She is oriented to person, place, and time  She appears well-developed and well-nourished  No distress  HENT:   Head: Normocephalic and atraumatic  Neck: Normal range of motion  Neck supple  No JVD present  No thyromegaly present  Cardiovascular: Regular rhythm, S1 normal and S2 normal  Tachycardia present  Exam reveals no gallop and no friction rub  Murmur heard  Systolic murmur is present with a grade of 1/6  Pulmonary/Chest: Effort normal  No respiratory distress  She has no wheezes  She has no rales  She exhibits no tenderness  Abdominal: Soft  Musculoskeletal: She exhibits no edema     Neurological: She is alert and oriented to person, place, and time  Skin: Skin is warm and dry  She is not diaphoretic  Psychiatric: She has a normal mood and affect  Vitals reviewed

## 2020-07-14 NOTE — ASSESSMENT & PLAN NOTE
Hypertension, higher than desirable  The patient appears to be somewhat anxious  I will be asking to continue present medication but we will increase metoprolol tartrate to 100 mg in the morning and 50 mg in the evening

## 2020-07-14 NOTE — TELEPHONE ENCOUNTER
Potassium 3 3    I spoke with patient and sent in a prescription potassium 10 mEq twice a day for 7 days

## 2020-07-14 NOTE — PROGRESS NOTES
Assessment/Plan:    Nonrheumatic aortic valve insufficiency   Patient has evidence of mild-to-moderate aortic insufficiency  This has been stable  The patient is asymptomatic in this regard  Will continue observation  Essential hypertension    Hypertension, higher than desirable  The patient appears to be somewhat anxious  I will be asking to continue present medication but we will increase metoprolol tartrate to 100 mg in the morning and 50 mg in the evening  Tachycardia    The patient has had tendency for sinus tachycardia  Today she has evidence of sinus tachycardia with heart rate in the vicinity of 104 beats per minute  This could be also aggravated by her chemotherapy including steroid use  Patient feels hyper with this medication  As mentioned above I will be increasing metoprolol to 100 mg in the morning and 50 mg in the        Diagnoses and all orders for this visit:    Essential hypertension    Nonrheumatic aortic valve insufficiency    Tachycardia  -     POCT ECG    Other orders  -     Bortezomib (VELCADE IJ); Inject as directed  -     aspirin (ECOTRIN LOW STRENGTH) 81 mg EC tablet; Take 81 mg by mouth daily          Subjective:   Mild palpitation  Patient ID: Cyndee Harris is a 78 y o  female  Patient presented to this office for the purpose of cardiac follow-up  She is known to have a history of hypertension and tendency for sinus tachycardia premature beats  She also a history of multiple myeloma and  She is undergoing chemotherapy at this time  She is being considered for stem cell transplant in the near future  She is aware of some palpitation denies any chest pain or shortness of breath  She has no leg edema  Patient is also known to have mild-to-moderate aortic insufficiency which has been stable    A recent echocardiogram shows normal left ventricular systolic functions      The following portions of the patient's history were reviewed and updated as appropriate: allergies, current medications, past family history, past medical history, past social history, past surgical history and problem list     Review of Systems   Respiratory: Negative for apnea, cough, chest tightness, shortness of breath and wheezing  Cardiovascular: Positive for palpitations  Negative for chest pain and leg swelling  Gastrointestinal: Negative for abdominal pain  Neurological: Negative for dizziness and light-headedness  Hematological: Negative  Psychiatric/Behavioral: The patient is nervous/anxious  Objective:  Stable cardiac-wise but somewhat anxious  /82 (BP Location: Left arm, Patient Position: Sitting, Cuff Size: Adult)   Pulse (!) 118   Ht 5' (1 524 m)   Wt 60 3 kg (133 lb)   BMI 25 97 kg/m²          Physical Exam   Constitutional: She is oriented to person, place, and time  She appears well-developed and well-nourished  No distress  HENT:   Head: Normocephalic and atraumatic  Neck: Normal range of motion  Neck supple  No JVD present  No thyromegaly present  Cardiovascular: Regular rhythm, S1 normal and S2 normal  Tachycardia present  Exam reveals no gallop and no friction rub  Murmur heard  Systolic murmur is present with a grade of 1/6  Pulmonary/Chest: Effort normal  No respiratory distress  She has no wheezes  She has no rales  She exhibits no tenderness  Abdominal: Soft  Musculoskeletal: She exhibits no edema  Neurological: She is alert and oriented to person, place, and time  Skin: Skin is warm and dry  She is not diaphoretic  Psychiatric: She has a normal mood and affect  Vitals reviewed

## 2020-07-14 NOTE — ASSESSMENT & PLAN NOTE
Patient has evidence of mild-to-moderate aortic insufficiency  This has been stable  The patient is asymptomatic in this regard  Will continue observation

## 2020-07-14 NOTE — ASSESSMENT & PLAN NOTE
The patient has had tendency for sinus tachycardia  Today she has evidence of sinus tachycardia with heart rate in the vicinity of 104 beats per minute  This could be also aggravated by her chemotherapy including steroid use  Patient feels hyper with this medication    As mentioned above I will be increasing metoprolol to 100 mg in the morning and 50 mg in the

## 2020-07-17 DIAGNOSIS — Z20.828 EXPOSURE TO SARS-ASSOCIATED CORONAVIRUS: ICD-10-CM

## 2020-07-17 PROCEDURE — U0003 INFECTIOUS AGENT DETECTION BY NUCLEIC ACID (DNA OR RNA); SEVERE ACUTE RESPIRATORY SYNDROME CORONAVIRUS 2 (SARS-COV-2) (CORONAVIRUS DISEASE [COVID-19]), AMPLIFIED PROBE TECHNIQUE, MAKING USE OF HIGH THROUGHPUT TECHNOLOGIES AS DESCRIBED BY CMS-2020-01-R: HCPCS

## 2020-07-18 LAB
INPATIENT: NORMAL
SARS-COV-2 RNA SPEC QL NAA+PROBE: NOT DETECTED

## 2020-07-20 ENCOUNTER — APPOINTMENT (OUTPATIENT)
Dept: LAB | Facility: CLINIC | Age: 70
End: 2020-07-20
Payer: MEDICARE

## 2020-07-20 LAB
ALBUMIN SERPL BCP-MCNC: 3.7 G/DL (ref 3.5–5)
ALP SERPL-CCNC: 70 U/L (ref 46–116)
ALT SERPL W P-5'-P-CCNC: 27 U/L (ref 12–78)
ANION GAP SERPL CALCULATED.3IONS-SCNC: 6 MMOL/L (ref 4–13)
AST SERPL W P-5'-P-CCNC: 14 U/L (ref 5–45)
BILIRUB SERPL-MCNC: 0.77 MG/DL (ref 0.2–1)
BUN SERPL-MCNC: 11 MG/DL (ref 5–25)
CALCIUM SERPL-MCNC: 9.6 MG/DL (ref 8.3–10.1)
CHLORIDE SERPL-SCNC: 104 MMOL/L (ref 100–108)
CO2 SERPL-SCNC: 28 MMOL/L (ref 21–32)
CREAT SERPL-MCNC: 0.77 MG/DL (ref 0.6–1.3)
GFR SERPL CREATININE-BSD FRML MDRD: 80 ML/MIN/1.73SQ M
GLUCOSE SERPL-MCNC: 101 MG/DL (ref 65–140)
POTASSIUM SERPL-SCNC: 4 MMOL/L (ref 3.5–5.3)
PROT SERPL-MCNC: 7.1 G/DL (ref 6.4–8.2)
SODIUM SERPL-SCNC: 138 MMOL/L (ref 136–145)

## 2020-07-20 PROCEDURE — 36415 COLL VENOUS BLD VENIPUNCTURE: CPT

## 2020-07-20 PROCEDURE — 80053 COMPREHEN METABOLIC PANEL: CPT

## 2020-07-21 ENCOUNTER — HOSPITAL ENCOUNTER (OUTPATIENT)
Dept: INFUSION CENTER | Facility: CLINIC | Age: 70
Discharge: HOME | End: 2020-07-21
Payer: MEDICARE

## 2020-07-21 VITALS
HEART RATE: 72 BPM | HEIGHT: 60 IN | RESPIRATION RATE: 18 BRPM | BODY MASS INDEX: 25.91 KG/M2 | SYSTOLIC BLOOD PRESSURE: 108 MMHG | DIASTOLIC BLOOD PRESSURE: 64 MMHG | WEIGHT: 132 LBS | OXYGEN SATURATION: 98 %

## 2020-07-21 DIAGNOSIS — C90.00 MULTIPLE MYELOMA NOT HAVING ACHIEVED REMISSION (HCC): Primary | ICD-10-CM

## 2020-07-21 PROCEDURE — 96401 CHEMO ANTI-NEOPL SQ/IM: CPT

## 2020-07-21 RX ADMIN — BORTEZOMIB 2.1 MG: 3.5 INJECTION, POWDER, LYOPHILIZED, FOR SOLUTION INTRAVENOUS; SUBCUTANEOUS at 11:20

## 2020-07-21 NOTE — PROGRESS NOTES
Pt here for Velcade  Injection given in RLQ  Pt tolerated well  Aware of future appts   Declines AVS

## 2020-07-22 ENCOUNTER — HOSPITAL ENCOUNTER (OUTPATIENT)
Dept: PULMONOLOGY | Facility: HOSPITAL | Age: 70
Discharge: HOME | End: 2020-07-22
Payer: MEDICARE

## 2020-07-22 DIAGNOSIS — C90.00 MULTIPLE MYELOMA, REMISSION STATUS UNSPECIFIED (HCC): ICD-10-CM

## 2020-07-22 DIAGNOSIS — C90.00 MULTIPLE MYELOMA NOT HAVING ACHIEVED REMISSION (HCC): ICD-10-CM

## 2020-07-22 PROCEDURE — 94760 N-INVAS EAR/PLS OXIMETRY 1: CPT

## 2020-07-22 PROCEDURE — 94726 PLETHYSMOGRAPHY LUNG VOLUMES: CPT | Performed by: INTERNAL MEDICINE

## 2020-07-22 PROCEDURE — 94729 DIFFUSING CAPACITY: CPT

## 2020-07-22 PROCEDURE — 94726 PLETHYSMOGRAPHY LUNG VOLUMES: CPT

## 2020-07-22 PROCEDURE — 94010 BREATHING CAPACITY TEST: CPT

## 2020-07-22 PROCEDURE — 94729 DIFFUSING CAPACITY: CPT | Performed by: INTERNAL MEDICINE

## 2020-07-22 PROCEDURE — 94010 BREATHING CAPACITY TEST: CPT | Performed by: INTERNAL MEDICINE

## 2020-07-22 RX ORDER — LENALIDOMIDE 15 MG/1
CAPSULE ORAL
Refills: 0 | OUTPATIENT
Start: 2020-07-22

## 2020-07-31 ENCOUNTER — TELEPHONE (OUTPATIENT)
Dept: HEMATOLOGY ONCOLOGY | Facility: CLINIC | Age: 70
End: 2020-07-31

## 2020-07-31 NOTE — TELEPHONE ENCOUNTER
Reviewed with patient after seeing Dr Rafa Garduno on 7/29 at [facility] she will be going for stem cell harvesting on 8/10 and then hopefully transplant on 8/26  Patient reviewed she does not need anything else at this time and will follow up with Dr Sandra Hernandez after her transplant

## 2020-07-31 NOTE — PROGRESS NOTES
Per patient: Cancel infusion for 8/4 [facility]  Reviewed with Geofm Councilman at [facility]   (Patient tx completed for now)

## 2020-08-24 ENCOUNTER — TRANSCRIBE ORDERS (OUTPATIENT)
Dept: LAB | Facility: CLINIC | Age: 70
End: 2020-08-24

## 2020-08-24 ENCOUNTER — APPOINTMENT (OUTPATIENT)
Dept: LAB | Facility: CLINIC | Age: 70
End: 2020-08-24
Payer: MEDICARE

## 2020-08-24 DIAGNOSIS — C90.00 MYELOMA ASSOCIATED AMYLOIDOSIS (HCC): ICD-10-CM

## 2020-08-24 DIAGNOSIS — C90.00 MULTIPLE MYELOMA NOT HAVING ACHIEVED REMISSION (HCC): Primary | ICD-10-CM

## 2020-08-24 DIAGNOSIS — E85.9 MYELOMA ASSOCIATED AMYLOIDOSIS (HCC): ICD-10-CM

## 2020-08-24 LAB
ALBUMIN SERPL BCP-MCNC: 3.8 G/DL (ref 3.5–5)
ALP SERPL-CCNC: 75 U/L (ref 46–116)
ALT SERPL W P-5'-P-CCNC: 19 U/L (ref 12–78)
ANION GAP SERPL CALCULATED.3IONS-SCNC: 5 MMOL/L (ref 4–13)
AST SERPL W P-5'-P-CCNC: 13 U/L (ref 5–45)
BILIRUB SERPL-MCNC: 0.61 MG/DL (ref 0.2–1)
BUN SERPL-MCNC: 16 MG/DL (ref 5–25)
CALCIUM SERPL-MCNC: 8.7 MG/DL (ref 8.3–10.1)
CHLORIDE SERPL-SCNC: 108 MMOL/L (ref 100–108)
CO2 SERPL-SCNC: 27 MMOL/L (ref 21–32)
CREAT SERPL-MCNC: 0.68 MG/DL (ref 0.6–1.3)
GFR SERPL CREATININE-BSD FRML MDRD: 90 ML/MIN/1.73SQ M
GLUCOSE P FAST SERPL-MCNC: 92 MG/DL (ref 65–99)
MAGNESIUM SERPL-MCNC: 2.5 MG/DL (ref 1.6–2.6)
POTASSIUM SERPL-SCNC: 4 MMOL/L (ref 3.5–5.3)
PROT SERPL-MCNC: 7.4 G/DL (ref 6.4–8.2)
SODIUM SERPL-SCNC: 140 MMOL/L (ref 136–145)

## 2020-08-24 PROCEDURE — 80053 COMPREHEN METABOLIC PANEL: CPT

## 2020-08-24 PROCEDURE — 36415 COLL VENOUS BLD VENIPUNCTURE: CPT

## 2020-08-24 PROCEDURE — 83735 ASSAY OF MAGNESIUM: CPT

## 2020-09-17 ENCOUNTER — APPOINTMENT (OUTPATIENT)
Dept: LAB | Facility: CLINIC | Age: 70
End: 2020-09-17
Payer: MEDICARE

## 2020-09-17 LAB
ALBUMIN SERPL BCP-MCNC: 3.1 G/DL (ref 3.5–5)
ALP SERPL-CCNC: 605 U/L (ref 46–116)
ALT SERPL W P-5'-P-CCNC: 118 U/L (ref 12–78)
ANION GAP SERPL CALCULATED.3IONS-SCNC: 11 MMOL/L (ref 4–13)
AST SERPL W P-5'-P-CCNC: 56 U/L (ref 5–45)
BASOPHILS # BLD AUTO: 0.01 THOUSANDS/ΜL (ref 0–0.1)
BASOPHILS NFR BLD AUTO: 0 % (ref 0–1)
BILIRUB DIRECT SERPL-MCNC: 0.19 MG/DL (ref 0–0.2)
BILIRUB SERPL-MCNC: 0.36 MG/DL (ref 0.2–1)
BUN SERPL-MCNC: 12 MG/DL (ref 5–25)
CALCIUM ALBUM COR SERPL-MCNC: 9.6 MG/DL (ref 8.3–10.1)
CALCIUM SERPL-MCNC: 8.9 MG/DL (ref 8.3–10.1)
CHLORIDE SERPL-SCNC: 103 MMOL/L (ref 100–108)
CO2 SERPL-SCNC: 25 MMOL/L (ref 21–32)
CREAT SERPL-MCNC: 0.58 MG/DL (ref 0.6–1.3)
EOSINOPHIL # BLD AUTO: 0 THOUSAND/ΜL (ref 0–0.61)
EOSINOPHIL NFR BLD AUTO: 0 % (ref 0–6)
ERYTHROCYTE [DISTWIDTH] IN BLOOD BY AUTOMATED COUNT: 15.4 % (ref 11.6–15.1)
GFR SERPL CREATININE-BSD FRML MDRD: 95 ML/MIN/1.73SQ M
GLUCOSE SERPL-MCNC: 94 MG/DL (ref 65–140)
HCT VFR BLD AUTO: 28.1 % (ref 34.8–46.1)
HGB BLD-MCNC: 9.2 G/DL (ref 11.5–15.4)
IMM GRANULOCYTES # BLD AUTO: 0.07 THOUSAND/UL (ref 0–0.2)
IMM GRANULOCYTES NFR BLD AUTO: 2 % (ref 0–2)
LYMPHOCYTES # BLD AUTO: 1.03 THOUSANDS/ΜL (ref 0.6–4.47)
LYMPHOCYTES NFR BLD AUTO: 27 % (ref 14–44)
MAGNESIUM SERPL-MCNC: 2.4 MG/DL (ref 1.6–2.6)
MCH RBC QN AUTO: 31.1 PG (ref 26.8–34.3)
MCHC RBC AUTO-ENTMCNC: 32.7 G/DL (ref 31.4–37.4)
MCV RBC AUTO: 95 FL (ref 82–98)
MONOCYTES # BLD AUTO: 0.9 THOUSAND/ΜL (ref 0.17–1.22)
MONOCYTES NFR BLD AUTO: 24 % (ref 4–12)
NEUTROPHILS # BLD AUTO: 1.75 THOUSANDS/ΜL (ref 1.85–7.62)
NEUTS SEG NFR BLD AUTO: 47 % (ref 43–75)
NRBC BLD AUTO-RTO: 0 /100 WBCS
PLATELET # BLD AUTO: 279 THOUSANDS/UL (ref 149–390)
PMV BLD AUTO: 13.7 FL (ref 8.9–12.7)
POTASSIUM SERPL-SCNC: 3 MMOL/L (ref 3.5–5.3)
PROT SERPL-MCNC: 7.1 G/DL (ref 6.4–8.2)
RBC # BLD AUTO: 2.96 MILLION/UL (ref 3.81–5.12)
SODIUM SERPL-SCNC: 139 MMOL/L (ref 136–145)
WBC # BLD AUTO: 3.76 THOUSAND/UL (ref 4.31–10.16)

## 2020-09-17 PROCEDURE — 83735 ASSAY OF MAGNESIUM: CPT

## 2020-09-17 PROCEDURE — 36415 COLL VENOUS BLD VENIPUNCTURE: CPT

## 2020-09-17 PROCEDURE — 82248 BILIRUBIN DIRECT: CPT

## 2020-09-17 PROCEDURE — 85025 COMPLETE CBC W/AUTO DIFF WBC: CPT

## 2020-09-17 PROCEDURE — 80053 COMPREHEN METABOLIC PANEL: CPT

## 2020-09-21 ENCOUNTER — APPOINTMENT (OUTPATIENT)
Dept: LAB | Facility: CLINIC | Age: 70
End: 2020-09-21
Payer: MEDICARE

## 2020-09-29 DIAGNOSIS — F41.9 ANXIETY: ICD-10-CM

## 2020-09-29 RX ORDER — ESCITALOPRAM OXALATE 10 MG/1
TABLET ORAL
Qty: 90 TABLET | Refills: 3 | Status: SHIPPED | OUTPATIENT
Start: 2020-09-29 | End: 2021-09-05

## 2020-11-09 ENCOUNTER — TELEPHONE (OUTPATIENT)
Dept: HEMATOLOGY ONCOLOGY | Facility: MEDICAL CENTER | Age: 70
End: 2020-11-09

## 2020-11-10 ENCOUNTER — DOCUMENTATION (OUTPATIENT)
Dept: HEMATOLOGY ONCOLOGY | Facility: CLINIC | Age: 70
End: 2020-11-10

## 2020-11-24 ENCOUNTER — OFFICE VISIT (OUTPATIENT)
Dept: INTERNAL MEDICINE CLINIC | Facility: CLINIC | Age: 70
End: 2020-11-24
Payer: MEDICARE

## 2020-11-24 VITALS
SYSTOLIC BLOOD PRESSURE: 134 MMHG | WEIGHT: 134 LBS | OXYGEN SATURATION: 98 % | HEIGHT: 60 IN | HEART RATE: 84 BPM | DIASTOLIC BLOOD PRESSURE: 70 MMHG | BODY MASS INDEX: 26.31 KG/M2

## 2020-11-24 DIAGNOSIS — I10 ESSENTIAL HYPERTENSION: ICD-10-CM

## 2020-11-24 DIAGNOSIS — Z12.11 SCREEN FOR COLON CANCER: ICD-10-CM

## 2020-11-24 DIAGNOSIS — Z23 NEED FOR VACCINATION: ICD-10-CM

## 2020-11-24 DIAGNOSIS — C90.00 MULTIPLE MYELOMA NOT HAVING ACHIEVED REMISSION (HCC): ICD-10-CM

## 2020-11-24 DIAGNOSIS — Z00.00 MEDICARE ANNUAL WELLNESS VISIT, SUBSEQUENT: Primary | ICD-10-CM

## 2020-11-24 PROCEDURE — G0439 PPPS, SUBSEQ VISIT: HCPCS | Performed by: INTERNAL MEDICINE

## 2020-11-25 PROCEDURE — 90662 IIV NO PRSV INCREASED AG IM: CPT

## 2020-11-25 PROCEDURE — G0008 ADMIN INFLUENZA VIRUS VAC: HCPCS

## 2020-12-07 DIAGNOSIS — C90.00 MULTIPLE MYELOMA NOT HAVING ACHIEVED REMISSION (HCC): Primary | ICD-10-CM

## 2020-12-10 ENCOUNTER — LAB (OUTPATIENT)
Dept: LAB | Facility: CLINIC | Age: 70
End: 2020-12-10
Payer: MEDICARE

## 2020-12-10 DIAGNOSIS — I10 ESSENTIAL HYPERTENSION: ICD-10-CM

## 2020-12-10 LAB
CHOLEST SERPL-MCNC: 215 MG/DL (ref 50–200)
HDLC SERPL-MCNC: 39 MG/DL
LDLC SERPL CALC-MCNC: 128 MG/DL (ref 0–100)
TRIGL SERPL-MCNC: 239 MG/DL

## 2020-12-10 PROCEDURE — 36415 COLL VENOUS BLD VENIPUNCTURE: CPT

## 2020-12-10 PROCEDURE — 80061 LIPID PANEL: CPT

## 2020-12-15 ENCOUNTER — TELEPHONE (OUTPATIENT)
Dept: HEMATOLOGY ONCOLOGY | Facility: CLINIC | Age: 70
End: 2020-12-15

## 2020-12-18 ENCOUNTER — OFFICE VISIT (OUTPATIENT)
Dept: HEMATOLOGY ONCOLOGY | Facility: CLINIC | Age: 70
End: 2020-12-18
Payer: MEDICARE

## 2020-12-18 ENCOUNTER — TELEPHONE (OUTPATIENT)
Dept: HEMATOLOGY ONCOLOGY | Facility: CLINIC | Age: 70
End: 2020-12-18

## 2020-12-18 VITALS
DIASTOLIC BLOOD PRESSURE: 90 MMHG | HEART RATE: 88 BPM | HEIGHT: 60 IN | RESPIRATION RATE: 18 BRPM | OXYGEN SATURATION: 100 % | WEIGHT: 131.5 LBS | TEMPERATURE: 97.1 F | BODY MASS INDEX: 25.82 KG/M2 | SYSTOLIC BLOOD PRESSURE: 136 MMHG

## 2020-12-18 DIAGNOSIS — Z92.89 H/O SCREENING MAMMOGRAPHY: ICD-10-CM

## 2020-12-18 DIAGNOSIS — C90.01 MULTIPLE MYELOMA IN REMISSION (HCC): Primary | ICD-10-CM

## 2020-12-18 DIAGNOSIS — Z85.3 HISTORY OF BREAST CANCER: ICD-10-CM

## 2020-12-18 DIAGNOSIS — M81.8 OSTEOPOROSIS OF MULTIPLE SITES ASSOCIATED WITH MULTIPLE MYELOMA (HCC): ICD-10-CM

## 2020-12-18 DIAGNOSIS — C90.00 OSTEOPOROSIS OF MULTIPLE SITES ASSOCIATED WITH MULTIPLE MYELOMA (HCC): ICD-10-CM

## 2020-12-18 PROCEDURE — 99214 OFFICE O/P EST MOD 30 MIN: CPT | Performed by: INTERNAL MEDICINE

## 2020-12-21 ENCOUNTER — DOCUMENTATION (OUTPATIENT)
Dept: HEMATOLOGY ONCOLOGY | Facility: CLINIC | Age: 70
End: 2020-12-21

## 2020-12-21 DIAGNOSIS — C90.01 MULTIPLE MYELOMA IN REMISSION (HCC): Primary | ICD-10-CM

## 2020-12-21 RX ORDER — LENALIDOMIDE 10 MG/1
10 CAPSULE ORAL DAILY
Qty: 28 CAPSULE | Refills: 0 | Status: SHIPPED | OUTPATIENT
Start: 2020-12-21 | End: 2021-01-18 | Stop reason: SDUPTHER

## 2020-12-23 ENCOUNTER — TELEPHONE (OUTPATIENT)
Dept: HEMATOLOGY ONCOLOGY | Facility: CLINIC | Age: 70
End: 2020-12-23

## 2020-12-30 DIAGNOSIS — C90.01 MULTIPLE MYELOMA IN REMISSION (HCC): Primary | ICD-10-CM

## 2020-12-30 RX ORDER — SODIUM CHLORIDE 9 MG/ML
20 INJECTION, SOLUTION INTRAVENOUS ONCE
Status: CANCELLED | OUTPATIENT
Start: 2021-01-05

## 2020-12-31 ENCOUNTER — LAB (OUTPATIENT)
Dept: LAB | Facility: CLINIC | Age: 70
End: 2020-12-31
Payer: MEDICARE

## 2020-12-31 ENCOUNTER — TRANSCRIBE ORDERS (OUTPATIENT)
Dept: LAB | Facility: CLINIC | Age: 70
End: 2020-12-31

## 2020-12-31 LAB
ALBUMIN SERPL BCP-MCNC: 4 G/DL (ref 3.5–5)
ALP SERPL-CCNC: 85 U/L (ref 46–116)
ALT SERPL W P-5'-P-CCNC: 17 U/L (ref 12–78)
ANION GAP SERPL CALCULATED.3IONS-SCNC: 6 MMOL/L (ref 4–13)
AST SERPL W P-5'-P-CCNC: 20 U/L (ref 5–45)
BASOPHILS # BLD AUTO: 0.01 THOUSANDS/ΜL (ref 0–0.1)
BASOPHILS NFR BLD AUTO: 0 % (ref 0–1)
BILIRUB SERPL-MCNC: 0.56 MG/DL (ref 0.2–1)
BUN SERPL-MCNC: 11 MG/DL (ref 5–25)
CALCIUM SERPL-MCNC: 9.5 MG/DL (ref 8.3–10.1)
CHLORIDE SERPL-SCNC: 108 MMOL/L (ref 100–108)
CO2 SERPL-SCNC: 26 MMOL/L (ref 21–32)
CREAT SERPL-MCNC: 0.61 MG/DL (ref 0.6–1.3)
EOSINOPHIL # BLD AUTO: 0.07 THOUSAND/ΜL (ref 0–0.61)
EOSINOPHIL NFR BLD AUTO: 2 % (ref 0–6)
ERYTHROCYTE [DISTWIDTH] IN BLOOD BY AUTOMATED COUNT: 14 % (ref 11.6–15.1)
GFR SERPL CREATININE-BSD FRML MDRD: 93 ML/MIN/1.73SQ M
GLUCOSE P FAST SERPL-MCNC: 89 MG/DL (ref 65–99)
HCT VFR BLD AUTO: 36.3 % (ref 34.8–46.1)
HGB BLD-MCNC: 12.4 G/DL (ref 11.5–15.4)
IGA SERPL-MCNC: 14 MG/DL (ref 70–400)
IGG SERPL-MCNC: 718 MG/DL (ref 700–1600)
IGM SERPL-MCNC: 33 MG/DL (ref 40–230)
IMM GRANULOCYTES # BLD AUTO: 0 THOUSAND/UL (ref 0–0.2)
IMM GRANULOCYTES NFR BLD AUTO: 0 % (ref 0–2)
LDH SERPL-CCNC: 108 U/L (ref 81–234)
LYMPHOCYTES # BLD AUTO: 1.03 THOUSANDS/ΜL (ref 0.6–4.47)
LYMPHOCYTES NFR BLD AUTO: 33 % (ref 14–44)
MCH RBC QN AUTO: 30.5 PG (ref 26.8–34.3)
MCHC RBC AUTO-ENTMCNC: 34.2 G/DL (ref 31.4–37.4)
MCV RBC AUTO: 89 FL (ref 82–98)
MONOCYTES # BLD AUTO: 0.28 THOUSAND/ΜL (ref 0.17–1.22)
MONOCYTES NFR BLD AUTO: 9 % (ref 4–12)
NEUTROPHILS # BLD AUTO: 1.74 THOUSANDS/ΜL (ref 1.85–7.62)
NEUTS SEG NFR BLD AUTO: 56 % (ref 43–75)
NRBC BLD AUTO-RTO: 0 /100 WBCS
PLATELET # BLD AUTO: 251 THOUSANDS/UL (ref 149–390)
PMV BLD AUTO: 9.4 FL (ref 8.9–12.7)
POTASSIUM SERPL-SCNC: 3.9 MMOL/L (ref 3.5–5.3)
PROT SERPL-MCNC: 6.9 G/DL (ref 6.4–8.2)
RBC # BLD AUTO: 4.07 MILLION/UL (ref 3.81–5.12)
SODIUM SERPL-SCNC: 140 MMOL/L (ref 136–145)
WBC # BLD AUTO: 3.13 THOUSAND/UL (ref 4.31–10.16)

## 2020-12-31 PROCEDURE — 86334 IMMUNOFIX E-PHORESIS SERUM: CPT | Performed by: INTERNAL MEDICINE

## 2020-12-31 PROCEDURE — 84165 PROTEIN E-PHORESIS SERUM: CPT | Performed by: PATHOLOGY

## 2020-12-31 PROCEDURE — 86334 IMMUNOFIX E-PHORESIS SERUM: CPT | Performed by: PATHOLOGY

## 2020-12-31 PROCEDURE — 36415 COLL VENOUS BLD VENIPUNCTURE: CPT | Performed by: INTERNAL MEDICINE

## 2020-12-31 PROCEDURE — 83615 LACTATE (LD) (LDH) ENZYME: CPT | Performed by: INTERNAL MEDICINE

## 2020-12-31 PROCEDURE — 80053 COMPREHEN METABOLIC PANEL: CPT | Performed by: INTERNAL MEDICINE

## 2020-12-31 PROCEDURE — 85025 COMPLETE CBC W/AUTO DIFF WBC: CPT | Performed by: INTERNAL MEDICINE

## 2020-12-31 PROCEDURE — 82784 ASSAY IGA/IGD/IGG/IGM EACH: CPT | Performed by: INTERNAL MEDICINE

## 2020-12-31 PROCEDURE — 82232 ASSAY OF BETA-2 PROTEIN: CPT | Performed by: INTERNAL MEDICINE

## 2020-12-31 PROCEDURE — 83883 ASSAY NEPHELOMETRY NOT SPEC: CPT | Performed by: INTERNAL MEDICINE

## 2020-12-31 PROCEDURE — 84165 PROTEIN E-PHORESIS SERUM: CPT | Performed by: INTERNAL MEDICINE

## 2021-01-03 LAB — B2 MICROGLOB SERPL-MCNC: 1.7 MG/L (ref 0.6–2.4)

## 2021-01-04 LAB
ALBUMIN SERPL ELPH-MCNC: 4.39 G/DL (ref 3.5–5)
ALBUMIN SERPL ELPH-MCNC: 66.5 % (ref 52–65)
ALPHA1 GLOB SERPL ELPH-MCNC: 0.28 G/DL (ref 0.1–0.4)
ALPHA1 GLOB SERPL ELPH-MCNC: 4.3 % (ref 2.5–5)
ALPHA2 GLOB SERPL ELPH-MCNC: 0.68 G/DL (ref 0.4–1.2)
ALPHA2 GLOB SERPL ELPH-MCNC: 10.3 % (ref 7–13)
BETA GLOB ABNORMAL SERPL ELPH-MCNC: 0.37 G/DL (ref 0.4–0.8)
BETA1 GLOB SERPL ELPH-MCNC: 5.6 % (ref 5–13)
BETA2 GLOB SERPL ELPH-MCNC: 2.8 % (ref 2–8)
BETA2+GAMMA GLOB SERPL ELPH-MCNC: 0.18 G/DL (ref 0.2–0.5)
GAMMA GLOB ABNORMAL SERPL ELPH-MCNC: 0.69 G/DL (ref 0.5–1.6)
GAMMA GLOB SERPL ELPH-MCNC: 10.5 % (ref 12–22)
IGG/ALB SER: 1.99 {RATIO} (ref 1.1–1.8)
INTERPRETATION UR IFE-IMP: NORMAL
PROT PATTERN SERPL ELPH-IMP: ABNORMAL
PROT SERPL-MCNC: 6.6 G/DL (ref 6.4–8.2)

## 2021-01-05 ENCOUNTER — HOSPITAL ENCOUNTER (OUTPATIENT)
Dept: INFUSION CENTER | Facility: CLINIC | Age: 71
Discharge: HOME | End: 2021-01-05
Payer: MEDICARE

## 2021-01-05 VITALS
HEIGHT: 60 IN | SYSTOLIC BLOOD PRESSURE: 108 MMHG | HEART RATE: 81 BPM | TEMPERATURE: 96.7 F | OXYGEN SATURATION: 98 % | WEIGHT: 131 LBS | RESPIRATION RATE: 16 BRPM | DIASTOLIC BLOOD PRESSURE: 76 MMHG | BODY MASS INDEX: 25.72 KG/M2

## 2021-01-05 DIAGNOSIS — C90.01 MULTIPLE MYELOMA IN REMISSION (HCC): Primary | ICD-10-CM

## 2021-01-05 LAB
KAPPA LC FREE SER-MCNC: 7.7 MG/L (ref 3.3–19.4)
KAPPA LC FREE/LAMBDA FREE SER: 0.19 {RATIO} (ref 0.26–1.65)
LAMBDA LC FREE SERPL-MCNC: 39.7 MG/L (ref 5.7–26.3)

## 2021-01-05 PROCEDURE — 96365 THER/PROPH/DIAG IV INF INIT: CPT

## 2021-01-05 RX ORDER — SODIUM CHLORIDE 9 MG/ML
20 INJECTION, SOLUTION INTRAVENOUS ONCE
Status: CANCELLED | OUTPATIENT
Start: 2021-03-30

## 2021-01-05 RX ORDER — SODIUM CHLORIDE 9 MG/ML
20 INJECTION, SOLUTION INTRAVENOUS ONCE
Status: COMPLETED | OUTPATIENT
Start: 2021-01-05 | End: 2021-01-05

## 2021-01-05 RX ADMIN — ZOLEDRONIC ACID: 0.04 INJECTION, SOLUTION INTRAVENOUS at 09:10

## 2021-01-05 RX ADMIN — SODIUM CHLORIDE 20 ML/HR: 0.9 INJECTION, SOLUTION INTRAVENOUS at 08:30

## 2021-01-05 NOTE — PROGRESS NOTES
Patient to Nadine for Zometa: Offers no complaints at present time: Lab work ( 12/21/20 ) reviewed: Calcium - 9 5: Within parameters to treat: Left FA PIV initiated without incident

## 2021-01-11 ENCOUNTER — TELEPHONE (OUTPATIENT)
Dept: SURGICAL ONCOLOGY | Facility: CLINIC | Age: 71
End: 2021-01-11

## 2021-01-11 NOTE — TELEPHONE ENCOUNTER
daMedication Refill     Who is Calling  [facility]   Medication  REVLIMID   10 MG   How many pills left     Preferred [address] [facility]   Call back number  [phone]   Relevant Information

## 2021-01-12 ENCOUNTER — OFFICE VISIT (OUTPATIENT)
Dept: CARDIOLOGY CLINIC | Facility: CLINIC | Age: 71
End: 2021-01-12
Payer: MEDICARE

## 2021-01-12 VITALS
HEART RATE: 78 BPM | DIASTOLIC BLOOD PRESSURE: 72 MMHG | WEIGHT: 131 LBS | OXYGEN SATURATION: 98 % | BODY MASS INDEX: 25.72 KG/M2 | HEIGHT: 60 IN | SYSTOLIC BLOOD PRESSURE: 118 MMHG

## 2021-01-12 DIAGNOSIS — E78.2 MIXED HYPERLIPIDEMIA: ICD-10-CM

## 2021-01-12 DIAGNOSIS — I10 ESSENTIAL HYPERTENSION: Primary | ICD-10-CM

## 2021-01-12 DIAGNOSIS — I35.1 NONRHEUMATIC AORTIC VALVE INSUFFICIENCY: ICD-10-CM

## 2021-01-12 PROCEDURE — 99214 OFFICE O/P EST MOD 30 MIN: CPT | Performed by: INTERNAL MEDICINE

## 2021-01-12 NOTE — LETTER
January 12, 2021     Melissa Medellin, [address] [address]    Patient: Loli Sun   YOB: 1950   Date of Visit: 1/12/2021       Dear Dr Rah Mahan: Thank you for referring Nahomi Sykes to me for evaluation  Below are my notes for this consultation  If you have questions, please do not hesitate to call me  I look forward to following your patient along with you  Sincerely,        Alyce Vera MD        CC: No Recipients  Alyce Vera MD  1/12/2021  1:35 PM  Sign when Signing Visit  Assessment/Plan:    Mixed hyperlipidemia  Hyperlipidemia, less than optimal   The patient would like to continue dietary modification for now we will reassess the situation later  Nonrheumatic aortic valve insufficiency  Mild aortic insufficiency, stable  We will continue observation  Essential hypertension  Hypertension, stable and adequately controlled  Diagnoses and all orders for this visit:    Essential hypertension    Nonrheumatic aortic valve insufficiency    Mixed hyperlipidemia          Subjective:  Feels well  Patient ID: Loli Sun is a 78 y o  female  Patient presented to this office for the purpose of cardiac follow-up  She is known to have a history hypertension and hyperlipidemia as well as anxiety disorder  The patient is feeling well from the cardiac standpoint denying any symptoms of chest pain, shortness of breath palpitation, dizziness or lightheadedness  She has no leg edema  The following portions of the patient's history were reviewed and updated as appropriate: allergies, current medications, past family history, past medical history, past social history, past surgical history and problem list     Review of Systems   Respiratory: Negative for apnea, cough, chest tightness, shortness of breath and wheezing  Cardiovascular: Negative for chest pain, palpitations and leg swelling  Gastrointestinal: Negative for abdominal pain  Neurological: Negative for dizziness and light-headedness  Psychiatric/Behavioral: Negative  Objective:  Stable cardiac-wise  /72 (BP Location: Left arm, Patient Position: Sitting, Cuff Size: Standard)   Pulse 78   Ht 5' (1 524 m)   Wt 59 4 kg (131 lb)   LMP  (LMP Unknown)   SpO2 98%   BMI 25 58 kg/m²          Physical Exam  Vitals signs reviewed  Constitutional:       General: She is not in acute distress  Appearance: She is well-developed  She is not diaphoretic  HENT:      Head: Normocephalic and atraumatic  Neck:      Musculoskeletal: Normal range of motion and neck supple  Thyroid: No thyromegaly  Vascular: No JVD  Cardiovascular:      Rate and Rhythm: Normal rate and regular rhythm  Heart sounds: S1 normal and S2 normal  Murmur present  Systolic murmur present with a grade of 1/6  No friction rub  No gallop  Pulmonary:      Effort: Pulmonary effort is normal  No respiratory distress  Breath sounds: No wheezing or rales  Chest:      Chest wall: No tenderness  Abdominal:      Palpations: Abdomen is soft  Musculoskeletal:         General: No swelling  Skin:     General: Skin is warm and dry  Neurological:      Mental Status: She is oriented to person, place, and time     Psychiatric:         Mood and Affect: Mood normal          Behavior: Behavior normal

## 2021-01-12 NOTE — PROGRESS NOTES
Assessment/Plan:    Mixed hyperlipidemia  Hyperlipidemia, less than optimal   The patient would like to continue dietary modification for now we will reassess the situation later  Nonrheumatic aortic valve insufficiency  Mild aortic insufficiency, stable  We will continue observation  Essential hypertension  Hypertension, stable and adequately controlled  Diagnoses and all orders for this visit:    Essential hypertension    Nonrheumatic aortic valve insufficiency    Mixed hyperlipidemia          Subjective:  Feels well  Patient ID: Jewell Lawrence is a 78 y o  female  Patient presented to this office for the purpose of cardiac follow-up  She is known to have a history hypertension and hyperlipidemia as well as anxiety disorder  The patient is feeling well from the cardiac standpoint denying any symptoms of chest pain, shortness of breath palpitation, dizziness or lightheadedness  She has no leg edema  The following portions of the patient's history were reviewed and updated as appropriate: allergies, current medications, past family history, past medical history, past social history, past surgical history and problem list     Review of Systems   Respiratory: Negative for apnea, cough, chest tightness, shortness of breath and wheezing  Cardiovascular: Negative for chest pain, palpitations and leg swelling  Gastrointestinal: Negative for abdominal pain  Neurological: Negative for dizziness and light-headedness  Psychiatric/Behavioral: Negative  Objective:  Stable cardiac-wise  /72 (BP Location: Left arm, Patient Position: Sitting, Cuff Size: Standard)   Pulse 78   Ht 5' (1 524 m)   Wt 59 4 kg (131 lb)   LMP  (LMP Unknown)   SpO2 98%   BMI 25 58 kg/m²          Physical Exam  Vitals signs reviewed  Constitutional:       General: She is not in acute distress  Appearance: She is well-developed  She is not diaphoretic     HENT:      Head: Normocephalic and atraumatic  Neck:      Musculoskeletal: Normal range of motion and neck supple  Thyroid: No thyromegaly  Vascular: No JVD  Cardiovascular:      Rate and Rhythm: Normal rate and regular rhythm  Heart sounds: S1 normal and S2 normal  Murmur present  Systolic murmur present with a grade of 1/6  No friction rub  No gallop  Pulmonary:      Effort: Pulmonary effort is normal  No respiratory distress  Breath sounds: No wheezing or rales  Chest:      Chest wall: No tenderness  Abdominal:      Palpations: Abdomen is soft  Musculoskeletal:         General: No swelling  Skin:     General: Skin is warm and dry  Neurological:      Mental Status: She is oriented to person, place, and time     Psychiatric:         Mood and Affect: Mood normal          Behavior: Behavior normal

## 2021-01-12 NOTE — ASSESSMENT & PLAN NOTE
Hyperlipidemia, less than optimal   The patient would like to continue dietary modification for now we will reassess the situation later

## 2021-01-12 NOTE — TELEPHONE ENCOUNTER
The refill request is too early  Patient should still have medication left  I printed this to recheck when the survey becomes available

## 2021-01-13 ENCOUNTER — TELEPHONE (OUTPATIENT)
Dept: HEMATOLOGY ONCOLOGY | Facility: CLINIC | Age: 71
End: 2021-01-13

## 2021-01-14 NOTE — TELEPHONE ENCOUNTER
[facility] pharmacy called requesting a refill for Revlimid, and was given the information noted below, pharmacist will notify the patient

## 2021-01-18 DIAGNOSIS — C90.01 MULTIPLE MYELOMA IN REMISSION (HCC): ICD-10-CM

## 2021-01-18 RX ORDER — LENALIDOMIDE 10 MG/1
10 CAPSULE ORAL DAILY
Qty: 28 CAPSULE | Refills: 0 | Status: SHIPPED | OUTPATIENT
Start: 2021-01-18 | End: 2021-02-18 | Stop reason: SDUPTHER

## 2021-02-04 ENCOUNTER — LAB (OUTPATIENT)
Dept: LAB | Facility: CLINIC | Age: 71
End: 2021-02-04
Payer: MEDICARE

## 2021-02-04 ENCOUNTER — TRANSCRIBE ORDERS (OUTPATIENT)
Dept: LAB | Facility: CLINIC | Age: 71
End: 2021-02-04

## 2021-02-04 PROCEDURE — 84165 PROTEIN E-PHORESIS SERUM: CPT | Performed by: PATHOLOGY

## 2021-02-05 ENCOUNTER — TELEPHONE (OUTPATIENT)
Dept: HEMATOLOGY ONCOLOGY | Facility: CLINIC | Age: 71
End: 2021-02-05

## 2021-02-05 NOTE — TELEPHONE ENCOUNTER
Spoke with patient  Informed her that her potassium was slightly low  Patient reported no vomiting or diarrhea and that she is eating and drinking normally  Instructed patient to eat a banana and/or baked potato a few days a week  Patient verbalized understanding and appreciated the call

## 2021-02-05 NOTE — TELEPHONE ENCOUNTER
Potassium is slightly low  Please call patient and confirm she is eating/drinking normally; not having vomiting or diarrhea  I do not see she is taking any potassium supplement  She does not need to  She should eat banana few times a week or baked potato or both

## 2021-02-13 DIAGNOSIS — Z23 ENCOUNTER FOR IMMUNIZATION: ICD-10-CM

## 2021-02-18 ENCOUNTER — TELEPHONE (OUTPATIENT)
Dept: HEMATOLOGY ONCOLOGY | Facility: CLINIC | Age: 71
End: 2021-02-18

## 2021-02-18 DIAGNOSIS — C90.01 MULTIPLE MYELOMA IN REMISSION (HCC): ICD-10-CM

## 2021-02-18 RX ORDER — LENALIDOMIDE 10 MG/1
10 CAPSULE ORAL DAILY
Qty: 28 CAPSULE | Refills: 0 | Status: SHIPPED | OUTPATIENT
Start: 2021-02-18 | End: 2021-03-18 | Stop reason: SDUPTHER

## 2021-02-19 ENCOUNTER — TELEPHONE (OUTPATIENT)
Dept: HEMATOLOGY ONCOLOGY | Facility: CLINIC | Age: 71
End: 2021-02-19

## 2021-02-19 ENCOUNTER — OFFICE VISIT (OUTPATIENT)
Dept: HEMATOLOGY ONCOLOGY | Facility: CLINIC | Age: 71
End: 2021-02-19
Payer: MEDICARE

## 2021-02-19 VITALS
HEART RATE: 68 BPM | BODY MASS INDEX: 25.91 KG/M2 | TEMPERATURE: 96.9 F | RESPIRATION RATE: 18 BRPM | DIASTOLIC BLOOD PRESSURE: 82 MMHG | WEIGHT: 132 LBS | OXYGEN SATURATION: 99 % | HEIGHT: 60 IN | SYSTOLIC BLOOD PRESSURE: 142 MMHG

## 2021-02-19 DIAGNOSIS — C90.31 PLASMACYTOMA IN REMISSION, UNSPECIFIED PLASMACYTOMA TYPE (HCC): ICD-10-CM

## 2021-02-19 DIAGNOSIS — C50.911 MALIGNANT NEOPLASM OF RIGHT BREAST IN FEMALE, ESTROGEN RECEPTOR POSITIVE, UNSPECIFIED SITE OF BREAST (HCC): Primary | ICD-10-CM

## 2021-02-19 DIAGNOSIS — C90.01 MULTIPLE MYELOMA IN REMISSION (HCC): ICD-10-CM

## 2021-02-19 DIAGNOSIS — Z17.0 MALIGNANT NEOPLASM OF RIGHT BREAST IN FEMALE, ESTROGEN RECEPTOR POSITIVE, UNSPECIFIED SITE OF BREAST (HCC): Primary | ICD-10-CM

## 2021-02-19 PROCEDURE — 99214 OFFICE O/P EST MOD 30 MIN: CPT | Performed by: PHYSICIAN ASSISTANT

## 2021-02-19 NOTE — PROGRESS NOTES
Hematology/Oncology Outpatient Follow-up  Trena Enamorado 78 y o  female 1950 [id number]    Date:  2/19/2021      Assessment and Plan:    1  Malignant neoplasm of right breast in female, estrogen receptor positive, unspecified site of breast ([email or web address] )  History of breast cancer  She remains on observation  She requests a Mastectomy bra prescription     - Mastectomy bra without prosthesis    2  Plasmacytoma in remission, unspecified plasmacytoma type ([email or web address] ), 3  Multiple myeloma in remission ([email or web address] )  History of plasmacytoma as well as IgG lambda multiple myeloma status post bone marrow transplant in August 2020  She continues to have surveillance multiple myeloma labs monthly  She has upcoming follow-up next month with transplant physician at [facility]  SPEP shows no bands  Lambda free light chain was slightly increased compared to December  This was 64 5, previously 39 7  Her kappa free light chain also increased to 21 2, previously 7 7 however ratio remains normal   She will be having repeat labs in the beginning of March 2021 at [facility]  I have asked her to contact us if her [facility] physician changes anything based on that blood work  At this time will plan to follow-up in April 2021  She and her  will be spending a lot of time this summer at their home in [place]  Reviewed that virtual visit could be conducted at this time if needed  Additionally follow-up visits and Zometa appointments can be coordinated  HPI:  Oncology History   Malignant neoplasm of right breast ([email or web address] )   9/2000 Surgery    Right breast mastectomy     8/13/2012 Initial Diagnosis    Malignant neoplasm of right breast [facility])      Chemotherapy    Tamoxifen for 5 years     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[email or web address] )   12/18/2018 Initial Diagnosis    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[email or web address] )     12/18/2018 Biopsy    Final Diagnosis   A   Bone, L-4, core biopsy:  - Fragments of trabecular bone with changes compatible with prior fracture site  - 10% lambda predominant plasmacytosis  See note  - Hematopoietic marrow with trilineage hematopoiesis  - No epithelial elements identified, confirmed with negative keratin AE1/AE3 stains             2/1/2019 - 3/7/2019 Radiation    Plan ID Energy Fractions Dose per Fraction (cGy) Dose Correction (cGy) Total Dose Delivered (cGy) Elapsed Days   L3_L5 Spine 10X/6X 25 / 25 180 0 4,500 34          [facility]   7/23/2019 Initial Diagnosis    [facility] myel[facility] not having achieved [facility]     3/16/2020 - 8/3/2020 Chemotherapy    bortezomib (VELCADE) subcutaneous injection 2 1 mg, 1 3 mg/m2 = 2 1 mg, Subcutaneous, Once, 3 of 4 cycles  Administration: 2 1 mg (3/16/2020), 2 1 mg (4/6/2020), 2 1 mg (3/23/2020), 2 1 mg (3/30/2020), 2 1 mg (4/20/2020), 2 1 mg (5/11/2020), 2 1 mg (4/27/2020), 2 1 mg (5/4/2020), 2 1 mg (6/30/2020), 2 1 mg (7/21/2020), 2 1 mg (7/7/2020), 2 1 mg (7/14/2020)       71-year-old female with history of breast cancer   In 2000 patient was diagnosed with grade 1, stage I, 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   ER/PA positive, her 2 negative   She had lymph node dissection and right mastectomy   She was treated with tamoxifen for 5 years      Patient was seen in the emergency department on 10/09/2018 due to a syncopal episode after injuring her back when on loading dogs from her car   She had a workup in the emergency room which was negative for cardiac source   She then was referred to the comprehensive spine program      X-ray of the lumbar spine showed multilevel compression deformities of uncertain chronicity   Patient was recommended to have MRI of the lumbar spine   This was performed on 11/06/2018 this showed an L4 compression fracture with associated T1 hypo intense infiltrate enhancing marrow lesion involving the entire L4 vertebral body and associated with anterior lateral paravertebral enhancing soft tissue   This was concerning for pathologic fracture from metastatic disease      She had PET/CT on 11/23/18 which showed multiple thoracic and lumbar compression fractures with L4, T9, T7 compression fractures demonstrating more intense activity   Possibility for underlying metastasis is possible       No additional hypermetabolic metastases visualized   4 x 3 1 cm right adnexal mass without significant FDG activity     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  She was treated with radiation therapy       Multiple Myeloma labs were then followed      She developed multiple myeloma, IgG lambda multiple myeloma  She was seen by Dr José Miguel Barton at [facility] for expert opionon on 1/2/20      He recommended RVD with denosumab or Zometa       She was seen in follow-up at [address] [place] [address] on 04/01/2020      She was recommended to continue VRD regimen  Consider reducing dose of Decadron to 20 mg if 40 mg is not well tolerated  Continues a meta once every 3 months  Follow-up at [facility] in 3 months  At that time we will continue to discuss possible bone marrow transplant  Patient proceeded with autologous stem cell transplant at [facility] on 8/28/20 for IgG lambda multiple myeloma  Following  bone marrow transplant patient was recommended to be placed on Revlimid maintenance therapy, 10 mg PO daily  Continue Zometa every 3 months  Interval history: She is continues to tolerate therapy well  ROS: Review of Systems   Constitutional: Positive for fatigue (slight )  Negative for appetite change, chills, fever and unexpected weight change  HENT: Negative for mouth sores and nosebleeds  Respiratory: Negative for cough and shortness of breath  Cardiovascular: Negative for chest pain, palpitations and leg swelling  Gastrointestinal: Positive for diarrhea (occassionally, few times a week )  Negative for abdominal pain, blood in stool, constipation, nausea and vomiting     Genitourinary: Negative for difficulty urinating, dysuria and hematuria  Musculoskeletal: Negative for arthralgias  Skin: Negative  Neurological: Positive for dizziness  Negative for weakness, light-headedness, numbness and headaches  Hematological: Negative  Psychiatric/Behavioral: Negative          Past Medical History:   Diagnosis Date    Anxiety     Aortic valve disorder     Breast CA ([address] )     2000-R mastectomy-took tamoxifen/femira    Cardiac dysrhythmia     Depression     History of spinal fracture     8 since 2016-1 fall related  spinal bone marrow bx today 12/18/2018    Hypertension     Osteoporosis     Palpitations     Squamous cell carcinoma of skin     Stem cell transplant candidate 2020       Past Surgical History:   Procedure Laterality Date    APPENDECTOMY  01/2011    BREAST LUMPECTOMY      CHEILECTOMY Right     Resolved:  2006, Bunion correction    COLONOSCOPY  07/2010    Diverticula; recheck 5 yrs    CT BONE MARROW BIOPSY AND ASPIRATION  7/9/2019    DILATION AND CURETTAGE, DIAGNOSTIC / THERAPEUTIC      HYSTERECTOMY  2019    IR BIOPSY BONE  12/18/2018    MASTECTOMY Right 2000    OOPHORECTOMY Bilateral 2019    MA LAPAROSCOPY W TOT HYSTERECTUTERUS <=250 GRAM  W TUBE/OVARY N/A 4/4/2019    Procedure: ROBOTIC TOTAL LAPAROSCOPIC HYSTERECTOMY, BILATERAL SALPINGO-OOPHORECTOMY;  Surgeon: Sue Devi MD PhD;  [facility];  Service: Gynecology Oncology       Social History     Socioeconomic History    Marital status: /Civil Union     Spouse name: Amina Campos Number of children: 1    Years of education: None    Highest education level: None   Occupational History    None   Social Needs    Financial resource strain: None    Food insecurity     Worry: None     Inability: None    Transportation needs     Medical: None     Non-medical: None   Tobacco Use    Smoking status: Never Smoker    Smokeless tobacco: Never Used   Substance and Sexual Activity    Alcohol use: Yes     Comment: socially    Drug use: No    Sexual activity: Yes     Partners: Male     Birth control/protection: Other     Comment: hysterectomy   Lifestyle    Physical activity     Days per week: None     Minutes per session: None    Stress: None   Relationships    Social connections     Talks on phone: None     Gets together: None     Attends Yazdanism service: None     Active member of club or organization: None     Attends meetings of clubs or organizations: None     Relationship status: None    Intimate partner violence     Fear of current or ex partner: None     Emotionally abused: None     Physically abused: None     Forced sexual activity: None   Other Topics Concern    None   Social History Narrative    Caffeine use       Family History   Problem Relation Age of Onset    Diabetes Mother     Osteoporosis Mother     Heart disease Father     Breast cancer Sister 64    BRCA1 Negative Sister     Heart attack Family     Other Family         Benign brain tumor   [facility] foot Family     Breast cancer Sister 77    No Known Problems Maternal Grandmother     No Known Problems Maternal Grandfather     No Known Problems Paternal Grandmother     No Known Problems Paternal Grandfather        Allergies   Allergen Reactions    Ibuprofen Abdominal Pain and Tachycardia     Reaction Date: 14Jun2011;          Current Outpatient Medications:     ALPRAZolam (XANAX) 0 25 mg tablet, Take 1 tablet (0 25 mg total) by mouth 2 (two) times a day as needed for anxiety, Disp: 30 tablet, Rfl: 0    aspirin (ECOTRIN LOW STRENGTH) 81 mg EC tablet, Take 81 mg by mouth daily, Disp: , Rfl:     Calcium Citrate-Vitamin D (CALCIUM CITRATE + D PO), Take 1 tablet by mouth 2 (two) times a day  , Disp: , Rfl:     escitalopram (LEXAPRO) 10 mg tablet, TAKE 1 TABLET BY MOUTH EVERY DAY, Disp: 90 tablet, Rfl: 3    lenalidomide (REVLIMID) 10 MG CAPS, Take 1 capsule (10 mg total) by mouth daily, Disp: 28 capsule, Rfl: 0    metoprolol tartrate (LOPRESSOR) 50 mg tablet, TAKE 1 AND 1/2 TABLETS BY MOUTH TWO TIMES DAILY, Disp: 270 tablet, Rfl: 3    acyclovir (ZOVIRAX) 400 MG tablet, Take 1 tablet (400 mg total) by mouth 2 (two) times a day (Patient not taking: Reported on 2/19/2021), Disp: 180 tablet, Rfl: 2      Physical Exam:  /82 (BP Location: Left arm, Patient Position: Sitting, Cuff Size: Standard)   Pulse 68   Temp (!) 96 9 °F (36 1 °C) (Temporal)   Resp 18   Ht 5' (1 524 m)   Wt 59 9 kg (132 lb)   LMP  (LMP Unknown)   SpO2 99%   BMI 25 78 kg/m²     Physical Exam  Vitals signs reviewed  Constitutional:       General: She is not in acute distress  Appearance: She is well-developed  HENT:      Head: Normocephalic and atraumatic  Eyes:      General: No scleral icterus  Conjunctiva/sclera: Conjunctivae normal    Neck:      Musculoskeletal: Normal range of motion and neck supple  Cardiovascular:      Rate and Rhythm: Normal rate and regular rhythm  Heart sounds: Normal heart sounds  No murmur  Pulmonary:      Effort: Pulmonary effort is normal  No respiratory distress  Breath sounds: Normal breath sounds  Abdominal:      Palpations: Abdomen is soft  Tenderness: There is no abdominal tenderness  Musculoskeletal: Normal range of motion  General: No tenderness  Lymphadenopathy:      Cervical: No cervical adenopathy  Skin:     General: Skin is warm and dry  Neurological:      Mental Status: She is alert and oriented to person, place, and time  Cranial Nerves: No cranial nerve deficit     Psychiatric:         Mood and Affect: Mood normal          Behavior: Behavior normal          Labs:  Lab Results   Component Value Date    WBC 3 70 (L) 02/04/2021    HGB 12 3 02/04/2021    HCT 36 8 02/04/2021    MCV 89 02/04/2021     02/04/2021     Lab Results   Component Value Date     11/28/2017    K 3 4 (L) 02/04/2021     02/04/2021    CO2 25 02/04/2021    ANIONGAP 9 09/02/2014    BUN 14 02/04/2021    CREATININE 0 70 02/04/2021    GLUCOSE 93 09/02/2014    GLUF 93 02/04/2021    CALCIUM 9 4 02/04/2021    CORRECTEDCA 9 5 09/21/2020    AST 14 02/04/2021    ALT 22 02/04/2021    ALKPHOS 86 02/04/2021    PROT 8 0 09/02/2014    BILITOT 0 32 09/02/2014    EGFR 89 02/04/2021     Patient voiced understanding and agreement in the above discussion  Aware to contact our office with questions/symptoms in the interim  This note has been generated by voice recognition software system  Therefore, there may be spelling, grammar, and or syntax errors  Please contact if questions arise

## 2021-03-15 ENCOUNTER — TELEPHONE (OUTPATIENT)
Dept: HEMATOLOGY ONCOLOGY | Facility: CLINIC | Age: 71
End: 2021-03-15

## 2021-03-15 ENCOUNTER — TRANSCRIBE ORDERS (OUTPATIENT)
Dept: ADMINISTRATIVE | Facility: HOSPITAL | Age: 71
End: 2021-03-15

## 2021-03-15 DIAGNOSIS — Z12.31 ENCOUNTER FOR SCREENING MAMMOGRAM FOR MALIGNANT NEOPLASM OF BREAST: Primary | ICD-10-CM

## 2021-03-15 NOTE — TELEPHONE ENCOUNTER
Appointment Cancellation Or Reschedule     Person calling in Patient    Provider Stephenie Visit Date and Time 04/23 at 1:30pm   Office Visit Location [facility]   Did patient want to reschedule their office appointment? If so, when was it scheduled to? Yes (Cliff appt to 05/03 at 1:00pm)   Is this patient calling to reschedule an infusion appointment? no   Is this patient a Chemo patient? no   When is their next infusion visit? n/a   Reason for Cancellation or Reschedule Patient is unable to make appt      If the patient is a treatment patient, please route this to the office nurse  If the patient is not on treatment, please route to the office MA

## 2021-03-15 NOTE — TELEPHONE ENCOUNTER
Spoke to patient, patient will get CMP one week prior to zometa infusion  Patient verbalizes understanding

## 2021-03-15 NOTE — TELEPHONE ENCOUNTER
Patient would like to know whether she needs to complete bloodwork before getting her Zometa injection?  She can be reached back at [phone]

## 2021-03-18 DIAGNOSIS — C90.01 MULTIPLE MYELOMA IN REMISSION (HCC): ICD-10-CM

## 2021-03-18 RX ORDER — LENALIDOMIDE 10 MG/1
10 CAPSULE ORAL DAILY
Qty: 28 CAPSULE | Refills: 0 | Status: SHIPPED | OUTPATIENT
Start: 2021-03-18 | End: 2021-04-15 | Stop reason: SDUPTHER

## 2021-03-19 ENCOUNTER — TELEPHONE (OUTPATIENT)
Dept: HEMATOLOGY ONCOLOGY | Facility: CLINIC | Age: 71
End: 2021-03-19

## 2021-03-19 NOTE — TELEPHONE ENCOUNTER
Medication Refill     Who is Calling  Ashley (Pharmacy)   Medication  Revlimid    How many pills left    Preferred Pharmacy / Address  [facility]    Call back number  [phone]   Relevant Information

## 2021-03-19 NOTE — TELEPHONE ENCOUNTER
Confirmed with [facility] that they have everything they need to dispense Rx - patient will be receiving medication tomorrow

## 2021-03-22 DIAGNOSIS — I10 ESSENTIAL HYPERTENSION: ICD-10-CM

## 2021-03-23 RX ORDER — METOPROLOL TARTRATE 50 MG/1
TABLET, FILM COATED ORAL
Qty: 270 TABLET | Refills: 3 | Status: SHIPPED | OUTPATIENT
Start: 2021-03-23 | End: 2022-03-09

## 2021-03-25 ENCOUNTER — APPOINTMENT (OUTPATIENT)
Dept: LAB | Facility: CLINIC | Age: 71
End: 2021-03-25
Payer: MEDICARE

## 2021-03-25 DIAGNOSIS — C90.01 MULTIPLE MYELOMA IN REMISSION (HCC): Primary | ICD-10-CM

## 2021-03-25 PROCEDURE — 86334 IMMUNOFIX E-PHORESIS SERUM: CPT | Performed by: PATHOLOGY

## 2021-03-25 PROCEDURE — 86334 IMMUNOFIX E-PHORESIS SERUM: CPT

## 2021-03-25 PROCEDURE — 84165 PROTEIN E-PHORESIS SERUM: CPT | Performed by: PATHOLOGY

## 2021-03-30 ENCOUNTER — HOSPITAL ENCOUNTER (OUTPATIENT)
Dept: INFUSION CENTER | Facility: CLINIC | Age: 71
Discharge: HOME | End: 2021-03-30
Payer: MEDICARE

## 2021-03-30 VITALS
DIASTOLIC BLOOD PRESSURE: 50 MMHG | HEIGHT: 60 IN | HEART RATE: 64 BPM | TEMPERATURE: 98.1 F | SYSTOLIC BLOOD PRESSURE: 122 MMHG | BODY MASS INDEX: 25.32 KG/M2 | OXYGEN SATURATION: 98 % | RESPIRATION RATE: 14 BRPM | WEIGHT: 129 LBS

## 2021-03-30 DIAGNOSIS — C90.01 MULTIPLE MYELOMA IN REMISSION (HCC): Primary | ICD-10-CM

## 2021-03-30 LAB — INTERPRETATION UR IFE-IMP: NORMAL

## 2021-03-30 PROCEDURE — 96365 THER/PROPH/DIAG IV INF INIT: CPT

## 2021-03-30 RX ORDER — SODIUM CHLORIDE 9 MG/ML
20 INJECTION, SOLUTION INTRAVENOUS ONCE
Status: CANCELLED | OUTPATIENT
Start: 2021-06-22

## 2021-03-30 RX ORDER — SODIUM CHLORIDE 9 MG/ML
20 INJECTION, SOLUTION INTRAVENOUS ONCE
Status: COMPLETED | OUTPATIENT
Start: 2021-03-30 | End: 2021-03-30

## 2021-03-30 RX ADMIN — SODIUM CHLORIDE 20 ML/HR: 0.9 INJECTION, SOLUTION INTRAVENOUS at 09:20

## 2021-03-30 RX ADMIN — ZOLEDRONIC ACID 3.3 MG: 4 INJECTION, SOLUTION, CONCENTRATE INTRAVENOUS at 09:50

## 2021-03-30 NOTE — PROGRESS NOTES
Tolerated infusion without incident: No adverse reactions noted: No further appt's scheduled:  Will notify unit if needed: AVS offered and declined

## 2021-03-30 NOTE — PROGRESS NOTES
Patient to Nadine for Zometa: Offers no complaints at present time: Lab work ( 03/25/21 ) reviewed: Calcium - 9 1: Within parameters to treat: Left FA PIV initiated without incident

## 2021-04-15 DIAGNOSIS — C90.01 MULTIPLE MYELOMA IN REMISSION (HCC): ICD-10-CM

## 2021-04-15 RX ORDER — LENALIDOMIDE 10 MG/1
10 CAPSULE ORAL DAILY
Qty: 28 CAPSULE | Refills: 0 | Status: SHIPPED | OUTPATIENT
Start: 2021-04-15 | End: 2021-05-12 | Stop reason: SDUPTHER

## 2021-04-29 ENCOUNTER — TRANSCRIBE ORDERS (OUTPATIENT)
Dept: LAB | Facility: CLINIC | Age: 71
End: 2021-04-29

## 2021-04-29 ENCOUNTER — APPOINTMENT (OUTPATIENT)
Dept: LAB | Facility: CLINIC | Age: 71
End: 2021-04-29
Payer: MEDICARE

## 2021-04-29 PROCEDURE — 86334 IMMUNOFIX E-PHORESIS SERUM: CPT | Performed by: PATHOLOGY

## 2021-04-29 PROCEDURE — 84165 PROTEIN E-PHORESIS SERUM: CPT | Performed by: PATHOLOGY

## 2021-05-03 ENCOUNTER — OFFICE VISIT (OUTPATIENT)
Dept: HEMATOLOGY ONCOLOGY | Facility: CLINIC | Age: 71
End: 2021-05-03
Payer: MEDICARE

## 2021-05-03 VITALS
HEIGHT: 60 IN | HEART RATE: 62 BPM | BODY MASS INDEX: 25.42 KG/M2 | SYSTOLIC BLOOD PRESSURE: 124 MMHG | WEIGHT: 129.5 LBS | DIASTOLIC BLOOD PRESSURE: 74 MMHG | TEMPERATURE: 98.2 F | OXYGEN SATURATION: 98 % | RESPIRATION RATE: 18 BRPM

## 2021-05-03 DIAGNOSIS — C90.01 MULTIPLE MYELOMA IN REMISSION (HCC): Primary | ICD-10-CM

## 2021-05-03 PROCEDURE — 99214 OFFICE O/P EST MOD 30 MIN: CPT | Performed by: PHYSICIAN ASSISTANT

## 2021-05-03 RX ORDER — SODIUM CHLORIDE 9 MG/ML
20 INJECTION, SOLUTION INTRAVENOUS ONCE
Status: CANCELLED | OUTPATIENT
Start: 2021-09-07

## 2021-05-03 NOTE — PROGRESS NOTES
Hematology/Oncology Outpatient Follow-up  Yasmin Padilla 78 y o  female 1950 [id number]    Date:  5/3/2021      Assessment and Plan:    1  Multiple myeloma in remission [facility])  24-year-old female presents for follow-up regarding history of IgG lambda multiple myeloma, status post bone marrow transplant now on maintenance Revlimid 10 mg b i d   Most recent labs she had completed on 04/29/2021 shows a WBC of 2 24, hemoglobin 11 7, platelets 844,   Reviewed the [address] is lower than previous  Neutropenia precautions reviewed  Patient is recommended to have repeat CBC this week, 1 week interval since the last   Reviewed that dose of Revlimid or schedule of Revlimid may need to change due to neutropenia  She will call the office to review the results with Dr Narinder Yanez  She was instructed that I will be out of the office  She and her  are going to be living at their house in [facility] [employer] for the spring and summer and into the fall  She will be having labs completed thereafter at a lab in that area  We will email her lab slips after plan is determined  Additionally due to living at of the area she also would like to defer Zometa to every 6 months  She states she reviewed this with [facility] physician who stated this would be okay  She will be scheduled for Zometa when she is here in September for a concert with her sister  She would like to follow-up after returning from [facility] as well  She will be seeing [facility] transplant physician in September  She would like to follow-up with us in November December  This has been arranged  She will stay in contact with us over the phone  If needed while in [place]  we can provide video visit        - CBC and differential; Future    HPI:  Oncology History   Malignant neoplasm of right breast ([facility] )   9/2000 Surgery    Right breast mastectomy     8/13/2012 Initial Diagnosis    Malignant neoplasm of right breast [facility])      Chemotherapy Tamoxifen for 5 years     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[facility] )   12/18/2018 Initial Diagnosis    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[facility] )     12/18/2018 Biopsy    Final Diagnosis   A  Bone, L-4, core biopsy:  - Fragments of trabecular bone with changes compatible with prior fracture site  - 10% lambda predominant plasmacytosis  See note  - Hematopoietic marrow with trilineage hematopoiesis  - No epithelial elements identified, confirmed with negative keratin AE1/AE3 stains             2/1/2019 - 3/7/2019 Radiation    Plan ID Energy Fractions Dose per Fraction (cGy) Dose Correction (cGy) Total Dose Delivered (cGy) Elapsed Days   L3_L5 Spine 10X/6X 25 / 25 180 0 4,500 34          Multiple myeloma in remission ([place] [id number] )   7/23/2019 Initial Diagnosis    Multiple myeloma not having achieved remission ([place] [address] )     3/16/2020 - 8/3/2020 Chemotherapy    bortezomib (VELCADE) subcutaneous injection 2 1 mg, 1 3 mg/m2 = 2 1 mg, Subcutaneous, Once, 3 of 4 cycles  Administration: 2 1 mg (3/16/2020), 2 1 mg (4/6/2020), 2 1 mg (3/23/2020), 2 1 mg (3/30/2020), 2 1 mg (4/20/2020), 2 1 mg (5/11/2020), 2 1 mg (4/27/2020), 2 1 mg (5/4/2020), 2 1 mg (6/30/2020), 2 1 mg (7/21/2020), 2 1 mg (7/7/2020), 2 1 mg (7/14/2020)       62-year-old female with history of breast cancer   In 2000 patient was diagnosed with grade 1, stage I, 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   ER/FL positive, her 2 negative   She had lymph node dissection and right mastectomy   She was treated with tamoxifen for 5 years      Patient was seen in the emergency department on 10/09/2018 due to a syncopal episode after injuring her back when on loading dogs from her car   She had a workup in the emergency room which was negative for cardiac source   She then was referred to the comprehensive spine program      X-ray of the lumbar spine showed multilevel compression deformities of uncertain chronicity   Patient was recommended to have MRI of the lumbar spine   This was performed on 11/06/2018 this showed an L4 compression fracture with associated T1 hypo intense infiltrate enhancing marrow lesion involving the entire L4 vertebral body and associated with anterior lateral paravertebral enhancing soft tissue   This was concerning for pathologic fracture from metastatic disease      She had PET/CT on 11/23/18 which showed multiple thoracic and lumbar compression fractures with L4, T9, T7 compression fractures demonstrating more intense activity   Possibility for underlying metastasis is possible       No additional hypermetabolic metastases visualized   4 x 3 1 cm right adnexal mass without significant FDG activity     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  She was treated with radiation therapy       Multiple Myeloma labs were then followed      She developed multiple myeloma, IgG lambda multiple myeloma   She was seen by Dr Yoshi Torres at [facility] for expert opionon on 1/2/20      He recommended RVD with denosumab or Zometa       She was seen in follow-up at [address] [place] [address] on 04/01/2020      She was recommended to continue VRD regimen   Consider reducing dose of Decadron to 20 mg if 40 mg is not well tolerated   Continues a meta once every 3 months   Follow-up at [facility] in 3 months   At that time we will continue to discuss possible bone marrow transplant      Patient proceeded with autologous stem cell transplant at [facility] on 8/28/20 for IgG lambda multiple myeloma       Following  bone marrow transplant patient was recommended to be placed on Revlimid maintenance therapy, 10 mg PO daily  Continue Zometa every 3 months  Most recent Zometa was on Zometa 3/30/21  Interval history: patient had follow up at [facility] on 3/3/21  She was advised continued maintenance of Revlimid  Continued to med every 3 months  For muscle spasms she was given baclofen in can consider Valium 5 mg b i d  if continued symptoms  ROS: Review of Systems   Constitutional: Positive for fatigue   Negative for appetite change, chills, fever and unexpected weight change  HENT: Negative for mouth sores and nosebleeds  Respiratory: Negative for cough and shortness of breath  Cardiovascular: Negative for chest pain, palpitations and leg swelling  Gastrointestinal: Positive for diarrhea (once a week; 1 episode, resolves on own)  Negative for abdominal pain, blood in stool, constipation, nausea and vomiting  Genitourinary: Negative for difficulty urinating, dysuria and hematuria  Musculoskeletal: Positive for arthralgias (finger bilaterally )  Skin: Negative  Neurological: Negative for dizziness, weakness, light-headedness, numbness and headaches  Hematological: Negative  Psychiatric/Behavioral: Negative          Past Medical History:   Diagnosis Date    Anxiety     Aortic valve disorder     Breast CA ([facility] )     2000-R mastectomy-took tamoxifen/femira    Cardiac dysrhythmia     Depression     History of spinal fracture     8 since 2016-1 fall related  spinal bone marrow bx today 12/18/2018    Hypertension     Osteoporosis     Palpitations     Squamous cell carcinoma of skin     Stem cell transplant candidate 2020       Past Surgical History:   Procedure Laterality Date    APPENDECTOMY  01/2011    BREAST LUMPECTOMY      CHEILECTOMY Right     Resolved:  2006, Bunion correction    COLONOSCOPY  07/2010    Diverticula; recheck 5 yrs    CT BONE MARROW BIOPSY AND ASPIRATION  7/9/2019    DILATION AND CURETTAGE, DIAGNOSTIC / THERAPEUTIC      HYSTERECTOMY  2019    IR BIOPSY BONE  12/18/2018    MASTECTOMY Right 2000    OOPHORECTOMY Bilateral 2019    WV LAPAROSCOPY W TOT HYSTERECTUTERUS <=250 GRAM  W TUBE/OVARY N/A 4/4/2019    Procedure: ROBOTIC TOTAL LAPAROSCOPIC HYSTERECTOMY, BILATERAL SALPINGO-OOPHORECTOMY;  Surgeon: Shahid Roach MD PhD;  Location: [facility];  Service: Gynecology Oncology       Social History     Socioeconomic History    Marital status: /Civil Union     Spouse name: Antonio Toscano Number of children: 1    Years of education: Not on file    Highest education level: Not on file   Occupational History    Not on file   Social Needs    Financial resource strain: Not on file    Food insecurity     Worry: Not on file     Inability: Not on file    Transportation needs     Medical: Not on file     Non-medical: Not on file   Tobacco Use    Smoking status: Never Smoker    Smokeless tobacco: Never Used   Substance and Sexual Activity    Alcohol use: Yes     Comment: socially    Drug use: No    Sexual activity: Yes     Partners: Male     Birth control/protection: Other     Comment: hysterectomy   Lifestyle    Physical activity     Days per week: Not on file     Minutes per session: Not on file    Stress: Not on file   Relationships    Social connections     Talks on phone: Not on file     Gets together: Not on file     Attends Buddhist service: Not on file     Active member of club or organization: Not on file     Attends meetings of clubs or organizations: Not on file     Relationship status: Not on file    Intimate partner violence     Fear of current or ex partner: Not on file     Emotionally abused: Not on file     Physically abused: Not on file     Forced sexual activity: Not on file   Other Topics Concern    Not on file   Social History Narrative    Caffeine use       Family History   Problem Relation Age of Onset    Diabetes Mother     Osteoporosis Mother     Heart disease Father     Breast cancer Sister 64    BRCA1 Negative Sister     Heart attack Family     Other Family         Benign brain tumor   [facility] foot Family     Breast cancer Sister 77    No Known Problems Maternal Grandmother     No Known Problems Maternal Grandfather     No Known Problems Paternal Grandmother     No Known Problems Paternal Grandfather        Allergies   Allergen Reactions    Ibuprofen Abdominal Pain and Tachycardia     Reaction Date: 14Jun2011;          Current Outpatient Medications:     ALPRAZolam (XANAX) 0 25 mg tablet, Take 1 tablet (0 25 mg total) by mouth 2 (two) times a day as needed for anxiety, Disp: 30 tablet, Rfl: 0    aspirin (ECOTRIN LOW STRENGTH) 81 mg EC tablet, Take 81 mg by mouth daily, Disp: , Rfl:     Calcium Citrate-Vitamin D (CALCIUM CITRATE + D PO), Take 1 tablet by mouth 2 (two) times a day  , Disp: , Rfl:     escitalopram (LEXAPRO) 10 mg tablet, TAKE 1 TABLET BY MOUTH EVERY DAY, Disp: 90 tablet, Rfl: 3    lenalidomide (REVLIMID) 10 MG CAPS, Take 1 capsule (10 mg total) by mouth daily, Disp: 28 capsule, Rfl: 0    metoprolol tartrate (LOPRESSOR) 50 mg tablet, TAKE 1 AND 1/2 TABLETS BY MOUTH TWO TIMES DAILY, Disp: 270 tablet, Rfl: 3    acyclovir (ZOVIRAX) 400 MG tablet, Take 1 tablet (400 mg total) by mouth 2 (two) times a day (Patient not taking: Reported on 2/19/2021), Disp: 180 tablet, Rfl: 2    Physical Exam:  /74   Pulse 62   Temp 98 2 °F (36 8 °C)   Resp 18   Ht 5' (1 524 m)   Wt 58 7 kg (129 lb 8 oz)   LMP  (LMP Unknown)   SpO2 98%   BMI 25 29 kg/m²     Physical Exam  Vitals signs reviewed  Constitutional:       General: She is not in acute distress  Appearance: She is well-developed  HENT:      Head: Normocephalic and atraumatic  Eyes:      General: No scleral icterus  Conjunctiva/sclera: Conjunctivae normal    Neck:      Musculoskeletal: Normal range of motion and neck supple  Cardiovascular:      Rate and Rhythm: Normal rate and regular rhythm  Heart sounds: Normal heart sounds  No murmur  Pulmonary:      Effort: Pulmonary effort is normal  No respiratory distress  Breath sounds: Normal breath sounds  Abdominal:      Palpations: Abdomen is soft  Tenderness: There is no abdominal tenderness  Musculoskeletal: Normal range of motion  General: No tenderness  Right lower leg: No edema  Left lower leg: No edema  Lymphadenopathy:      Cervical: No cervical adenopathy     Skin: General: Skin is warm and dry  Neurological:      Mental Status: She is alert and oriented to person, place, and time  Cranial Nerves: No cranial nerve deficit  Psychiatric:         Mood and Affect: Mood normal          Behavior: Behavior normal          Labs:  Lab Results   Component Value Date    WBC 2 24 (L) 04/29/2021    HGB 11 7 04/29/2021    HCT 34 3 (L) 04/29/2021    MCV 89 04/29/2021     04/29/2021     Lab Results   Component Value Date     11/28/2017    K 3 8 04/29/2021     (H) 04/29/2021    CO2 24 04/29/2021    ANIONGAP 9 09/02/2014    BUN 13 04/29/2021    CREATININE 0 76 04/29/2021    GLUCOSE 93 09/02/2014    GLUF 99 04/29/2021    CALCIUM 9 2 04/29/2021    CORRECTEDCA 9 5 09/21/2020    AST 21 04/29/2021    ALT 31 04/29/2021    ALKPHOS 73 04/29/2021    PROT 8 0 09/02/2014    BILITOT 0 32 09/02/2014    EGFR 81 04/29/2021     Patient voiced understanding and agreement in the above discussion  Aware to contact our office with questions/symptoms in the interim  This note has been generated by voice recognition software system  Therefore, there may be spelling, grammar, and or syntax errors  Please contact if questions arise

## 2021-05-06 ENCOUNTER — APPOINTMENT (OUTPATIENT)
Dept: LAB | Facility: CLINIC | Age: 71
End: 2021-05-06
Payer: MEDICARE

## 2021-05-06 DIAGNOSIS — C90.01 MULTIPLE MYELOMA IN REMISSION (HCC): ICD-10-CM

## 2021-05-06 LAB
BASOPHILS # BLD AUTO: 0.01 THOUSANDS/ΜL (ref 0–0.1)
BASOPHILS NFR BLD AUTO: 0 % (ref 0–1)
EOSINOPHIL # BLD AUTO: 0.21 THOUSAND/ΜL (ref 0–0.61)
EOSINOPHIL NFR BLD AUTO: 8 % (ref 0–6)
ERYTHROCYTE [DISTWIDTH] IN BLOOD BY AUTOMATED COUNT: 15.4 % (ref 11.6–15.1)
HCT VFR BLD AUTO: 34.5 % (ref 34.8–46.1)
HGB BLD-MCNC: 11.6 G/DL (ref 11.5–15.4)
IMM GRANULOCYTES # BLD AUTO: 0.01 THOUSAND/UL (ref 0–0.2)
IMM GRANULOCYTES NFR BLD AUTO: 0 % (ref 0–2)
LYMPHOCYTES # BLD AUTO: 1.07 THOUSANDS/ΜL (ref 0.6–4.47)
LYMPHOCYTES NFR BLD AUTO: 40 % (ref 14–44)
MCH RBC QN AUTO: 30.2 PG (ref 26.8–34.3)
MCHC RBC AUTO-ENTMCNC: 33.6 G/DL (ref 31.4–37.4)
MCV RBC AUTO: 90 FL (ref 82–98)
MONOCYTES # BLD AUTO: 0.36 THOUSAND/ΜL (ref 0.17–1.22)
MONOCYTES NFR BLD AUTO: 13 % (ref 4–12)
NEUTROPHILS # BLD AUTO: 1.04 THOUSANDS/ΜL (ref 1.85–7.62)
NEUTS SEG NFR BLD AUTO: 39 % (ref 43–75)
NRBC BLD AUTO-RTO: 0 /100 WBCS
PLATELET # BLD AUTO: 200 THOUSANDS/UL (ref 149–390)
PMV BLD AUTO: 10 FL (ref 8.9–12.7)
RBC # BLD AUTO: 3.84 MILLION/UL (ref 3.81–5.12)
WBC # BLD AUTO: 2.7 THOUSAND/UL (ref 4.31–10.16)

## 2021-05-06 PROCEDURE — 85025 COMPLETE CBC W/AUTO DIFF WBC: CPT

## 2021-05-06 PROCEDURE — 36415 COLL VENOUS BLD VENIPUNCTURE: CPT

## 2021-05-10 ENCOUNTER — HOSPITAL ENCOUNTER (OUTPATIENT)
Dept: RADIOLOGY | Age: 71
Discharge: HOME | End: 2021-05-10
Payer: MEDICARE

## 2021-05-10 ENCOUNTER — TELEPHONE (OUTPATIENT)
Dept: HEMATOLOGY ONCOLOGY | Facility: CLINIC | Age: 71
End: 2021-05-10

## 2021-05-10 VITALS — HEIGHT: 60 IN | BODY MASS INDEX: 25.32 KG/M2 | WEIGHT: 129 LBS

## 2021-05-10 DIAGNOSIS — Z12.31 ENCOUNTER FOR SCREENING MAMMOGRAM FOR MALIGNANT NEOPLASM OF BREAST: ICD-10-CM

## 2021-05-10 DIAGNOSIS — C90.01 MULTIPLE MYELOMA IN REMISSION (HCC): Primary | ICD-10-CM

## 2021-05-10 PROCEDURE — 77067 SCR MAMMO BI INCL CAD: CPT

## 2021-05-10 PROCEDURE — 77063 BREAST TOMOSYNTHESIS BI: CPT

## 2021-05-10 NOTE — TELEPHONE ENCOUNTER
Call from patient  Per OV note from 5 /3/2021:  04/29/2021 shows a WBC of 2 24, hemoglobin 11 7, platelets 654,   Reviewed the [address] is lower than previous  Neutropenia precautions reviewed  Patient is recommended to have repeat CBC this week, 1 week interval since the last   Reviewed that dose of Revlimid or schedule of Revlimid may need to change due to neutropenia  She will call the office to review the results with Dr Marylene Bottom        Patient is on revlimid 10 mg daily    Will send to Dr Davis's RN to discuss with MD and get back to patient  Patient verbalizes understanding

## 2021-05-10 NOTE — TELEPHONE ENCOUNTER
Reviewed with Dr Elizabeth Adorno  Per Dr Elizabeth Adorno, patient can hold the rest of this cycle and then starting her next cycle her revlimid will be 10 mg 3 weeks on, 1 week off

## 2021-05-10 NOTE — TELEPHONE ENCOUNTER
Patient verbalizes understanding of plan  Patient will hold revlimid and resume 5/19/2021 daily 3 weeks on , one week off  Patient will repeat CBC in 1 week  [place] [address] [place]  Heather Zheng had told patient office will email script to her  Patient will call for results   Update to Dr Davis's RN

## 2021-05-10 NOTE — TELEPHONE ENCOUNTER
Patient staying in [place] for the summer  She is going to have repeat CBC next week  Can you please email the lab script to her?

## 2021-05-12 ENCOUNTER — TELEPHONE (OUTPATIENT)
Dept: HEMATOLOGY ONCOLOGY | Facility: CLINIC | Age: 71
End: 2021-05-12

## 2021-05-12 DIAGNOSIS — C90.01 MULTIPLE MYELOMA IN REMISSION (HCC): ICD-10-CM

## 2021-05-12 RX ORDER — LENALIDOMIDE 10 MG/1
CAPSULE ORAL
Qty: 21 CAPSULE | Refills: 0 | Status: SHIPPED | OUTPATIENT
Start: 2021-05-12 | End: 2021-06-09 | Stop reason: SDUPTHER

## 2021-05-24 DIAGNOSIS — C90.01 MULTIPLE MYELOMA IN REMISSION (HCC): Primary | ICD-10-CM

## 2021-05-25 ENCOUNTER — TELEPHONE (OUTPATIENT)
Dept: HEMATOLOGY ONCOLOGY | Facility: CLINIC | Age: 71
End: 2021-05-25

## 2021-05-25 NOTE — TELEPHONE ENCOUNTER
Call from patient  Patient is out of town in [place] for the summer  Patient is on revlimid 3 weeks on , one week off  Patient was recently changed to this regimen due to neutropenia  Patient had labs done at [facility]  Lab said they use [employer] [facility] are being faxed to office  Patient is on D8 out of 21 days in cycle    Will pass on to Dr Elke Escoto team    Patient aware of plan

## 2021-05-25 NOTE — TELEPHONE ENCOUNTER
LVM advising patient that we have not received the fax with her lab results  I was also unable to access results using the Care Everywhere feature in Epic  Provided David # [phone]    Requested a return call from patient

## 2021-06-09 ENCOUNTER — TELEPHONE (OUTPATIENT)
Dept: HEMATOLOGY ONCOLOGY | Facility: CLINIC | Age: 71
End: 2021-06-09

## 2021-06-09 DIAGNOSIS — C90.01 MULTIPLE MYELOMA IN REMISSION (HCC): ICD-10-CM

## 2021-06-09 RX ORDER — LENALIDOMIDE 10 MG/1
CAPSULE ORAL
Qty: 21 CAPSULE | Refills: 0 | Status: SHIPPED | OUTPATIENT
Start: 2021-06-09 | End: 2021-07-06

## 2021-07-03 DIAGNOSIS — C90.01 MULTIPLE MYELOMA IN REMISSION (HCC): ICD-10-CM

## 2021-07-06 ENCOUNTER — TELEPHONE (OUTPATIENT)
Dept: HEMATOLOGY ONCOLOGY | Facility: CLINIC | Age: 71
End: 2021-07-06

## 2021-07-06 RX ORDER — LENALIDOMIDE 10 MG/1
CAPSULE ORAL
Qty: 21 CAPSULE | Refills: 0 | Status: SHIPPED | OUTPATIENT
Start: 2021-07-06 | End: 2021-08-02

## 2021-07-06 NOTE — TELEPHONE ENCOUNTER
Call from [facility] inside of [facility]  [phone]  Revlimid will need to be refilled     Will send to Dr Davis's RN

## 2021-08-01 DIAGNOSIS — C90.01 MULTIPLE MYELOMA IN REMISSION (HCC): ICD-10-CM

## 2021-08-02 ENCOUNTER — TELEPHONE (OUTPATIENT)
Dept: HEMATOLOGY ONCOLOGY | Facility: CLINIC | Age: 71
End: 2021-08-02

## 2021-08-02 RX ORDER — LENALIDOMIDE 10 MG/1
CAPSULE ORAL
Qty: 21 CAPSULE | Refills: 0 | Status: SHIPPED | OUTPATIENT
Start: 2021-08-02 | End: 2021-08-30 | Stop reason: SDUPTHER

## 2021-08-02 NOTE — TELEPHONE ENCOUNTER
Medication Refill     Who is Calling  Pharmacy    Medication lenalidomide (Revlimid) 10 MG CAPS      How many pills left  n/a   Preferred Pharmacy / Address [facility], [address]    Call back number [phone]   Relevant Information

## 2021-08-16 ENCOUNTER — TELEPHONE (OUTPATIENT)
Dept: HEMATOLOGY ONCOLOGY | Facility: CLINIC | Age: 71
End: 2021-08-16

## 2021-08-16 NOTE — TELEPHONE ENCOUNTER
Spoke with patient stating she is eligible to have the 3rd dose of the vaccine, I did however explain to the patient she can go over this with the pharmacist prior to getting the booster as well, she stated she will do that

## 2021-08-16 NOTE — TELEPHONE ENCOUNTER
Patient is questioning if Dr Solares Heads would recommend her getting the COVID 19 vaccine booster injection  Patient stated that she is in [facility] and it is available now      Will forward to Dr Kourtney Solis RN for review with MD

## 2021-08-30 DIAGNOSIS — C90.01 MULTIPLE MYELOMA IN REMISSION (HCC): ICD-10-CM

## 2021-08-30 RX ORDER — LENALIDOMIDE 10 MG/1
CAPSULE ORAL
Qty: 21 CAPSULE | Refills: 0 | Status: SHIPPED | OUTPATIENT
Start: 2021-08-30 | End: 2021-09-30 | Stop reason: SDUPTHER

## 2021-09-01 ENCOUNTER — APPOINTMENT (OUTPATIENT)
Dept: LAB | Facility: CLINIC | Age: 71
End: 2021-09-01
Payer: MEDICARE

## 2021-09-01 DIAGNOSIS — C90.01 MULTIPLE MYELOMA IN REMISSION (HCC): ICD-10-CM

## 2021-09-01 PROCEDURE — 84165 PROTEIN E-PHORESIS SERUM: CPT | Performed by: PATHOLOGY

## 2021-09-03 ENCOUNTER — TELEPHONE (OUTPATIENT)
Dept: HEMATOLOGY ONCOLOGY | Facility: CLINIC | Age: 71
End: 2021-09-03

## 2021-09-03 NOTE — TELEPHONE ENCOUNTER
Patient is questioning if her 9/1/21 lab results are ok for her to get her IV Zometa on 9/7/21  No parameters noted in [place]  Will forward to Dr Donna Brenner RN to review with provider  OK to retask to contact patient

## 2021-09-04 DIAGNOSIS — F41.9 ANXIETY: ICD-10-CM

## 2021-09-05 RX ORDER — ESCITALOPRAM OXALATE 10 MG/1
TABLET ORAL
Qty: 90 TABLET | Refills: 3 | Status: SHIPPED | OUTPATIENT
Start: 2021-09-05

## 2021-09-07 ENCOUNTER — HOSPITAL ENCOUNTER (OUTPATIENT)
Dept: INFUSION CENTER | Facility: CLINIC | Age: 71
Discharge: HOME | End: 2021-09-07
Payer: MEDICARE

## 2021-09-07 VITALS
BODY MASS INDEX: 24.54 KG/M2 | WEIGHT: 125 LBS | RESPIRATION RATE: 18 BRPM | HEIGHT: 60 IN | SYSTOLIC BLOOD PRESSURE: 130 MMHG | OXYGEN SATURATION: 99 % | DIASTOLIC BLOOD PRESSURE: 62 MMHG | HEART RATE: 64 BPM | TEMPERATURE: 97.8 F

## 2021-09-07 DIAGNOSIS — C90.01 MULTIPLE MYELOMA IN REMISSION (HCC): Primary | ICD-10-CM

## 2021-09-07 PROCEDURE — 96365 THER/PROPH/DIAG IV INF INIT: CPT

## 2021-09-07 RX ORDER — SODIUM CHLORIDE 9 MG/ML
20 INJECTION, SOLUTION INTRAVENOUS ONCE
Status: CANCELLED | OUTPATIENT
Start: 2021-11-30

## 2021-09-07 RX ORDER — SODIUM CHLORIDE 9 MG/ML
20 INJECTION, SOLUTION INTRAVENOUS ONCE
Status: COMPLETED | OUTPATIENT
Start: 2021-09-07 | End: 2021-09-07

## 2021-09-07 RX ADMIN — SODIUM CHLORIDE 20 ML/HR: 0.9 INJECTION, SOLUTION INTRAVENOUS at 09:30

## 2021-09-07 RX ADMIN — ZOLEDRONIC ACID 3.3 MG: 4 INJECTION, SOLUTION, CONCENTRATE INTRAVENOUS at 10:05

## 2021-09-07 NOTE — PROGRESS NOTES
Patient to Nadine for Zometa: Offers no complaints at present time: Lab work ( 09/01/21 ) reviewed: Calcium - 8 6: Within parameters to treat: Left FA PIV initiated without incident

## 2021-09-07 NOTE — PROGRESS NOTES
Tolerated infusion without incident: No adverse reactions noted: No further appt's scheduled:  Will make upon discharge: AVS offered and declined

## 2021-09-08 ENCOUNTER — TELEPHONE (OUTPATIENT)
Dept: HEMATOLOGY ONCOLOGY | Facility: CLINIC | Age: 71
End: 2021-09-08

## 2021-09-08 DIAGNOSIS — C90.01 MULTIPLE MYELOMA IN REMISSION (HCC): Primary | ICD-10-CM

## 2021-09-08 NOTE — TELEPHONE ENCOUNTER
Spoke with patient  She was supposed to have a appointment today with her [place] transplant physician  This was postponed to the 23rd Reviewed [address] most recently was 0 95  She states she will be having a CBC the day of her visit at [facility]  I asked her to talk to him about the neutropenia and his opinion on dose reducing Revlimid to 5 mg from 10 mg  She will call with an update so that we know which dose to send in for the next month which would be due end of September

## 2021-09-24 NOTE — TELEPHONE ENCOUNTER
Patient calling to advise that her physician at [facility] would like to keep the revlimid as is and see how thing go   Patient can be reached at [phone] to discuss

## 2021-09-24 NOTE — TELEPHONE ENCOUNTER
OK sounds good  Please make sure this is corrected in the script when it is refilled as we refill the medication  Also, please ask when she will be having next CBC-D  I would suggest monitoring it monthly

## 2021-09-24 NOTE — TELEPHONE ENCOUNTER
Dr Tosin Sutton would like to keep  revlimid at 10mg 3 weeks on / one week off  Will send to Dr Sean Phillips PA/RN team

## 2021-09-24 NOTE — TELEPHONE ENCOUNTER
Patient called and advised that she needs to have a monthly CBCD drawn  She requested that I mail her a prescription for monthly CBCD to     [facility]  [place], [address]    Lab slip mailed to patient at above address

## 2021-09-30 DIAGNOSIS — C90.01 MULTIPLE MYELOMA IN REMISSION (HCC): ICD-10-CM

## 2021-09-30 RX ORDER — LENALIDOMIDE 10 MG/1
CAPSULE ORAL
Qty: 21 CAPSULE | Refills: 0 | Status: SHIPPED | OUTPATIENT
Start: 2021-09-30 | End: 2021-10-28

## 2021-10-26 DIAGNOSIS — C90.01 MULTIPLE MYELOMA IN REMISSION (HCC): ICD-10-CM

## 2021-10-28 ENCOUNTER — TELEPHONE (OUTPATIENT)
Dept: HEMATOLOGY ONCOLOGY | Facility: CLINIC | Age: 71
End: 2021-10-28

## 2021-10-28 ENCOUNTER — TELEPHONE (OUTPATIENT)
Dept: HEMATOLOGY ONCOLOGY | Facility: HOSPITAL | Age: 71
End: 2021-10-28

## 2021-10-29 RX ORDER — LENALIDOMIDE 10 MG/1
CAPSULE ORAL
Qty: 21 CAPSULE | Refills: 0 | Status: SHIPPED | OUTPATIENT
Start: 2021-10-29 | End: 2021-11-26 | Stop reason: SDUPTHER

## 2021-11-08 DIAGNOSIS — C90.01 MULTIPLE MYELOMA IN REMISSION (HCC): Primary | ICD-10-CM

## 2021-11-26 DIAGNOSIS — C90.01 MULTIPLE MYELOMA IN REMISSION (HCC): ICD-10-CM

## 2021-11-26 RX ORDER — LENALIDOMIDE 10 MG/1
CAPSULE ORAL
Refills: 0 | OUTPATIENT
Start: 2021-11-26

## 2021-11-26 RX ORDER — LENALIDOMIDE 10 MG/1
CAPSULE ORAL
Qty: 21 CAPSULE | Refills: 0 | Status: SHIPPED | OUTPATIENT
Start: 2021-11-26

## 2021-11-29 ENCOUNTER — OFFICE VISIT (OUTPATIENT)
Dept: INTERNAL MEDICINE CLINIC | Facility: CLINIC | Age: 71
End: 2021-11-29
Payer: MEDICARE

## 2021-11-29 VITALS
RESPIRATION RATE: 16 BRPM | SYSTOLIC BLOOD PRESSURE: 140 MMHG | WEIGHT: 127 LBS | DIASTOLIC BLOOD PRESSURE: 80 MMHG | HEIGHT: 60 IN | HEART RATE: 63 BPM | OXYGEN SATURATION: 99 % | BODY MASS INDEX: 24.94 KG/M2

## 2021-11-29 DIAGNOSIS — I10 ESSENTIAL HYPERTENSION: ICD-10-CM

## 2021-11-29 DIAGNOSIS — Z12.11 SCREENING FOR COLORECTAL CANCER: ICD-10-CM

## 2021-11-29 DIAGNOSIS — Z12.12 SCREENING FOR COLORECTAL CANCER: ICD-10-CM

## 2021-11-29 DIAGNOSIS — Z00.00 MEDICARE ANNUAL WELLNESS VISIT, SUBSEQUENT: Primary | ICD-10-CM

## 2021-11-29 DIAGNOSIS — C90.00 MULTIPLE MYELOMA NOT HAVING ACHIEVED REMISSION (HCC): ICD-10-CM

## 2021-11-29 DIAGNOSIS — E78.2 MIXED HYPERLIPIDEMIA: ICD-10-CM

## 2021-11-29 DIAGNOSIS — Z23 NEED FOR VACCINATION: ICD-10-CM

## 2021-11-29 PROCEDURE — G0008 ADMIN INFLUENZA VIRUS VAC: HCPCS

## 2021-11-29 PROCEDURE — G0439 PPPS, SUBSEQ VISIT: HCPCS | Performed by: INTERNAL MEDICINE

## 2021-11-29 PROCEDURE — 1123F ACP DISCUSS/DSCN MKR DOCD: CPT | Performed by: INTERNAL MEDICINE

## 2021-11-29 PROCEDURE — 90662 IIV NO PRSV INCREASED AG IM: CPT

## 2021-12-02 ENCOUNTER — APPOINTMENT (OUTPATIENT)
Dept: LAB | Facility: CLINIC | Age: 71
End: 2021-12-02
Payer: MEDICARE

## 2021-12-05 PROBLEM — D72.822 PLASMACYTOSIS: Status: RESOLVED | Noted: 2018-12-26 | Resolved: 2021-12-05

## 2021-12-05 PROBLEM — R00.0 TACHYCARDIA: Status: RESOLVED | Noted: 2018-12-10 | Resolved: 2021-12-05

## 2021-12-05 PROBLEM — C90.30 PLASMACYTOMA (HCC): Status: RESOLVED | Noted: 2019-01-11 | Resolved: 2021-12-05

## 2021-12-06 ENCOUNTER — HOSPITAL ENCOUNTER (OUTPATIENT)
Dept: INFUSION CENTER | Facility: CLINIC | Age: 71
Discharge: HOME | End: 2021-12-06
Payer: MEDICARE

## 2021-12-06 VITALS
HEIGHT: 60 IN | TEMPERATURE: 97.4 F | HEART RATE: 75 BPM | RESPIRATION RATE: 18 BRPM | DIASTOLIC BLOOD PRESSURE: 60 MMHG | BODY MASS INDEX: 24.64 KG/M2 | SYSTOLIC BLOOD PRESSURE: 133 MMHG | WEIGHT: 125.5 LBS | OXYGEN SATURATION: 98 %

## 2021-12-06 DIAGNOSIS — C90.00 MULTIPLE MYELOMA NOT HAVING ACHIEVED REMISSION (HCC): Primary | ICD-10-CM

## 2021-12-06 PROCEDURE — 96365 THER/PROPH/DIAG IV INF INIT: CPT

## 2021-12-06 RX ORDER — SODIUM CHLORIDE 9 MG/ML
20 INJECTION, SOLUTION INTRAVENOUS ONCE
Status: CANCELLED | OUTPATIENT
Start: 2022-02-22

## 2021-12-06 RX ORDER — SODIUM CHLORIDE 9 MG/ML
20 INJECTION, SOLUTION INTRAVENOUS ONCE
Status: COMPLETED | OUTPATIENT
Start: 2021-12-06 | End: 2021-12-06

## 2021-12-06 RX ADMIN — SODIUM CHLORIDE 20 ML/HR: 0.9 INJECTION, SOLUTION INTRAVENOUS at 08:54

## 2021-12-06 RX ADMIN — ZOLEDRONIC ACID 3.3 MG: 4 INJECTION, SOLUTION, CONCENTRATE INTRAVENOUS at 09:24

## 2021-12-07 ENCOUNTER — TELEPHONE (OUTPATIENT)
Dept: INTERNAL MEDICINE CLINIC | Facility: CLINIC | Age: 71
End: 2021-12-07

## 2021-12-07 ENCOUNTER — DOCUMENTATION (OUTPATIENT)
Dept: HEMATOLOGY ONCOLOGY | Facility: CLINIC | Age: 71
End: 2021-12-07

## 2021-12-09 ENCOUNTER — CLINICAL SUPPORT (OUTPATIENT)
Dept: INTERNAL MEDICINE CLINIC | Facility: CLINIC | Age: 71
End: 2021-12-09
Payer: MEDICARE

## 2021-12-09 DIAGNOSIS — Z23 NEED FOR VACCINATION: Primary | ICD-10-CM

## 2021-12-09 PROCEDURE — G0009 ADMIN PNEUMOCOCCAL VACCINE: HCPCS

## 2021-12-09 PROCEDURE — 90670 PCV13 VACCINE IM: CPT

## 2021-12-15 ENCOUNTER — APPOINTMENT (OUTPATIENT)
Dept: LAB | Facility: CLINIC | Age: 71
End: 2021-12-15
Payer: MEDICARE

## 2021-12-15 DIAGNOSIS — C90.01 MULTIPLE MYELOMA IN REMISSION (HCC): ICD-10-CM

## 2021-12-15 LAB
BASOPHILS # BLD AUTO: 0.01 THOUSANDS/ΜL (ref 0–0.1)
BASOPHILS NFR BLD AUTO: 0 % (ref 0–1)
EOSINOPHIL # BLD AUTO: 0.03 THOUSAND/ΜL (ref 0–0.61)
EOSINOPHIL NFR BLD AUTO: 1 % (ref 0–6)
ERYTHROCYTE [DISTWIDTH] IN BLOOD BY AUTOMATED COUNT: 15 % (ref 11.6–15.1)
HCT VFR BLD AUTO: 36.2 % (ref 34.8–46.1)
HGB BLD-MCNC: 12.2 G/DL (ref 11.5–15.4)
IMM GRANULOCYTES # BLD AUTO: 0 THOUSAND/UL (ref 0–0.2)
IMM GRANULOCYTES NFR BLD AUTO: 0 % (ref 0–2)
LYMPHOCYTES # BLD AUTO: 1.27 THOUSANDS/ΜL (ref 0.6–4.47)
LYMPHOCYTES NFR BLD AUTO: 44 % (ref 14–44)
MCH RBC QN AUTO: 32 PG (ref 26.8–34.3)
MCHC RBC AUTO-ENTMCNC: 33.7 G/DL (ref 31.4–37.4)
MCV RBC AUTO: 95 FL (ref 82–98)
MONOCYTES # BLD AUTO: 0.56 THOUSAND/ΜL (ref 0.17–1.22)
MONOCYTES NFR BLD AUTO: 19 % (ref 4–12)
NEUTROPHILS # BLD AUTO: 1.06 THOUSANDS/ΜL (ref 1.85–7.62)
NEUTS SEG NFR BLD AUTO: 36 % (ref 43–75)
NRBC BLD AUTO-RTO: 0 /100 WBCS
PLATELET # BLD AUTO: 145 THOUSANDS/UL (ref 149–390)
PMV BLD AUTO: 9.9 FL (ref 8.9–12.7)
RBC # BLD AUTO: 3.81 MILLION/UL (ref 3.81–5.12)
WBC # BLD AUTO: 2.93 THOUSAND/UL (ref 4.31–10.16)

## 2021-12-15 PROCEDURE — 36415 COLL VENOUS BLD VENIPUNCTURE: CPT

## 2021-12-15 PROCEDURE — 85025 COMPLETE CBC W/AUTO DIFF WBC: CPT

## 2021-12-16 ENCOUNTER — TELEPHONE (OUTPATIENT)
Dept: HEMATOLOGY ONCOLOGY | Facility: CLINIC | Age: 71
End: 2021-12-16

## 2021-12-28 ENCOUNTER — TELEPHONE (OUTPATIENT)
Dept: HEMATOLOGY ONCOLOGY | Facility: CLINIC | Age: 71
End: 2021-12-28

## 2021-12-28 DIAGNOSIS — C90.01 MULTIPLE MYELOMA IN REMISSION (HCC): Primary | ICD-10-CM

## 2021-12-29 ENCOUNTER — APPOINTMENT (OUTPATIENT)
Dept: RADIOLOGY | Age: 71
End: 2021-12-29
Payer: MEDICARE

## 2021-12-29 DIAGNOSIS — C90.00 MULTIPLE MYELOMA NOT HAVING ACHIEVED REMISSION (HCC): ICD-10-CM

## 2021-12-29 PROCEDURE — 77075 RADEX OSSEOUS SURVEY COMPL: CPT

## 2021-12-31 ENCOUNTER — APPOINTMENT (OUTPATIENT)
Dept: LAB | Facility: CLINIC | Age: 71
End: 2021-12-31
Payer: MEDICARE

## 2021-12-31 DIAGNOSIS — C90.01 MULTIPLE MYELOMA IN REMISSION (HCC): ICD-10-CM

## 2021-12-31 LAB
ALBUMIN SERPL BCP-MCNC: 3.9 G/DL (ref 3.5–5)
ALP SERPL-CCNC: 78 U/L (ref 46–116)
ALT SERPL W P-5'-P-CCNC: 31 U/L (ref 12–78)
ANION GAP SERPL CALCULATED.3IONS-SCNC: 6 MMOL/L (ref 4–13)
AST SERPL W P-5'-P-CCNC: 21 U/L (ref 5–45)
BILIRUB SERPL-MCNC: 0.96 MG/DL (ref 0.2–1)
BUN SERPL-MCNC: 14 MG/DL (ref 5–25)
CALCIUM SERPL-MCNC: 9 MG/DL (ref 8.3–10.1)
CHLORIDE SERPL-SCNC: 106 MMOL/L (ref 100–108)
CO2 SERPL-SCNC: 27 MMOL/L (ref 21–32)
CREAT SERPL-MCNC: 0.74 MG/DL (ref 0.6–1.3)
GFR SERPL CREATININE-BSD FRML MDRD: 82 ML/MIN/1.73SQ M
GLUCOSE P FAST SERPL-MCNC: 91 MG/DL (ref 65–99)
IGA SERPL-MCNC: 96 MG/DL (ref 70–400)
IGG SERPL-MCNC: 1190 MG/DL (ref 700–1600)
IGM SERPL-MCNC: 34 MG/DL (ref 40–230)
LDH SERPL-CCNC: 111 U/L (ref 81–234)
POTASSIUM SERPL-SCNC: 3.8 MMOL/L (ref 3.5–5.3)
PROT SERPL-MCNC: 7.5 G/DL (ref 6.4–8.2)
SODIUM SERPL-SCNC: 139 MMOL/L (ref 136–145)
URATE SERPL-MCNC: 3.3 MG/DL (ref 2–6.8)

## 2021-12-31 PROCEDURE — 82232 ASSAY OF BETA-2 PROTEIN: CPT

## 2021-12-31 PROCEDURE — 80053 COMPREHEN METABOLIC PANEL: CPT

## 2021-12-31 PROCEDURE — 82784 ASSAY IGA/IGD/IGG/IGM EACH: CPT

## 2021-12-31 PROCEDURE — 85025 COMPLETE CBC W/AUTO DIFF WBC: CPT

## 2021-12-31 PROCEDURE — 84165 PROTEIN E-PHORESIS SERUM: CPT

## 2021-12-31 PROCEDURE — 83883 ASSAY NEPHELOMETRY NOT SPEC: CPT

## 2021-12-31 PROCEDURE — 84165 PROTEIN E-PHORESIS SERUM: CPT | Performed by: PATHOLOGY

## 2021-12-31 PROCEDURE — 86334 IMMUNOFIX E-PHORESIS SERUM: CPT | Performed by: PATHOLOGY

## 2021-12-31 PROCEDURE — 86334 IMMUNOFIX E-PHORESIS SERUM: CPT

## 2021-12-31 PROCEDURE — 83615 LACTATE (LD) (LDH) ENZYME: CPT

## 2021-12-31 PROCEDURE — 84550 ASSAY OF BLOOD/URIC ACID: CPT

## 2022-01-01 ENCOUNTER — TELEPHONE (OUTPATIENT)
Dept: OTHER | Facility: OTHER | Age: 72
End: 2022-01-01

## 2022-01-01 LAB
BASOPHILS # BLD AUTO: 0 THOUSANDS/ΜL (ref 0–0.1)
BASOPHILS NFR BLD AUTO: 0 % (ref 0–1)
EOSINOPHIL # BLD AUTO: 0.02 THOUSAND/ΜL (ref 0–0.61)
EOSINOPHIL NFR BLD AUTO: 1 % (ref 0–6)
ERYTHROCYTE [DISTWIDTH] IN BLOOD BY AUTOMATED COUNT: 15.9 % (ref 11.6–15.1)
HCT VFR BLD AUTO: 36.4 % (ref 34.8–46.1)
HGB BLD-MCNC: 12.2 G/DL (ref 11.5–15.4)
IMM GRANULOCYTES # BLD AUTO: 0.01 THOUSAND/UL (ref 0–0.2)
IMM GRANULOCYTES NFR BLD AUTO: 1 % (ref 0–2)
LYMPHOCYTES # BLD AUTO: 1 THOUSANDS/ΜL (ref 0.6–4.47)
LYMPHOCYTES NFR BLD AUTO: 51 % (ref 14–44)
MCH RBC QN AUTO: 32.1 PG (ref 26.8–34.3)
MCHC RBC AUTO-ENTMCNC: 33.5 G/DL (ref 31.4–37.4)
MCV RBC AUTO: 96 FL (ref 82–98)
MONOCYTES # BLD AUTO: 0.33 THOUSAND/ΜL (ref 0.17–1.22)
MONOCYTES NFR BLD AUTO: 17 % (ref 4–12)
NEUTROPHILS # BLD AUTO: 0.59 THOUSANDS/ΜL (ref 1.85–7.62)
NEUTS SEG NFR BLD AUTO: 30 % (ref 43–75)
NRBC BLD AUTO-RTO: 0 /100 WBCS
PLATELET # BLD AUTO: 137 THOUSANDS/UL (ref 149–390)
PMV BLD AUTO: 9.6 FL (ref 8.9–12.7)
RBC # BLD AUTO: 3.8 MILLION/UL (ref 3.81–5.12)
WBC # BLD AUTO: 1.97 THOUSAND/UL (ref 4.31–10.16)

## 2022-01-01 NOTE — TELEPHONE ENCOUNTER
Lab Result: WBC 1 97   Date/Time Drawn: 12/31/2021 @ 1048   Ordering Provider: Dixie Kapoor PA-C   Lab Personnel's Name: Felisha Wooten  The following critical/stat result was read back to the lab as stated above and [employer] to the on-call provider

## 2022-01-02 ENCOUNTER — DOCUMENTATION (OUTPATIENT)
Dept: HEMATOLOGY ONCOLOGY | Facility: CLINIC | Age: 72
End: 2022-01-02

## 2022-01-02 NOTE — PROGRESS NOTES
Patient is not taking Revlimid  That was put on hold because of low WBC count  ANC has actually decreased more  I spoke with patient  She has instructions about febrile neutropenia  Will try to arrange stat blood count tomorrow and probably Granix  Patient will be contacted tomorrow

## 2022-01-03 DIAGNOSIS — D70.1 CHEMOTHERAPY INDUCED NEUTROPENIA (HCC): Primary | ICD-10-CM

## 2022-01-03 DIAGNOSIS — T45.1X5A CHEMOTHERAPY INDUCED NEUTROPENIA (HCC): Primary | ICD-10-CM

## 2022-01-03 NOTE — TELEPHONE ENCOUNTER
Telephone call spoke with pt  [facility] will go to [facility] infusion on Wednesday for 1 dose of Granix  Review neutropenic precautions

## 2022-01-04 LAB
ALBUMIN SERPL ELPH-MCNC: 4.4 G/DL (ref 3.5–5)
ALBUMIN SERPL ELPH-MCNC: 61.1 % (ref 52–65)
ALPHA1 GLOB SERPL ELPH-MCNC: 0.33 G/DL (ref 0.1–0.4)
ALPHA1 GLOB SERPL ELPH-MCNC: 4.6 % (ref 2.5–5)
ALPHA2 GLOB SERPL ELPH-MCNC: 0.71 G/DL (ref 0.4–1.2)
ALPHA2 GLOB SERPL ELPH-MCNC: 9.8 % (ref 7–13)
B2 MICROGLOB SERPL-MCNC: 1.8 MG/L (ref 0.6–2.4)
BETA GLOB ABNORMAL SERPL ELPH-MCNC: 0.41 G/DL (ref 0.4–0.8)
BETA1 GLOB SERPL ELPH-MCNC: 5.7 % (ref 5–13)
BETA2 GLOB SERPL ELPH-MCNC: 4 % (ref 2–8)
BETA2+GAMMA GLOB SERPL ELPH-MCNC: 0.29 G/DL (ref 0.2–0.5)
GAMMA GLOB ABNORMAL SERPL ELPH-MCNC: 1.07 G/DL (ref 0.5–1.6)
GAMMA GLOB SERPL ELPH-MCNC: 14.8 % (ref 12–22)
IGG/ALB SER: 1.57 {RATIO} (ref 1.1–1.8)
INTERPRETATION UR IFE-IMP: NORMAL
KAPPA LC FREE SER-MCNC: 13.5 MG/L (ref 3.3–19.4)
KAPPA LC FREE/LAMBDA FREE SER: 0.28 {RATIO} (ref 0.26–1.65)
LAMBDA LC FREE SERPL-MCNC: 47.4 MG/L (ref 5.7–26.3)
PROT PATTERN SERPL ELPH-IMP: NORMAL
PROT SERPL-MCNC: 7.2 G/DL (ref 6.4–8.2)

## 2022-01-05 ENCOUNTER — HOSPITAL ENCOUNTER (OUTPATIENT)
Dept: INFUSION CENTER | Facility: CLINIC | Age: 72
Discharge: HOME | End: 2022-01-05
Payer: MEDICARE

## 2022-01-05 VITALS — TEMPERATURE: 97.8 F

## 2022-01-05 DIAGNOSIS — D70.1 CHEMOTHERAPY INDUCED NEUTROPENIA (HCC): Primary | ICD-10-CM

## 2022-01-05 DIAGNOSIS — T45.1X5A CHEMOTHERAPY INDUCED NEUTROPENIA (HCC): Primary | ICD-10-CM

## 2022-01-05 PROCEDURE — 96372 THER/PROPH/DIAG INJ SC/IM: CPT

## 2022-01-05 RX ADMIN — TBO-FILGRASTIM 300 MCG: 300 INJECTION, SOLUTION SUBCUTANEOUS at 13:28

## 2022-01-06 ENCOUNTER — OFFICE VISIT (OUTPATIENT)
Dept: HEMATOLOGY ONCOLOGY | Facility: CLINIC | Age: 72
End: 2022-01-06
Payer: MEDICARE

## 2022-01-06 VITALS
HEART RATE: 101 BPM | DIASTOLIC BLOOD PRESSURE: 80 MMHG | OXYGEN SATURATION: 99 % | HEIGHT: 60 IN | WEIGHT: 127 LBS | SYSTOLIC BLOOD PRESSURE: 122 MMHG | RESPIRATION RATE: 16 BRPM | TEMPERATURE: 97.8 F | BODY MASS INDEX: 24.94 KG/M2

## 2022-01-06 DIAGNOSIS — I10 ESSENTIAL HYPERTENSION: ICD-10-CM

## 2022-01-06 DIAGNOSIS — D70.1 CHEMOTHERAPY INDUCED NEUTROPENIA (HCC): ICD-10-CM

## 2022-01-06 DIAGNOSIS — Z85.3 HISTORY OF BREAST CANCER: ICD-10-CM

## 2022-01-06 DIAGNOSIS — C90.00 OSTEOPOROSIS OF MULTIPLE SITES ASSOCIATED WITH MULTIPLE MYELOMA (HCC): ICD-10-CM

## 2022-01-06 DIAGNOSIS — C50.911 MALIGNANT NEOPLASM OF RIGHT BREAST IN FEMALE, ESTROGEN RECEPTOR POSITIVE, UNSPECIFIED SITE OF BREAST (HCC): ICD-10-CM

## 2022-01-06 DIAGNOSIS — M81.8 OSTEOPOROSIS OF MULTIPLE SITES ASSOCIATED WITH MULTIPLE MYELOMA (HCC): ICD-10-CM

## 2022-01-06 DIAGNOSIS — C90.00 MULTIPLE MYELOMA NOT HAVING ACHIEVED REMISSION (HCC): Primary | ICD-10-CM

## 2022-01-06 DIAGNOSIS — Z17.0 MALIGNANT NEOPLASM OF RIGHT BREAST IN FEMALE, ESTROGEN RECEPTOR POSITIVE, UNSPECIFIED SITE OF BREAST (HCC): ICD-10-CM

## 2022-01-06 DIAGNOSIS — T45.1X5A CHEMOTHERAPY INDUCED NEUTROPENIA (HCC): ICD-10-CM

## 2022-01-06 DIAGNOSIS — D69.6 THROMBOCYTOPENIA (HCC): ICD-10-CM

## 2022-01-06 PROCEDURE — 99214 OFFICE O/P EST MOD 30 MIN: CPT | Performed by: INTERNAL MEDICINE

## 2022-01-06 NOTE — PATIENT INSTRUCTIONS
Blood counts on Monday  Standing blood orders every 4 weeks  To decide about Revlimid dose and when to start  Follow-up in 2 months

## 2022-01-06 NOTE — PROGRESS NOTES
HPI:  Follow-up visit for IgG lambda multiple myeloma post autologous stem cell transplant and patient is on maintenance Revlimid and dose was 10 mg daily, 3 weeks on and 1 week off  She has developed profound neutropenia and has required Granix injection  Revlimid is on hold because of profound neutropenia and she will be restarted on a lower dose of 5 mg of Revlimid daily and there is improvement in blood counts  She is taking baby aspirin daily and she will continue taking that  It is not giving her stomach symptoms and no bleeding  She is not on acyclovir anymore  She is undergoing posttransplant vaccinations  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 and she had radiation  Bone marrow test showed 15% plasma cells and there was IgG lambda spike on SPEP  After period of observation she was started on induction RVD earlier this year in 2020  On 08/28/2020 she had autologous stem cell transplant  No follow-up bone marrow after that so far  She has follow-up appointment with myeloma specialist at [facility]  She has been taking calcium with vitamin-D and has been on Zometa  once every 3 months  She has some tiredness  Minor arthritic symptoms and occasionally low back discomfort  Patient has remote past history of stage I, grade 1, 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  Positive hormone receptors and negative her 2   Patient had right mastectomy   She had adjuvant tamoxifen for 5 years                  Current Outpatient Medications:     aspirin (ECOTRIN LOW STRENGTH) 81 mg EC tablet, Take 81 mg by mouth daily, Disp: , Rfl:     Calcium Citrate-Vitamin D (CALCIUM CITRATE + D PO), Take 1 tablet by mouth 2 (two) times a day  , Disp: , Rfl:     escitalopram (LEXAPRO) 10 mg tablet, TAKE 1 TABLET BY MOUTH EVERY DAY, Disp: 90 tablet, Rfl: 3    metoprolol tartrate (LOPRESSOR) 50 mg tablet, TAKE 1 AND 1/2 TABLETS BY MOUTH TWO TIMES DAILY, Disp: 270 tablet, Rfl: 3    lenalidomide (Revlimid) 10 MG CAPS, TAKE 1 CAPSULE BY MOUTH ONCE DAILY FOR 3 WEEKS ON THEN 1 WEEK OFF (Patient not taking: Reported on 1/5/2022 ), Disp: 21 capsule, Rfl: 0    Allergies   Allergen Reactions    Ibuprofen Abdominal Pain and Tachycardia     Reaction Date: [id number]; Oncology History   Malignant ne[facility] right breast ([facility] )   9/2000 Surgery    Right breast mastectomy     8/13/2012 Initial Diagnosis    Malignant neoplasm of right breast [facility])      Chemotherapy    Tamoxifen for 5 years     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[facility] ) (Resolved)   12/18/2018 Initial Diagnosis    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[facility]  )     12/18/2018 Biopsy    Final Diagnosis   A  Bone, L-4, core biopsy:  - Fragments of trabecular bone with changes compatible with prior fracture site  - 10% lambda predominant plasmacytosis  See note  - Hematopoietic marrow with trilineage hematopoiesis  - No epithelial elements identified, confirmed with negative keratin AE1/AE3 stains  2/1/2019 - 3/7/2019 Radiation    Plan ID Energy Fractions Dose per Fraction (cGy) Dose Correction (cGy) Total Dose Delivered (cGy) Elapsed Days   L3_L5 Spine 10X/6X 25 / 25 180 0 4,500 34          Multiple myeloma not having achieved remission ([facility] )   7/23/2019 Initial Diagnosis    Multiple myeloma not having achieved remission ([place] [address] )     3/16/2020 - 8/3/2020 Chemotherapy    bortezomib (VELCADE) subcutaneous injection 2 1 mg, 1 3 mg/m2 = 2 1 mg, Subcutaneous, Once, 3 of 4 cycles  Administration: 2 1 mg (3/16/2020), 2 1 mg (4/6/2020), 2 1 mg (3/23/2020), 2 1 mg (3/30/2020), 2 1 mg (4/20/2020), 2 1 mg (5/11/2020), 2 1 mg (4/27/2020), 2 1 mg (5/4/2020), 2 1 mg (6/30/2020), 2 1 mg (7/21/2020), 2 1 mg (7/7/2020), 2 1 mg (7/14/2020)         ROS:  01/06/22 Reviewed 12 systems: See symptoms in HPI  Presently no other neurological, cardiac, pulmonary, GI and  symptoms other than mentioned in HPI    No other symptoms like  fever, chills, bleeding, bone pains, skin rash, weight loss,   weakness, numbness,  claudication and gait problem  No frequent infections  Not unusually sensitive to heat or cold  No swelling of the ankles  No swollen glands  Patient is anxious     No new symptoms since last visit      /80 (BP Location: Left arm, Patient Position: Sitting, Cuff Size: Adult)   Pulse 101   Temp 97 8 °F (36 6 °C)   Resp 16   Ht 5' (1 524 m)   Wt 57 6 kg (127 lb)   LMP  (LMP Unknown)   SpO2 99%   BMI 24 80 kg/m²     Physical Exam:    Patient is alert and oriented  Patient is not in distress  Vital signs are above  There is no   icterus, no oral thrush, no palpable neck mass, clear lung fields, regular heart rate, abdomen  soft and non tender, no palpable abdominal mass, no ascites, no edema of ankles, no calf tenderness, no focal neurological deficit, no skin rash, no palpable lymphadenopathy in the neck and axillary areas, good arterial pulses, no clubbing  Patient is anxious  Performance status 1  IMAGING:  IMPRESSION:  No mammographic evidence of malignancy            ASSESSMENT/BI-RADS CATEGORY:  Left: 1 - Negative  Overall: 1 - Negative     RECOMMENDATION:       - Routine screening mammogram in 1 year for the left breast      Workstation ID: [id number]          IMPRESSION:     Multiple thoracolumbar compression fractures which appear stable when allowance is made for technical differences      No new abnormalities             Workstation performed: [id number]     Imaging    Mammo screening left w 3d & cad (Order: [id number]) - 5/10/2021      LABS:    Results for orders placed or performed in visit on 12/31/21   CBC and differential   Result Value Ref Range    WBC 1 97 (LL) 4 31 - 10 16 Thousand/uL    RBC 3 80 (L) 3 81 - 5 12 Million/uL    Hemoglobin 12 2 11 5 - 15 4 g/dL    Hematocrit 36 4 34 8 - 46 1 %    MCV 96 82 - 98 fL    MCH 32 1 26 8 - 34 3 pg    MCHC 33 5 31 4 - 37 4 g/dL    RDW 15 9 (H) 11 6 - 15 1 %    MPV 9 6 8 9 - 12 7 fL Platelets 849 (L) 185 - 390 Thousands/uL    nRBC 0 /100 WBCs    Neutrophils Relative 30 (L) 43 - 75 %    Immat GRANS % 1 0 - 2 %    Lymphocytes Relative 51 (H) 14 - 44 %    Monocytes Relative 17 (H) 4 - 12 %    Eosinophils Relative 1 0 - 6 %    Basophils Relative 0 0 - 1 %    Neutrophils Absolute 0 59 (L) 1 85 - 7 62 Thousands/µL    Immature Grans Absolute 0 01 0 00 - 0 20 Thousand/uL    Lymphocytes Absolute 1 00 0 60 - 4 47 Thousands/µL    Monocytes Absolute 0 33 0 17 - 1 22 Thousand/µL    Eosinophils Absolute 0 02 0 00 - 0 61 Thousand/µL    Basophils Absolute 0 00 0 00 - 0 10 Thousands/µL   Comprehensive metabolic panel   Result Value Ref Range    Sodium 139 136 - 145 mmol/L    Potassium 3 8 3 5 - 5 3 mmol/L    Chloride 106 100 - 108 mmol/L    CO2 27 21 - 32 mmol/L    ANION GAP 6 4 - 13 mmol/L    BUN 14 5 - 25 mg/dL    Creatinine 0 74 0 60 - 1 30 mg/dL    Glucose, Fasting 91 65 - 99 mg/dL    Calcium 9 0 8 3 - 10 1 mg/dL    AST 21 5 - 45 U/L    ALT 31 12 - 78 U/L    Alkaline Phosphatase 78 46 - 116 U/L    Total Protein 7 5 6 4 - 8 2 g/dL    Albumin 3 9 3 5 - 5 0 g/dL    Total Bilirubin 0 96 0 20 - 1 00 mg/dL    eGFR 82 ml/min/1 73sq m   Beta 2 microglobulin, serum   Result Value Ref Range    Beta-2 Microglobulin 1 8 0 6 - 2 4 mg/L   IgG, IgA, IgM   Result Value Ref Range    IGA 96 0 70 0 - 400 0 mg/dL    IGG 1,190 0 700 0-1,600 0 mg/dL    IGM 34 0 (L) 40 0 - 230 0 mg/dL   Immunoglobulin free LT chains blood   Result Value Ref Range    Ig Kappa Free Light Chain 13 5 3 3 - 19 4 mg/L    Ig Lambda Free Light Chain 47 4 (H) 5 7 - 26 3 mg/L    Kappa/Lambda FluidC Ratio 0 28 0 26 - 1 65   Protein electrophoresis, serum   Result Value Ref Range    A/G Ratio 1 57 1 10 - 1 80    Albumin Electrophoresis 61 1 52 0 - 65 0 %    Albumin CONC 4 40 3 50 - 5 00 g/dl    Alpha 1 4 6 2 5 - 5 0 %    ALPHA 1 CONC 0 33 0 10 - 0 40 g/dL    Alpha 2 9 8 7 0 - 13 0 %    ALPHA 2 CONC 0 71 0 40 - 1 20 g/dL    Beta-1 5 7 5 0 - 13 0 %    BETA 1 CONC 0 41 0 40 - 0 80 g/dL    Beta-2 4 0 2 0 - 8 0 %    BETA 2 CONC 0 29 0 20 - 0 50 g/dL    Gamma Globulin 14 8 12 0 - 22 0 %    GAMMA CONC 1 07 0 50 - 1 60 g/dL    Total Protein 7 2 6 4 - 8 2 g/dL    SPEP Interpretation See Comment    LD,Blood   Result Value Ref Range     81 - 234 U/L   Uric acid   Result Value Ref Range    Uric Acid 3 3 2 0 - 6 8 mg/dL   Immunofixation, Serum(Reflex Only-Do Not Order)   Result Value Ref Range    Immunofixation Interpretation See Comment      Labs, Imaging, & Other studies:   All pertinent labs and imaging studies were personally reviewed    Lab Results   Component Value Date     11/28/2017    K 3 8 12/31/2021     12/31/2021    CO2 27 12/31/2021    ANIONGAP 9 09/02/2014    BUN 14 12/31/2021    CREATININE 0 74 12/31/2021    GLUCOSE 93 09/02/2014    GLUF 91 12/31/2021    CALCIUM 9 0 12/31/2021    CORRECTEDCA 9 5 09/21/2020    AST 21 12/31/2021    ALT 31 12/31/2021    ALKPHOS 78 12/31/2021    PROT 8 0 09/02/2014    BILITOT 0 32 09/02/2014    EGFR 82 12/31/2021     Lab Results   Component Value Date    WBC 1 97 (LL) 12/31/2021    HGB 12 2 12/31/2021    HCT 36 4 12/31/2021    MCV 96 12/31/2021     (L) 12/31/2021       Reviewed and discussed with patient  Assessment and plan: Follow-up visit for IgG lambda multiple myeloma post autologous stem cell transplant and patient is on maintenance Revlimid and dose was 10 mg daily, 3 weeks on and 1 week off  She has developed profound neutropenia and has required Granix injection  Revlimid is on hold because of profound neutropenia and she will be restarted on a lower dose of 5 mg of Revlimid daily and there is improvement in blood counts  She is taking baby aspirin daily and she will continue taking that  It is not giving her stomach symptoms and no bleeding  She is not on acyclovir anymore  She is undergoing posttransplant vaccinations    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 and she had radiation  Bone marrow test showed 15% plasma cells and there was IgG lambda spike on SPEP  After period of observation she was started on induction RVD earlier this year in 2020  On 08/28/2020 she had autologous stem cell transplant  No follow-up bone marrow after that so far  She has follow-up appointment with myeloma specialist at [facility]  She has been taking calcium with vitamin-D and has been on Zometa  once every 3 months  She has some tiredness  Minor arthritic symptoms and occasionally low back discomfort  Patient has remote past history of stage I, grade 1, 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  Positive hormone receptors and negative her 2  Patient had right mastectomy   She had adjuvant tamoxifen for 5 years          Physical examination and test results are as recorded and discussed     Plan is as above, maintenance Revlimid but at lower dose of 5 mg daily because of profound neutropenia, baby aspirin, Zometa and calcium with vitamin-D  Emily Multani She has instructions about febrile neutropenia  Follow-up with myeloma specialist at [facility]     She does not have problem with her teeth for Zometa      All discussed in detail   Questions answered   Discussed the importance of self-breast examination, eating healthy foods, staying active and health screening tests   Patient is capable of self-care     Discussed precautions against coronavirus         She follows with her gynecologist for examination of breast         1  Multiple myeloma not having achieved remission ([facility] )    - Beta 2 microglobulin, serum; Standing  - CBC and differential; Standing  - Comprehensive metabolic panel; Standing  - IgG, IgA, IgM;  Standing  - LD,Blood; Standing  - Protein electrophoresis, serum; Standing  - Immunoglobulin free LT chains blood; Standing  - Beta 2 microglobulin, serum  - CBC and differential  - Comprehensive metabolic panel  - IgG, IgA, IgM  - LD,Blood  - Protein electrophoresis, serum  - Immunoglobulin free LT chains blood    2  Chemotherapy induced neutropenia ([facility])    - CBC and differential; Future    3  Thrombocytopenia ([facility] )      4  Osteoporosis of multiple sites associated with multiple myeloma ([facility] )      5  Malignant neoplasm of right breast in female, estrogen receptor positive, unspecified site of breast ([facility] )      6  History of breast cancer      7  Essential hypertension                Blood counts on Monday  Standing blood orders every 4 weeks  To decide about Revlimid dose and when to start  Follow-up in 2 months  Patient voiced understanding and agreed      Counseling / Coordination of Care      Provided counseling and support

## 2022-01-10 ENCOUNTER — APPOINTMENT (OUTPATIENT)
Dept: LAB | Facility: CLINIC | Age: 72
End: 2022-01-10
Payer: MEDICARE

## 2022-01-10 LAB
BASOPHILS # BLD AUTO: 0.01 THOUSANDS/ΜL (ref 0–0.1)
BASOPHILS NFR BLD AUTO: 0 % (ref 0–1)
EOSINOPHIL # BLD AUTO: 0.02 THOUSAND/ΜL (ref 0–0.61)
EOSINOPHIL NFR BLD AUTO: 1 % (ref 0–6)
ERYTHROCYTE [DISTWIDTH] IN BLOOD BY AUTOMATED COUNT: 14.9 % (ref 11.6–15.1)
HCT VFR BLD AUTO: 39.1 % (ref 34.8–46.1)
HGB BLD-MCNC: 13 G/DL (ref 11.5–15.4)
IMM GRANULOCYTES # BLD AUTO: 0.02 THOUSAND/UL (ref 0–0.2)
IMM GRANULOCYTES NFR BLD AUTO: 1 % (ref 0–2)
LYMPHOCYTES # BLD AUTO: 1.59 THOUSANDS/ΜL (ref 0.6–4.47)
LYMPHOCYTES NFR BLD AUTO: 51 % (ref 14–44)
MCH RBC QN AUTO: 32.1 PG (ref 26.8–34.3)
MCHC RBC AUTO-ENTMCNC: 33.2 G/DL (ref 31.4–37.4)
MCV RBC AUTO: 97 FL (ref 82–98)
MONOCYTES # BLD AUTO: 0.3 THOUSAND/ΜL (ref 0.17–1.22)
MONOCYTES NFR BLD AUTO: 10 % (ref 4–12)
NEUTROPHILS # BLD AUTO: 1.13 THOUSANDS/ΜL (ref 1.85–7.62)
NEUTS SEG NFR BLD AUTO: 37 % (ref 43–75)
NRBC BLD AUTO-RTO: 0 /100 WBCS
PLATELET # BLD AUTO: 136 THOUSANDS/UL (ref 149–390)
PMV BLD AUTO: 10 FL (ref 8.9–12.7)
RBC # BLD AUTO: 4.05 MILLION/UL (ref 3.81–5.12)
WBC # BLD AUTO: 3.07 THOUSAND/UL (ref 4.31–10.16)

## 2022-01-10 PROCEDURE — 85025 COMPLETE CBC W/AUTO DIFF WBC: CPT | Performed by: INTERNAL MEDICINE

## 2022-01-10 PROCEDURE — 36415 COLL VENOUS BLD VENIPUNCTURE: CPT | Performed by: INTERNAL MEDICINE

## 2022-01-13 ENCOUNTER — CLINICAL SUPPORT (OUTPATIENT)
Dept: INTERNAL MEDICINE CLINIC | Facility: CLINIC | Age: 72
End: 2022-01-13
Payer: MEDICARE

## 2022-01-13 DIAGNOSIS — Z23 NEED FOR VACCINATION: Primary | ICD-10-CM

## 2022-01-13 PROCEDURE — G0009 ADMIN PNEUMOCOCCAL VACCINE: HCPCS

## 2022-01-13 PROCEDURE — 90670 PCV13 VACCINE IM: CPT

## 2022-01-13 NOTE — PROGRESS NOTES
Per Dr Lori Casanova message below patient to receive 3 Prevnar-13 shots one month apart  Message from Mari Dorsey MD sent at 12/7/2021  1:37 PM EST -----  Regarding: FW: vaccines post stem cell transplant  Please call patient that we can start the pneumococcal vaccine  Prevnar 13-  3 doses, 1 month apart  There are other vaccines to follow and this is the one we have here in the office  I will arrange for the other vaccine she needs  Patient presents for second Prevnar-13 injection  Injection given in left deltoid, without issue  Patient tolerated well

## 2022-01-21 ENCOUNTER — CLINICAL SUPPORT (OUTPATIENT)
Dept: INTERNAL MEDICINE CLINIC | Facility: CLINIC | Age: 72
End: 2022-01-21

## 2022-01-21 DIAGNOSIS — Z23 NEED FOR VACCINATION: Primary | ICD-10-CM

## 2022-01-26 ENCOUNTER — TELEPHONE (OUTPATIENT)
Dept: HEMATOLOGY ONCOLOGY | Facility: CLINIC | Age: 72
End: 2022-01-26

## 2022-01-26 NOTE — TELEPHONE ENCOUNTER
Appointment Cancellation Or Reschedule     Person calling in Patient    Provider Dr Marylene Bottom   Office Visit Date and Time  3/24/22 1:20 pm   Office Visit Location [facility] [place], [place]   Did patient want to reschedule their office appointment? If so, when was it scheduled to? Yes  4/4/22 1:20pm   Is this patient calling to reschedule an infusion appointment? no   When is their next infusion appointment? n/a   Is this patient a Chemo patient? no   Reason for Cancellation or Reschedule Patient calling in, to reschedule appointment stating she will be out of town day of appointment and would like to reschedule the week of the 4th April  If the patient is a treatment patient, please route this to the office nurse  If the patient is not on treatment, please route to the office MA

## 2022-01-31 ENCOUNTER — APPOINTMENT (OUTPATIENT)
Dept: LAB | Facility: CLINIC | Age: 72
End: 2022-01-31
Payer: MEDICARE

## 2022-01-31 LAB
ALBUMIN SERPL BCP-MCNC: 3.9 G/DL (ref 3.5–5)
ALP SERPL-CCNC: 76 U/L (ref 46–116)
ALT SERPL W P-5'-P-CCNC: 24 U/L (ref 12–78)
ANION GAP SERPL CALCULATED.3IONS-SCNC: 4 MMOL/L (ref 4–13)
AST SERPL W P-5'-P-CCNC: 16 U/L (ref 5–45)
BILIRUB SERPL-MCNC: 0.49 MG/DL (ref 0.2–1)
BUN SERPL-MCNC: 19 MG/DL (ref 5–25)
CALCIUM SERPL-MCNC: 9.2 MG/DL (ref 8.3–10.1)
CHLORIDE SERPL-SCNC: 107 MMOL/L (ref 100–108)
CO2 SERPL-SCNC: 26 MMOL/L (ref 21–32)
CREAT SERPL-MCNC: 0.77 MG/DL (ref 0.6–1.3)
GFR SERPL CREATININE-BSD FRML MDRD: 79 ML/MIN/1.73SQ M
GLUCOSE P FAST SERPL-MCNC: 94 MG/DL (ref 65–99)
IGA SERPL-MCNC: 91 MG/DL (ref 70–400)
IGG SERPL-MCNC: 1080 MG/DL (ref 700–1600)
IGM SERPL-MCNC: 39 MG/DL (ref 40–230)
LDH SERPL-CCNC: 107 U/L (ref 81–234)
POTASSIUM SERPL-SCNC: 3.7 MMOL/L (ref 3.5–5.3)
PROT SERPL-MCNC: 7.5 G/DL (ref 6.4–8.2)
SODIUM SERPL-SCNC: 137 MMOL/L (ref 136–145)

## 2022-01-31 PROCEDURE — 83521 IG LIGHT CHAINS FREE EACH: CPT | Performed by: INTERNAL MEDICINE

## 2022-01-31 PROCEDURE — 80053 COMPREHEN METABOLIC PANEL: CPT | Performed by: INTERNAL MEDICINE

## 2022-01-31 PROCEDURE — 36415 COLL VENOUS BLD VENIPUNCTURE: CPT | Performed by: INTERNAL MEDICINE

## 2022-01-31 PROCEDURE — 84165 PROTEIN E-PHORESIS SERUM: CPT | Performed by: INTERNAL MEDICINE

## 2022-01-31 PROCEDURE — 82232 ASSAY OF BETA-2 PROTEIN: CPT | Performed by: INTERNAL MEDICINE

## 2022-01-31 PROCEDURE — 82784 ASSAY IGA/IGD/IGG/IGM EACH: CPT | Performed by: INTERNAL MEDICINE

## 2022-01-31 PROCEDURE — 86334 IMMUNOFIX E-PHORESIS SERUM: CPT | Performed by: INTERNAL MEDICINE

## 2022-01-31 PROCEDURE — 84165 PROTEIN E-PHORESIS SERUM: CPT | Performed by: PATHOLOGY

## 2022-01-31 PROCEDURE — 83615 LACTATE (LD) (LDH) ENZYME: CPT | Performed by: INTERNAL MEDICINE

## 2022-02-01 ENCOUNTER — TELEPHONE (OUTPATIENT)
Dept: HEMATOLOGY ONCOLOGY | Facility: CLINIC | Age: 72
End: 2022-02-01

## 2022-02-01 NOTE — TELEPHONE ENCOUNTER
Spoke with patient  [address] slightly decreased  No symptoms  She is taking Revlimid as prescribed  Reviewed the we would like CBC-D monthly  She is leaving for [place] in RV next week for the month  Advised to go to [facility] labs once a week, then call and give us location and phone number to obtain results  Staff to email patient CBC-D script  Reviewed it is standing order so do not let the lab keep the order

## 2022-02-02 LAB
ALBUMIN SERPL ELPH-MCNC: 4.21 G/DL (ref 3.5–5)
ALBUMIN SERPL ELPH-MCNC: 60.2 % (ref 52–65)
ALPHA1 GLOB SERPL ELPH-MCNC: 0.33 G/DL (ref 0.1–0.4)
ALPHA1 GLOB SERPL ELPH-MCNC: 4.7 % (ref 2.5–5)
ALPHA2 GLOB SERPL ELPH-MCNC: 0.76 G/DL (ref 0.4–1.2)
ALPHA2 GLOB SERPL ELPH-MCNC: 10.8 % (ref 7–13)
B2 MICROGLOB SERPL-MCNC: 1.5 MG/L (ref 0.6–2.4)
BETA GLOB ABNORMAL SERPL ELPH-MCNC: 0.38 G/DL (ref 0.4–0.8)
BETA1 GLOB SERPL ELPH-MCNC: 5.4 % (ref 5–13)
BETA2 GLOB SERPL ELPH-MCNC: 4.4 % (ref 2–8)
BETA2+GAMMA GLOB SERPL ELPH-MCNC: 0.31 G/DL (ref 0.2–0.5)
GAMMA GLOB ABNORMAL SERPL ELPH-MCNC: 1.02 G/DL (ref 0.5–1.6)
GAMMA GLOB SERPL ELPH-MCNC: 14.5 % (ref 12–22)
IGG/ALB SER: 1.51 {RATIO} (ref 1.1–1.8)
INTERPRETATION UR IFE-IMP: NORMAL
KAPPA LC FREE SER-MCNC: 13.8 MG/L (ref 3.3–19.4)
KAPPA LC FREE/LAMBDA FREE SER: 0.29 {RATIO} (ref 0.26–1.65)
LAMBDA LC FREE SERPL-MCNC: 48.4 MG/L (ref 5.7–26.3)
M PEAK ID 2: 2.1 %
M PROTEIN 1 MFR SERPL ELPH: 2.1 %
M PROTEIN 1 SERPL ELPH-MCNC: 0.15 G/DL
M PROTEIN 2 SERPL ELPH-MCNC: 0.15 G/DL
PROT PATTERN SERPL ELPH-IMP: ABNORMAL
PROT SERPL-MCNC: 7 G/DL (ref 6.4–8.2)

## 2022-02-07 ENCOUNTER — CLINICAL SUPPORT (OUTPATIENT)
Dept: INTERNAL MEDICINE CLINIC | Facility: CLINIC | Age: 72
End: 2022-02-07
Payer: MEDICARE

## 2022-02-07 ENCOUNTER — APPOINTMENT (OUTPATIENT)
Dept: LAB | Facility: CLINIC | Age: 72
End: 2022-02-07
Payer: MEDICARE

## 2022-02-07 DIAGNOSIS — Z23 NEED FOR VACCINATION: Primary | ICD-10-CM

## 2022-02-07 PROCEDURE — 90648 HIB PRP-T VACCINE 4 DOSE IM: CPT

## 2022-02-07 PROCEDURE — 90471 IMMUNIZATION ADMIN: CPT

## 2022-02-07 PROCEDURE — 90472 IMMUNIZATION ADMIN EACH ADD: CPT

## 2022-02-07 PROCEDURE — 90734 MENACWYD/MENACWYCRM VACC IM: CPT

## 2022-02-25 ENCOUNTER — PATIENT MESSAGE (OUTPATIENT)
Dept: HEMATOLOGY ONCOLOGY | Facility: CLINIC | Age: 72
End: 2022-02-25

## 2022-02-25 ENCOUNTER — TELEPHONE (OUTPATIENT)
Dept: HEMATOLOGY ONCOLOGY | Facility: CLINIC | Age: 72
End: 2022-02-25

## 2022-02-25 NOTE — TELEPHONE ENCOUNTER
Jumana Lorenz patient is not currently taking the Revlimid  I printed lab results and will scan them into epic

## 2022-02-25 NOTE — TELEPHONE ENCOUNTER
----- Message from Aj Seymour sent at 2/25/2022 10:44 AM EST -----  Regarding: Blood tests from [place]  Per our last phone call, I had tests done at [facility] in [place], [place] on 2/23  Their website has an option for me to email the results or I was told you can get them by calling their client services at [phone]  There is also a chance that they faxed it to [phone], (per the script)  BTW, I am not currently taking Revlimid  Also, the temp here yesterday was 89 degrees! We will be home ~ March 8    Thanks  Álvaro Chung

## 2022-02-25 NOTE — TELEPHONE ENCOUNTER
Spoke with patient  ANC is 1 1  She is not taking revlimid and has not for over 2 months now  She will have labs when she comes home from [facility] in early March  Continue to hold revlimid

## 2022-03-09 DIAGNOSIS — I10 ESSENTIAL HYPERTENSION: ICD-10-CM

## 2022-03-09 RX ORDER — METOPROLOL TARTRATE 50 MG/1
TABLET, FILM COATED ORAL
Qty: 270 TABLET | Refills: 3 | Status: SHIPPED | OUTPATIENT
Start: 2022-03-09

## 2022-03-10 ENCOUNTER — APPOINTMENT (OUTPATIENT)
Dept: LAB | Facility: CLINIC | Age: 72
End: 2022-03-10
Payer: MEDICARE

## 2022-03-10 DIAGNOSIS — T45.1X5A CHEMOTHERAPY INDUCED NEUTROPENIA (HCC): ICD-10-CM

## 2022-03-10 DIAGNOSIS — D70.1 CHEMOTHERAPY INDUCED NEUTROPENIA (HCC): ICD-10-CM

## 2022-03-10 DIAGNOSIS — C90.00 MULTIPLE MYELOMA NOT HAVING ACHIEVED REMISSION (HCC): ICD-10-CM

## 2022-03-10 PROCEDURE — 84165 PROTEIN E-PHORESIS SERUM: CPT | Performed by: PATHOLOGY

## 2022-03-11 ENCOUNTER — TELEPHONE (OUTPATIENT)
Dept: HEMATOLOGY ONCOLOGY | Facility: CLINIC | Age: 72
End: 2022-03-11

## 2022-03-11 DIAGNOSIS — C90.00 MULTIPLE MYELOMA NOT HAVING ACHIEVED REMISSION (HCC): Primary | ICD-10-CM

## 2022-03-11 RX ORDER — SODIUM CHLORIDE 9 MG/ML
20 INJECTION, SOLUTION INTRAVENOUS ONCE
Status: CANCELLED | OUTPATIENT
Start: 2022-03-14

## 2022-03-11 NOTE — TELEPHONE ENCOUNTER
Spoke with patient  Advised to continue to hold Revlimid  Advise to discuss with Dr Josse Mcginnis at [place]  She will then review with Dr Maxwell Bowie at visit on 4/4/22

## 2022-03-14 ENCOUNTER — HOSPITAL ENCOUNTER (OUTPATIENT)
Dept: INFUSION CENTER | Facility: CLINIC | Age: 72
Discharge: HOME | End: 2022-03-14
Payer: MEDICARE

## 2022-03-14 VITALS
DIASTOLIC BLOOD PRESSURE: 95 MMHG | WEIGHT: 129.5 LBS | BODY MASS INDEX: 25.29 KG/M2 | TEMPERATURE: 97.4 F | HEART RATE: 77 BPM | RESPIRATION RATE: 18 BRPM | SYSTOLIC BLOOD PRESSURE: 138 MMHG | OXYGEN SATURATION: 97 %

## 2022-03-14 DIAGNOSIS — C90.00 MULTIPLE MYELOMA NOT HAVING ACHIEVED REMISSION (HCC): Primary | ICD-10-CM

## 2022-03-14 PROCEDURE — 96365 THER/PROPH/DIAG IV INF INIT: CPT

## 2022-03-14 RX ORDER — SODIUM CHLORIDE 9 MG/ML
20 INJECTION, SOLUTION INTRAVENOUS ONCE
Status: COMPLETED | OUTPATIENT
Start: 2022-03-14 | End: 2022-03-14

## 2022-03-14 RX ORDER — SODIUM CHLORIDE 9 MG/ML
20 INJECTION, SOLUTION INTRAVENOUS ONCE
OUTPATIENT
Start: 2022-06-06

## 2022-03-14 RX ADMIN — ZOLEDRONIC ACID 3.3 MG: 4 INJECTION, SOLUTION, CONCENTRATE INTRAVENOUS at 14:21

## 2022-03-14 RX ADMIN — SODIUM CHLORIDE 20 ML/HR: 0.9 INJECTION, SOLUTION INTRAVENOUS at 14:15

## 2022-03-14 NOTE — PROGRESS NOTES
Pt to clinic for zometa infusion, pt tolerated infusion without complications, no further appointments scheduled with infusion at this time, pt wants to talk to provider regarding schedule because she will be away for next treatment, declined avs

## 2022-03-17 ENCOUNTER — TELEPHONE (OUTPATIENT)
Dept: INTERNAL MEDICINE CLINIC | Facility: CLINIC | Age: 72
End: 2022-03-17

## 2022-03-17 ENCOUNTER — CLINICAL SUPPORT (OUTPATIENT)
Dept: INTERNAL MEDICINE CLINIC | Facility: CLINIC | Age: 72
End: 2022-03-17
Payer: MEDICARE

## 2022-03-17 DIAGNOSIS — Z23 NEED FOR VACCINATION: Primary | ICD-10-CM

## 2022-03-17 PROCEDURE — G0009 ADMIN PNEUMOCOCCAL VACCINE: HCPCS

## 2022-03-17 PROCEDURE — 90670 PCV13 VACCINE IM: CPT

## 2022-03-17 NOTE — TELEPHONE ENCOUNTER
----- Message from Sunitha Callahan MD sent at 3/16/2022  6:29 PM EDT -----  Patient is coming for vaccines on 3/17 because she had stem cell transplant > 1 year ago  Administer Prevnar 13  In 2 weeks, schedule DTap and polio then in another 2 weeks but after 4/7, schedule Hib and Menactra

## 2022-03-28 ENCOUNTER — TELEPHONE (OUTPATIENT)
Dept: HEMATOLOGY ONCOLOGY | Facility: CLINIC | Age: 72
End: 2022-03-28

## 2022-03-28 DIAGNOSIS — Z90.11 HX OF RIGHT MASTECTOMY: Primary | ICD-10-CM

## 2022-03-28 DIAGNOSIS — Z85.3 HISTORY OF BREAST CANCER: ICD-10-CM

## 2022-03-31 ENCOUNTER — CLINICAL SUPPORT (OUTPATIENT)
Dept: INTERNAL MEDICINE CLINIC | Facility: CLINIC | Age: 72
End: 2022-03-31
Payer: MEDICARE

## 2022-03-31 ENCOUNTER — APPOINTMENT (OUTPATIENT)
Dept: LAB | Facility: CLINIC | Age: 72
End: 2022-03-31
Payer: MEDICARE

## 2022-03-31 DIAGNOSIS — Z23 NEED FOR VACCINATION: Primary | ICD-10-CM

## 2022-03-31 PROCEDURE — 90472 IMMUNIZATION ADMIN EACH ADD: CPT

## 2022-03-31 PROCEDURE — 84165 PROTEIN E-PHORESIS SERUM: CPT | Performed by: PATHOLOGY

## 2022-03-31 PROCEDURE — 90471 IMMUNIZATION ADMIN: CPT

## 2022-03-31 PROCEDURE — 90715 TDAP VACCINE 7 YRS/> IM: CPT

## 2022-03-31 PROCEDURE — 90713 POLIOVIRUS IPV SC/IM: CPT

## 2022-04-04 ENCOUNTER — OFFICE VISIT (OUTPATIENT)
Dept: HEMATOLOGY ONCOLOGY | Facility: CLINIC | Age: 72
End: 2022-04-04
Payer: MEDICARE

## 2022-04-04 VITALS
TEMPERATURE: 97 F | RESPIRATION RATE: 17 BRPM | SYSTOLIC BLOOD PRESSURE: 140 MMHG | DIASTOLIC BLOOD PRESSURE: 90 MMHG | HEIGHT: 60 IN | WEIGHT: 130 LBS | HEART RATE: 79 BPM | BODY MASS INDEX: 25.52 KG/M2 | OXYGEN SATURATION: 97 %

## 2022-04-04 DIAGNOSIS — C90.00 OSTEOPOROSIS OF MULTIPLE SITES ASSOCIATED WITH MULTIPLE MYELOMA (HCC): ICD-10-CM

## 2022-04-04 DIAGNOSIS — C90.00 MULTIPLE MYELOMA NOT HAVING ACHIEVED REMISSION (HCC): Primary | ICD-10-CM

## 2022-04-04 DIAGNOSIS — M81.8 OSTEOPOROSIS OF MULTIPLE SITES ASSOCIATED WITH MULTIPLE MYELOMA (HCC): ICD-10-CM

## 2022-04-04 DIAGNOSIS — I10 ESSENTIAL HYPERTENSION: ICD-10-CM

## 2022-04-04 DIAGNOSIS — Z17.0 MALIGNANT NEOPLASM OF RIGHT BREAST IN FEMALE, ESTROGEN RECEPTOR POSITIVE, UNSPECIFIED SITE OF BREAST (HCC): ICD-10-CM

## 2022-04-04 DIAGNOSIS — C50.911 MALIGNANT NEOPLASM OF RIGHT BREAST IN FEMALE, ESTROGEN RECEPTOR POSITIVE, UNSPECIFIED SITE OF BREAST (HCC): ICD-10-CM

## 2022-04-04 DIAGNOSIS — Z12.31 SCREENING MAMMOGRAM FOR HIGH-RISK PATIENT: ICD-10-CM

## 2022-04-04 PROCEDURE — 99214 OFFICE O/P EST MOD 30 MIN: CPT | Performed by: INTERNAL MEDICINE

## 2022-04-04 NOTE — PATIENT INSTRUCTIONS
No Revlimid  Mammography around 05/12/2022  Blood work every month  Changing Zometa dates to 05/31/2022 and 09/09/2022  Visit in 3 months   Andrea please

## 2022-04-04 NOTE — PROGRESS NOTES
HPI:    Patient was on maintenance Revlimid until December 2021 for IgG lambda multiple myeloma but that was stopped because of prolonged profound neutropenia and counts were not recovering  She still has leukopenia with neutropenia but no frequent or severe infections and no febrile neutropenia  She has instructions about febrile neutropenia  Neutrophil count has come up above 1000  She remains under surveillance for treated multiple myeloma and has very good partial remission     She is taking baby aspirin daily and she will continue taking that  It is not giving her stomach symptoms and no bleeding  She is not on acyclovir anymore  She is undergoing posttransplant vaccinations  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 and she had radiation  Bone marrow test showed 15% plasma cells and there was IgG lambda spike on SPEP  After period of observation she was started on induction RVD  in 2020  On 08/28/2020 she had autologous stem cell transplant  No follow-up bone marrow after that so far  She follows with myeloma specialist at [facility]  She has been taking calcium with vitamin-D and has been on Zometa  once every 3 months  She has less tiredness  Has arthritic symptoms and occasionally low back discomfort  Patient has remote past history of stage I, grade 1, 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  Positive hormone receptors and negative her 2  Patient had right mastectomy   She had adjuvant tamoxifen for 5 years     She goes for left mammography                 Current Outpatient Medications:     aspirin (ECOTRIN LOW STRENGTH) 81 mg EC tablet, Take 81 mg by mouth daily, Disp: , Rfl:     Calcium Citrate-Vitamin D (CALCIUM CITRATE + D PO), Take 1 tablet by mouth 2 (two) times a day  , Disp: , Rfl:     escitalopram (LEXAPRO) 10 mg tablet, TAKE 1 TABLET BY MOUTH EVERY DAY, Disp: 90 tablet, Rfl: 3    metoprolol tartrate (LOPRESSOR) 50 mg tablet, TAKE 1 AND 1/2 TABLETS BY MOUTH TWO TIMES DAILY, Disp: 270 tablet, Rfl: 3    lenalidomide (Revlimid) 10 MG CAPS, TAKE 1 CAPSULE BY MOUTH ONCE DAILY FOR 3 WEEKS ON THEN 1 WEEK OFF (Patient not taking: Reported on 1/5/2022 ), Disp: 21 capsule, Rfl: 0    Allergies   Allergen Reactions    Ibuprofen Abdominal Pain and Tachycardia     Reaction Date: [id number]; Oncology History   Malignant neoplasm of right breast ([facility] )   9/2000 Surgery    Right breast mastectomy     8/13/2012 Initial Diagnosis    Malignant neoplasm of right breast [facility])      Chemotherapy    Tamoxifen for 5 years     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[place] [address] ) (Resolved)   12/18/2018 Initial Diagnosis    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[place] [address] )     12/18/2018 Biopsy    Final Diagnosis   A  Bone, L-4, core biopsy:  - Fragments of trabecular bone with changes compatible with prior fracture site  - 10% lambda predominant plasmacytosis  See note  - Hematopoietic marrow with trilineage hematopoiesis  - No epithelial elements identified, confirmed with negative keratin AE1/AE3 stains  2/1/2019 - 3/7/2019 Radiation    Plan ID Energy Fractions Dose per Fraction (cGy) Dose Correction (cGy) Total Dose Delivered (cGy) Elapsed Days   L3_L5 Spine 10X/6X 25 / 25 180 0 4,500 34          Multiple myeloma not having achieved remission ([place] [address] )   7/23/2019 Initial Diagnosis    Multiple myeloma not having achieved remission ([place] [address] )     3/16/2020 - 8/3/2020 Chemotherapy    bortezomib (VELCADE) subcutaneous injection 2 1 mg, 1 3 mg/m2 = 2 1 mg, Subcutaneous, Once, 3 of 4 cycles  Administration: 2 1 mg (3/16/2020), 2 1 mg (4/6/2020), 2 1 mg (3/23/2020), 2 1 mg (3/30/2020), 2 1 mg (4/20/2020), 2 1 mg (5/11/2020), 2 1 mg (4/27/2020), 2 1 mg (5/4/2020), 2 1 mg (6/30/2020), 2 1 mg (7/21/2020), 2 1 mg (7/7/2020), 2 1 mg (7/14/2020)         ROS:  04/04/22 Reviewed 12 systems: See symptoms in HPI     Presently no other neurological, cardiac, pulmonary, GI and  symptoms other than mentioned in HPI  Other symptoms are in HPI  No   fever, chills, bleeding, bone pains, skin rash, weight loss, night sweats,  weakness, numbness,  claudication and gait problem  No frequent infections  Not unusually sensitive to heat or cold  No swelling of the ankles  No swollen glands  Patient is less anxious  /90 (BP Location: Left arm, Patient Position: Sitting, Cuff Size: Adult)   Pulse 79   Temp (!) 97 °F (36 1 °C)   Resp 17   Ht 5' (1 524 m)   Wt 59 kg (130 lb)   LMP  (LMP Unknown)   SpO2 97%   BMI 25 39 kg/m²     Physical Exam:    Alert and oriented and not in distress and stable vitals  No icterus, no oral thrush, no palpable neck mass,  lung fields clear to percussion and auscultation, regular heart rate,   soft and non tender abdomen, no palpable abdominal mass, no ascites, no edema of ankles, no calf tenderness, no focal neurological deficit, no skin rash, no palpable lymphadenopathy in the neck and axillary areas, , no clubbing  Patient is anxious  Performance status 1  IMAGING:  IMPRESSION:  No mammographic evidence of malignancy            ASSESSMENT/BI-RADS CATEGORY:  Left: 1 - Negative  Overall: 1 - Negative     RECOMMENDATION:       - Routine screening mammogram in 1 year for the left breast      Workstation ID: [id number]          IMPRESSION:     Multiple thoracolumbar compression fractures which appear stable when allowance is made for technical differences      No new abnormalities             Workstation performed: [id number]     Imaging    Mammo screening left w 3d & cad (Order: [id number]) - 5/10/2021      LABS:    Results for orders placed or performed in visit on 03/25/22   Beta 2 microglobulin, serum   Result Value Ref Range    Beta-2 Microglobulin 1 8 0 6 - 2 4 mg/L   Comprehensive metabolic panel   Result Value Ref Range    Sodium 137 136 - 145 mmol/L    Potassium 4 3 3 5 - 5 3 mmol/L    Chloride 105 100 - 108 mmol/L    CO2 28 21 - 32 mmol/L    ANION GAP 4 4 - 13 mmol/L    BUN 16 5 - 25 mg/dL    Creatinine 0 90 0 60 - 1 30 mg/dL    Glucose, Fasting 94 65 - 99 mg/dL    Calcium 9 5 8 3 - 10 1 mg/dL    AST 19 5 - 45 U/L    ALT 25 12 - 78 U/L    Alkaline Phosphatase 70 46 - 116 U/L    Total Protein 7 5 6 4 - 8 2 g/dL    Albumin 4 1 3 5 - 5 0 g/dL    Total Bilirubin 0 52 0 20 - 1 00 mg/dL    eGFR 65 ml/min/1 73sq m   IgG, IgA, IgM   Result Value Ref Range    IGA 96 0 70 0 - 400 0 mg/dL    IGG 1,110 0 700 0-1,600 0 mg/dL    IGM 58 0 40 0 - 230 0 mg/dL   LD,Blood   Result Value Ref Range     81 - 234 U/L   Protein electrophoresis, serum   Result Value Ref Range    A/G Ratio 1 55 1 10 - 1 80    Albumin Electrophoresis 60 8 52 0 - 65 0 %    Albumin CONC 4 32 3 50 - 5 00 g/dl    Alpha 1 4 5 2 5 - 5 0 %    ALPHA 1 CONC 0 32 0 10 - 0 40 g/dL    Alpha 2 10 7 7 0 - 13 0 %    ALPHA 2 CONC 0 76 0 40 - 1 20 g/dL    Beta-1 5 5 5 0 - 13 0 %    BETA 1 CONC 0 39 (L) 0 40 - 0 80 g/dL    Beta-2 4 2 2 0 - 8 0 %    BETA 2 CONC 0 30 0 20 - 0 50 g/dL    Gamma Globulin 14 3 12 0 - 22 0 %    GAMMA CONC 1 02 0 50 - 1 60 g/dL    M Peak ID 1 1 80 %    M PEAK 1 CONC 0 13 g/dL    M Peak ID 2 2 10 %    M Peak 2 CONC 0 15 g/dL    Total Protein 7 1 6 4 - 8 2 g/dL    SPEP Interpretation See Comment    Immunoglobulin free LT chains blood   Result Value Ref Range    Ig Kappa Free Light Chain 13 9 3 3 - 19 4 mg/L    Ig Lambda Free Light Chain 52 3 (H) 5 7 - 26 3 mg/L    Kappa/Lambda FluidC Ratio 0 27 0 26 - 1 65     Labs, Imaging, & Other studies:   All pertinent labs and imaging studies were personally reviewed    Lab Results   Component Value Date     11/28/2017    K 4 3 03/31/2022     03/31/2022    CO2 28 03/31/2022    ANIONGAP 9 09/02/2014    BUN 16 03/31/2022    CREATININE 0 90 03/31/2022    GLUCOSE 93 09/02/2014    GLUF 94 03/31/2022    CALCIUM 9 5 03/31/2022    CORRECTEDCA 9 5 09/21/2020    AST 19 03/31/2022    ALT 25 03/31/2022    ALKPHOS 70 03/31/2022    PROT 8 0 09/02/2014 BILITOT 0 32 09/02/2014    EGFR 65 03/31/2022     Lab Results   Component Value Date    WBC 2 54 (L) 03/31/2022    HGB 12 6 03/31/2022    HCT 38 5 03/31/2022    MCV 97 03/31/2022     (L) 03/31/2022       Reviewed test results  and discussed with patient  Assessment and plan:   Patient was on maintenance Revlimid until December 2021 for IgG lambda multiple myeloma but that was stopped because of prolonged profound neutropenia and counts were not recovering  She still has leukopenia with neutropenia but no frequent or severe infections and no febrile neutropenia  She has instructions about febrile neutropenia  Neutrophil count has come up above 1000  She remains under surveillance for treated multiple myeloma and has very good partial remission     She is taking baby aspirin daily and she will continue taking that  It is not giving her stomach symptoms and no bleeding  She is not on acyclovir anymore  She is undergoing posttransplant vaccinations  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 and she had radiation  Bone marrow test showed 15% plasma cells and there was IgG lambda spike on SPEP  After period of observation she was started on induction RVD  in 2020  On 08/28/2020 she had autologous stem cell transplant  No follow-up bone marrow after that so far  She follows with myeloma specialist at [facility]  She has been taking calcium with vitamin-D and has been on Zometa  once every 3 months  She has less tiredness  Has arthritic symptoms and occasionally low back discomfort  Patient has remote past history of stage I, grade 1, 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  Positive hormone receptors and negative her 2  Patient had right mastectomy   She had adjuvant tamoxifen for 5 years     She goes for left mammography           Physical examination and test results are as recorded and discussed    Patient is in very good partial remission  She remains under surveillance  She is not on maintenance Revlimid because of persistent neutropenia  Leukopenia/neutropenia problem is persisting but no febrile neutropenia and she has instructions to prevent infections and to report to emergency room with febrile neutropenia and other medical emergencies  She plans to has some medications during summer months and has a new schedule for Zometa and office visit  She will have follow-up mammography  She does not have problem with her teeth for Zometa  Goal is prolongation of survival and prevention of febrile neutropenia  She will continue to follow with myeloma specialist at [facility] discussed in detail   Questions answered   Discussed the importance of self-breast examination, eating healthy foods, staying active and health screening tests  Anne-Marie Blind is capable of self-care     Discussed precautions against coronavirus      Patient is interested in having Andrea         She follows with her gynecologist for examination of breast       1  Multiple myeloma not having achieved remission ([address] )      2  Malignant neoplasm of right breast in female, estrogen receptor positive, unspecified site of breast ([address] )    - Mammo screening left w 3d & cad; Future    3  Essential hypertension      4  Osteoporosis of multiple sites associated with multiple myeloma ([address] )      5  Screening mammogram for high-risk patient    - Mammo screening left w 3d & cad; Future    No Revlimid  Mammography around 05/12/2022  Blood work every month  Changing Zometa dates to 05/31/2022 and 09/09/2022  Visit in 3 months  Andrea please                                 Patient voiced understanding and agreed      Counseling / Coordination of Care      Provided counseling and support

## 2022-04-05 ENCOUNTER — TELEPHONE (OUTPATIENT)
Dept: HEMATOLOGY ONCOLOGY | Facility: CLINIC | Age: 72
End: 2022-04-05

## 2022-04-05 NOTE — TELEPHONE ENCOUNTER
Spoke with patient  Andrea injection is scheduled for 4/14/22 @ 3pm - [facility]  Patient was agreeable and verbalized understanding

## 2022-04-05 NOTE — TELEPHONE ENCOUNTER
Patient would like to check and see if she is okay to do her normal infusion on 4/14 for Hem Onc, along with the HIB and Menactra injections at Dr Yuly Lomax' office in the same day  She thinks this may be too much  Please call patient back to discuss  She will reschedule the nurses appt if she wants to hold off     She had called for her PCP but wanted to keep oncology in the loop

## 2022-04-05 NOTE — TELEPHONE ENCOUNTER
Patient called and rescheduled for 4/28/22 for her HIB and Menactra vaccines      Routing to Hematology oncology as an Carlitos

## 2022-04-14 ENCOUNTER — HOSPITAL ENCOUNTER (OUTPATIENT)
Dept: INFUSION CENTER | Facility: CLINIC | Age: 72
Discharge: HOME | End: 2022-04-14
Payer: MEDICARE

## 2022-04-14 VITALS
TEMPERATURE: 97.4 F | RESPIRATION RATE: 16 BRPM | HEART RATE: 78 BPM | DIASTOLIC BLOOD PRESSURE: 72 MMHG | SYSTOLIC BLOOD PRESSURE: 136 MMHG | OXYGEN SATURATION: 99 %

## 2022-04-14 DIAGNOSIS — C90.00 MULTIPLE MYELOMA NOT HAVING ACHIEVED REMISSION (HCC): Primary | ICD-10-CM

## 2022-04-14 PROCEDURE — M0220 HB TIXAGEV AND CILGAV INF ADMIN: HCPCS | Performed by: INTERNAL MEDICINE

## 2022-04-14 RX ADMIN — AZD7442 600 MG COMBINED: KIT at 15:09

## 2022-04-14 NOTE — PLAN OF CARE
Problem: Knowledge Deficit  Goal: Patient/family/caregiver demonstrates understanding of disease process, treatment plan, medications, and discharge instructions  Description: Complete learning assessment and assess knowledge base    Interventions:  - Provide teaching at level of understanding  - Provide teaching via preferred learning methods  Outcome: Progressing
Patent

## 2022-04-14 NOTE — PROGRESS NOTES
Patient here for Andrea  She understands it is EUA and wishes to proceed, EUA handout given and reviewed  Cilgavimab given in left gluteal, tixagevimab given in right  Bandaids applied  One hour observation in progress  Call bell provided

## 2022-04-25 DIAGNOSIS — C90.00 MULTIPLE MYELOMA NOT HAVING ACHIEVED REMISSION (HCC): Primary | ICD-10-CM

## 2022-04-28 ENCOUNTER — CLINICAL SUPPORT (OUTPATIENT)
Dept: INTERNAL MEDICINE CLINIC | Facility: CLINIC | Age: 72
End: 2022-04-28
Payer: MEDICARE

## 2022-04-28 DIAGNOSIS — Z23 NEED FOR VACCINATION: Primary | ICD-10-CM

## 2022-04-28 PROCEDURE — 90648 HIB PRP-T VACCINE 4 DOSE IM: CPT

## 2022-04-28 PROCEDURE — 90734 MENACWYD/MENACWYCRM VACC IM: CPT

## 2022-04-28 PROCEDURE — 90471 IMMUNIZATION ADMIN: CPT

## 2022-04-28 PROCEDURE — 90472 IMMUNIZATION ADMIN EACH ADD: CPT

## 2022-05-02 ENCOUNTER — TELEPHONE (OUTPATIENT)
Dept: INTERNAL MEDICINE CLINIC | Facility: CLINIC | Age: 72
End: 2022-05-02

## 2022-05-02 NOTE — TELEPHONE ENCOUNTER
----- Message from Humberto Ugalde MD sent at 5/1/2022  8:00 PM EDT -----  Regarding: vaccine schedule  Ask if she has received the COVID 4th dose  I recommend she get it and schedule the vaccines below at least 2 weeks after  Schedule third and final DTap and IPV (polio) in at least 2 weeks from her last vaccinesThen 2 weeks later (after 5/28), the third and final dose of Hib  Then 2 weeks later the first hepatitis A and B  Then 4 weeks later the second hep B  Then 6 months from the first hep A and B, schedule the second hep A and third hep B

## 2022-05-03 ENCOUNTER — TELEPHONE (OUTPATIENT)
Dept: ADMINISTRATIVE | Facility: OTHER | Age: 72
End: 2022-05-03

## 2022-05-03 NOTE — TELEPHONE ENCOUNTER
----- Message from Pepe Roche LPN sent at 2/5/0417 11:59 AM EDT -----  Regarding: care gap request  05/02/22 11:59 AM    Hello, our patient attached above has had CRC: Colonoscopy completed/performed  Please assist in updating the patient chart by pulling the document from the Media Tab  The date of service is 1/20/2022       Thank you,  Pepe Roche LPN  [facility]

## 2022-05-03 NOTE — TELEPHONE ENCOUNTER
Upon review of the In Basket request we were able to locate, review, and update the patient chart as requested for CRC: Colonoscopy  Any additional questions or concerns should be emailed to the Practice Liaisons via [email or web address] email, please do not reply via In Basket      Thank you  Nuha Kim

## 2022-05-12 ENCOUNTER — CLINICAL SUPPORT (OUTPATIENT)
Dept: INTERNAL MEDICINE CLINIC | Facility: CLINIC | Age: 72
End: 2022-05-12

## 2022-05-12 DIAGNOSIS — Z23 NEED FOR VACCINATION: Primary | ICD-10-CM

## 2022-05-26 ENCOUNTER — HOSPITAL ENCOUNTER (OUTPATIENT)
Dept: RADIOLOGY | Age: 72
Discharge: HOME | End: 2022-05-26
Payer: MEDICARE

## 2022-05-26 ENCOUNTER — CLINICAL SUPPORT (OUTPATIENT)
Dept: INTERNAL MEDICINE CLINIC | Facility: CLINIC | Age: 72
End: 2022-05-26
Payer: MEDICARE

## 2022-05-26 VITALS — WEIGHT: 130 LBS | BODY MASS INDEX: 25.52 KG/M2 | HEIGHT: 60 IN

## 2022-05-26 DIAGNOSIS — Z12.31 SCREENING MAMMOGRAM FOR HIGH-RISK PATIENT: ICD-10-CM

## 2022-05-26 DIAGNOSIS — Z17.0 MALIGNANT NEOPLASM OF RIGHT BREAST IN FEMALE, ESTROGEN RECEPTOR POSITIVE, UNSPECIFIED SITE OF BREAST (HCC): ICD-10-CM

## 2022-05-26 DIAGNOSIS — C50.911 MALIGNANT NEOPLASM OF RIGHT BREAST IN FEMALE, ESTROGEN RECEPTOR POSITIVE, UNSPECIFIED SITE OF BREAST (HCC): ICD-10-CM

## 2022-05-26 DIAGNOSIS — Z23 NEED FOR VACCINATION: Primary | ICD-10-CM

## 2022-05-26 PROCEDURE — 77063 BREAST TOMOSYNTHESIS BI: CPT

## 2022-05-26 PROCEDURE — 90648 HIB PRP-T VACCINE 4 DOSE IM: CPT

## 2022-05-26 PROCEDURE — 90471 IMMUNIZATION ADMIN: CPT

## 2022-05-26 PROCEDURE — 77067 SCR MAMMO BI INCL CAD: CPT

## 2022-05-27 ENCOUNTER — APPOINTMENT (OUTPATIENT)
Dept: LAB | Facility: CLINIC | Age: 72
End: 2022-05-27
Payer: MEDICARE

## 2022-05-27 PROCEDURE — 84165 PROTEIN E-PHORESIS SERUM: CPT | Performed by: PATHOLOGY

## 2022-05-30 ENCOUNTER — APPOINTMENT (EMERGENCY)
Dept: CT IMAGING | Facility: HOSPITAL | Age: 72
DRG: 392 | End: 2022-05-30
Payer: MEDICARE

## 2022-05-30 ENCOUNTER — HOSPITAL ENCOUNTER (INPATIENT)
Facility: HOSPITAL | Age: 72
LOS: 2 days | Discharge: HOME | DRG: 392 | End: 2022-06-01
Attending: EMERGENCY MEDICINE | Admitting: SURGERY
Payer: MEDICARE

## 2022-05-30 DIAGNOSIS — K57.20 DIVERTICULITIS OF LARGE INTESTINE WITH PERFORATION, UNSPECIFIED BLEEDING STATUS: ICD-10-CM

## 2022-05-30 DIAGNOSIS — K57.92 DIVERTICULITIS: Primary | ICD-10-CM

## 2022-05-30 LAB
ALBUMIN SERPL BCP-MCNC: 3.9 G/DL (ref 3.5–5)
ALP SERPL-CCNC: 62 U/L (ref 34–104)
ALT SERPL W P-5'-P-CCNC: 13 U/L (ref 7–52)
ANION GAP SERPL CALCULATED.3IONS-SCNC: 10 MMOL/L (ref 4–13)
AST SERPL W P-5'-P-CCNC: 16 U/L (ref 13–39)
BACTERIA UR QL AUTO: ABNORMAL /HPF
BASOPHILS # BLD AUTO: 0.01 THOUSANDS/ΜL (ref 0–0.1)
BASOPHILS NFR BLD AUTO: 0 % (ref 0–1)
BILIRUB SERPL-MCNC: 0.84 MG/DL (ref 0.2–1)
BILIRUB UR QL STRIP: NEGATIVE
BUN SERPL-MCNC: 11 MG/DL (ref 5–25)
CALCIUM SERPL-MCNC: 8.9 MG/DL (ref 8.4–10.2)
CHLORIDE SERPL-SCNC: 103 MMOL/L (ref 96–108)
CLARITY UR: CLEAR
CO2 SERPL-SCNC: 24 MMOL/L (ref 21–32)
COLOR UR: YELLOW
CREAT SERPL-MCNC: 0.74 MG/DL (ref 0.6–1.3)
EOSINOPHIL # BLD AUTO: 0 THOUSAND/ΜL (ref 0–0.61)
EOSINOPHIL NFR BLD AUTO: 0 % (ref 0–6)
ERYTHROCYTE [DISTWIDTH] IN BLOOD BY AUTOMATED COUNT: 13.2 % (ref 11.6–15.1)
GFR SERPL CREATININE-BSD FRML MDRD: 82 ML/MIN/1.73SQ M
GLUCOSE SERPL-MCNC: 110 MG/DL (ref 65–140)
GLUCOSE UR STRIP-MCNC: NEGATIVE MG/DL
HCT VFR BLD AUTO: 34.2 % (ref 34.8–46.1)
HGB BLD-MCNC: 11.7 G/DL (ref 11.5–15.4)
HGB UR QL STRIP.AUTO: ABNORMAL
IMM GRANULOCYTES # BLD AUTO: 0.06 THOUSAND/UL (ref 0–0.2)
IMM GRANULOCYTES NFR BLD AUTO: 1 % (ref 0–2)
KETONES UR STRIP-MCNC: ABNORMAL MG/DL
LEUKOCYTE ESTERASE UR QL STRIP: NEGATIVE
LIPASE SERPL-CCNC: 7 U/L (ref 11–82)
LYMPHOCYTES # BLD AUTO: 0.91 THOUSANDS/ΜL (ref 0.6–4.47)
LYMPHOCYTES NFR BLD AUTO: 14 % (ref 14–44)
MCH RBC QN AUTO: 31.6 PG (ref 26.8–34.3)
MCHC RBC AUTO-ENTMCNC: 34.2 G/DL (ref 31.4–37.4)
MCV RBC AUTO: 92 FL (ref 82–98)
MONOCYTES # BLD AUTO: 0.92 THOUSAND/ΜL (ref 0.17–1.22)
MONOCYTES NFR BLD AUTO: 14 % (ref 4–12)
MUCOUS THREADS UR QL AUTO: ABNORMAL
NEUTROPHILS # BLD AUTO: 4.57 THOUSANDS/ΜL (ref 1.85–7.62)
NEUTS SEG NFR BLD AUTO: 71 % (ref 43–75)
NITRITE UR QL STRIP: NEGATIVE
NON-SQ EPI CELLS URNS QL MICRO: ABNORMAL /HPF
NRBC BLD AUTO-RTO: 0 /100 WBCS
PH UR STRIP.AUTO: 5.5 [PH]
PLATELET # BLD AUTO: 144 THOUSANDS/UL (ref 149–390)
PMV BLD AUTO: 9.5 FL (ref 8.9–12.7)
POTASSIUM SERPL-SCNC: 3.4 MMOL/L (ref 3.5–5.3)
PROT SERPL-MCNC: 6.9 G/DL (ref 6.4–8.4)
PROT UR STRIP-MCNC: NEGATIVE MG/DL
RBC # BLD AUTO: 3.7 MILLION/UL (ref 3.81–5.12)
RBC #/AREA URNS AUTO: ABNORMAL /HPF
SODIUM SERPL-SCNC: 137 MMOL/L (ref 135–147)
SP GR UR STRIP.AUTO: 1.02 (ref 1–1.03)
UROBILINOGEN UR QL STRIP.AUTO: 0.2 E.U./DL
WBC # BLD AUTO: 6.47 THOUSAND/UL (ref 4.31–10.16)
WBC #/AREA URNS AUTO: ABNORMAL /HPF

## 2022-05-30 PROCEDURE — 96361 HYDRATE IV INFUSION ADD-ON: CPT

## 2022-05-30 PROCEDURE — 99285 EMERGENCY DEPT VISIT HI MDM: CPT

## 2022-05-30 PROCEDURE — 74176 CT ABD & PELVIS W/O CONTRAST: CPT

## 2022-05-30 PROCEDURE — 87086 URINE CULTURE/COLONY COUNT: CPT | Performed by: EMERGENCY MEDICINE

## 2022-05-30 PROCEDURE — 80053 COMPREHEN METABOLIC PANEL: CPT | Performed by: EMERGENCY MEDICINE

## 2022-05-30 PROCEDURE — 83690 ASSAY OF LIPASE: CPT | Performed by: EMERGENCY MEDICINE

## 2022-05-30 PROCEDURE — 87040 BLOOD CULTURE FOR BACTERIA: CPT | Performed by: EMERGENCY MEDICINE

## 2022-05-30 PROCEDURE — 85025 COMPLETE CBC W/AUTO DIFF WBC: CPT | Performed by: EMERGENCY MEDICINE

## 2022-05-30 PROCEDURE — G1004 CDSM NDSC: HCPCS

## 2022-05-30 PROCEDURE — 96365 THER/PROPH/DIAG IV INF INIT: CPT

## 2022-05-30 PROCEDURE — 99285 EMERGENCY DEPT VISIT HI MDM: CPT | Performed by: EMERGENCY MEDICINE

## 2022-05-30 PROCEDURE — 81001 URINALYSIS AUTO W/SCOPE: CPT | Performed by: EMERGENCY MEDICINE

## 2022-05-30 PROCEDURE — 36415 COLL VENOUS BLD VENIPUNCTURE: CPT | Performed by: EMERGENCY MEDICINE

## 2022-05-30 RX ORDER — SODIUM CHLORIDE, SODIUM GLUCONATE, SODIUM ACETATE, POTASSIUM CHLORIDE, MAGNESIUM CHLORIDE, SODIUM PHOSPHATE, DIBASIC, AND POTASSIUM PHOSPHATE .53; .5; .37; .037; .03; .012; .00082 G/100ML; G/100ML; G/100ML; G/100ML; G/100ML; G/100ML; G/100ML
50 INJECTION, SOLUTION INTRAVENOUS CONTINUOUS
Status: DISCONTINUED | OUTPATIENT
Start: 2022-05-31 | End: 2022-06-01

## 2022-05-30 RX ORDER — OXYCODONE HYDROCHLORIDE 5 MG/1
5 TABLET ORAL EVERY 4 HOURS PRN
Status: DISCONTINUED | OUTPATIENT
Start: 2022-05-30 | End: 2022-06-01 | Stop reason: HOSPADM

## 2022-05-30 RX ORDER — ENOXAPARIN SODIUM 100 MG/ML
40 INJECTION SUBCUTANEOUS DAILY
Status: DISCONTINUED | OUTPATIENT
Start: 2022-05-31 | End: 2022-06-01 | Stop reason: HOSPADM

## 2022-05-30 RX ORDER — OXYCODONE HYDROCHLORIDE 5 MG/1
2.5 TABLET ORAL EVERY 4 HOURS PRN
Status: DISCONTINUED | OUTPATIENT
Start: 2022-05-30 | End: 2022-06-01 | Stop reason: HOSPADM

## 2022-05-30 RX ORDER — ESCITALOPRAM OXALATE 10 MG/1
10 TABLET ORAL DAILY
Status: DISCONTINUED | OUTPATIENT
Start: 2022-05-31 | End: 2022-06-01 | Stop reason: HOSPADM

## 2022-05-30 RX ORDER — HYDROMORPHONE HCL/PF 1 MG/ML
0.5 SYRINGE (ML) INJECTION EVERY 4 HOURS PRN
Status: DISCONTINUED | OUTPATIENT
Start: 2022-05-30 | End: 2022-06-01 | Stop reason: HOSPADM

## 2022-05-30 RX ORDER — ASPIRIN 81 MG/1
81 TABLET ORAL DAILY
Status: DISCONTINUED | OUTPATIENT
Start: 2022-05-31 | End: 2022-06-01 | Stop reason: HOSPADM

## 2022-05-30 RX ADMIN — SODIUM CHLORIDE 3.38 G: 900 INJECTION, SOLUTION INTRAVENOUS at 23:27

## 2022-05-30 RX ADMIN — SODIUM CHLORIDE 1000 ML: 0.9 INJECTION, SOLUTION INTRAVENOUS at 21:52

## 2022-05-31 ENCOUNTER — HOSPITAL ENCOUNTER (OUTPATIENT)
Dept: INFUSION CENTER | Facility: CLINIC | Age: 72
End: 2022-05-31

## 2022-05-31 PROBLEM — K57.20 DIVERTICULITIS OF LARGE INTESTINE WITH PERFORATION: Status: ACTIVE | Noted: 2022-05-31

## 2022-05-31 LAB
ANION GAP SERPL CALCULATED.3IONS-SCNC: 7 MMOL/L (ref 4–13)
BASOPHILS # BLD AUTO: 0 THOUSANDS/ΜL (ref 0–0.1)
BASOPHILS NFR BLD AUTO: 0 % (ref 0–1)
BUN SERPL-MCNC: 8 MG/DL (ref 5–25)
CALCIUM SERPL-MCNC: 8.1 MG/DL (ref 8.4–10.2)
CHLORIDE SERPL-SCNC: 108 MMOL/L (ref 96–108)
CO2 SERPL-SCNC: 24 MMOL/L (ref 21–32)
CREAT SERPL-MCNC: 0.67 MG/DL (ref 0.6–1.3)
EOSINOPHIL # BLD AUTO: 0.02 THOUSAND/ΜL (ref 0–0.61)
EOSINOPHIL NFR BLD AUTO: 0 % (ref 0–6)
ERYTHROCYTE [DISTWIDTH] IN BLOOD BY AUTOMATED COUNT: 13.5 % (ref 11.6–15.1)
GFR SERPL CREATININE-BSD FRML MDRD: 89 ML/MIN/1.73SQ M
GLUCOSE SERPL-MCNC: 116 MG/DL (ref 65–140)
HCT VFR BLD AUTO: 31.5 % (ref 34.8–46.1)
HGB BLD-MCNC: 10.5 G/DL (ref 11.5–15.4)
IMM GRANULOCYTES # BLD AUTO: 0.06 THOUSAND/UL (ref 0–0.2)
IMM GRANULOCYTES NFR BLD AUTO: 1 % (ref 0–2)
LYMPHOCYTES # BLD AUTO: 1.06 THOUSANDS/ΜL (ref 0.6–4.47)
LYMPHOCYTES NFR BLD AUTO: 19 % (ref 14–44)
MAGNESIUM SERPL-MCNC: 2.1 MG/DL (ref 1.9–2.7)
MCH RBC QN AUTO: 31.3 PG (ref 26.8–34.3)
MCHC RBC AUTO-ENTMCNC: 33.3 G/DL (ref 31.4–37.4)
MCV RBC AUTO: 94 FL (ref 82–98)
MONOCYTES # BLD AUTO: 0.77 THOUSAND/ΜL (ref 0.17–1.22)
MONOCYTES NFR BLD AUTO: 14 % (ref 4–12)
NEUTROPHILS # BLD AUTO: 3.77 THOUSANDS/ΜL (ref 1.85–7.62)
NEUTS SEG NFR BLD AUTO: 66 % (ref 43–75)
NRBC BLD AUTO-RTO: 0 /100 WBCS
PHOSPHATE SERPL-MCNC: 2.8 MG/DL (ref 2.3–4.1)
PLATELET # BLD AUTO: 114 THOUSANDS/UL (ref 149–390)
PMV BLD AUTO: 9.8 FL (ref 8.9–12.7)
POTASSIUM SERPL-SCNC: 3.2 MMOL/L (ref 3.5–5.3)
RBC # BLD AUTO: 3.35 MILLION/UL (ref 3.81–5.12)
SODIUM SERPL-SCNC: 139 MMOL/L (ref 135–147)
WBC # BLD AUTO: 5.68 THOUSAND/UL (ref 4.31–10.16)

## 2022-05-31 PROCEDURE — NC001 PR NO CHARGE: Performed by: SURGERY

## 2022-05-31 PROCEDURE — 93005 ELECTROCARDIOGRAM TRACING: CPT

## 2022-05-31 PROCEDURE — 80048 BASIC METABOLIC PNL TOTAL CA: CPT | Performed by: SURGERY

## 2022-05-31 PROCEDURE — 85025 COMPLETE CBC W/AUTO DIFF WBC: CPT | Performed by: SURGERY

## 2022-05-31 PROCEDURE — 84100 ASSAY OF PHOSPHORUS: CPT | Performed by: SURGERY

## 2022-05-31 PROCEDURE — 83735 ASSAY OF MAGNESIUM: CPT | Performed by: SURGERY

## 2022-05-31 PROCEDURE — 99223 1ST HOSP IP/OBS HIGH 75: CPT | Performed by: SURGERY

## 2022-05-31 RX ORDER — POTASSIUM CHLORIDE 20 MEQ/1
40 TABLET, EXTENDED RELEASE ORAL ONCE
Status: COMPLETED | OUTPATIENT
Start: 2022-05-31 | End: 2022-05-31

## 2022-05-31 RX ADMIN — PIPERACILLIN AND TAZOBACTAM 4.5 G: 4; .5 INJECTION, POWDER, LYOPHILIZED, FOR SOLUTION INTRAVENOUS at 18:49

## 2022-05-31 RX ADMIN — PIPERACILLIN AND TAZOBACTAM 4.5 G: 4; .5 INJECTION, POWDER, LYOPHILIZED, FOR SOLUTION INTRAVENOUS at 23:16

## 2022-05-31 RX ADMIN — SODIUM CHLORIDE, SODIUM GLUCONATE, SODIUM ACETATE, POTASSIUM CHLORIDE, MAGNESIUM CHLORIDE, SODIUM PHOSPHATE, DIBASIC, AND POTASSIUM PHOSPHATE 100 ML/HR: .53; .5; .37; .037; .03; .012; .00082 INJECTION, SOLUTION INTRAVENOUS at 10:03

## 2022-05-31 RX ADMIN — SODIUM CHLORIDE, SODIUM GLUCONATE, SODIUM ACETATE, POTASSIUM CHLORIDE, MAGNESIUM CHLORIDE, SODIUM PHOSPHATE, DIBASIC, AND POTASSIUM PHOSPHATE 125 ML/HR: .53; .5; .37; .037; .03; .012; .00082 INJECTION, SOLUTION INTRAVENOUS at 01:39

## 2022-05-31 RX ADMIN — PIPERACILLIN AND TAZOBACTAM 4.5 G: 4; .5 INJECTION, POWDER, LYOPHILIZED, FOR SOLUTION INTRAVENOUS at 05:11

## 2022-05-31 RX ADMIN — METOPROLOL TARTRATE 75 MG: 50 TABLET, FILM COATED ORAL at 20:34

## 2022-05-31 RX ADMIN — PIPERACILLIN AND TAZOBACTAM 4.5 G: 4; .5 INJECTION, POWDER, LYOPHILIZED, FOR SOLUTION INTRAVENOUS at 10:00

## 2022-05-31 RX ADMIN — METOPROLOL TARTRATE 75 MG: 50 TABLET, FILM COATED ORAL at 00:48

## 2022-05-31 RX ADMIN — SODIUM CHLORIDE, SODIUM GLUCONATE, SODIUM ACETATE, POTASSIUM CHLORIDE, MAGNESIUM CHLORIDE, SODIUM PHOSPHATE, DIBASIC, AND POTASSIUM PHOSPHATE 100 ML/HR: .53; .5; .37; .037; .03; .012; .00082 INJECTION, SOLUTION INTRAVENOUS at 21:15

## 2022-05-31 RX ADMIN — ESCITALOPRAM OXALATE 10 MG: 10 TABLET ORAL at 09:56

## 2022-05-31 RX ADMIN — ASPIRIN 81 MG: 81 TABLET, COATED ORAL at 09:56

## 2022-05-31 RX ADMIN — POTASSIUM CHLORIDE 40 MEQ: 1500 TABLET, EXTENDED RELEASE ORAL at 09:57

## 2022-05-31 NOTE — PLAN OF CARE
Problem: Potential for Falls  Goal: Patient will remain free of falls  Description: INTERVENTIONS:  - Educate patient/family on patient safety including physical limitations  - Instruct patient to call for assistance with activity   - Consult OT/PT to assist with strengthening/mobility   - Keep Call bell within reach  - Keep bed low and locked with side rails adjusted as appropriate  - Keep care items and personal belongings within reach  - Initiate and maintain comfort rounds  - Make Fall Risk Sign visible to staff  - Offer Toileting every 2 Hours, in advance of need  - Initiate/Maintain bed alarm  - Obtain necessary fall risk management equipment  - Apply yellow socks and bracelet for high fall risk patients  - Consider moving patient to room near nurses station  Outcome: Progressing     Problem: PAIN - ADULT  Goal: Verbalizes/displays adequate comfort level or baseline comfort level  Description: Interventions:  - Encourage patient to monitor pain and request assistance  - Assess pain using appropriate pain scale  - Administer analgesics based on type and severity of pain and evaluate response  - Implement non-pharmacological measures as appropriate and evaluate response  - Consider cultural and social influences on pain and pain management  - Notify physician/advanced practitioner if interventions unsuccessful or patient reports new pain  Outcome: Progressing     Problem: INFECTION - ADULT  Goal: Absence or prevention of progression during hospitalization  Description: INTERVENTIONS:  - Assess and monitor for signs and symptoms of infection  - Monitor lab/diagnostic results  - Monitor all insertion sites, i e  indwelling lines, tubes, and drains  - Monitor endotracheal if appropriate and nasal secretions for changes in amount and color  - [place] appropriate cooling/warming therapies per order  - Administer medications as ordered  - Instruct and encourage patient and family to use good hand hygiene technique  - Identify and instruct in appropriate isolation precautions for identified infection/condition  Outcome: Progressing  Goal: Absence of fever/infection during neutropenic period  Description: INTERVENTIONS:  - Monitor WBC    Outcome: Progressing     Problem: SAFETY ADULT  Goal: Patient will remain free of falls  Description: INTERVENTIONS:  - Educate patient/family on patient safety including physical limitations  - Instruct patient to call for assistance with activity   - Consult OT/PT to assist with strengthening/mobility   - Keep Call bell within reach  - Keep bed low and locked with side rails adjusted as appropriate  - Keep care items and personal belongings within reach  - Initiate and maintain comfort rounds  - Make Fall Risk Sign visible to staff  - Offer Toileting every 2 Hours, in advance of need  - Initiate/Maintain bed alarm  - Obtain necessary fall risk management equipment  - Apply yellow socks and bracelet for high fall risk patients  - Consider moving patient to room near nurses station  Outcome: Progressing  Goal: Maintain or return to baseline ADL function  Description: INTERVENTIONS:  -  Assess patient's ability to carry out ADLs; assess patient's baseline for ADL function and identify physical deficits which impact ability to perform ADLs (bathing, care of mouth/teeth, toileting, grooming, dressing, etc )  - Assess/evaluate cause of self-care deficits   - Assess range of motion  - Assess patient's mobility; develop plan if impaired  - Assess patient's need for assistive devices and provide as appropriate  - Encourage maximum independence but intervene and supervise when necessary  - Involve family in performance of ADLs  - Assess for home care needs following discharge   - Consider OT consult to assist with ADL evaluation and planning for discharge  - Provide patient education as appropriate  Outcome: Progressing  Goal: Maintains/Returns to pre admission functional level  Description: INTERVENTIONS:  - Perform BMAT or MOVE assessment daily    - Set and communicate daily mobility goal to care team and patient/family/caregiver  - Collaborate with rehabilitation services on mobility goals if consulted  - Perform Range of Motion 3 times a day  - Reposition patient every 3 hours  - Dangle patient 3 times a day  - Stand patient 3 times a day  - Ambulate patient 3 times a day  - Out of bed to chair 3 times a day   - Out of bed for meals 3 times a day  - Out of bed for toileting  - Record patient progress and toleration of activity level   Outcome: Progressing     Problem: DISCHARGE PLANNING  Goal: Discharge to home or other facility with appropriate resources  Description: INTERVENTIONS:  - Identify barriers to discharge w/patient and caregiver  - Arrange for needed discharge resources and transportation as appropriate  - Identify discharge learning needs (meds, wound care, etc )  - Arrange for interpretive services to assist at discharge as needed  - Refer to Case Management Department for coordinating discharge planning if the patient needs post-hospital services based on physician/advanced practitioner order or complex needs related to functional status, cognitive ability, or social support system  Outcome: Progressing     Problem: Knowledge Deficit  Goal: Patient/family/caregiver demonstrates understanding of disease process, treatment plan, medications, and discharge instructions  Description: Complete learning assessment and assess knowledge base    Interventions:  - Provide teaching at level of understanding  - Provide teaching via preferred learning methods  Outcome: Progressing

## 2022-05-31 NOTE — PROGRESS NOTES
Progress Note - Abby Colin 73 y o  female MRN: [id number]    Unit/Bed#: S -01 Encounter: [id number]      Assessment:  72 y/o F w diverticulitis c/b microperforation  Vss  Afebrile  abd soft, minimal abdominal tenderness compared to a few hours ago on admission  Plan:  Sips of clears  Continue iv abx  Ambulate  dvt ppx  Follow fever curve, wbc  Pain control    Subjective:   Feels better already  Reports less abd pain  Passing flatus  Wants to eat  Denied fever, chills, chest pain, shortness of breath, nausea, vomiting, or abdominal pain this morning  Objective:     Vitals: Blood pressure 118/62, pulse 88, temperature 99 2 °F (37 3 °C), temperature source Oral, resp  rate 15, weight 56 7 kg (125 lb), SpO2 97 %, not currently breastfeeding  ,Body mass index is 24 41 kg/m²  Intake/Output Summary (Last 24 hours) at 5/31/2022 0554  Last data filed at 5/31/2022 0139  Gross per 24 hour   Intake 3100 ml   Output --   Net 3100 ml       Physical Exam  General: NAD  HEENT: NC/AT  MMM  Cv: RRR     Lungs: normal effort  Ab: Soft, NT/ND  Ex: no CCE  Neuro: AAOx3    Scheduled Meds:  Current Facility-Administered Medications   Medication Dose Route Frequency Provider Last Rate    aspirin  81 mg Oral Daily Spring Abrams MD      enoxaparin  40 mg Subcutaneous Daily Spring Abrams MD      escitalopram  10 mg Oral Daily Spring Abrams MD      HYDROmorphone  0 5 mg Intravenous Q4H PRN Spring Abrams MD      metoprolol tartrate  75 mg Oral BID Spring Abrams MD      multi-electrolyte  125 mL/hr Intravenous Continuous Spring Abrams  mL/hr (05/31/22 0139)   Lakeshia Hunt oxyCODONE  2 5 mg Oral Q4H PRN Spring Abrams MD      oxyCODONE  5 mg Oral Q4H PRN Spring Abrams MD      piperacillin-tazobactam  4 5 g Intravenous Q6H Spring Abrams MD 4 5 g (05/31/22 0511)     Continuous Infusions:multi-electrolyte, 125 mL/hr, Last Rate: 125 mL/hr (05/31/22 0139)      PRN Meds:   HYDROmorphone    oxyCODONE   oxyCODONE      Invasive Devices  Report    Peripheral Intravenous Line  Duration           Peripheral IV 05/30/22 Left Antecubital <1 day                Lab, Imaging and other studies: I have personally reviewed pertinent reports      VTE Pharmacologic Prophylaxis: Sequential compression device (Venodyne)   VTE Mechanical Prophylaxis: sequential compression device

## 2022-05-31 NOTE — ED PROVIDER NOTES
History  Chief Complaint   Patient presents with    Abdominal Pain    Diarrhea     Pt reports mid lower abdominal pain over the last week with some blood in her stool, reports bouts of diarrhea, fever today  Hx multiple myeloma     72 y/o F with h/o multple myeloma, hypertension, breast cancer in remission and aortic insufficiency c/o diarrhea, fever to 101 3 for past week  Had more formed but soft stools yesterday and today with bright red blood on stools today  Patient has history of appendectomy and bilateral oophorectomy  Prior to Admission Medications   Prescriptions Last Dose Informant Patient Reported? Taking? Calcium Citrate-Vitamin D (CALCIUM CITRATE + D PO)  Self Yes No   Sig: Take 1 tablet by mouth 2 (two) times a day     aspirin (ECOTRIN LOW STRENGTH) 81 mg EC tablet  Self Yes No   Sig: Take 81 mg by mouth daily   escitalopram (LEXAPRO) 10 mg tablet  Self No No   Sig: TAKE 1 TABLET BY MOUTH EVERY DAY   lenalidomide (Revlimid) 10 MG CAPS  Self No No   Sig: TAKE 1 CAPSULE BY MOUTH ONCE DAILY FOR 3 WEEKS ON THEN 1 WEEK OFF   Patient not taking: Reported on 1/5/2022    metoprolol tartrate (LOPRESSOR) 50 mg tablet   No No   Sig: TAKE 1 AND 1/2 TABLETS BY MOUTH TWO TIMES DAILY      Facility-Administered Medications: None       Past Medical History:   Diagnosis Date    Anxiety     Aortic valve disorder     Breast CA (HCC)     2000-R mastectomy-took tamoxifen/femira    Cardiac dysrhythmia     Depression     History of spinal fracture     8 since 2016-1 fall related  spinal bone marrow bx today 12/18/2018    Hypertension     Multiple myeloma ([facility] [address] ) 2019    Osteopenia 08/13/2012    Description: DEXA 9/2009; late 2011 stable      Osteoporosis     Palpitations     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    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    Squamous cell carcinoma of skin     Stem cell transplant candidate 2020    Tachycardia 12/10/2018       Past Surgical History:   Procedure Laterality Date    APPENDECTOMY  01/2011    BREAST LUMPECTOMY      CHEILECTOMY Right     Resolved:  2006, Bunion correction    COLONOSCOPY  07/2010    Diverticula; recheck 5 yrs    CT BONE MARROW BIOPSY AND ASPIRATION  7/9/2019    DILATION AND CURETTAGE, DIAGNOSTIC / THERAPEUTIC      HYSTERECTOMY  2019    IR BIOPSY BONE  12/18/2018    MASTECTOMY Right 2000    OOPHORECTOMY Bilateral 2019    MN LAPAROSCOPY W TOT HYSTERECTUTERUS <=250 GRAM  W TUBE/OVARY N/A 4/4/2019    Procedure: ROBOTIC TOTAL LAPAROSCOPIC HYSTERECTOMY, BILATERAL SALPINGO-OOPHORECTOMY;  Surgeon: Tisha Pastrana MD PhD;  [facility];  Service: Gynecology Oncology       Family History   Problem Relation Age of Onset    Diabetes Mother     Osteoporosis Mother     Heart disease Father     Breast cancer Sister 64    BRCA1 Negative Sister     Heart attack Family     Other Family         Benign brain tumor    Club foot Family     Breast cancer Sister 77    No Known Problems Maternal Grandmother     No Known Problems Maternal Grandfather     No Known Problems Paternal Grandmother     No Known Problems Paternal Grandfather      I have reviewed and agree with the history as documented  E-Cigarette/Vaping    E-Cigarette Use Never User      E-Cigarette/Vaping Substances    Nicotine No     THC No     CBD No     Flavoring No     Other No     Unknown No      Social History     Tobacco Use    Smoking status: Never Smoker    Smokeless tobacco: Never Used   Vaping Use    Vaping Use: Never used   Substance Use Topics    Alcohol use: Yes     Comment: socially    Drug use: No       Review of Systems   Constitutional: Positive for fatigue and fever  HENT: Negative for congestion, rhinorrhea and sore throat  Respiratory: Negative for cough and shortness of breath  Cardiovascular: Negative for chest pain and palpitations  Gastrointestinal: Positive for abdominal pain, blood in stool and diarrhea  Negative for nausea and vomiting  Genitourinary: Negative for difficulty urinating, dysuria, flank pain and hematuria  Musculoskeletal: Positive for back pain (chronic)  Negative for gait problem and myalgias  Skin: Negative for pallor and rash  Neurological: Negative for dizziness and syncope  All other systems reviewed and are negative  Physical Exam  Physical Exam  Vitals and nursing note reviewed  Constitutional:       General: She is not in acute distress  Appearance: She is well-developed and normal weight  She is not ill-appearing or diaphoretic  HENT:      Head: Normocephalic and atraumatic  Mouth/Throat:      Mouth: Mucous membranes are moist       Pharynx: Oropharynx is clear  Eyes:      General: No scleral icterus  Extraocular Movements: Extraocular movements intact  Conjunctiva/sclera: Conjunctivae normal    Cardiovascular:      Rate and Rhythm: Normal rate and regular rhythm  Heart sounds: Normal heart sounds  No murmur heard  Pulmonary:      Effort: Pulmonary effort is normal       Breath sounds: Normal breath sounds  Abdominal:      General: Abdomen is flat  Bowel sounds are normal  There is no distension or abdominal bruit  Palpations: Abdomen is soft  There is no fluid wave or mass  Tenderness: There is abdominal tenderness in the right lower quadrant, suprapubic area and left lower quadrant  There is no guarding or rebound  Negative signs include Pitts's sign, Rovsing's sign and McBurney's sign  Hernia: No hernia is present  Musculoskeletal:         General: Normal range of motion  Cervical back: Normal range of motion and neck supple  Skin:     General: Skin is warm and dry  Capillary Refill: Capillary refill takes less than 2 seconds  Findings: No rash  Neurological:      General: No focal deficit present  Mental Status: She is alert and oriented to person, place, and time  Cranial Nerves: No cranial nerve deficit        Motor: No weakness  Coordination: Coordination normal       Deep Tendon Reflexes: Reflexes are normal and symmetric  Psychiatric:         Mood and Affect: Mood normal          Behavior: Behavior normal          Vital Signs  ED Triage Vitals   Temperature Pulse Respirations Blood Pressure SpO2   05/30/22 1946 05/30/22 1946 05/30/22 1946 05/30/22 1946 05/30/22 1946   99 2 °F (37 3 °C) (!) 138 17 157/76 97 %      Temp Source Heart Rate Source Patient Position - Orthostatic VS BP Location FiO2 (%)   05/30/22 1946 05/30/22 1946 05/30/22 2000 05/30/22 2000 --   Oral Monitor Sitting Right arm       Pain Score       05/30/22 1946       4           Vitals:    05/30/22 1946 05/30/22 2000   BP: 157/76    Pulse: (!) 138 (!) 128   Patient Position - Orthostatic VS:  Sitting         Visual Acuity      ED Medications  Medications   piperacillin-tazobactam (ZOSYN) 3 375 g in sodium chloride 0 9 % 100 mL IVPB (has no administration in time range)   sodium chloride 0 9 % bolus 1,000 mL (0 mL Intravenous Stopped 5/30/22 2301)       Diagnostic Studies  Results Reviewed     Procedure Component Value Units Date/Time    Urine culture [[id number]] Collected: 05/30/22 2134    Lab Status:  In process Specimen: Urine, Clean Catch Updated: 05/30/22 2202    Urine Microscopic [[id number]]  (Abnormal) Collected: 05/30/22 2051    Lab Status: Final result Specimen: Urine Updated: 05/30/22 2122     RBC, UA 2-4 /hpf      WBC, UA 1-2 /hpf      Epithelial Cells Occasional /hpf      Bacteria, UA Moderate /hpf      MUCUS THREADS Moderate    CBC and differential [[id number]]  (Abnormal) Collected: 05/30/22 2034    Lab Status: Final result Specimen: Blood from Arm, Left Updated: 05/30/22 2119     WBC 6 47 Thousand/uL      RBC 3 70 Million/uL      Hemoglobin 11 7 g/dL      Hematocrit 34 2 %      MCV 92 fL      MCH 31 6 pg      MCHC 34 2 g/dL      RDW 13 2 %      MPV 9 5 fL      Platelets 533 Thousands/uL      nRBC 0 /100 WBCs      Neutrophils Relative 71 % Immat GRANS % 1 %      Lymphocytes Relative 14 %      Monocytes Relative 14 %      Eosinophils Relative 0 %      Basophils Relative 0 %      Neutrophils Absolute 4 57 Thousands/µL      Immature Grans Absolute 0 06 Thousand/uL      Lymphocytes Absolute 0 91 Thousands/µL      Monocytes Absolute 0 92 Thousand/µL      Eosinophils Absolute 0 00 Thousand/µL      Basophils Absolute 0 01 Thousands/µL     UA (URINE) with reflex to Scope [[id number]]  (Abnormal) Collected: 05/30/22 2051    Lab Status: Final result Specimen: Urine Updated: 05/30/22 2112     Color, UA Yellow     Clarity, UA Clear     Specific Gravity, UA 1 025     pH, UA 5 5     Leukocytes, UA Negative     Nitrite, UA Negative     Protein, UA Negative mg/dl      Glucose, UA Negative mg/dl      Ketones, UA 40 (2+) mg/dl      Urobilinogen, UA 0 2 E U /dl      Bilirubin, UA Negative     Blood, UA Small    Comprehensive metabolic panel [[id number]]  (Abnormal) Collected: 05/30/22 2034    Lab Status: Final result Specimen: Blood from Arm, Left Updated: 05/30/22 2109     Sodium 137 mmol/L      Potassium 3 4 mmol/L      Chloride 103 mmol/L      CO2 24 mmol/L      ANION GAP 10 mmol/L      BUN 11 mg/dL      Creatinine 0 74 mg/dL      Glucose 110 mg/dL      Calcium 8 9 mg/dL      AST 16 U/L      ALT 13 U/L      Alkaline Phosphatase 62 U/L      Total Protein 6 9 g/dL      Albumin 3 9 g/dL      Total Bilirubin 0 84 mg/dL      eGFR 82 ml/min/1 73sq m     Narrative:      Leo guidelines for Chronic Kidney Disease (CKD):     Stage 1 with normal or high GFR (GFR > 90 mL/min/1 73 square meters)    Stage 2 Mild CKD (GFR = 60-89 mL/min/1 73 square meters)    Stage 3A Moderate CKD (GFR = 45-59 mL/min/1 73 square meters)    Stage 3B Moderate CKD (GFR = 30-44 mL/min/1 73 square meters)    Stage 4 Severe CKD (GFR = 15-29 mL/min/1 73 square meters)    Stage 5 End Stage CKD (GFR <15 mL/min/1 73 square meters)  Note: GFR calculation is accurate only with a steady state creatinine    Lipase [[id number]]  (Abnormal) Collected: 05/30/22 2034    Lab Status: Final result Specimen: Blood from Arm, Left Updated: 05/30/22 2109     Lipase 7 u/L     Blood culture #1 [[id number]] Collected: 05/30/22 2035    Lab Status: In process Specimen: Blood from Hand, Left Updated: 05/30/22 2043    Blood culture #2 [[id number]] Collected: 05/30/22 2034    Lab Status: In process Specimen: Blood from Arm, Left Updated: 05/30/22 2043                 CT abdomen pelvis wo contrast   Final Result by Tahmina Fuchs DO (05/30 2250)      Colonic diverticulosis with significant inflammatory changes and phlegmonous changes in the left lower quadrant  Findings likely represent acute diverticulitis with microperforation  Follow-up with gastroenterology is recommended posttreatment to rule out underlying neoplasm  I personally discussed this study with Dr Cristal Milton on 5/30/2022 at 10:25 PM                      Workstation performed: [id number]                    Procedures  Procedures         ED Course                                             MDM  Number of Diagnoses or Management Options  Diagnosis management comments: Elderly immunocompromised female with belly pain, fever, diarrhea and bloody stool  Abdomen is tender  Differential diagnosis includes but not limited to neutropenic sepsis, dehydration, JAMEY, electrolyte abnormality, colitis, diverticulitis, intra-abdominal abscess, UTI, pyelonephritis  Labs are essentially normal but CT shows acute diverticulitis with microperforation and phlegmon  Patient will be admitted on IV antibiotics    Case signed out to night shift EP at end of my shift         Amount and/or Complexity of Data Reviewed  Clinical lab tests: ordered and reviewed  Tests in the radiology section of CPT®: ordered and reviewed  Review and summarize past medical records: yes  Discuss the patient with other providers: yes  Independent visualization of images, tracings, or specimens: yes        Disposition  Final diagnoses:   None     ED Disposition     None      Follow-up Information    None         Patient's Medications   Discharge Prescriptions    No medications on file       No discharge procedures on file      PDMP Review       Value Time User    PDMP Reviewed  Yes 3/11/2020  1:09 PM Socorro Olivarez MD          ED Provider  Electronically Signed by           Amadeo Morgan DO  05/30/22 0417

## 2022-05-31 NOTE — H&P
H&P Exam - Acute Care Surgery   Kristy Velasco 73 y o  female MRN: [id number]  Unit/Bed#: FT 03 Encounter: [id number]    Assessment/Plan     Assessment:  72 y/o F w h/o Multiple myeloma in remission, presents w 1 wk abdominal pain and CT showing acute microperforated diverticulitis  Vss  Afebrile  HR 120s  ekg showing sinus tachy  Pt did not yet take her home metoprolol this evening  Abdomen is soft, significant LLQ tenderness  Non distended  Labs:   Wbc: 6 47  Hgb: 11 7  Cr: 0 74    Plan:  Admit to surgery service  NPO  IVF, isolyte @ 125  IV abx, Zosyn  Serial abdominal exams  dvt ppx, lovenox  Resume home medications, asa, lopressor    History of Present Illness   History, ROS and PFSH unobtainable from any source due to none  HPI:  Kristy Velasco is a 73 y o  female w PMH of multiple myeloma in remission, s/p stem cell transplant,  R breast cancer s/p mastectomy, HTN, appendectomy 2011, ISABELA BSO 2019, who presents with one week of intermittent aching LLQ abdominal pain and CT imaging showing microperforated diverticulitis  Pt reported that this is her first episode of diverticulitis  She reports history of loose bowel mvts  Today her bowel mvt had small amount of blood in it  Noted that this week she noted aching LLQ pain that came in waves  Associated with fever up to 101  Denied any nausea or vomiting  Has been able to eat light food like applesauce and eggs this week without issues  Has not taken her immunosuppressant for MM since Dec 2021  Last c scope Jan 2022 showing diverticulosis  Pt denied any current chest pain or shortness of breath  Review of Systems   Constitutional: Positive for fever  Negative for chills  HENT: Negative  Eyes: Negative  Respiratory: Negative  Cardiovascular: Negative  Gastrointestinal: Negative  Endocrine: Negative  Genitourinary: Negative  Musculoskeletal: Negative  Skin: Negative  Allergic/Immunologic: Negative      Neurological: Negative  Hematological: Negative  Psychiatric/Behavioral: Negative  Historical Information   Past Medical History:   Diagnosis Date    Anxiety     Aortic valve disorder     [facility]     2000-R mastectomy-took tamoxifen/femira    Cardiac dysrhythmia     Depression     History of spinal fracture     8 since 2016-1 fall related  spinal bone marrow bx today 12/18/2018    Hypertension     Multiple myeloma ([facility] [address] ) 2019    Osteopenia 08/13/2012    Description: DEXA 9/2009; late 2011 stable      Osteoporosis     Palpitations     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 01/11/2019    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    Squamous cell carcinoma of skin     Stem cell transplant candidate 2020    Tachycardia 12/10/2018     Past Surgical History:   Procedure Laterality Date    APPENDECTOMY  01/2011    BREAST LUMPECTOMY      CHEILECTOMY Right     Resolved:  2006, Bunion correction    COLONOSCOPY  07/2010    Diverticula; recheck 5 yrs    CT BONE MARROW BIOPSY AND ASPIRATION  7/9/2019    DILATION AND CURETTAGE, DIAGNOSTIC / THERAPEUTIC      HYSTERECTOMY  2019    IR BIOPSY BONE  12/18/2018    MASTECTOMY Right 2000    OOPHORECTOMY Bilateral 2019    MN LAPAROSCOPY W TOT HYSTERECTUTERUS <=250 GRAM  W TUBE/OVARY N/A 4/4/2019    Procedure: ROBOTIC TOTAL LAPAROSCOPIC HYSTERECTOMY, BILATERAL SALPINGO-OOPHORECTOMY;  Surgeon: Chandu Corrales MD PhD;  [facility];  Service: Gynecology Oncology     Social History   Social History     Substance and Sexual Activity   Alcohol Use Yes    Comment: socially     Social History     Substance and Sexual Activity   Drug Use No     Social History     Tobacco Use   Smoking Status Never Smoker   Smokeless Tobacco Never Used     E-Cigarette/Vaping    E-Cigarette Use Never User      E-Cigarette/Vaping Substances    Nicotine No     THC No     CBD No     Flavoring No     Other No     Unknown No      Family History: non-contributory    Meds/Allergies   all current active meds have been reviewed  Allergies   Allergen Reactions    Ibuprofen Abdominal Pain and Tachycardia     Reaction Date: [id number];        Objective   Vitals: Blood pressure 143/69, pulse (!) 120, temperature 99 2 °F (37 3 °C), temperature source Oral, resp  rate 17, weight 56 7 kg (125 lb), SpO2 97 %, not currently breastfeeding  ,Body mass index is 24 41 kg/m²  Intake/Output Summary (Last 24 hours) at 5/30/2022 2352  Last data filed at 5/30/2022 2301  Gross per 24 hour   Intake 2000 ml   Output --   Net 2000 ml     Invasive Devices  Report    Peripheral Intravenous Line  Duration           Peripheral IV 05/30/22 Left Antecubital <1 day                Physical Exam  Vitals reviewed  Constitutional:       Appearance: She is normal weight  She is not toxic-appearing  HENT:      Head: Normocephalic and atraumatic  Nose: Nose normal       Mouth/Throat:      Mouth: Mucous membranes are moist    Eyes:      Pupils: Pupils are equal, round, and reactive to light  Cardiovascular:      Rate and Rhythm: Normal rate  Pulses: Normal pulses  Pulmonary:      Effort: Pulmonary effort is normal    Abdominal:      General: There is no distension  Palpations: Abdomen is soft  There is no mass  Tenderness: There is abdominal tenderness  There is no guarding  Genitourinary:     General: Normal vulva  Musculoskeletal:         General: Normal range of motion  Cervical back: Normal range of motion  Skin:     General: Skin is warm  Capillary Refill: Capillary refill takes 2 to 3 seconds  Neurological:      General: No focal deficit present  Mental Status: She is alert  Psychiatric:         Mood and Affect: Mood normal          Lab Results:   I have personally reviewed pertinent reports    , Coags: No results found for: PT, PTT, INR, Creatinine:   Lab Results   Component Value Date    CREATININE 0 74 05/30/2022   , Lipid Panel: No results found for: CHOL, CBC with diff:   Lab Results Component Value Date    WBC 6 47 05/30/2022    HGB 11 7 05/30/2022    HCT 34 2 (L) 05/30/2022    MCV 92 05/30/2022     (L) 05/30/2022    MCH 31 6 05/30/2022    MCHC 34 2 05/30/2022    RDW 13 2 05/30/2022    MPV 9 5 05/30/2022    NRBC 0 05/30/2022   , BMP/CMP:   Lab Results   Component Value Date    SODIUM 137 05/30/2022    K 3 4 (L) 05/30/2022     05/30/2022    CO2 24 05/30/2022    BUN 11 05/30/2022    CREATININE 0 74 05/30/2022    CALCIUM 8 9 05/30/2022    AST 16 05/30/2022    ALT 13 05/30/2022    ALKPHOS 62 05/30/2022    EGFR 82 05/30/2022   , Coags: No results found for: PT, PTT, INR  Imaging: I have personally reviewed pertinent reports  EKG, Pathology, and Other Studies: I have personally reviewed pertinent reports  Code Status: No Order  Advance Directive and Living Will:      Power of :    POLST:      Counseling / Coordination of Care  Counseling/Coordination of Care: Total floor / unit time spent today 30 minutes  Greater than 50% of total time was spent with the patient and / or family counseling and / or coordination of care   A description of the counseling / coordination of care: 30

## 2022-05-31 NOTE — PLAN OF CARE
Problem: Potential for Falls  Goal: Patient will remain free of falls  Description: INTERVENTIONS:  - Educate patient/family on patient safety including physical limitations  - Instruct patient to call for assistance with activity   - Consult OT/PT to assist with strengthening/mobility   - Keep Call bell within reach  - Keep bed low and locked with side rails adjusted as appropriate  - Keep care items and personal belongings within reach  - Initiate and maintain comfort rounds  - Make Fall Risk Sign visible to staff  - Offer Toileting every  Hours, in advance of need  - Initiate/Maintain alarm  - Obtain necessary fall risk management equipment:   - Apply yellow socks and bracelet for high fall risk patients  - Consider moving patient to room near nurses station  Outcome: Progressing     Problem: PAIN - ADULT  Goal: Verbalizes/displays adequate comfort level or baseline comfort level  Description: Interventions:  - Encourage patient to monitor pain and request assistance  - Assess pain using appropriate pain scale  - Administer analgesics based on type and severity of pain and evaluate response  - Implement non-pharmacological measures as appropriate and evaluate response  - Consider cultural and social influences on pain and pain management  - Notify physician/advanced practitioner if interventions unsuccessful or patient reports new pain  Outcome: Progressing     Problem: INFECTION - ADULT  Goal: Absence or prevention of progression during hospitalization  Description: INTERVENTIONS:  - Assess and monitor for signs and symptoms of infection  - Monitor lab/diagnostic results  - Monitor all insertion sites, i e  indwelling lines, tubes, and drains  - Monitor endotracheal if appropriate and nasal secretions for changes in amount and color  - [place] appropriate cooling/warming therapies per order  - Administer medications as ordered  - Instruct and encourage patient and family to use good hand hygiene technique  - Identify and instruct in appropriate isolation precautions for identified infection/condition  Outcome: Progressing  Goal: Absence of fever/infection during neutropenic period  Description: INTERVENTIONS:  - Monitor WBC    Outcome: Progressing     Problem: SAFETY ADULT  Goal: Patient will remain free of falls  Description: INTERVENTIONS:  - Educate patient/family on patient safety including physical limitations  - Instruct patient to call for assistance with activity   - Consult OT/PT to assist with strengthening/mobility   - Keep Call bell within reach  - Keep bed low and locked with side rails adjusted as appropriate  - Keep care items and personal belongings within reach  - Initiate and maintain comfort rounds  - Make Fall Risk Sign visible to staff  - Offer Toileting every  Hours, in advance of need  - Initiate/Maintain alarm  - Obtain necessary fall risk management equipment:   - Apply yellow socks and bracelet for high fall risk patients  - Consider moving patient to room near nurses station  Outcome: Progressing  Goal: Maintain or return to baseline ADL function  Description: INTERVENTIONS:  -  Assess patient's ability to carry out ADLs; assess patient's baseline for ADL function and identify physical deficits which impact ability to perform ADLs (bathing, care of mouth/teeth, toileting, grooming, dressing, etc )  - Assess/evaluate cause of self-care deficits   - Assess range of motion  - Assess patient's mobility; develop plan if impaired  - Assess patient's need for assistive devices and provide as appropriate  - Encourage maximum independence but intervene and supervise when necessary  - Involve family in performance of ADLs  - Assess for home care needs following discharge   - Consider OT consult to assist with ADL evaluation and planning for discharge  - Provide patient education as appropriate  Outcome: Progressing  Goal: Maintains/Returns to pre admission functional level  Description: INTERVENTIONS:  - Perform BMAT or MOVE assessment daily    - Set and communicate daily mobility goal to care team and patient/family/caregiver  - Collaborate with rehabilitation services on mobility goals if consulted  - Perform Range of Motion  times a day  - Reposition patient every  hours  - Dangle patient  times a day  - Stand patient  times a day  - Ambulate patient  times a day  - Out of bed to chair  times a day   - Out of bed for meals times a day  - Out of bed for toileting  - Record patient progress and toleration of activity level   Outcome: Progressing     Problem: DISCHARGE PLANNING  Goal: Discharge to home or other facility with appropriate resources  Description: INTERVENTIONS:  - Identify barriers to discharge w/patient and caregiver  - Arrange for needed discharge resources and transportation as appropriate  - Identify discharge learning needs (meds, wound care, etc )  - Arrange for interpretive services to assist at discharge as needed  - Refer to Case Management Department for coordinating discharge planning if the patient needs post-hospital services based on physician/advanced practitioner order or complex needs related to functional status, cognitive ability, or social support system  Outcome: Progressing     Problem: Knowledge Deficit  Goal: Patient/family/caregiver demonstrates understanding of disease process, treatment plan, medications, and discharge instructions  Description: Complete learning assessment and assess knowledge base    Interventions:  - Provide teaching at level of understanding  - Provide teaching via preferred learning methods  Outcome: Progressing

## 2022-06-01 VITALS
SYSTOLIC BLOOD PRESSURE: 121 MMHG | HEART RATE: 78 BPM | DIASTOLIC BLOOD PRESSURE: 64 MMHG | RESPIRATION RATE: 16 BRPM | TEMPERATURE: 97.8 F | WEIGHT: 125 LBS | BODY MASS INDEX: 24.41 KG/M2 | OXYGEN SATURATION: 96 %

## 2022-06-01 LAB
ANION GAP SERPL CALCULATED.3IONS-SCNC: 9 MMOL/L (ref 4–13)
ANISOCYTOSIS BLD QL SMEAR: PRESENT
ATRIAL RATE: 118 BPM
BACTERIA UR CULT: NORMAL
BASOPHILS # BLD MANUAL: 0 THOUSAND/UL (ref 0–0.1)
BASOPHILS NFR MAR MANUAL: 0 % (ref 0–1)
BUN SERPL-MCNC: 5 MG/DL (ref 5–25)
CALCIUM SERPL-MCNC: 7.7 MG/DL (ref 8.4–10.2)
CHLORIDE SERPL-SCNC: 110 MMOL/L (ref 96–108)
CO2 SERPL-SCNC: 22 MMOL/L (ref 21–32)
CREAT SERPL-MCNC: 0.61 MG/DL (ref 0.6–1.3)
EOSINOPHIL # BLD MANUAL: 0 THOUSAND/UL (ref 0–0.4)
EOSINOPHIL NFR BLD MANUAL: 0 % (ref 0–6)
ERYTHROCYTE [DISTWIDTH] IN BLOOD BY AUTOMATED COUNT: 13.4 % (ref 11.6–15.1)
GFR SERPL CREATININE-BSD FRML MDRD: 92 ML/MIN/1.73SQ M
GLUCOSE SERPL-MCNC: 101 MG/DL (ref 65–140)
HCT VFR BLD AUTO: 27.9 % (ref 34.8–46.1)
HGB BLD-MCNC: 9.7 G/DL (ref 11.5–15.4)
HYPERCHROMIA BLD QL SMEAR: PRESENT
LYMPHOCYTES # BLD AUTO: 0.85 THOUSAND/UL (ref 0.6–4.47)
LYMPHOCYTES # BLD AUTO: 26 % (ref 14–44)
MCH RBC QN AUTO: 32 PG (ref 26.8–34.3)
MCHC RBC AUTO-ENTMCNC: 34.8 G/DL (ref 31.4–37.4)
MCV RBC AUTO: 92 FL (ref 82–98)
MONOCYTES # BLD AUTO: 0.46 THOUSAND/UL (ref 0–1.22)
MONOCYTES NFR BLD: 14 % (ref 4–12)
NEUTROPHILS # BLD MANUAL: 1.95 THOUSAND/UL (ref 1.85–7.62)
NEUTS SEG NFR BLD AUTO: 60 % (ref 43–75)
P AXIS: 56 DEGREES
PLATELET # BLD AUTO: 106 THOUSANDS/UL (ref 149–390)
PLATELET BLD QL SMEAR: ABNORMAL
PMV BLD AUTO: 9.6 FL (ref 8.9–12.7)
POTASSIUM SERPL-SCNC: 3.2 MMOL/L (ref 3.5–5.3)
PR INTERVAL: 156 MS
QRS AXIS: -64 DEGREES
QRSD INTERVAL: 90 MS
QT INTERVAL: 312 MS
QTC INTERVAL: 428 MS
RBC # BLD AUTO: 3.03 MILLION/UL (ref 3.81–5.12)
RBC MORPH BLD: PRESENT
SODIUM SERPL-SCNC: 141 MMOL/L (ref 135–147)
T WAVE AXIS: 50 DEGREES
VENTRICULAR RATE: 113 BPM
WBC # BLD AUTO: 3.25 THOUSAND/UL (ref 4.31–10.16)

## 2022-06-01 PROCEDURE — 93010 ELECTROCARDIOGRAM REPORT: CPT | Performed by: INTERNAL MEDICINE

## 2022-06-01 PROCEDURE — 85007 BL SMEAR W/DIFF WBC COUNT: CPT | Performed by: SURGERY

## 2022-06-01 PROCEDURE — 80048 BASIC METABOLIC PNL TOTAL CA: CPT | Performed by: SURGERY

## 2022-06-01 PROCEDURE — 85027 COMPLETE CBC AUTOMATED: CPT | Performed by: SURGERY

## 2022-06-01 PROCEDURE — 99232 SBSQ HOSP IP/OBS MODERATE 35: CPT | Performed by: SURGERY

## 2022-06-01 RX ORDER — POTASSIUM CHLORIDE 20 MEQ/1
40 TABLET, EXTENDED RELEASE ORAL 2 TIMES DAILY
Status: DISCONTINUED | OUTPATIENT
Start: 2022-06-01 | End: 2022-06-01 | Stop reason: HOSPADM

## 2022-06-01 RX ORDER — AMOXICILLIN AND CLAVULANATE POTASSIUM 875; 125 MG/1; MG/1
1 TABLET, FILM COATED ORAL EVERY 12 HOURS SCHEDULED
Qty: 14 TABLET | Refills: 0 | Status: SHIPPED | OUTPATIENT
Start: 2022-06-01 | End: 2022-06-08

## 2022-06-01 RX ORDER — DEXTROSE, SODIUM CHLORIDE, AND POTASSIUM CHLORIDE 5; .45; .15 G/100ML; G/100ML; G/100ML
50 INJECTION INTRAVENOUS CONTINUOUS
Status: DISCONTINUED | OUTPATIENT
Start: 2022-06-01 | End: 2022-06-01 | Stop reason: HOSPADM

## 2022-06-01 RX ORDER — METRONIDAZOLE 250 MG/1
250 TABLET ORAL EVERY 8 HOURS SCHEDULED
Qty: 21 TABLET | Refills: 0 | Status: SHIPPED | OUTPATIENT
Start: 2022-06-01 | End: 2022-06-08

## 2022-06-01 RX ADMIN — METOPROLOL TARTRATE 75 MG: 50 TABLET, FILM COATED ORAL at 08:30

## 2022-06-01 RX ADMIN — DEXTROSE, SODIUM CHLORIDE, AND POTASSIUM CHLORIDE 50 ML/HR: 5; .45; .15 INJECTION INTRAVENOUS at 11:52

## 2022-06-01 RX ADMIN — ENOXAPARIN SODIUM 40 MG: 40 INJECTION SUBCUTANEOUS at 08:29

## 2022-06-01 RX ADMIN — PIPERACILLIN AND TAZOBACTAM 4.5 G: 4; .5 INJECTION, POWDER, LYOPHILIZED, FOR SOLUTION INTRAVENOUS at 05:19

## 2022-06-01 RX ADMIN — PIPERACILLIN AND TAZOBACTAM 4.5 G: 4; .5 INJECTION, POWDER, LYOPHILIZED, FOR SOLUTION INTRAVENOUS at 10:54

## 2022-06-01 RX ADMIN — POTASSIUM CHLORIDE 40 MEQ: 1500 TABLET, EXTENDED RELEASE ORAL at 10:56

## 2022-06-01 RX ADMIN — ASPIRIN 81 MG: 81 TABLET, COATED ORAL at 08:30

## 2022-06-01 RX ADMIN — ESCITALOPRAM OXALATE 10 MG: 10 TABLET ORAL at 08:30

## 2022-06-01 NOTE — PROGRESS NOTES
Progress Note - General Surgery   Abby Colin 73 y o  female MRN: [id number]  Unit/Bed#: S -01 Encounter: [id number]    Assessment:  73 y o  F who presents with diverticulitis with contained microperforation and phlegmon     Afebrile  VSS    Plan:  Advance to full liquid diet   Maintenance Arnol@[facility]  Continue IV abx - Zosyn   Prn analgesia  OOB/ambulate   DVT ppx   Possible d/c this afternoon on po abx pending progress       Subjective/Objective     Subjective: No acute events overnight  Her abdominal pain is improving  She had one episode of diarrhea  She is tolerating clear liquid diet without nausea/vomiting  No fevers, chills  Objective:     Blood pressure 114/57, pulse 96, temperature 98 2 °F (36 8 °C), temperature source Oral, resp  rate 16, weight 56 7 kg (125 lb), SpO2 97 %, not currently breastfeeding  ,Body mass index is 24 41 kg/m²  Intake/Output Summary (Last 24 hours) at 6/1/2022 0559  Last data filed at 6/1/2022 0303  Gross per 24 hour   Intake 1150 ml   Output 1650 ml   Net -500 ml       Invasive Devices  Report    Peripheral Intravenous Line  Duration           Peripheral IV 05/30/22 Left Antecubital 1 day                Physical Exam:   NAD, alert and oriented x3  Normocephalic, atraumatic  MMM  Norm resp effort on room air   Regular rate  Abd soft, nondistended, mild tenderness in LLQ   No rebound, rigidity, or guarding   No calf tenderness or peripheral edema  -rash/lesions      Lab, Imaging and other studies:  CBC:   No results found for: WBC, HGB, HCT, MCV, PLT, ADJUSTEDWBC, MCH, MCHC, RDW, MPV, NRBC, CMP:   No results found for: SODIUM, K, CL, CO2, ANIONGAP, BUN, CREATININE, GLUCOSE, CALCIUM, AST, ALT, ALKPHOS, PROT, BILITOT, EGFR  VTE Pharmacologic Prophylaxis: Enoxaparin (Lovenox)  VTE Mechanical Prophylaxis: sequential compression device

## 2022-06-01 NOTE — DISCHARGE INSTRUCTIONS
Continue full liquids (including anything that can be put in a ) until you have finished your course of antibiotics  You may also have toast and crackers  After this, continue a low fiber diet for 2 weeks  Then you should transition to a high fiber diet to help prevent diverticulitis episodes in the future  Full Liquid Diet   AMBULATORY CARE:   Liquids and foods to include:   Fruit juices or pureed fruit without seeds    Vegetable juices or pureed vegetables in broth     Broth or strained cream soups    Refined and strained cooked cereals thinned with milk or half-and-half creamer    Flavored gelatin without chunks of fruit    Plain ice cream, milk shakes, sherbet, or popsicles    Yogurt, pudding, or soft custard    Milk or liquid nutrition supplements    Coffee, tea, sodas, water, or sports drinks    Butter, margarine, or cream    Other things you should know about a full liquid diet:   You may need nutrition supplements  if you will be on this diet for more than 5 to 7 days  The full liquid diet does not provide all the nutrients, vitamins, minerals, or calories that your body needs  You may need other diet changes  if you have another medical condition such as diabetes, high blood pressure, or heart disease  For example, you may need to choose sugar-free, low-sodium, or low-fat foods as part of your full liquid diet  Choose lactose-free liquids if you are lactose intolerant  Examples include acidophilus milk, lactose-free milk, soy milk, or lactose-free liquid nutrition supplements  © Copyright [address] 2022 Information is for End User's use only and may not be sold, redistributed or otherwise used for commercial purposes  All illustrations and images included in CareNotes® are the copyrighted property of [employer]  or [facility] [employer] Arcos   The above information is an  only  It is not intended as medical advice for individual conditions or treatments   Talk to your doctor, nurse or pharmacist before following any medical regimen to see if it is safe and effective for you  Diverticulitis   WHAT YOU NEED TO KNOW:   Diverticulitis is a condition that causes small pockets along your intestine called diverticula to become inflamed or infected  This is caused by hard bowel movements, food, or bacteria that get stuck in the pockets  DISCHARGE INSTRUCTIONS:   Return to the emergency department if:   You have bowel movement or foul-smelling discharge leaking from your vagina or in your urine  You have severe diarrhea  You urinate less than usual or not at all  You are not able to have a bowel movement  You cannot stop vomiting  You have severe abdominal pain, a fever, and your abdomen is larger than usual     You have new or increased blood in your bowel movements  Call your doctor if:   You have pain when you urinate  Your symptoms get worse or do not go away  You have questions or concerns about your condition or care  Medicines:   Antibiotics  may be given to help treat a bacterial infection  Prescription pain medicine  may be given  Ask your healthcare provider how to take this medicine safely  Some prescription pain medicines contain acetaminophen  Do not take other medicines that contain acetaminophen without talking to your healthcare provider  Too much acetaminophen may cause liver damage  Prescription pain medicine may cause constipation  Ask your healthcare provider how to prevent or treat constipation  Take your medicine as directed  Contact your healthcare provider if you think your medicine is not helping or if you have side effects  Tell him or her if you are allergic to any medicine  Keep a list of the medicines, vitamins, and herbs you take  Include the amounts, and when and why you take them  Bring the list or the pill bottles to follow-up visits  Carry your medicine list with you in case of an emergency      Clear liquid diet:  A clear liquid diet includes any liquids that you can see through  Examples include water, ginger-troy, cranberry or apple juice, frozen fruit ice, or broth  Stay on a clear liquid diet until your symptoms are gone, or as directed  Follow up with your doctor as directed: You may need to return for a colonoscopy  When your symptoms are gone, you may need a low-fat, high-fiber diet to prevent diverticulitis from developing again  Your healthcare provider or dietitian can help you create meal plans  Write down your questions so you remember to ask them during your visits  © Copyright [employer] 2022 Information is for End User's use only and may not be sold, redistributed or otherwise used for commercial purposes  All illustrations and images included in CareNotes® are the copyrighted property of [employer]  or [employer]  The above information is an  only  It is not intended as medical advice for individual conditions or treatments  Talk to your doctor, nurse or pharmacist before following any medical regimen to see if it is safe and effective for you

## 2022-06-02 ENCOUNTER — TRANSITIONAL CARE MANAGEMENT (OUTPATIENT)
Dept: INTERNAL MEDICINE CLINIC | Facility: CLINIC | Age: 72
End: 2022-06-02

## 2022-06-02 ENCOUNTER — TELEPHONE (OUTPATIENT)
Dept: HEMATOLOGY ONCOLOGY | Facility: CLINIC | Age: 72
End: 2022-06-02

## 2022-06-02 PROCEDURE — TCMPR: Performed by: INTERNAL MEDICINE

## 2022-06-02 NOTE — TELEPHONE ENCOUNTER
Free light chain ratio is 0 16  Usually she has that around 0 28  She will be going for blood test again in September because she is going to be away to [facility] between now until September  I spoke with patient

## 2022-06-04 LAB
BACTERIA BLD CULT: NORMAL
BACTERIA BLD CULT: NORMAL

## 2022-08-09 ENCOUNTER — TELEPHONE (OUTPATIENT)
Dept: INFUSION CENTER | Facility: CLINIC | Age: 72
End: 2022-08-09

## 2022-08-09 NOTE — TELEPHONE ENCOUNTER
LM for pt to call [facility] to schedule 6 month dose of Evusheld injection  Pt due 10/11/22

## 2022-09-07 DIAGNOSIS — F41.9 ANXIETY: ICD-10-CM

## 2022-09-08 RX ORDER — ESCITALOPRAM OXALATE 10 MG/1
TABLET ORAL
Qty: 90 TABLET | Refills: 3 | Status: SHIPPED | OUTPATIENT
Start: 2022-09-08

## 2022-09-09 DIAGNOSIS — C90.00 MULTIPLE MYELOMA NOT HAVING ACHIEVED REMISSION (HCC): Primary | ICD-10-CM

## 2022-09-09 RX ORDER — SODIUM CHLORIDE 9 MG/ML
20 INJECTION, SOLUTION INTRAVENOUS ONCE
Status: CANCELLED | OUTPATIENT
Start: 2022-09-13

## 2022-09-13 ENCOUNTER — HOSPITAL ENCOUNTER (OUTPATIENT)
Dept: INFUSION CENTER | Facility: CLINIC | Age: 72
Discharge: HOME | End: 2022-09-13
Payer: MEDICARE

## 2022-09-13 VITALS
SYSTOLIC BLOOD PRESSURE: 121 MMHG | TEMPERATURE: 97.7 F | HEART RATE: 79 BPM | OXYGEN SATURATION: 97 % | RESPIRATION RATE: 18 BRPM | WEIGHT: 130.6 LBS | BODY MASS INDEX: 25.51 KG/M2 | DIASTOLIC BLOOD PRESSURE: 75 MMHG

## 2022-09-13 DIAGNOSIS — C90.00 MULTIPLE MYELOMA NOT HAVING ACHIEVED REMISSION (HCC): Primary | ICD-10-CM

## 2022-09-13 PROCEDURE — 96365 THER/PROPH/DIAG IV INF INIT: CPT

## 2022-09-13 RX ORDER — SODIUM CHLORIDE 9 MG/ML
20 INJECTION, SOLUTION INTRAVENOUS ONCE
OUTPATIENT
Start: 2022-12-06

## 2022-09-13 RX ORDER — SODIUM CHLORIDE 9 MG/ML
20 INJECTION, SOLUTION INTRAVENOUS ONCE
Status: COMPLETED | OUTPATIENT
Start: 2022-09-13 | End: 2022-09-13

## 2022-09-13 RX ADMIN — SODIUM CHLORIDE 20 ML/HR: 0.9 INJECTION, SOLUTION INTRAVENOUS at 14:56

## 2022-09-13 RX ADMIN — ZOLEDRONIC ACID 3.3 MG: 4 INJECTION, SOLUTION, CONCENTRATE INTRAVENOUS at 14:56

## 2022-09-13 NOTE — PROGRESS NOTES
Patient to infusion for Zometa  She offers no complaints  She tolerated infusion without issue  She will make next appointment on her way out today    She declined AVS

## 2022-09-20 DIAGNOSIS — C90.00 MULTIPLE MYELOMA NOT HAVING ACHIEVED REMISSION (HCC): Primary | ICD-10-CM

## 2022-10-10 DIAGNOSIS — C90.00 MULTIPLE MYELOMA NOT HAVING ACHIEVED REMISSION (HCC): Primary | ICD-10-CM

## 2022-11-04 ENCOUNTER — APPOINTMENT (OUTPATIENT)
Dept: LAB | Facility: CLINIC | Age: 72
End: 2022-11-04

## 2022-11-10 ENCOUNTER — TELEPHONE (OUTPATIENT)
Dept: INFUSION CENTER | Facility: CLINIC | Age: 72
End: 2022-11-10

## 2022-11-10 ENCOUNTER — OFFICE VISIT (OUTPATIENT)
Dept: HEMATOLOGY ONCOLOGY | Facility: CLINIC | Age: 72
End: 2022-11-10

## 2022-11-10 VITALS
SYSTOLIC BLOOD PRESSURE: 130 MMHG | TEMPERATURE: 97.2 F | HEIGHT: 60 IN | HEART RATE: 88 BPM | OXYGEN SATURATION: 97 % | DIASTOLIC BLOOD PRESSURE: 80 MMHG | BODY MASS INDEX: 25.51 KG/M2

## 2022-11-10 DIAGNOSIS — C50.911 MALIGNANT NEOPLASM OF RIGHT BREAST IN FEMALE, ESTROGEN RECEPTOR POSITIVE, UNSPECIFIED SITE OF BREAST (HCC): ICD-10-CM

## 2022-11-10 DIAGNOSIS — T45.1X5A CHEMOTHERAPY INDUCED NEUTROPENIA (HCC): ICD-10-CM

## 2022-11-10 DIAGNOSIS — M81.8 OSTEOPOROSIS OF MULTIPLE SITES ASSOCIATED WITH MULTIPLE MYELOMA (HCC): ICD-10-CM

## 2022-11-10 DIAGNOSIS — C90.00 OSTEOPOROSIS OF MULTIPLE SITES ASSOCIATED WITH MULTIPLE MYELOMA (HCC): ICD-10-CM

## 2022-11-10 DIAGNOSIS — D69.6 THROMBOCYTOPENIA (HCC): ICD-10-CM

## 2022-11-10 DIAGNOSIS — C90.00 MULTIPLE MYELOMA NOT HAVING ACHIEVED REMISSION (HCC): Primary | ICD-10-CM

## 2022-11-10 DIAGNOSIS — I10 ESSENTIAL HYPERTENSION: ICD-10-CM

## 2022-11-10 DIAGNOSIS — M62.838 MUSCLE SPASM: ICD-10-CM

## 2022-11-10 DIAGNOSIS — D70.1 CHEMOTHERAPY INDUCED NEUTROPENIA (HCC): ICD-10-CM

## 2022-11-10 DIAGNOSIS — Z17.0 MALIGNANT NEOPLASM OF RIGHT BREAST IN FEMALE, ESTROGEN RECEPTOR POSITIVE, UNSPECIFIED SITE OF BREAST (HCC): ICD-10-CM

## 2022-11-10 RX ORDER — BACLOFEN 10 MG/1
10 TABLET ORAL 3 TIMES DAILY PRN
Qty: 60 TABLET | Refills: 2 | Status: SHIPPED | OUTPATIENT
Start: 2022-11-10

## 2022-11-10 NOTE — PROGRESS NOTES
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 and she had radiation  Bone marrow test showed 15% plasma cells and there was IgG lambda spike on SPEP  After period of observation she was started on induction RVD  in 2020  On 08/28/2020 she had autologous stem cell transplant  No follow-up bone marrow after that so far  She follows with myeloma specialist at [facility]  Patient was on maintenance Revlimid until December 2021  but that was stopped because of prolonged profound neutropenia and counts were not recovering  She still has leukopenia with neutropenia and thrombocytopenia but no frequent or severe infections and no febrile neutropenia  She has instructions about febrile neutropenia  Neutrophil count has come up above 1000  She remains under surveillance for treated multiple myeloma and has probably has very good partial remission  She is taking baby aspirin daily and she will continue taking that  It is not giving her stomach symptoms and no bleeding  She is not on acyclovir anymore  She has been taking calcium with vitamin-D and has been on Zometa  once every 3 months  She has less tiredness  Has arthritic symptoms and occasionally low back discomfort/spasm and has requested baclofen  Patient has remote past history of stage I, grade 1, 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  Positive hormone receptors and negative her 2  Patient had right mastectomy   She had adjuvant tamoxifen for 5 years     She goes for left mammography                 Current Outpatient Medications:   •  aspirin (ECOTRIN LOW STRENGTH) 81 mg EC tablet, Take 81 mg by mouth daily, Disp: , Rfl:   •  Calcium Citrate-Vitamin D (CALCIUM CITRATE + D PO), Take 1 tablet by mouth 2 (two) times a day  , Disp: , Rfl:   •  escitalopram (LEXAPRO) 10 mg tablet, TAKE 1 TABLET BY MOUTH EVERY DAY, Disp: 90 tablet, Rfl: 3  •  metoprolol tartrate (LOPRESSOR) 50 mg tablet, TAKE 1 AND 1/2 TABLETS BY MOUTH TWO TIMES DAILY, Disp: 270 tablet, Rfl: 3  •  lenalidomide (Revlimid) 10 MG CAPS, TAKE 1 CAPSULE BY MOUTH ONCE DAILY FOR 3 WEEKS ON THEN 1 WEEK OFF (Patient not taking: Reported on 1/5/2022 ), Disp: 21 capsule, Rfl: 0    Allergies   Allergen Reactions   • Ibuprofen Abdominal Pain and Tachycardia     Reaction Date: [id number]; Oncology History   Malignant neoplasm of right breast ([facility] )   9/2000 Surgery    Right breast mastectomy     8/13/2012 Initial Diagnosis    Malignant neoplasm of right breast [facility])      Chemotherapy    Tamoxifen for 5 years     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[place] [address] ) (Resolved)   12/18/2018 Initial Diagnosis    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[place] [address] )     12/18/2018 Biopsy    Final Diagnosis   A  Bone, L-4, core biopsy:  - Fragments of trabecular bone with changes compatible with prior fracture site  - 10% lambda predominant plasmacytosis  See note  - Hematopoietic marrow with trilineage hematopoiesis  - No epithelial elements identified, confirmed with negative keratin AE1/AE3 stains  2/1/2019 - 3/7/2019 Radiation    Plan ID Energy Fractions Dose per Fraction (cGy) Dose Correction (cGy) Total Dose Delivered (cGy) Elapsed Days   L3_L5 Spine 10X/6X 25 / 25 180 0 4,500 34          Multiple myeloma not having achieved remission ([place] [address] )   7/23/2019 Initial Diagnosis    Multiple myeloma not having achieved remission ([place] [address] )     3/16/2020 - 8/3/2020 Chemotherapy    bortezomib (VELCADE) subcutaneous injection 2 1 mg, 1 3 mg/m2 = 2 1 mg, Subcutaneous, Once, 3 of 4 cycles  Administration: 2 1 mg (3/16/2020), 2 1 mg (4/6/2020), 2 1 mg (3/23/2020), 2 1 mg (3/30/2020), 2 1 mg (4/20/2020), 2 1 mg (5/11/2020), 2 1 mg (4/27/2020), 2 1 mg (5/4/2020), 2 1 mg (6/30/2020), 2 1 mg (7/21/2020), 2 1 mg (7/7/2020), 2 1 mg (7/14/2020)         ROS:  11/10/22 Reviewed 12 systems: See symptoms in HPI     Presently no other neurological, cardiac, pulmonary, GI and  symptoms other than mentioned in HPI  Other symptoms are in HPI  No symptoms like   fever, chills, bleeding, bone pains, skin rash, weight loss, night sweats,  weakness, numbness,  claudication and gait problem  No frequent infections  Not unusually sensitive to heat or cold  No swelling of the ankles  No swollen glands  Patient is less anxious  /80 (BP Location: Left arm, Patient Position: Sitting, Cuff Size: Standard)   Pulse 88   Temp (!) 97 2 °F (36 2 °C) (Tympanic)   Ht 5' (1 524 m)   LMP  (LMP Unknown)   SpO2 97%   BMI 25 51 kg/m²     Physical Exam:  Patient is alert and oriented  Patient is not in distress  Vital signs are above  There is no icterus, no oral thrush, no palpable neck mass, clear  lung fields  to percussion and auscultation, regular heart rate, abdomen   soft and non tender , no palpable abdominal mass, no ascites, no edema of ankles, no calf tenderness, no focal neurological deficit, no skin rash, no palpable lymphadenopathy in the neck and axillary areas, , no clubbing  Patient is anxious  Performance status 1  IMAGING:  IMPRESSION:  No mammographic evidence of malignancy            ASSESSMENT/BI-RADS CATEGORY:  Left: 1 - Negative  Overall: 1 - Negative     RECOMMENDATION:       - Routine screening mammogram in 1 year for the left breast      Workstation ID: [id number]          IMPRESSION:     Multiple thoracolumbar compression fractures which appear stable when allowance is made for technical differences      No new abnormalities             Workstation performed: [id number]     Imaging    Mammo screening left w 3d & cad (Order: [id number]) - 5/10/2021      LABS:    Results for orders placed or performed in visit on 06/17/22   Beta 2 microglobulin, serum   Result Value Ref Range    Beta-2 Microglobulin 2 1 0 6 - 2 4 mg/L   Comprehensive metabolic panel   Result Value Ref Range    Sodium 139 135 - 147 mmol/L Potassium 3 9 3 5 - 5 3 mmol/L    Chloride 107 96 - 108 mmol/L    CO2 26 21 - 32 mmol/L    ANION GAP 6 4 - 13 mmol/L    BUN 17 5 - 25 mg/dL    Creatinine 0 89 0 60 - 1 30 mg/dL    Glucose, Fasting 95 65 - 99 mg/dL    Calcium 9 0 8 3 - 10 1 mg/dL    AST 24 5 - 45 U/L    ALT 35 12 - 78 U/L    Alkaline Phosphatase 68 46 - 116 U/L    Total Protein 7 3 6 4 - 8 4 g/dL    Albumin 3 9 3 5 - 5 0 g/dL    Total Bilirubin 0 57 0 20 - 1 00 mg/dL    eGFR 65 ml/min/1 73sq m   IgG, IgA, IgM   Result Value Ref Range    IGA 83 0 70 0 - 400 0 mg/dL    IGG 1,050 0 700 0 - 1,600 0 mg/dL    IGM 50 0 40 0 - 230 0 mg/dL   LD,Blood   Result Value Ref Range     81 - 234 U/L   Immunoglobulin free LT chains blood   Result Value Ref Range    Ig Kappa Free Light Chain 13 7 3 3 - 19 4 mg/L    Ig Lambda Free Light Chain 84 7 (H) 5 7 - 26 3 mg/L    Kappa/Lambda FluidC Ratio 0 16 (L) 0 26 - 1 65     Labs, Imaging, & Other studies:   All pertinent labs and imaging studies were personally reviewed    Lab Results   Component Value Date     11/28/2017    K 3 9 11/04/2022     11/04/2022    CO2 26 11/04/2022    ANIONGAP 9 09/02/2014    BUN 17 11/04/2022    CREATININE 0 89 11/04/2022    GLUCOSE 93 09/02/2014    GLUF 95 11/04/2022    CALCIUM 9 0 11/04/2022    CORRECTEDCA 9 5 09/21/2020    AST 24 11/04/2022    ALT 35 11/04/2022    ALKPHOS 68 11/04/2022    PROT 8 0 09/02/2014    BILITOT 0 32 09/02/2014    EGFR 65 11/04/2022     Lab Results   Component Value Date    WBC 2 66 (L) 11/04/2022    HGB 12 4 11/04/2022    HCT 36 0 11/04/2022    MCV 94 11/04/2022     (L) 11/04/2022   ANC 1060  On SPEP there is a small spike of IgG kappa, too small to accurately quantitate  Reviewed test results  and discussed with patient  Assessment and plan:    Patient is here with her   Follow-up visit for IgG lambda multiple myeloma     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 and she had radiation  Bone marrow test showed 15% plasma cells and there was IgG lambda spike on SPEP  After period of observation she was started on induction RVD  in 2020  On 08/28/2020 she had autologous stem cell transplant  No follow-up bone marrow after that so far  She follows with myeloma specialist at [facility]  Patient was on maintenance Revlimid until December 2021  but that was stopped because of prolonged profound neutropenia and counts were not recovering  She still has leukopenia with neutropenia and thrombocytopenia but no frequent or severe infections and no febrile neutropenia  She has instructions about febrile neutropenia  Neutrophil count has come up above 1000  She remains under surveillance for treated multiple myeloma and has probably has very good partial remission  She is taking baby aspirin daily and she will continue taking that  It is not giving her stomach symptoms and no bleeding  She is not on acyclovir anymore  She has been taking calcium with vitamin-D and has been on Zometa  once every 3 months  She has less tiredness  Has arthritic symptoms and occasionally low back discomfort/spasm and has requested baclofen     Patient has remote past history of stage I, grade 1, 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  Positive hormone receptors and negative her 2  Patient had right mastectomy   She had adjuvant tamoxifen for 5 years     She goes for left mammography             Physical examination and test results are as recorded and discussed  Patient appears to be in very good partial remission  She remains under surveillance  She is not on maintenance Revlimid because of persistent neutropenia  Leukopenia/neutropenia and thrombocytopenia and problem is persisting but no febrile neutropenia and she has instructions to prevent infections and to report to emergency room with febrile neutropenia and other medical emergencies    She does not have problem with her teeth for Zometa  Goal is prolongation of survival and prevention of febrile neutropenia  She will continue to follow with myeloma specialist at [facility] discussed in detail   Questions answered   Discussed the importance of self-breast examination, eating healthy foods, staying active and health screening tests  Merlin Cespedes is capable of self-care     Discussed precautions against coronavirus and flu      Patient had 1 dose of Evusheld and she has decided not to have the 2nd dose         She follows with her gynecologist for examination of breast       1  Multiple myeloma not having achieved remission ([facility] )    - Beta 2 microglobulin, serum; Standing  - CBC and differential; Future  - Comprehensive metabolic panel; Standing  - IgG, IgA, IgM; Standing  - Immunoglobulin free LT chains blood; Standing  - LD,Blood; Standing  - Protein electrophoresis, serum; Standing  - Uric acid; Standing  - Beta 2 microglobulin, serum  - Comprehensive metabolic panel  - IgG, IgA, IgM  - Immunoglobulin free LT chains blood  - LD,Blood  - Protein electrophoresis, serum  - Uric acid  - baclofen 10 mg tablet; Take 1 tablet (10 mg total) by mouth 3 (three) times a day as needed for muscle spasms  Dispense: 60 tablet; Refill: 2  - Basic metabolic panel; Standing  - Basic metabolic panel    2  Malignant neoplasm of right breast in female, estrogen receptor positive, unspecified site of breast ([facility] )      3  Essential hypertension      4  Osteoporosis of multiple sites associated with multiple myeloma ([facility] )      5  Chemotherapy induced neutropenia (HCC)      6  Thrombocytopenia ([facility] )      7  Muscle spasm    - baclofen 10 mg tablet; Take 1 tablet (10 mg total) by mouth 3 (three) times a day as needed for muscle spasms  Dispense: 60 tablet; Refill: 2      Patient has standing orders for blood  Zometa to continue once every 12 weeks  Follow-up in 4 months          I used dictation device to dictate this note and there could be mistakes in my note                    Patient voiced understanding and agreed      Counseling / Coordination of Care      Provided counseling and support

## 2022-11-10 NOTE — PATIENT INSTRUCTIONS
Patient has standing orders for blood  Zometa to continue once every 12 weeks  Follow-up in 4 months

## 2022-11-10 NOTE — TELEPHONE ENCOUNTER
Pt return called  New information from FDA regarding efficacy and new COVID-19 variants reviewed  Patient scheduled for dose 2 tomorrow and would like to leave scheduled as is  She does have an appointment with Dr Hanny Sullivan today and will discuss further with him

## 2022-11-11 ENCOUNTER — HOSPITAL ENCOUNTER (OUTPATIENT)
Dept: INFUSION CENTER | Facility: CLINIC | Age: 72
Discharge: HOME | End: 2022-11-11

## 2022-11-11 NOTE — TELEPHONE ENCOUNTER
Per Dr Colin Salazar RN, pt does not want kristofer, order d/c'd at visit yesterday    Apt canceled for today

## 2022-11-15 ENCOUNTER — APPOINTMENT (OUTPATIENT)
Dept: LAB | Facility: CLINIC | Age: 72
End: 2022-11-15

## 2022-11-15 DIAGNOSIS — C90.00 MULTIPLE MYELOMA NOT HAVING ACHIEVED REMISSION (HCC): ICD-10-CM

## 2022-11-16 LAB
ALBUMIN SERPL ELPH-MCNC: 5.21 G/DL (ref 3.5–5)
ALBUMIN SERPL ELPH-MCNC: 69.4 % (ref 52–65)
ALPHA1 GLOB SERPL ELPH-MCNC: 0.32 G/DL (ref 0.1–0.4)
ALPHA1 GLOB SERPL ELPH-MCNC: 4.2 % (ref 2.5–5)
ALPHA2 GLOB SERPL ELPH-MCNC: 0.71 G/DL (ref 0.4–1.2)
ALPHA2 GLOB SERPL ELPH-MCNC: 9.5 % (ref 7–13)
BETA GLOB ABNORMAL SERPL ELPH-MCNC: 0.32 G/DL (ref 0.4–0.8)
BETA1 GLOB SERPL ELPH-MCNC: 4.3 % (ref 5–13)
BETA2 GLOB SERPL ELPH-MCNC: 3 % (ref 2–8)
BETA2+GAMMA GLOB SERPL ELPH-MCNC: 0.23 G/DL (ref 0.2–0.5)
GAMMA GLOB ABNORMAL SERPL ELPH-MCNC: 0.72 G/DL (ref 0.5–1.6)
GAMMA GLOB SERPL ELPH-MCNC: 9.6 % (ref 12–22)
IGG/ALB SER: 2.27 {RATIO} (ref 1.1–1.8)
M PEAK ID 2: 1.5 %
M PROTEIN 1 MFR SERPL ELPH: 1.1 %
M PROTEIN 1 SERPL ELPH-MCNC: 0.08 G/DL
M PROTEIN 2 SERPL ELPH-MCNC: 0.11 G/DL
PROT PATTERN SERPL ELPH-IMP: ABNORMAL
PROT SERPL-MCNC: 7.5 G/DL (ref 6.4–8.2)

## 2022-11-20 ENCOUNTER — RA CDI HCC (OUTPATIENT)
Dept: OTHER | Facility: HOSPITAL | Age: 72
End: 2022-11-20

## 2022-11-20 NOTE — PROGRESS NOTES
[address] [address]  coding opportunities       Chart reviewed, no opportunity found: CHART REVIEWED, NO OPPORTUNITY FOUND        Patients Insurance     Medicare Insurance: Medicare

## 2022-11-30 ENCOUNTER — OFFICE VISIT (OUTPATIENT)
Dept: INTERNAL MEDICINE CLINIC | Facility: CLINIC | Age: 72
End: 2022-11-30

## 2022-11-30 VITALS
BODY MASS INDEX: 27.38 KG/M2 | HEART RATE: 78 BPM | OXYGEN SATURATION: 98 % | SYSTOLIC BLOOD PRESSURE: 138 MMHG | DIASTOLIC BLOOD PRESSURE: 80 MMHG | HEIGHT: 59 IN | WEIGHT: 135.8 LBS

## 2022-11-30 DIAGNOSIS — E55.9 VITAMIN D DEFICIENCY: ICD-10-CM

## 2022-11-30 DIAGNOSIS — Z23 NEED FOR VACCINATION: ICD-10-CM

## 2022-11-30 DIAGNOSIS — Z00.00 MEDICARE ANNUAL WELLNESS VISIT, SUBSEQUENT: Primary | ICD-10-CM

## 2022-11-30 DIAGNOSIS — E78.2 MIXED HYPERLIPIDEMIA: ICD-10-CM

## 2022-11-30 DIAGNOSIS — Z94.84 H/O STEM CELL TRANSPLANT (HCC): ICD-10-CM

## 2022-11-30 PROBLEM — K57.20 DIVERTICULITIS OF LARGE INTESTINE WITH PERFORATION: Status: RESOLVED | Noted: 2022-05-31 | Resolved: 2022-11-30

## 2022-11-30 NOTE — PATIENT INSTRUCTIONS
Medicare Preventive Visit Patient Instructions  Thank you for completing your Welcome to Medicare Visit or Medicare Annual Wellness Visit today  Your next wellness visit will be due in one year (12/1/2023)  The screening/preventive services that you may require over the next 5-10 years are detailed below  Some tests may not apply to you based off risk factors and/or age  Screening tests ordered at today's visit but not completed yet may show as past due  Also, please note that scanned in results may not display below  Preventive Screenings:  Service Recommendations Previous Testing/Comments   Colorectal Cancer Screening  * Colonoscopy    * Fecal Occult Blood Test (FOBT)/Fecal Immunochemical Test (FIT)  * Fecal DNA/Cologuard Test  * Flexible Sigmoidoscopy Age: 39-70 years old   Colonoscopy: every 10 years (may be performed more frequently if at higher risk)  OR  FOBT/FIT: every 1 year  OR  Cologuard: every 3 years  OR  Sigmoidoscopy: every 5 years  Screening may be recommended earlier than age 39 if at higher risk for colorectal cancer  Also, an individualized decision between you and your healthcare provider will decide whether screening between the ages of 74-80 would be appropriate  Colonoscopy: 01/20/2022  FOBT/FIT: Not on file  Cologuard: Not on file  Sigmoidoscopy: Not on file          Breast Cancer Screening Age: 36 years old  Frequency: every 1-2 years  Not required if history of left and right mastectomy Mammogram: 05/26/2022        Cervical Cancer Screening Between the ages of 21-29, pap smear recommended once every 3 years  Between the ages of 33-67, can perform pap smear with HPV co-testing every 5 years     Recommendations may differ for women with a history of total hysterectomy, cervical cancer, or abnormal pap smears in past  Pap Smear: 08/21/2019        Hepatitis C Screening Once for adults born between 1945 and 1965  More frequently in patients at high risk for Hepatitis C Hep C Antibody: 11/07/2018        Diabetes Screening 1-2 times per year if you're at risk for diabetes or have pre-diabetes Fasting glucose: 95 mg/dL (11/4/2022)  A1C: 5 6 % (4/2/2019)      Cholesterol Screening Once every 5 years if you don't have a lipid disorder  May order more often based on risk factors  Lipid panel: 12/02/2021          Other Preventive Screenings Covered by Medicare:  1  Abdominal Aortic Aneurysm (AAA) Screening: covered once if your at risk  You're considered to be at risk if you have a family history of AAA  2  Lung Cancer Screening: covers low dose CT scan once per year if you meet all of the following conditions: (1) Age 50-69; (2) No signs or symptoms of lung cancer; (3) Current smoker or have quit smoking within the last 15 years; (4) You have a tobacco smoking history of at least 20 pack years (packs per day multiplied by number of years you smoked); (5) You get a written order from a healthcare provider  3  Glaucoma Screening: covered annually if you're considered high risk: (1) You have diabetes OR (2) Family history of glaucoma OR (3)  aged 48 and older OR (3)  American aged 72 and older  3  Osteoporosis Screening: covered every 2 years if you meet one of the following conditions: (1) You're estrogen deficient and at risk for osteoporosis based off medical history and other findings; (2) Have a vertebral abnormality; (3) On glucocorticoid therapy for more than 3 months; (4) Have primary hyperparathyroidism; (5) On osteoporosis medications and need to assess response to drug therapy  · Last bone density test (DXA Scan): 01/23/2020   5  HIV Screening: covered annually if you're between the age of 15-65  Also covered annually if you are younger than 13 and older than 72 with risk factors for HIV infection  For pregnant patients, it is covered up to 3 times per pregnancy      Immunizations:  Immunization Recommendations   Influenza Vaccine Annual influenza vaccination during flu season is recommended for all persons aged >= 6 months who do not have contraindications   Pneumococcal Vaccine   * Pneumococcal conjugate vaccine = PCV13 (Prevnar 13), PCV15 (Vaxneuvance), PCV20 (Prevnar 20)  * Pneumococcal polysaccharide vaccine = PPSV23 (Pneumovax) Adults 25-60 years old: 1-3 doses may be recommended based on certain risk factors  Adults 72 years old: 1-2 doses may be recommended based off what pneumonia vaccine you previously received   Hepatitis B Vaccine 3 dose series if at intermediate or high risk (ex: diabetes, end stage renal disease, liver disease)   Tetanus (Td) Vaccine - COST NOT COVERED BY MEDICARE PART B Following completion of primary series, a booster dose should be given every 10 years to maintain immunity against tetanus  Td may also be given as tetanus wound prophylaxis  Tdap Vaccine - COST NOT COVERED BY MEDICARE PART B Recommended at least once for all adults  For pregnant patients, recommended with each pregnancy  Shingles Vaccine (Shingrix) - COST NOT COVERED BY MEDICARE PART B  2 shot series recommended in those aged 48 and above     Health Maintenance Due:      Topic Date Due   • Colorectal Cancer Screening  01/20/2032   • Hepatitis C Screening  Completed     Immunizations Due:      Topic Date Due   • Hepatitis B Vaccine (1 of 3 - 3-dose series) Never done   • COVID-19 Vaccine (1) Never done   • Influenza Vaccine (1) 09/01/2022     Advance Directives   What are advance directives? Advance directives are legal documents that state your wishes and plans for medical care  These plans are made ahead of time in case you lose your ability to make decisions for yourself  Advance directives can apply to any medical decision, such as the treatments you want, and if you want to donate organs  What are the types of advance directives? There are many types of advance directives, and each state has rules about how to use them   You may choose a combination of any of the following:  · Living will: This is a written record of the treatment you want  You can also choose which treatments you do not want, which to limit, and which to stop at a certain time  This includes surgery, medicine, IV fluid, and tube feedings  · Durable power of  for healthcare [facility]): This is a written record that states who you want to make healthcare choices for you when you are unable to make them for yourself  This person, called a proxy, is usually a family member or a friend  You may choose more than 1 proxy  · Do not resuscitate (DNR) order:  A DNR order is used in case your heart stops beating or you stop breathing  It is a request not to have certain forms of treatment, such as CPR  A DNR order may be included in other types of advance directives  · Medical directive: This covers the care that you want if you are in a coma, near death, or unable to make decisions for yourself  You can list the treatments you want for each condition  Treatment may include pain medicine, surgery, blood transfusions, dialysis, IV or tube feedings, and a ventilator (breathing machine)  · Values history: This document has questions about your views, beliefs, and how you feel and think about life  This information can help others choose the care that you would choose  Why are advance directives important? An advance directive helps you control your care  Although spoken wishes may be used, it is better to have your wishes written down  Spoken wishes can be misunderstood, or not followed  Treatments may be given even if you do not want them  An advance directive may make it easier for your family to make difficult choices about your care  Weight Management   Why it is important to manage your weight:  Being overweight increases your risk of health conditions such as heart disease, high blood pressure, type 2 diabetes, and certain types of cancer   It can also increase your risk for osteoarthritis, sleep apnea, and other respiratory problems  Aim for a slow, steady weight loss  Even a small amount of weight loss can lower your risk of health problems  How to lose weight safely:  A safe and healthy way to lose weight is to eat fewer calories and get regular exercise  You can lose up about 1 pound a week by decreasing the number of calories you eat by 500 calories each day  Healthy meal plan for weight management:  A healthy meal plan includes a variety of foods, contains fewer calories, and helps you stay healthy  A healthy meal plan includes the following:  · Eat whole-grain foods more often  A healthy meal plan should contain fiber  Fiber is the part of grains, fruits, and vegetables that is not broken down by your body  Whole-grain foods are healthy and provide extra fiber in your diet  Some examples of whole-grain foods are whole-wheat breads and pastas, oatmeal, brown rice, and bulgur  · Eat a variety of vegetables every day  Include dark, leafy greens such as spinach, kale, josemanuel greens, and mustard greens  Eat yellow and orange vegetables such as carrots, sweet potatoes, and winter squash  · Eat a variety of fruits every day  Choose fresh or canned fruit (canned in its own juice or light syrup) instead of juice  Fruit juice has very little or no fiber  · Eat low-fat dairy foods  Drink fat-free (skim) milk or 1% milk  Eat fat-free yogurt and low-fat cottage cheese  Try low-fat cheeses such as mozzarella and other reduced-fat cheeses  · Choose meat and other protein foods that are low in fat  Choose beans or other legumes such as split peas or lentils  Choose fish, skinless poultry (chicken or turkey), or lean cuts of red meat (beef or pork)  Before you cook meat or poultry, cut off any visible fat  · Use less fat and oil  Try baking foods instead of frying them  Add less fat, such as margarine, sour cream, regular salad dressing and mayonnaise to foods  Eat fewer high-fat foods   Some examples of high-fat foods include french fries, doughnuts, ice cream, and cakes  · Eat fewer sweets  Limit foods and drinks that are high in sugar  This includes candy, cookies, regular soda, and sweetened drinks  Exercise:  Exercise at least 30 minutes per day on most days of the week  Some examples of exercise include walking, biking, dancing, and swimming  You can also fit in more physical activity by taking the stairs instead of the elevator or parking farther away from stores  Ask your healthcare provider about the best exercise plan for you  © Copyright [employer] 2018 Information is for End User's use only and may not be sold, redistributed or otherwise used for commercial purposes   All illustrations and images included in CareNotes® are the copyrighted property of A D A M , Inc  or [address] [place], [place] [address]audrey priyank

## 2022-11-30 NOTE — PROGRESS NOTES
Assessment and Plan:     Problem List Items Addressed This Visit        Other    Mixed hyperlipidemia    Relevant Orders    Lipid Panel with Direct LDL reflex    H/O stem cell transplant [facility])     August 2020  Keeping up with vaccines post transplant  She will schedule the first hepatitis A and B vaccines in January  Second hep B a month alter and third B , second A 6months from the first doses  She will get her COVID booster from the local pharmacy  Prevnar 20 after March 17 ( a year form Prevnar 13)  She received the Shingrix x 2 right before her transplant in 2020  I will see if she can get it again now  She will need to get it from a local pharmacy            Other Visit Diagnoses     Medicare annual wellness visit, subsequent    -  Primary    Need for vaccination        Relevant Orders    influenza vaccine, high-dose, PF 0 7 mL (FLUZONE HIGH-DOSE) (Completed)    Vitamin D deficiency        Relevant Orders    Vitamin D 25 hydroxy        BMI Counseling: Body mass index is 27 3 kg/m²  The BMI is above normal  Exercise recommendations include exercising 3-5 times per week  Rationale for BMI follow-up plan is due to patient being overweight or obese  Depression Screening and Follow-up Plan: Patient was screened for depression during today's encounter  They screened negative with a PHQ-2 score of 0  Preventive health issues were discussed with patient, and age appropriate screening tests were ordered as noted in patient's After Visit Summary  Personalized health advice and appropriate referrals for health education or preventive services given if needed, as noted in patient's After Visit Summary       History of Present Illness:     Patient presents for a Medicare Wellness Visit    HPI   Patient Care Team:  Michelle Beckwith MD as PCP - [facility] Teresa Mike PA-C (Hematology and Oncology)  Mai Arteaga MD (Radiation Oncology)  James Isaac MD (Hematology and Oncology)  Balbina Mathews MD (Obstetrics and Gynecology)  Maddy Rogers MD (Cardiology)     Review of Systems:     Review of Systems   Constitutional: Negative for chills, fever and unexpected weight change  Respiratory: Negative for shortness of breath  Cardiovascular: Negative for chest pain and palpitations  Gastrointestinal: Negative for abdominal pain, blood in stool, constipation and diarrhea  Genitourinary: Negative for difficulty urinating, hematuria and vaginal bleeding  Hematological: Negative for adenopathy  Bruises/bleeds easily  Psychiatric/Behavioral: Positive for sleep disturbance  Problem List:     Patient Active Problem List   Diagnosis   • Malignant neoplasm of right breast ([facility] )   • Closed compression fracture of thoracic vertebra (HCC)   • Closed compression fracture of fourth lumbar vertebra (HCC)   • Mult fractures of thoracic spine, closed ([facility] )   • Nonrheumatic aortic valve insufficiency   • Essential hypertension   • S/P hysterectomy with oophorectomy   • Multiple myeloma not having achieved remission ([facility] )   • History of breast cancer   • Osteoporosis of multiple sites associated with multiple myeloma ([facility] )   • Mixed hyperlipidemia   • Chemotherapy induced neutropenia ([facility])   • Thrombocytopenia ([facility] )   • H/O stem cell transplant [facility])      Past Medical and Surgical History:     Past Medical History:   Diagnosis Date   • Anxiety    • Aortic valve disorder    • Breast CA ([facility] )     2000-R mastectomy-took tamoxifen/femira   • Cancer ([facility] ) 2000   • Cardiac dysrhythmia    • Depression    • Diverticulitis of colon 2022   • Diverticulitis of large intestine with perforation 5/31/2022   • Heart murmur    • History of spinal fracture     8 since 2016-1 fall related  spinal bone marrow bx today 12/18/2018   • Hypertension    • Multiple myeloma ([facility] ) 2019   • Osteopenia 08/13/2012    Description: DEXA 9/2009; late 2011 stable     • Osteoporosis    • Palpitations    • 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   • 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   • Scoliosis    • Squamous cell carcinoma of skin    • Stem cell transplant candidate 2020   • Tachycardia 12/10/2018   • Urinary tract infection     Multiple times/none recent     Past Surgical History:   Procedure Laterality Date   • APPENDECTOMY  01/2011   • BREAST LUMPECTOMY     • CHEILECTOMY Right     Resolved:  2006, Bunion correction   • COLONOSCOPY  07/2010    Diverticula; recheck 5 yrs   • CT BONE MARROW BIOPSY AND ASPIRATION  07/09/2019   • DILATION AND CURETTAGE, DIAGNOSTIC / THERAPEUTIC     • HYSTERECTOMY  2019   • IR BIOPSY BONE  12/18/2018   • LYMPH NODE BIOPSY  09/2000   • MASTECTOMY Right 2000   • OOPHORECTOMY Bilateral 2019   • DC LAPAROSCOPY W TOT HYSTERECTUTERUS <=250 GRAM  W TUBE/OVARY N/A 04/04/2019    Procedure: ROBOTIC TOTAL LAPAROSCOPIC HYSTERECTOMY, BILATERAL SALPINGO-OOPHORECTOMY;  Surgeon: Unique Agrawal MD PhD;  Location: [facility];  Service: Gynecology Oncology      Family History:     Family History   Problem Relation Age of Onset   • Diabetes Mother    • Osteoporosis Mother    • Heart disease Father    • Breast cancer Sister 64   • BRCA1 Negative Sister    • Heart attack Family    • Other Family         Benign brain tumor   • Club foot Family    • Breast cancer Sister 77   • Stroke Maternal Grandmother    • No Known Problems Maternal Grandfather    • No Known Problems Paternal Grandmother    • No Known Problems Paternal Grandfather    • Breast cancer Sister    • Cancer Sister       Social History:     Social History     Socioeconomic History   • Marital status: /Civil Union     Spouse name: Harish Mcarthur   • Number of children: 1   • Years of education: None   • Highest education level: None   Occupational History   • None   Tobacco Use   • Smoking status: Never   • Smokeless tobacco: Never   Vaping Use   • Vaping Use: Never used   Substance and Sexual Activity   • Alcohol use:  Yes     Alcohol/week: 5 0 standard drinks     Types: 5 Glasses of wine per week Comment: socially   • Drug use: No   • Sexual activity: Yes     Partners: Male     Birth control/protection: Post-menopausal     Comment: hysterectomy   Other Topics Concern   • None   Social History Narrative    Caffeine use     Social Determinants of Health     Financial Resource Strain: Low Risk    • Difficulty of Paying Living Expenses: Not hard at all   Food Insecurity: Not on file   Transportation Needs: No Transportation Needs   • Lack of Transportation (Medical): No   • Lack of Transportation (Non-Medical): No   Physical Activity: Not on file   Stress: Not on file   Social Connections: Not on file   Intimate Partner Violence: Not on file   Housing Stability: Not on file      Medications and Allergies:     Current Outpatient Medications   Medication Sig Dispense Refill   • aspirin (ECOTRIN LOW STRENGTH) 81 mg EC tablet Take 81 mg by mouth daily     • baclofen 10 mg tablet Take 1 tablet (10 mg total) by mouth 3 (three) times a day as needed for muscle spasms 60 tablet 2   • Calcium Citrate-Vitamin D (CALCIUM CITRATE + D PO) Take 1 tablet by mouth 2 (two) times a day       • escitalopram (LEXAPRO) 10 mg tablet TAKE 1 TABLET BY MOUTH EVERY DAY 90 tablet 3   • metoprolol tartrate (LOPRESSOR) 50 mg tablet TAKE 1 AND 1/2 TABLETS BY MOUTH TWO TIMES DAILY 270 tablet 3     No current facility-administered medications for this visit       Allergies   Allergen Reactions   • Ibuprofen Abdominal Pain and Tachycardia     Reaction Date: [id number];       Immunizations:     Immunization History   Administered Date(s) Administered   • COVID-19 MODERNA VACC 0 5 ML IM 01/25/2021, 02/22/2021, 08/16/2021   • DTaP 5 01/21/2022, 03/31/2022, 05/12/2022   • Hib (PRP-T) 02/07/2022, 04/28/2022, 05/26/2022   • IPV 01/21/2022, 03/31/2022, 05/12/2022   • Influenza, high dose seasonal 0 7 mL 11/19/2018, 11/21/2019, 11/25/2020, 11/29/2021, 11/30/2022   • Meningococcal MCV4P 02/07/2022, 04/28/2022   • Pneumococcal Conjugate 13-Valent 11/17/2017, 12/09/2021, 01/13/2022, 03/17/2022   • Pneumococcal Polysaccharide PPV23 11/19/2018   • Zoster Vaccine Recombinant 01/22/2020, 07/08/2020      Health Maintenance:         Topic Date Due   • Colorectal Cancer Screening  01/20/2032   • Hepatitis C Screening  Completed         Topic Date Due   • Hepatitis B Vaccine (1 of 3 - 3-dose series) Never done   • COVID-19 Vaccine (4 - Booster for Jackye Macias series) 11/08/2021      Medicare Screening Tests and Risk Assessments:     Yany Felder is here for her Subsequent Wellness visit  Health Risk Assessment:   Patient rates overall health as good  Patient feels that their physical health rating is same  Patient is very satisfied with their life  Eyesight was rated as same  Hearing was rated as same  Patient feels that their emotional and mental health rating is same  Patients states they are never, rarely angry  Patient states they are often unusually tired/fatigued  Pain experienced in the last 7 days has been some  Patient's pain rating has been 1/10  Patient states that she has experienced no weight loss or gain in last 6 months  Depression Screening:   PHQ-2 Score: 0      Fall Risk Screening: In the past year, patient has experienced: no history of falling in past year      Urinary Incontinence Screening:   Patient has leaked urine accidently in the last six months  Rarely    Home Safety:  Patient does not have trouble with stairs inside or outside of their home  Patient has working smoke alarms and has working carbon monoxide detector  Home safety hazards include: none  Nutrition:   Current diet is Limited junk food  Medications:   Patient is currently taking over-the-counter supplements  OTC medications include: Calcium, low dose aspirin  Patient is able to manage medications       Activities of Daily Living (ADLs)/Instrumental Activities of Daily Living (IADLs):   Walk and transfer into and out of bed and chair?: Yes  Dress and groom yourself?: Yes Bathe or shower yourself?: Yes    Feed yourself? Yes  Do your laundry/housekeeping?: Yes  Manage your money, pay your bills and track your expenses?: Yes  Make your own meals?: Yes    Do your own shopping?: Yes    Previous Hospitalizations:   Any hospitalizations or ED visits within the last 12 months?: Yes    How many hospitalizations have you had in the last year?: 1-2    Hospitalization Comments: Diverticulitis    Advance Care Planning:   Living will: Yes    Durable POA for healthcare: Yes    Advanced directive: Yes      Cognitive Screening:   Provider or family/friend/caregiver concerned regarding cognition?: No    PREVENTIVE SCREENINGS      Cardiovascular Screening:    General: Screening Not Indicated and History Lipid Disorder    Due for: Lipid Panel      Diabetes Screening:     General: Screening Current      Colorectal Cancer Screening:     General: Screening Current      Breast Cancer Screening:     General: History Breast Cancer      Cervical Cancer Screening:    General: Screening Not Indicated      Osteoporosis Screening:    General: Screening Not Indicated and History Osteoporosis      Abdominal Aortic Aneurysm (AAA) Screening:        General: Screening Not Indicated      Lung Cancer Screening:     General: Screening Not Indicated      Hepatitis C Screening:    General: Screening Current    Screening, Brief Intervention, and Referral to Treatment (SBIRT)    Screening  Typical number of drinks in a day: 1  Typical number of drinks in a week: 5  Interpretation: Low risk drinking behavior  AUDIT-C Screenin) How often did you have a drink containing alcohol in the past year? 2 to 3 times a week  2) How many drinks did you have on a typical day when you were drinking in the past year?  1 to 2  3) How often did you have 6 or more drinks on one occasion in the past year? never    AUDIT-C Score: 3  Interpretation: Score 3-12 (female): POSITIVE screen for alcohol misuse    AUDIT Screenin) How often during the last year have you found that you were not able to stop drinking once you had started? 0 - never  5) How often during the last year have you failed to do what was normally expected from you because of drinking? 0 - never  6) How often during the last year have you needed a first drink in the morning to get yourself going after a heavy drinking session? 0 - never  7) How often during the last year have you had a feeling of guilt or remorse after drinking? 0 - never  8) How often during the last year have you been unable to remember what happened the night before because you had been drinking? 0 - never  9) Have you or someone else been injured as a result of your drinking? 0 - no  10) Has a relative or friend or a doctor or another health worker been concerned about your drinking or suggested you cut down? 0 - no    AUDIT Score: 3  Interpretation: Low risk alcohol consumption    Single Item Drug Screening:  How often have you used an illegal drug (including marijuana) or a prescription medication for non-medical reasons in the past year? never    Single Item Drug Screen Score: 0  Interpretation: Negative screen for possible drug use disorder    No results found  Physical Exam:     /80   Pulse 78   Ht 4' 11 13" (1 502 m)   Wt 61 6 kg (135 lb 12 8 oz)   LMP  (LMP Unknown)   SpO2 98%   BMI 27 30 kg/m²     Physical Exam  Constitutional:       General: She is not in acute distress  Appearance: She is well-developed and well-nourished  She is not ill-appearing, toxic-appearing or diaphoretic  HENT:      Head: Normocephalic and atraumatic  Right Ear: External ear normal  There is no impacted cerumen  Left Ear: External ear normal  There is no impacted cerumen  Mouth/Throat:      Mouth: Oropharynx is clear and moist    Eyes:      Conjunctiva/sclera: Conjunctivae normal    Cardiovascular:      Rate and Rhythm: Normal rate and regular rhythm        Heart sounds: Normal heart sounds  No murmur heard  Pulmonary:      Effort: Pulmonary effort is normal  No respiratory distress  Breath sounds: Normal breath sounds  No stridor  No wheezing or rales  Abdominal:      General: There is no distension  Palpations: Abdomen is soft  There is no mass  Tenderness: There is no abdominal tenderness  There is no guarding or rebound  Musculoskeletal:      Cervical back: Neck supple  Right lower leg: No edema  Left lower leg: No edema  Skin:     General: Skin is warm and dry  Neurological:      Mental Status: She is alert and oriented to person, place, and time  Psychiatric:         Mood and Affect: Mood and affect and mood normal          Behavior: Behavior normal          Thought Content:  Thought content normal          Judgment: Judgment normal           Tito Diaz MD

## 2022-11-30 NOTE — ASSESSMENT & PLAN NOTE
August 2020  Keeping up with vaccines post transplant  She will schedule the first hepatitis A and B vaccines in January  Second hep B a month alter and third B , second A 6months from the first doses  She will get her COVID booster from the local pharmacy  Prevnar 20 after March 17 ( a year form Prevnar 13)  She received the Shingrix x 2 right before her transplant in 2020  I will see if she can get it again now   She will need to get it from a local pharmacy

## 2022-12-02 ENCOUNTER — APPOINTMENT (OUTPATIENT)
Dept: LAB | Facility: CLINIC | Age: 72
End: 2022-12-02

## 2022-12-02 DIAGNOSIS — E55.9 VITAMIN D DEFICIENCY: ICD-10-CM

## 2022-12-02 DIAGNOSIS — E78.2 MIXED HYPERLIPIDEMIA: ICD-10-CM

## 2022-12-02 DIAGNOSIS — C90.00 MULTIPLE MYELOMA NOT HAVING ACHIEVED REMISSION (HCC): ICD-10-CM

## 2022-12-02 LAB
25(OH)D3 SERPL-MCNC: 34.9 NG/ML (ref 30–100)
ALBUMIN SERPL BCP-MCNC: 4 G/DL (ref 3.5–5)
ALP SERPL-CCNC: 66 U/L (ref 46–116)
ALT SERPL W P-5'-P-CCNC: 26 U/L (ref 12–78)
ANION GAP SERPL CALCULATED.3IONS-SCNC: 6 MMOL/L (ref 4–13)
AST SERPL W P-5'-P-CCNC: 20 U/L (ref 5–45)
BILIRUB SERPL-MCNC: 0.59 MG/DL (ref 0.2–1)
BUN SERPL-MCNC: 16 MG/DL (ref 5–25)
CALCIUM SERPL-MCNC: 9.5 MG/DL (ref 8.3–10.1)
CHLORIDE SERPL-SCNC: 108 MMOL/L (ref 96–108)
CHOLEST SERPL-MCNC: 164 MG/DL
CO2 SERPL-SCNC: 25 MMOL/L (ref 21–32)
CREAT SERPL-MCNC: 0.82 MG/DL (ref 0.6–1.3)
GFR SERPL CREATININE-BSD FRML MDRD: 72 ML/MIN/1.73SQ M
GLUCOSE P FAST SERPL-MCNC: 106 MG/DL (ref 65–99)
HDLC SERPL-MCNC: 45 MG/DL
LDLC SERPL CALC-MCNC: 82 MG/DL (ref 0–100)
POTASSIUM SERPL-SCNC: 4.1 MMOL/L (ref 3.5–5.3)
PROT SERPL-MCNC: 7.2 G/DL (ref 6.4–8.4)
SODIUM SERPL-SCNC: 139 MMOL/L (ref 135–147)
TRIGL SERPL-MCNC: 187 MG/DL

## 2022-12-06 ENCOUNTER — HOSPITAL ENCOUNTER (OUTPATIENT)
Dept: INFUSION CENTER | Facility: CLINIC | Age: 72
Discharge: HOME | End: 2022-12-06

## 2022-12-06 VITALS
WEIGHT: 134 LBS | BODY MASS INDEX: 26.31 KG/M2 | HEART RATE: 78 BPM | SYSTOLIC BLOOD PRESSURE: 123 MMHG | DIASTOLIC BLOOD PRESSURE: 79 MMHG | OXYGEN SATURATION: 98 % | HEIGHT: 60 IN | TEMPERATURE: 97.6 F | RESPIRATION RATE: 18 BRPM

## 2022-12-06 DIAGNOSIS — C90.00 MULTIPLE MYELOMA NOT HAVING ACHIEVED REMISSION (HCC): Primary | ICD-10-CM

## 2022-12-06 RX ORDER — SODIUM CHLORIDE 9 MG/ML
20 INJECTION, SOLUTION INTRAVENOUS ONCE
Status: COMPLETED | OUTPATIENT
Start: 2022-12-06 | End: 2022-12-06

## 2022-12-06 RX ORDER — SODIUM CHLORIDE 9 MG/ML
20 INJECTION, SOLUTION INTRAVENOUS ONCE
OUTPATIENT
Start: 2023-02-24

## 2022-12-06 RX ADMIN — ZOLEDRONIC ACID 3.3 MG: 4 INJECTION INTRAVENOUS at 15:09

## 2022-12-06 RX ADMIN — SODIUM CHLORIDE 20 ML/HR: 0.9 INJECTION, SOLUTION INTRAVENOUS at 15:00

## 2022-12-06 NOTE — PROGRESS NOTES
Patient here for zometa, labs reviewed from 12/2, Ca and CrCl within parameters, tolerated treatment without complications  Patient declined AVS  Reviewed upcoming appointments with patient

## 2023-01-04 ENCOUNTER — CLINICAL SUPPORT (OUTPATIENT)
Dept: INTERNAL MEDICINE CLINIC | Facility: CLINIC | Age: 73
End: 2023-01-04

## 2023-01-04 DIAGNOSIS — Z94.84 H/O STEM CELL TRANSPLANT (HCC): ICD-10-CM

## 2023-01-04 DIAGNOSIS — Z23 NEED FOR VACCINATION: Primary | ICD-10-CM

## 2023-02-06 ENCOUNTER — CLINICAL SUPPORT (OUTPATIENT)
Dept: INTERNAL MEDICINE CLINIC | Facility: CLINIC | Age: 73
End: 2023-02-06

## 2023-02-06 DIAGNOSIS — Z23 ENCOUNTER FOR IMMUNIZATION: Primary | ICD-10-CM

## 2023-02-09 NOTE — TELEPHONE ENCOUNTER
[address] [address]

                             Tel: [phone]

                             Fax: [phone]



                    Lower Extremity Arterial Evaluation

____________________________________________________________________________



Name: YARA CARPENTER

MRN: [id number]                Age: 63 yrs

Gender: Female                 : 1955

Patient Status: Outpatient     Patient Location: 

Account #: [id number]

Study Date: 2018 10:06 AM

Accession #: [id number]

____________________________________________________________________________



Procedure: A color flow and duplex scan of the lower extremity arteries was

performed bilaterally with velocity and waveform anaylsis. Ankle brachial

indicies performed.

Reason For Study: ULCER





Ordering Physician: NISA GOFF

Performed By: Benjy Bonds

____________________________________________________________________________



____________________________________________________________________________





Measurements and Calculations



                                   Right         Left

  CFA PSV                          150.1        142.7    cm/sec

  Prox PFA PSV                     -161.9       135.9    cm/sec

  Prox SFA PSV                     479.7         94.9    cm/sec

  Mid SFA PSV                      -142.3       -56.2    cm/sec

  Dist SFA PSV                     -172.0       -61.3    cm/sec

  Prox Pop A PSV                   111.7         75.4    cm/sec

  Prox TRAY PSV                      31.4         48.0    cm/sec

  Dist TRAY PSV                      0.22         9.0     cm/sec

  Dist PTA PSV                      95.4         54.7    cm/sec

  Luis M Pedis PSV                    -17.7         15.9    cm/sec



____________________________________________________________________________



Right Side Arterial Evaluation

Normal velocity and triphasic waveforms noted in the Common Femoral artery.

Biphasic with preserved velocities, to the Posterior Tibial artery.

Monophasic in the Anterior tibial and 20-49 % stenosis in the Femoral

artery.



20-49 % stenosis at the Femoral artery. Sequential disease. NB, based on

velocity criteria, Femoral stenosis could be as high as 50-99%.



Ankle Brachial index is 0.95.



Left Side Arterial Evaluation

Normal velocity and biphasic waveforms noted in the Common Femoral artery.

Monophasic to the Posterior Tibial. Diminished velocity in the Anterior

Tibial .



20-49 % stenosis at the Inflow with sequential disease in the Femoral

artery and below.



Ankle Brachial index is 0.77.

____________________________________________________________________________



Interpretation Summary

Moderate hemodynamically significant lesions in the right lower extremity

only, on duplex imaging, at rest. Severe hemodynamically significant

lesions in the left lower extremity only, on duplex imaging, at rest.

____________________________________________________________________________



Electronically signed by:      Lennox Williams      on 2018 03:59 PM



CC: NISA GOFF

>

Williams, Lennox Return call from patient  Labs were faxed from office to [phone]  And [phone]    Patient will have labs refaxed to [facility] office per [facility] request    Will send to [facility] with Dr Flip Tobar Discharged

## 2023-03-02 DIAGNOSIS — I10 ESSENTIAL HYPERTENSION: ICD-10-CM

## 2023-03-03 RX ORDER — METOPROLOL TARTRATE 50 MG/1
TABLET, FILM COATED ORAL
Qty: 270 TABLET | Refills: 3 | Status: SHIPPED | OUTPATIENT
Start: 2023-03-03

## 2023-03-09 ENCOUNTER — TELEPHONE (OUTPATIENT)
Dept: HEMATOLOGY ONCOLOGY | Facility: CLINIC | Age: 73
End: 2023-03-09

## 2023-03-10 ENCOUNTER — APPOINTMENT (OUTPATIENT)
Dept: LAB | Facility: CLINIC | Age: 73
End: 2023-03-10

## 2023-03-10 DIAGNOSIS — C90.00 MULTIPLE MYELOMA NOT HAVING ACHIEVED REMISSION (HCC): ICD-10-CM

## 2023-03-10 LAB
ALBUMIN SERPL BCP-MCNC: 3.9 G/DL (ref 3.5–5)
ALP SERPL-CCNC: 65 U/L (ref 46–116)
ALT SERPL W P-5'-P-CCNC: 22 U/L (ref 12–78)
ANION GAP SERPL CALCULATED.3IONS-SCNC: 8 MMOL/L (ref 4–13)
AST SERPL W P-5'-P-CCNC: 20 U/L (ref 5–45)
BASOPHILS # BLD AUTO: 0 THOUSANDS/ÂΜL (ref 0–0.1)
BASOPHILS NFR BLD AUTO: 0 % (ref 0–1)
BILIRUB SERPL-MCNC: 0.43 MG/DL (ref 0.2–1)
BUN SERPL-MCNC: 15 MG/DL (ref 5–25)
CALCIUM SERPL-MCNC: 9.7 MG/DL (ref 8.3–10.1)
CHLORIDE SERPL-SCNC: 106 MMOL/L (ref 96–108)
CO2 SERPL-SCNC: 24 MMOL/L (ref 21–32)
CREAT SERPL-MCNC: 0.74 MG/DL (ref 0.6–1.3)
EOSINOPHIL # BLD AUTO: 0 THOUSAND/ÂΜL (ref 0–0.61)
EOSINOPHIL NFR BLD AUTO: 0 % (ref 0–6)
ERYTHROCYTE [DISTWIDTH] IN BLOOD BY AUTOMATED COUNT: 13.8 % (ref 11.6–15.1)
GFR SERPL CREATININE-BSD FRML MDRD: 82 ML/MIN/1.73SQ M
GLUCOSE P FAST SERPL-MCNC: 105 MG/DL (ref 65–99)
HCT VFR BLD AUTO: 39.4 % (ref 34.8–46.1)
HGB BLD-MCNC: 12.9 G/DL (ref 11.5–15.4)
IGA SERPL-MCNC: 90 MG/DL (ref 70–400)
IGG SERPL-MCNC: 1040 MG/DL (ref 700–1600)
IGM SERPL-MCNC: 47 MG/DL (ref 40–230)
IMM GRANULOCYTES # BLD AUTO: 0.01 THOUSAND/UL (ref 0–0.2)
IMM GRANULOCYTES NFR BLD AUTO: 0 % (ref 0–2)
LDH SERPL-CCNC: 121 U/L (ref 81–234)
LYMPHOCYTES # BLD AUTO: 1.26 THOUSANDS/ÂΜL (ref 0.6–4.47)
LYMPHOCYTES NFR BLD AUTO: 45 % (ref 14–44)
MCH RBC QN AUTO: 30.6 PG (ref 26.8–34.3)
MCHC RBC AUTO-ENTMCNC: 32.7 G/DL (ref 31.4–37.4)
MCV RBC AUTO: 94 FL (ref 82–98)
MONOCYTES # BLD AUTO: 0.53 THOUSAND/ÂΜL (ref 0.17–1.22)
MONOCYTES NFR BLD AUTO: 19 % (ref 4–12)
NEUTROPHILS # BLD AUTO: 1 THOUSANDS/ÂΜL (ref 1.85–7.62)
NEUTS SEG NFR BLD AUTO: 36 % (ref 43–75)
NRBC BLD AUTO-RTO: 0 /100 WBCS
PLATELET # BLD AUTO: 146 THOUSANDS/UL (ref 149–390)
PMV BLD AUTO: 10 FL (ref 8.9–12.7)
POTASSIUM SERPL-SCNC: 4.2 MMOL/L (ref 3.5–5.3)
PROT SERPL-MCNC: 7.5 G/DL (ref 6.4–8.4)
RBC # BLD AUTO: 4.21 MILLION/UL (ref 3.81–5.12)
SODIUM SERPL-SCNC: 138 MMOL/L (ref 135–147)
URATE SERPL-MCNC: 3.5 MG/DL (ref 2–7.5)
WBC # BLD AUTO: 2.8 THOUSAND/UL (ref 4.31–10.16)

## 2023-03-11 LAB
KAPPA LC FREE SER-MCNC: 14.5 MG/L (ref 3.3–19.4)
KAPPA LC FREE/LAMBDA FREE SER: 0.1 {RATIO} (ref 0.26–1.65)
LAMBDA LC FREE SERPL-MCNC: 141.1 MG/L (ref 5.7–26.3)

## 2023-03-13 LAB — B2 MICROGLOB SERPL-MCNC: 1.8 MG/L (ref 0.6–2.4)

## 2023-03-14 LAB
ALBUMIN SERPL ELPH-MCNC: 4.7 G/DL (ref 3.5–5)
ALBUMIN SERPL ELPH-MCNC: 66.2 % (ref 52–65)
ALPHA1 GLOB SERPL ELPH-MCNC: 0.31 G/DL (ref 0.1–0.4)
ALPHA1 GLOB SERPL ELPH-MCNC: 4.4 % (ref 2.5–5)
ALPHA2 GLOB SERPL ELPH-MCNC: 0.7 G/DL (ref 0.4–1.2)
ALPHA2 GLOB SERPL ELPH-MCNC: 9.9 % (ref 7–13)
BETA GLOB ABNORMAL SERPL ELPH-MCNC: 0.33 G/DL (ref 0.4–0.8)
BETA1 GLOB SERPL ELPH-MCNC: 4.7 % (ref 5–13)
BETA2 GLOB SERPL ELPH-MCNC: 3.5 % (ref 2–8)
BETA2+GAMMA GLOB SERPL ELPH-MCNC: 0.25 G/DL (ref 0.2–0.5)
GAMMA GLOB ABNORMAL SERPL ELPH-MCNC: 0.8 G/DL (ref 0.5–1.6)
GAMMA GLOB SERPL ELPH-MCNC: 11.3 % (ref 12–22)
IGG/ALB SER: 1.96 {RATIO} (ref 1.1–1.8)
INTERPRETATION UR IFE-IMP: NORMAL
PROT PATTERN SERPL ELPH-IMP: ABNORMAL
PROT SERPL-MCNC: 7.1 G/DL (ref 6.4–8.2)

## 2023-03-15 DIAGNOSIS — C90.00 MULTIPLE MYELOMA NOT HAVING ACHIEVED REMISSION (HCC): Primary | ICD-10-CM

## 2023-03-15 RX ORDER — SODIUM CHLORIDE 9 MG/ML
20 INJECTION, SOLUTION INTRAVENOUS ONCE
Status: CANCELLED | OUTPATIENT
Start: 2023-03-17

## 2023-03-16 ENCOUNTER — TELEPHONE (OUTPATIENT)
Dept: GYNECOLOGIC ONCOLOGY | Facility: CLINIC | Age: 73
End: 2023-03-16

## 2023-03-16 ENCOUNTER — OFFICE VISIT (OUTPATIENT)
Dept: HEMATOLOGY ONCOLOGY | Facility: CLINIC | Age: 73
End: 2023-03-16

## 2023-03-16 VITALS
WEIGHT: 134 LBS | HEIGHT: 60 IN | BODY MASS INDEX: 26.31 KG/M2 | SYSTOLIC BLOOD PRESSURE: 124 MMHG | DIASTOLIC BLOOD PRESSURE: 82 MMHG | RESPIRATION RATE: 17 BRPM | OXYGEN SATURATION: 95 % | HEART RATE: 82 BPM

## 2023-03-16 DIAGNOSIS — C90.00 OSTEOPOROSIS OF MULTIPLE SITES ASSOCIATED WITH MULTIPLE MYELOMA (HCC): ICD-10-CM

## 2023-03-16 DIAGNOSIS — T45.1X5A CHEMOTHERAPY INDUCED NEUTROPENIA (HCC): ICD-10-CM

## 2023-03-16 DIAGNOSIS — D69.6 THROMBOCYTOPENIA (HCC): ICD-10-CM

## 2023-03-16 DIAGNOSIS — M81.8 OSTEOPOROSIS OF MULTIPLE SITES ASSOCIATED WITH MULTIPLE MYELOMA (HCC): ICD-10-CM

## 2023-03-16 DIAGNOSIS — Z12.31 SCREENING MAMMOGRAM FOR HIGH-RISK PATIENT: ICD-10-CM

## 2023-03-16 DIAGNOSIS — I10 ESSENTIAL HYPERTENSION: ICD-10-CM

## 2023-03-16 DIAGNOSIS — D70.1 CHEMOTHERAPY INDUCED NEUTROPENIA (HCC): ICD-10-CM

## 2023-03-16 DIAGNOSIS — C90.00 MULTIPLE MYELOMA NOT HAVING ACHIEVED REMISSION (HCC): Primary | ICD-10-CM

## 2023-03-16 DIAGNOSIS — C50.911 MALIGNANT NEOPLASM OF RIGHT BREAST IN FEMALE, ESTROGEN RECEPTOR POSITIVE, UNSPECIFIED SITE OF BREAST (HCC): ICD-10-CM

## 2023-03-16 DIAGNOSIS — Z17.0 MALIGNANT NEOPLASM OF RIGHT BREAST IN FEMALE, ESTROGEN RECEPTOR POSITIVE, UNSPECIFIED SITE OF BREAST (HCC): ICD-10-CM

## 2023-03-16 NOTE — TELEPHONE ENCOUNTER
Patient will not be available for [facility] infusion  Please change plan to reflect next one to be the week of 9/18  Thank you!

## 2023-03-16 NOTE — TELEPHONE ENCOUNTER
While we try to accommodate patient requests, our priority is to schedule treatment according to Doctor's orders and site availability  1  Does the Provider use the intake sheet or checkout note? 2  What would be a preferred day of the week that would work best for your infusion appointment? Any day  3  Do you prefer mornings or afternoons for your appointments? afternoon  4  Are there any days or dates that do not work for your schedule, including any upcoming vacations? Beginning of June through 9/14  5  We are going to try our best to schedule you at the infusion center closest to your home  In the event that we are unable to what would be your next preferred infusion site or sites? [facility]  [facility]    6  Do you have transportation to take you to all of your appointments? Yes  7   Would you like the infusion center to draw labs from your port? (disregard if patient doesn't have a port or need labs for infusion appointment)

## 2023-03-16 NOTE — PATIENT INSTRUCTIONS
Blood work and Zometa every 3 months  She has instructions about febrile neutropenia    She will find out from specialist at [facility] about bone marrow test   Follow-up in 6 months

## 2023-03-16 NOTE — PROGRESS NOTES
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 and she had radiation  Bone marrow test showed 15% plasma cells and there was IgG lambda spike on SPEP  After a period of observation she was given induction systemic therapy, RVD and that was in 2020  On 08/28/2020 she had autologous stem cell transplant  She had not have a follow-up bone marrow test and she we will discuss that with her myeloma specialist at [facility]  She is finishing up posttransplant vaccinations  Patient was on maintenance Revlimid until December 2021  but that was stopped because of prolonged profound neutropenia and counts were not recovering  She still has leukopenia with neutropenia but no febrile neutropenia  No frequent or severe infections  She has instructions about febrile neutropenia, prevention and management  She had thrombocytopenia but platelet count has recovered to 146,000  She remains under surveillance for treated multiple myeloma and  probably has very good partial remission  She is taking baby aspirin daily and aspirin  is not giving her stomach symptoms and no bleeding  She is not on acyclovir anymore  She has been taking calcium with vitamin-D and has been on Zometa  once every 3 months  She has less tiredness  Has arthritic symptoms and occasionally low back discomfort/spasm  Patient has remote past history of stage I, grade 1, 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  Positive hormone receptors and negative her 2  Patient had right mastectomy   She had adjuvant tamoxifen for 5 years     She goes for left mammography                 Current Outpatient Medications:   •  aspirin (ECOTRIN LOW STRENGTH) 81 mg EC tablet, Take 81 mg by mouth daily, Disp: , Rfl:   •  baclofen 10 mg tablet, Take 1 tablet (10 mg total) by mouth 3 (three) times a day as needed for muscle spasms, Disp: 60 tablet, Rfl: 2  •  Calcium Citrate-Vitamin D (CALCIUM CITRATE + D PO), Take 1 tablet by mouth 2 (two) times a day  , Disp: , Rfl:   •  escitalopram (LEXAPRO) 10 mg tablet, TAKE 1 TABLET BY MOUTH EVERY DAY, Disp: 90 tablet, Rfl: 3  •  metoprolol tartrate (LOPRESSOR) 50 mg tablet, TAKE 1 1/2 TABLETS BY MOUTH TWO TIMES DAILY, Disp: 270 tablet, Rfl: 3    Allergies   Allergen Reactions   • Ibuprofen Abdominal Pain and Tachycardia     Reaction Date: [id number]; Oncology History   Malignant neoplasm of right breast ([facility] )   9/2000 Surgery    Right breast mastectomy     8/13/2012 Initial Diagnosis    Malignant neoplasm of right breast [facility])      Chemotherapy    Tamoxifen for 5 years     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[place] [address] ) (Resolved)   12/18/2018 Initial Diagnosis    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[place] [address] )     12/18/2018 Biopsy    Final Diagnosis   A  Bone, L-4, core biopsy:  - Fragments of trabecular bone with changes compatible with prior fracture site  - 10% lambda predominant plasmacytosis  See note  - Hematopoietic marrow with trilineage hematopoiesis  - No epithelial elements identified, confirmed with negative keratin AE1/AE3 stains  2/1/2019 - 3/7/2019 Radiation    Plan ID Energy Fractions Dose per Fraction (cGy) Dose Correction (cGy) Total Dose Delivered (cGy) Elapsed Days   L3_L5 Spine 10X/6X 25 / 25 180 0 4,500 34          Multiple myeloma not having achieved remission ([place] [address] )   7/23/2019 Initial Diagnosis    Multiple myeloma not having achieved remission ([place] [address] )     3/16/2020 - 8/3/2020 Chemotherapy    bortezomib (VELCADE) subcutaneous injection 2 1 mg, 1 3 mg/m2 = 2 1 mg, Subcutaneous, Once, 3 of 4 cycles  Administration: 2 1 mg (3/16/2020), 2 1 mg (4/6/2020), 2 1 mg (3/23/2020), 2 1 mg (3/30/2020), 2 1 mg (4/20/2020), 2 1 mg (5/11/2020), 2 1 mg (4/27/2020), 2 1 mg (5/4/2020), 2 1 mg (6/30/2020), 2 1 mg (7/21/2020), 2 1 mg (7/7/2020), 2 1 mg (7/14/2020)         ROS:  03/16/23 Reviewed 12 systems: See symptoms in HPI     Presently no other neurological, cardiac, pulmonary, GI and  symptoms other than mentioned in HPI  Other symptoms are in HPI  No    fever, chills, bleeding, bone pains, skin rash, weight loss, night sweats,  weakness, numbness,  claudication and gait problem  No frequent infections  Not unusually sensitive to heat or cold  No swelling of the ankles  No swollen glands  Patient is  anxious  /82 (BP Location: Left arm, Patient Position: Sitting, Cuff Size: Adult)   Pulse 82   Resp 17   Ht 5' (1 524 m)   Wt 60 8 kg (134 lb)   LMP  (LMP Unknown)   SpO2 95%   BMI 26 17 kg/m²     Physical Exam:   Alert and oriented and not in distress  Vitals are above  No icterus, no oral thrush, no palpable neck mass,   lung fields clear  to percussion and auscultation, regular heart rate, abdomen is   soft and non tender , no palpable abdominal mass, liver is not palpably enlarged, no ascites, no edema of ankles, no calf tenderness, no focal neurological deficit, no skin rash, no palpable lymphadenopathy in the neck and axillary areas, , no clubbing  Patient is anxious  Performance status 1  IMAGING:  IMPRESSION:  No mammographic evidence of malignancy            ASSESSMENT/BI-RADS CATEGORY:  Left: 1 - Negative  Overall: 1 - Negative     RECOMMENDATION:       - Routine screening mammogram in 1 year for the left breast      Workstation ID: [id number]          IMPRESSION:     Multiple thoracolumbar compression fractures which appear stable when allowance is made for technical differences      No new abnormalities             Workstation performed: [id number]     Imaging    Mammo screening left w 3d & cad (Order: [id number]) - 5/10/2021      LABS:    Results for orders placed or performed in visit on 03/10/23   CBC and differential   Result Value Ref Range    WBC 2 80 (L) 4 31 - 10 16 Thousand/uL    RBC 4 21 3 81 - 5 12 Million/uL    Hemoglobin 12 9 11 5 - 15 4 g/dL    Hematocrit 39 4 34 8 - 46 1 %    MCV 94 82 - 98 fL    MCH 30 6 26 8 - 34 3 pg MCHC 32 7 31 4 - 37 4 g/dL    RDW 13 8 11 6 - 15 1 %    MPV 10 0 8 9 - 12 7 fL    Platelets 798 (L) 311 - 390 Thousands/uL    nRBC 0 /100 WBCs    Neutrophils Relative 36 (L) 43 - 75 %    Immat GRANS % 0 0 - 2 %    Lymphocytes Relative 45 (H) 14 - 44 %    Monocytes Relative 19 (H) 4 - 12 %    Eosinophils Relative 0 0 - 6 %    Basophils Relative 0 0 - 1 %    Neutrophils Absolute 1 00 (L) 1 85 - 7 62 Thousands/µL    Immature Grans Absolute 0 01 0 00 - 0 20 Thousand/uL    Lymphocytes Absolute 1 26 0 60 - 4 47 Thousands/µL    Monocytes Absolute 0 53 0 17 - 1 22 Thousand/µL    Eosinophils Absolute 0 00 0 00 - 0 61 Thousand/µL    Basophils Absolute 0 00 0 00 - 0 10 Thousands/µL     Labs, Imaging, & Other studies:   All pertinent labs and imaging studies were personally reviewed    Lab Results   Component Value Date     11/28/2017    K 4 2 03/10/2023     03/10/2023    CO2 24 03/10/2023    ANIONGAP 9 09/02/2014    BUN 15 03/10/2023    CREATININE 0 74 03/10/2023    GLUCOSE 93 09/02/2014    GLUF 105 (H) 03/10/2023    CALCIUM 9 7 03/10/2023    CORRECTEDCA 9 5 09/21/2020    AST 20 03/10/2023    ALT 22 03/10/2023    ALKPHOS 65 03/10/2023    PROT 8 0 09/02/2014    BILITOT 0 32 09/02/2014    EGFR 82 03/10/2023     SPEP shows a faint band  Normal LDH and uric acid  Free light chain 0 10 and previously that was 0 16  Normal IgA, IgG and IgM  Beta-2 microglobulin 1 8  Reviewed test results  and discussed with patient  Assessment and plan: Emanuel Side Patient is here with her   In 2019 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 and she had radiation  Bone marrow test showed 15% plasma cells and there was IgG lambda spike on SPEP  After a period of observation she was given induction systemic therapy, RVD and that was in 2020  On 08/28/2020 she had autologous stem cell transplant    She had not have a follow-up bone marrow test and she we will discuss that with her myeloma specialist at [facility]  She is finishing up posttransplant vaccinations  Patient was on maintenance Revlimid until December 2021  but that was stopped because of prolonged profound neutropenia and counts were not recovering  She still has leukopenia with neutropenia but no febrile neutropenia  No frequent or severe infections  She has instructions about febrile neutropenia, prevention and management  She had thrombocytopenia but platelet count has recovered to 146,000  She remains under surveillance for treated multiple myeloma and  probably has very good partial remission  She is taking baby aspirin daily and aspirin  is not giving her stomach symptoms and no bleeding  She is not on acyclovir anymore  She has been taking calcium with vitamin-D and has been on Zometa  once every 3 months  She has less tiredness  Has arthritic symptoms and occasionally low back discomfort/spasm  Patient has remote past history of stage I, grade 1, 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  Positive hormone receptors and negative her 2  Patient had right mastectomy   She had adjuvant tamoxifen for 5 years     She goes for left mammography     Physical examination and test results are as recorded and discussed  ANC remains low, 1000 and there is slight change in free light chain ratio as above  No other significant change  Patient appears to be in very good partial remission  She remains under surveillance  She is not on maintenance Revlimid because of persistent neutropenia     No febrile neutropenia and she has instructions to prevent infections and to report to emergency room with febrile neutropenia and other medical emergencies  She does not have problem with her teeth for Zometa  Goal is prolongation of survival and prevention of febrile neutropenia    She will continue to follow with myeloma specialist at [facility] and she will ask about repeating bone marrow test   Goal is also cure from breast cancer     All discussed in detail   Questions answered   Discussed the importance of self-breast examination, eating healthy foods, staying active and health screening tests   Patient is capable of self-care     Discussed precautions against coronavirus and other infections         She follows with her gynecologist for GYN examination and examination of breast       1  Multiple myeloma not having achieved remission ([place] [address] )    - Beta 2 microglobulin, serum; Standing  - CBC and differential; Standing  - Comprehensive metabolic panel; Standing  - IgG, IgA, IgM; Standing  - Immunoglobulin free LT chains blood; Standing  - LD,Blood; Standing  - Protein electrophoresis, serum; Standing  - Beta 2 microglobulin, serum  - CBC and differential  - Comprehensive metabolic panel  - IgG, IgA, IgM  - Immunoglobulin free LT chains blood  - LD,Blood  - Protein electrophoresis, serum    2  Malignant neoplasm of right breast in female, estrogen receptor positive, unspecified site of breast ([place] [address] )      3  Essential hypertension      4  Osteoporosis of multiple sites associated with multiple myeloma ([place] [address] )      5  Chemotherapy induced neutropenia ([facility])      6  Thrombocytopenia ([place] [address] )      7  Screening mammogram for high-risk patient    - Mammo screening left w 3d & cad; Future    Blood work and Zometa every 3 months  She has instructions about febrile neutropenia  She will find out from specialist at [facility] about bone marrow test   Follow-up in 6 months          I used a dictation device to dictate this note and there could be mistakes in my note and she may call my office for that                     Patient voiced understanding and agreed      Counseling / Coordination of Care      Provided counseling and support

## 2023-03-17 ENCOUNTER — HOSPITAL ENCOUNTER (OUTPATIENT)
Dept: INFUSION CENTER | Facility: CLINIC | Age: 73
End: 2023-03-17

## 2023-03-17 VITALS
WEIGHT: 133.5 LBS | HEIGHT: 60 IN | RESPIRATION RATE: 18 BRPM | OXYGEN SATURATION: 98 % | BODY MASS INDEX: 26.21 KG/M2 | HEART RATE: 69 BPM | SYSTOLIC BLOOD PRESSURE: 135 MMHG | TEMPERATURE: 98.4 F | DIASTOLIC BLOOD PRESSURE: 77 MMHG

## 2023-03-17 DIAGNOSIS — C90.00 MULTIPLE MYELOMA NOT HAVING ACHIEVED REMISSION (HCC): Primary | ICD-10-CM

## 2023-03-17 RX ORDER — SODIUM CHLORIDE 9 MG/ML
20 INJECTION, SOLUTION INTRAVENOUS ONCE
Status: COMPLETED | OUTPATIENT
Start: 2023-03-17 | End: 2023-03-17

## 2023-03-17 RX ORDER — SODIUM CHLORIDE 9 MG/ML
20 INJECTION, SOLUTION INTRAVENOUS ONCE
OUTPATIENT
Start: 2023-06-09

## 2023-03-17 RX ADMIN — ZOLEDRONIC ACID 3.5 MG: 4 INJECTION, SOLUTION, CONCENTRATE INTRAVENOUS at 14:29

## 2023-03-17 RX ADMIN — SODIUM CHLORIDE 20 ML/HR: 0.9 INJECTION, SOLUTION INTRAVENOUS at 14:18

## 2023-03-17 NOTE — PROGRESS NOTES
Patient arrives to infusion center for Zometa today  Labs reviewed from 3/10, calcium 9 7, calculated CrCL 49 8 - within parameters for treatment today  Patient denies any recent or upcoming dental work  PIV inserted without issue  Patient resting on recliner chair, call bell within reach

## 2023-03-17 NOTE — PROGRESS NOTES
Patient tolerated Zometa today without issue  PIV removed intact, coban wrap in place  Patient aware to schedule next appointment upon DC  Patient ambulatory upon DC without gait disturbance noted

## 2023-03-27 DIAGNOSIS — C90.00 MULTIPLE MYELOMA NOT HAVING ACHIEVED REMISSION (HCC): Primary | ICD-10-CM

## 2023-05-12 ENCOUNTER — TELEPHONE (OUTPATIENT)
Dept: HEMATOLOGY ONCOLOGY | Facility: CLINIC | Age: 73
End: 2023-05-12

## 2023-05-12 NOTE — TELEPHONE ENCOUNTER
Patient Call    Who are you speaking with? self   If it is not the patient, are they listed on an active communication consent form? self   What is the reason for this call? Muscle spasms in back, could Dr Julee Lindo call in something stronger   Does this require a call back? yes   If a call back is required, please list best call back number [phone]   If a call back is required, advise that a message will be forwarded to their care team and someone will return their call as soon as possible  Did you relay this information to the patient?  yes

## 2023-05-12 NOTE — TELEPHONE ENCOUNTER
Returned telephone call spoke with Pt  Dr Salgado Conception does not feel comfortable rx a stronger pain medication  Recommend calling the PCP or she could be referred to pain management  Pt stated she understands and will call her pcp

## 2023-05-17 NOTE — TELEPHONE ENCOUNTER
Pt called to let Dr Baldwin Cleverly know that she seen Dr Rylan Shell with [facility] from [facility]  This week  There shoud be a completed note by early next week in care everywhere  Dr Rylan Shell would also like pt to start on Zometa  Pt needs f/u with out office this month  Please call pt back with  Appt, schedule is very booked  Transposition Flap Text: The defect edges were debeveled with a #15 scalpel blade.  Given the location of the defect and the proximity to free margins a transposition flap was deemed most appropriate.  Using a sterile surgical marker, an appropriate transposition flap was drawn incorporating the defect.    The area thus outlined was incised deep to adipose tissue with a #15 scalpel blade.  The skin margins were undermined to an appropriate distance in all directions utilizing iris scissors.

## 2023-05-30 ENCOUNTER — HOSPITAL ENCOUNTER (OUTPATIENT)
Dept: RADIOLOGY | Age: 73
Discharge: HOME | End: 2023-05-30

## 2023-05-30 VITALS — HEIGHT: 60 IN | WEIGHT: 133 LBS | BODY MASS INDEX: 26.11 KG/M2

## 2023-05-30 DIAGNOSIS — Z12.31 SCREENING MAMMOGRAM FOR HIGH-RISK PATIENT: ICD-10-CM

## 2023-06-01 ENCOUNTER — APPOINTMENT (OUTPATIENT)
Dept: LAB | Facility: CLINIC | Age: 73
End: 2023-06-01
Payer: MEDICARE

## 2023-06-01 LAB
ALBUMIN SERPL BCP-MCNC: 3.9 G/DL (ref 3.5–5)
ALP SERPL-CCNC: 68 U/L (ref 46–116)
ALT SERPL W P-5'-P-CCNC: 21 U/L (ref 12–78)
ANION GAP SERPL CALCULATED.3IONS-SCNC: 3 MMOL/L (ref 4–13)
AST SERPL W P-5'-P-CCNC: 17 U/L (ref 5–45)
BASOPHILS # BLD AUTO: 0 THOUSANDS/ÂΜL (ref 0–0.1)
BASOPHILS NFR BLD AUTO: 0 % (ref 0–1)
BILIRUB SERPL-MCNC: 0.51 MG/DL (ref 0.2–1)
BUN SERPL-MCNC: 15 MG/DL (ref 5–25)
CALCIUM SERPL-MCNC: 9.5 MG/DL (ref 8.3–10.1)
CHLORIDE SERPL-SCNC: 108 MMOL/L (ref 96–108)
CO2 SERPL-SCNC: 25 MMOL/L (ref 21–32)
CREAT SERPL-MCNC: 0.8 MG/DL (ref 0.6–1.3)
EOSINOPHIL # BLD AUTO: 0.01 THOUSAND/ÂΜL (ref 0–0.61)
EOSINOPHIL NFR BLD AUTO: 0 % (ref 0–6)
ERYTHROCYTE [DISTWIDTH] IN BLOOD BY AUTOMATED COUNT: 14.4 % (ref 11.6–15.1)
GFR SERPL CREATININE-BSD FRML MDRD: 74 ML/MIN/1.73SQ M
GLUCOSE P FAST SERPL-MCNC: 100 MG/DL (ref 65–99)
HCT VFR BLD AUTO: 39.4 % (ref 34.8–46.1)
HGB BLD-MCNC: 12.2 G/DL (ref 11.5–15.4)
IGA SERPL-MCNC: 84 MG/DL (ref 70–400)
IGG SERPL-MCNC: 947 MG/DL (ref 700–1600)
IGM SERPL-MCNC: 44 MG/DL (ref 40–230)
IMM GRANULOCYTES # BLD AUTO: 0.02 THOUSAND/UL (ref 0–0.2)
IMM GRANULOCYTES NFR BLD AUTO: 1 % (ref 0–2)
LDH SERPL-CCNC: 100 U/L (ref 81–234)
LYMPHOCYTES # BLD AUTO: 1.24 THOUSANDS/ÂΜL (ref 0.6–4.47)
LYMPHOCYTES NFR BLD AUTO: 44 % (ref 14–44)
MCH RBC QN AUTO: 31.1 PG (ref 26.8–34.3)
MCHC RBC AUTO-ENTMCNC: 31 G/DL (ref 31.4–37.4)
MCV RBC AUTO: 101 FL (ref 82–98)
MONOCYTES # BLD AUTO: 0.42 THOUSAND/ÂΜL (ref 0.17–1.22)
MONOCYTES NFR BLD AUTO: 15 % (ref 4–12)
NEUTROPHILS # BLD AUTO: 1.11 THOUSANDS/ÂΜL (ref 1.85–7.62)
NEUTS SEG NFR BLD AUTO: 40 % (ref 43–75)
NRBC BLD AUTO-RTO: 0 /100 WBCS
PLATELET # BLD AUTO: 170 THOUSANDS/UL (ref 149–390)
PMV BLD AUTO: 10.1 FL (ref 8.9–12.7)
POTASSIUM SERPL-SCNC: 3.8 MMOL/L (ref 3.5–5.3)
PROT SERPL-MCNC: 7.5 G/DL (ref 6.4–8.4)
RBC # BLD AUTO: 3.92 MILLION/UL (ref 3.81–5.12)
SODIUM SERPL-SCNC: 136 MMOL/L (ref 135–147)
WBC # BLD AUTO: 2.8 THOUSAND/UL (ref 4.31–10.16)

## 2023-06-02 LAB
KAPPA LC FREE SER-MCNC: 12.6 MG/L (ref 3.3–19.4)
KAPPA LC FREE/LAMBDA FREE SER: 0.08 {RATIO} (ref 0.26–1.65)
LAMBDA LC FREE SERPL-MCNC: 167.7 MG/L (ref 5.7–26.3)

## 2023-06-03 LAB
ALBUMIN SERPL ELPH-MCNC: 4.62 G/DL (ref 3.5–5)
ALBUMIN SERPL ELPH-MCNC: 66.9 % (ref 52–65)
ALPHA1 GLOB SERPL ELPH-MCNC: 0.35 G/DL (ref 0.1–0.4)
ALPHA1 GLOB SERPL ELPH-MCNC: 5 % (ref 2.5–5)
ALPHA2 GLOB SERPL ELPH-MCNC: 0.76 G/DL (ref 0.4–1.2)
ALPHA2 GLOB SERPL ELPH-MCNC: 11 % (ref 7–13)
B2 MICROGLOB SERPL-MCNC: 2 MG/L (ref 0.6–2.4)
BETA GLOB ABNORMAL SERPL ELPH-MCNC: 0.31 G/DL (ref 0.4–0.8)
BETA1 GLOB SERPL ELPH-MCNC: 4.5 % (ref 5–13)
BETA2 GLOB SERPL ELPH-MCNC: 3.3 % (ref 2–8)
BETA2+GAMMA GLOB SERPL ELPH-MCNC: 0.23 G/DL (ref 0.2–0.5)
GAMMA GLOB ABNORMAL SERPL ELPH-MCNC: 0.64 G/DL (ref 0.5–1.6)
GAMMA GLOB SERPL ELPH-MCNC: 9.3 % (ref 12–22)
IGG/ALB SER: 2.02 {RATIO} (ref 1.1–1.8)
PROT PATTERN SERPL ELPH-IMP: ABNORMAL
PROT SERPL-MCNC: 6.9 G/DL (ref 6.4–8.2)

## 2023-06-03 PROCEDURE — 84165 PROTEIN E-PHORESIS SERUM: CPT | Performed by: STUDENT IN AN ORGANIZED HEALTH CARE EDUCATION/TRAINING PROGRAM

## 2023-09-11 DIAGNOSIS — C90.00 MULTIPLE MYELOMA NOT HAVING ACHIEVED REMISSION (HCC): Primary | ICD-10-CM

## 2023-09-13 ENCOUNTER — APPOINTMENT (OUTPATIENT)
Dept: LAB | Facility: CLINIC | Age: 73
End: 2023-09-13
Payer: MEDICARE

## 2023-09-13 DIAGNOSIS — C90.00 MULTIPLE MYELOMA NOT HAVING ACHIEVED REMISSION (HCC): ICD-10-CM

## 2023-09-15 PROCEDURE — 84165 PROTEIN E-PHORESIS SERUM: CPT | Performed by: PATHOLOGY

## 2023-09-18 ENCOUNTER — OFFICE VISIT (OUTPATIENT)
Dept: HEMATOLOGY ONCOLOGY | Facility: CLINIC | Age: 73
End: 2023-09-18
Payer: MEDICARE

## 2023-09-18 VITALS
RESPIRATION RATE: 17 BRPM | SYSTOLIC BLOOD PRESSURE: 128 MMHG | TEMPERATURE: 97.7 F | HEIGHT: 60 IN | BODY MASS INDEX: 26.9 KG/M2 | OXYGEN SATURATION: 97 % | WEIGHT: 137 LBS | DIASTOLIC BLOOD PRESSURE: 88 MMHG | HEART RATE: 77 BPM

## 2023-09-18 DIAGNOSIS — C50.911 MALIGNANT NEOPLASM OF RIGHT BREAST IN FEMALE, ESTROGEN RECEPTOR POSITIVE, UNSPECIFIED SITE OF BREAST: ICD-10-CM

## 2023-09-18 DIAGNOSIS — S22.009A CLOSED FRACTURE OF MULTIPLE THORACIC VERTEBRAE, INITIAL ENCOUNTER (HCC): ICD-10-CM

## 2023-09-18 DIAGNOSIS — C90.00 MULTIPLE MYELOMA NOT HAVING ACHIEVED REMISSION (HCC): Primary | ICD-10-CM

## 2023-09-18 DIAGNOSIS — Z85.3 HISTORY OF BREAST CANCER: ICD-10-CM

## 2023-09-18 DIAGNOSIS — M81.8 OSTEOPOROSIS OF MULTIPLE SITES ASSOCIATED WITH MULTIPLE MYELOMA (HCC): ICD-10-CM

## 2023-09-18 DIAGNOSIS — C90.00 OSTEOPOROSIS OF MULTIPLE SITES ASSOCIATED WITH MULTIPLE MYELOMA (HCC): ICD-10-CM

## 2023-09-18 DIAGNOSIS — D69.6 THROMBOCYTOPENIA (HCC): ICD-10-CM

## 2023-09-18 DIAGNOSIS — Z17.0 MALIGNANT NEOPLASM OF RIGHT BREAST IN FEMALE, ESTROGEN RECEPTOR POSITIVE, UNSPECIFIED SITE OF BREAST: ICD-10-CM

## 2023-09-18 DIAGNOSIS — I10 ESSENTIAL HYPERTENSION: ICD-10-CM

## 2023-09-18 PROCEDURE — 99214 OFFICE O/P EST MOD 30 MIN: CPT | Performed by: INTERNAL MEDICINE

## 2023-09-18 NOTE — PROGRESS NOTES
Progress Note: Medical Oncology:  Dayday Dillon 72 y.o. female MRN: [id number]  Unit/Bed#:  Encounter: [id number]    Assessment and Plan:  1. IgG Lambda Multiple Myeloma:  2. Plasmacytoma of Lumbar Spine (L4):  3. Stable Leukopenia with Neutropenia:  Patient is a 57-year-old female, with a past medical history significant for IgG lambda multiple myeloma (Diagnosed in July 2019), as well as remote stage 1 invasive tubular carcinoma of the right breast (Diagnosed in 2000); who presents today for follow-up. Of particular importance, patient was diagnosed with a plasmacytoma of the lumbar spine (L4), complicated by an accompanying compression fracture in 2019; requiring Radiation Therapy to the affected area. Bone Marrow Biopsy was performed in the same time interval and demonstrated 15% plasma cells, with an IgG lambda monoclonal band on serum protein electrophoresis. After a period of observation, patient was administered induction chemotherapy with RVd in 2020, of which was closely followed by autologous stem cell transplantation on August 28th, 2020. She was on maintenance Revlimid until December 2021; however, this was discontinued due to prolonged neutropenia without recovery of counts. She presently remains under surveillance given evidence of partial remission. Will continue with observation. Given gradual progression of Free Light Chain Ratio, will recommend follow-up in 3-months with repeat laboratory-studies. Will also obtain a CT Low-Dose Myeloma Scan. In the interim, patient is to continue to receive Zoledronic Acid once every three-months. Note: Given chronic stable leukopenia with mild neutropenia, counseled patient regarding the importance of routine vaccinations (e.g. COVID-19, Influenza, and RSV), as well as monitoring for infectious signs or symptoms. Patient expressed understanding and agreement. 1. Recommend outpatient follow-up in 3-months:    A.  Will repeat CBC-D, CMP, LDH, and Uric Acid.    B. Obtain Myeloma Parameters prior to follow-up. C. Ordered CT Low-Dose Myeloma Scan. 2. Note: Discussed need for routine vaccinations. 4. Right Breast Cancer:  Patient is a 51-year-old female with a remote diagnosis of stage 1, grade 0 tubular carcinoma of the right breast (Hormone Positive and HER-2 Negative). Patient is status-post right mastectomy. She was administered adjuvant Tamoxifen for five-years. She is compliant with annual diagnostic mammography of the left breast. Left Breast Mammogram from May 30th was benign. Patient to obtain repeat Left Breast Mammogram in 1-year (May/June 2024). 1. Repeat Annual Left Breast Mammogram in 1-year. 5. Chronic Medical Problems:    A. Hypertension   B. Mixed Hyperlipidemia   C. Vitamin D Deficiency  Patient is a 51-year-old female, with an established history of hypertension, hyperlipidemia, and vitamin D deficiency; followed by Internal Medicine. Chronic medical conditions are presently stable. Patient to continue follow-up , as scheduled. 1. Recommend continued follow-up with Internal Medicine. Subjective: Interval History: Gisella GaldamezGeno Kim is a 51-year-old female, with a past medical history significant for IgG lambda multiple myeloma (Diagnosed in July 2019), as well as remote stage 1 invasive tubular carcinoma of the right breast (Diagnosed in 2000); who presents today for follow-up. Of particular importance, patient was diagnosed with a plasmacytoma of the lumbar spine (L4), complicated by an accompanying compression fracture in 2019; requiring Radiation Therapy to the affected area. Bone Marrow Biopsy was performed in the same time interval and demonstrated 15% plasma cells, with an IgG lambda monoclonal band on serum protein electrophoresis. After a period of observation, patient was administered induction chemotherapy with RVd in 2020, of which was closely followed by autologous stem cell transplantation on August 28th, 2020.  She was on maintenance Revlimid until December 2021; however, this was discontinued due to prolonged neutropenia without recovery of counts. She presently remains under surveillance. She is receiving Zoledronic Acid once every three months; in addition to daily calcium and vitamin D supplementation. Additionally, patient has a remote diagnosis of stage 1, grade 0 tubular carcinoma of the right breast (Hormone Positive and HER-2 Negative). Patient is status-post right mastectomy. She was administered adjuvant Tamoxifen for five-years. She is compliant with annual diagnostic mammography of the left breast.    On follow-up this afternoon, patient is feeling well. She denies significant change since prior follow-up visit in March 2023. Of note, patient has stable leukopenia with mild neutropenia, and thrombocytopenia. She denies recurrent infections, or recent hospitalizations. No unexplained bruising, or excessive bleeding, including gum bleeding, epistaxis, or other mucosal bleeding (e.g. Hematuria, or hematochezia). Patient denies fever, chills, nightsweats, or unintentional weight-loss. Review of Systems:  All systems reviewed and negative except otherwise listed in the History of Present Illness. Laboratory Studies:                      Imaging Studies:  Left Breast Mammogram (May 30th):      Objective:  Vitals:    09/18/23 1327   BP: 128/88   Pulse: 77   Resp: 17   Temp: 97.7 °F (36.5 °C)   SpO2: 97%     Physical Exam:  General: Alert, and oriented; Pleasant, and conversational; Well-Appearing Female (Younger Than Stated Age)  HEENT: Atraumatic, and normocephalic; PERRLA; EOMI; Moist mucosal membranes  Neck: Trachea midline; No neck masses, thyromegaly, or LAD  Cardiovascular: Regular rate and rhythm; No peripheral edema  Respiratory: Clear to auscultation bilaterally; No oxygen requirement  Abdomen: Soft, non-tender; Non-distended; No organomegaly   Extremities: No obvious deformities;  No Scattered Ecchymosis Neurologic: Appropriately alert, and oriented to Person, Place, and Time; No focal neurologic deficits    Lab Results: I have reviewed all pertinent labs. Imaging: I have personally reviewed pertinent reports. EKG, Pathology, and Other Studies: I have personally reviewed pertinent reports. Patient was seen and discussed with attending physician, Elyse Mayers M.D.    Maria M Garcia D.O.   Hematology-Oncology Fellow (PGY-4)

## 2023-09-19 ENCOUNTER — HOSPITAL ENCOUNTER (OUTPATIENT)
Dept: INFUSION CENTER | Facility: CLINIC | Age: 73
Discharge: HOME | End: 2023-09-19
Payer: MEDICARE

## 2023-09-19 VITALS
SYSTOLIC BLOOD PRESSURE: 142 MMHG | DIASTOLIC BLOOD PRESSURE: 82 MMHG | TEMPERATURE: 97 F | RESPIRATION RATE: 16 BRPM | HEART RATE: 67 BPM | OXYGEN SATURATION: 98 %

## 2023-09-19 DIAGNOSIS — C90.00 MULTIPLE MYELOMA NOT HAVING ACHIEVED REMISSION (HCC): Primary | ICD-10-CM

## 2023-09-19 DIAGNOSIS — S22.009A CLOSED FRACTURE OF MULTIPLE THORACIC VERTEBRAE, INITIAL ENCOUNTER (HCC): ICD-10-CM

## 2023-09-19 DIAGNOSIS — M81.8 OSTEOPOROSIS OF MULTIPLE SITES ASSOCIATED WITH MULTIPLE MYELOMA (HCC): ICD-10-CM

## 2023-09-19 DIAGNOSIS — C90.00 OSTEOPOROSIS OF MULTIPLE SITES ASSOCIATED WITH MULTIPLE MYELOMA (HCC): ICD-10-CM

## 2023-09-19 DIAGNOSIS — F41.9 ANXIETY: ICD-10-CM

## 2023-09-19 PROCEDURE — 96365 THER/PROPH/DIAG IV INF INIT: CPT

## 2023-09-19 RX ORDER — ESCITALOPRAM OXALATE 10 MG/1
TABLET ORAL
Qty: 90 TABLET | Refills: 3 | Status: SHIPPED | OUTPATIENT
Start: 2023-09-19

## 2023-09-19 RX ORDER — SODIUM CHLORIDE 9 MG/ML
20 INJECTION, SOLUTION INTRAVENOUS ONCE
Status: CANCELLED | OUTPATIENT
Start: 2023-09-19

## 2023-09-19 RX ORDER — SODIUM CHLORIDE 9 MG/ML
20 INJECTION, SOLUTION INTRAVENOUS ONCE
OUTPATIENT
Start: 2023-12-12

## 2023-09-19 RX ORDER — SODIUM CHLORIDE 9 MG/ML
20 INJECTION, SOLUTION INTRAVENOUS ONCE
Status: COMPLETED | OUTPATIENT
Start: 2023-09-19 | End: 2023-09-19

## 2023-09-19 RX ADMIN — SODIUM CHLORIDE 20 ML/HR: 0.9 INJECTION, SOLUTION INTRAVENOUS at 14:54

## 2023-09-19 RX ADMIN — ZOLEDRONIC ACID 3.3 MG: 4 INJECTION, SOLUTION, CONCENTRATE INTRAVENOUS at 15:07

## 2023-11-28 ENCOUNTER — RA CDI HCC (OUTPATIENT)
Dept: OTHER | Facility: HOSPITAL | Age: 73
End: 2023-11-28

## 2023-11-30 ENCOUNTER — HOSPITAL ENCOUNTER (OUTPATIENT)
Dept: RADIOLOGY | Age: 73
Discharge: HOME | End: 2023-11-30
Payer: MEDICARE

## 2023-11-30 DIAGNOSIS — C90.00 MULTIPLE MYELOMA NOT HAVING ACHIEVED REMISSION (HCC): ICD-10-CM

## 2023-11-30 PROCEDURE — 76497 UNLISTED CT PROCEDURE: CPT

## 2023-12-05 ENCOUNTER — OFFICE VISIT (OUTPATIENT)
Dept: INTERNAL MEDICINE CLINIC | Facility: CLINIC | Age: 73
End: 2023-12-05
Payer: MEDICARE

## 2023-12-05 VITALS
OXYGEN SATURATION: 99 % | SYSTOLIC BLOOD PRESSURE: 130 MMHG | WEIGHT: 139.2 LBS | HEART RATE: 70 BPM | DIASTOLIC BLOOD PRESSURE: 74 MMHG | HEIGHT: 60 IN | BODY MASS INDEX: 27.33 KG/M2

## 2023-12-05 DIAGNOSIS — Z94.84 H/O STEM CELL TRANSPLANT (HCC): ICD-10-CM

## 2023-12-05 DIAGNOSIS — Z00.00 MEDICARE ANNUAL WELLNESS VISIT, SUBSEQUENT: ICD-10-CM

## 2023-12-05 DIAGNOSIS — M62.830 SPASM OF MUSCLE OF LOWER BACK: Primary | ICD-10-CM

## 2023-12-05 DIAGNOSIS — Z23 ENCOUNTER FOR IMMUNIZATION: ICD-10-CM

## 2023-12-05 DIAGNOSIS — E78.2 MIXED HYPERLIPIDEMIA: ICD-10-CM

## 2023-12-05 PROCEDURE — 90662 IIV NO PRSV INCREASED AG IM: CPT

## 2023-12-05 PROCEDURE — G0439 PPPS, SUBSEQ VISIT: HCPCS | Performed by: INTERNAL MEDICINE

## 2023-12-05 PROCEDURE — G0008 ADMIN INFLUENZA VIRUS VAC: HCPCS

## 2023-12-05 PROCEDURE — 90746 HEPB VACCINE 3 DOSE ADULT IM: CPT

## 2023-12-05 PROCEDURE — G0010 ADMIN HEPATITIS B VACCINE: HCPCS

## 2023-12-05 PROCEDURE — 99214 OFFICE O/P EST MOD 30 MIN: CPT | Performed by: INTERNAL MEDICINE

## 2023-12-05 RX ORDER — TIZANIDINE 4 MG/1
4 TABLET ORAL 3 TIMES DAILY
Qty: 30 TABLET | Refills: 0 | Status: SHIPPED | OUTPATIENT
Start: 2023-12-05

## 2023-12-05 NOTE — ASSESSMENT & PLAN NOTE
Will switch to tizanidine from baclofen  Try OTC patch or cream with lidocaine  Call if not improving

## 2023-12-05 NOTE — PROGRESS NOTES
Assessment and Plan:     Problem List Items Addressed This Visit        Other    Mixed hyperlipidemia    Relevant Orders    Lipid panel    H/O stem cell transplant ([address])    Spasm of muscle of lower back - Primary     Will switch to tizanidine from baclofen  Try OTC patch or cream with lidocaine  Call if not improving           Relevant Medications    tiZANidine (ZANAFLEX) 4 mg tablet   Other Visit Diagnoses     Medicare annual wellness visit, subsequent        Encounter for immunization        Relevant Orders    influenza vaccine, high-dose, PF 0.7 mL (FLUZONE HIGH-DOSE) (Completed)    HEPATITIS B VACCINE ADULT IM (Completed)          BMI Counseling: Body mass index is 27.19 kg/m². The BMI is above normal. Exercise recommendations include exercising 3-5 times per week. Rationale for BMI follow-up plan is due to patient being overweight or obese. Depression Screening and Follow-up Plan: Patient was screened for depression during today's encounter. They screened negative with a PHQ-2 score of 0. Return for Prevnar 20  Get RSV from the pharmacy  Preventive health issues were discussed with patient, and age appropriate screening tests were ordered as noted in patient's After Visit Summary. Personalized health advice and appropriate referrals for health education or preventive services given if needed, as noted in patient's After Visit Summary.      History of Present Illness:     Patient presents for a Medicare Wellness Visit    2 weeks of left low back spasms that comes and goes lasting for a few minutes  Baclofen 10mg BID is not helping  She has been alternating heat or ice and topical agents OTC         Patient Care Team:  Mayuri Murry MD as PCP - Cabrera Larkin PA-C (Hematology and Oncology)  Hallie Spivey MD (Radiation Oncology)  uKldeep Marin MD (Hematology and Oncology)  Randi Faye MD (Cardiology)     Review of Systems:     Review of Systems   Constitutional:  Negative for chills, fever and unexpected weight change. Respiratory:  Negative for shortness of breath. Cardiovascular:  Negative for chest pain and palpitations. Gastrointestinal:  Negative for abdominal pain, constipation and diarrhea. Genitourinary:  Negative for difficulty urinating. Musculoskeletal:  Positive for back pain (left lower back spasms for 2 weeks). Neurological:  Negative for dizziness and headaches. Problem List:     Patient Active Problem List   Diagnosis   • Malignant neoplasm of right breast ([address])   • Closed compression fracture of thoracic vertebra (HCC)   • Closed compression fracture of fourth lumbar vertebra (HCC)   • Mult fractures of thoracic spine, closed ([address])   • Nonrheumatic aortic valve insufficiency   • Essential hypertension   • S/P hysterectomy with oophorectomy   • Multiple myeloma not having achieved remission ([address])   • History of breast cancer   • Osteoporosis of multiple sites associated with multiple myeloma ([address])   • Mixed hyperlipidemia   • Chemotherapy induced neutropenia    • Thrombocytopenia ([address])   • H/O stem cell transplant ([address])   • Spasm of muscle of lower back      Past Medical and Surgical History:     Past Medical History:   Diagnosis Date   • Anxiety    • Aortic valve disorder    • Breast CA ([address])     2000-R mastectomy-took tamoxifen/femira   • Cancer ([address]) 2000   • Cardiac dysrhythmia    • Depression    • Diverticulitis of colon 2022   • Diverticulitis of large intestine with perforation 5/31/2022   • Heart murmur    • History of spinal fracture     8 since 2016-1 fall related  spinal bone marrow bx today 12/18/2018   • Hypertension    • Multiple myeloma ([address]) 2019   • Osteopenia 08/13/2012    Description: DEXA 9/2009; late 2011 stable.    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   • 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   • Scoliosis    • Squamous cell carcinoma of skin    • Stem cell transplant candidate 2020   • Tachycardia 12/10/2018   • Urinary tract infection     Multiple times/none recent     Past Surgical History:   Procedure Laterality Date   • APPENDECTOMY  01/2011   • BREAST LUMPECTOMY     • CHEILECTOMY Right     Resolved:  2006, Bunion correction   • COLONOSCOPY  07/2010    Diverticula; recheck 5 yrs   • CT BONE MARROW BIOPSY AND ASPIRATION  07/09/2019   • DILATION AND CURETTAGE, DIAGNOSTIC / THERAPEUTIC     • HYSTERECTOMY  2019   • IR BIOPSY BONE  12/18/2018   • LYMPH NODE BIOPSY  09/2000   • MASTECTOMY Right 2000   • OOPHORECTOMY Bilateral 2019   • OR LAPS TOTAL HYSTERECT 250 GM/< W/RMVL TUBE/OVARY N/A 04/04/2019    Procedure: ROBOTIC TOTAL LAPAROSCOPIC HYSTERECTOMY, BILATERAL SALPINGO-OOPHORECTOMY;  Surgeon: Curtis Cota MD PhD;  Location: [facility];  Service: Gynecology Oncology      Family History:     Family History   Problem Relation Age of Onset   • Diabetes Mother    • Osteoporosis Mother    • Heart disease Father    • Breast cancer Sister 64   • BRCA1 Negative Sister    • Breast cancer Sister 77   • Breast cancer Sister    • Cancer Sister    • Stroke Maternal Grandmother    • No Known Problems Maternal Grandfather    • No Known Problems Paternal Grandmother    • No Known Problems Paternal Grandfather    • Heart attack Family    • Other Family         Benign brain tumor   • Club foot Family       Social History:     Social History     Socioeconomic History   • Marital status: /Civil Union     Spouse name: Dov Jamison   • Number of children: 1   • Years of education: None   • Highest education level: None   Occupational History   • None   Tobacco Use   • Smoking status: Never   • Smokeless tobacco: Never   Vaping Use   • Vaping Use: Never used   Substance and Sexual Activity   • Alcohol use:  Yes     Alcohol/week: 5.0 standard drinks of alcohol     Types: 5 Glasses of wine per week     Comment: socially   • Drug use: No   • Sexual activity: Not Currently     Partners: Male     Birth control/protection: Post-menopausal Comment: hysterectomy   Other Topics Concern   • None   Social History Narrative    Caffeine use     Social Determinants of Health     Financial Resource Strain: Low Risk  (11/28/2023)    Overall Financial Resource Strain (CARDIA)    • Difficulty of Paying Living Expenses: Not hard at all   Food Insecurity: Not on file   Transportation Needs: No Transportation Needs (11/28/2023)    PRAPARE - Transportation    • Lack of Transportation (Medical): No    • Lack of Transportation (Non-Medical): No   Physical Activity: Not on file   Stress: Not on file   Social Connections: Not on file   Intimate Partner Violence: Not on file   Housing Stability: Not on file      Medications and Allergies:     Current Outpatient Medications   Medication Sig Dispense Refill   • aspirin (ECOTRIN LOW STRENGTH) 81 mg EC tablet Take 81 mg by mouth daily     • Calcium Citrate-Vitamin D (CALCIUM CITRATE + D PO) Take 1 tablet by mouth 2 (two) times a day       • escitalopram (LEXAPRO) 10 mg tablet TAKE 1 TABLET BY MOUTH EVERY DAY. 90 tablet 3   • metoprolol tartrate (LOPRESSOR) 50 mg tablet TAKE 1 1/2 TABLETS BY MOUTH TWO TIMES DAILY 270 tablet 3   • tiZANidine (ZANAFLEX) 4 mg tablet Take 1 tablet (4 mg total) by mouth 3 (three) times a day 30 tablet 0     No current facility-administered medications for this visit.      Allergies   Allergen Reactions   • Ibuprofen Abdominal Pain and Tachycardia     Reaction Date: [id number];       Immunizations:     Immunization History   Administered Date(s) Administered   • COVID-19 MODERNA VACC 0.5 ML IM 01/25/2021, 02/22/2021, 08/16/2021   • DTaP 5 01/21/2022, 03/31/2022, 05/12/2022   • Hep A, adult 01/04/2023   • Hep B, adult 01/04/2023, 02/06/2023, 12/05/2023   • Hib (PRP-T) 02/07/2022, 04/28/2022, 05/26/2022   • INFLUENZA 11/30/2022   • IPV 01/21/2022, 03/31/2022, 05/12/2022   • Influenza, high dose seasonal 0.7 mL 11/19/2018, 11/21/2019, 11/25/2020, 11/29/2021, 11/30/2022, 12/05/2023   • Meningococcal MCV4P 02/07/2022, 04/28/2022   • Pneumococcal Conjugate 13-Valent 11/17/2017, 12/09/2021, 01/13/2022, 03/17/2022   • Pneumococcal Polysaccharide PPV23 11/19/2018   • Zoster Vaccine Recombinant 01/22/2020, 07/08/2020      Health Maintenance:         Topic Date Due   • Colorectal Cancer Screening  01/20/2032   • Hepatitis C Screening  Completed         Topic Date Due   • COVID-19 Vaccine (4 - Booster for Kaushal Wheeler series) 10/11/2021      Medicare Screening Tests and Risk Assessments:     Mari Shelton is here for her Subsequent Wellness visit. Health Risk Assessment:   Patient rates overall health as very good. Patient feels that their physical health rating is much better. Patient is very satisfied with their life. Eyesight was rated as same. Hearing was rated as same. Patient feels that their emotional and mental health rating is same. Patients states they are never, rarely angry. Patient states they are often unusually tired/fatigued. Pain experienced in the last 7 days has been a lot. Patient's pain rating has been 8/10. Patient states that she has experienced no weight loss or gain in last 6 months. Muscle spasms in back    Depression Screening:   PHQ-2 Score: 0      Fall Risk Screening: In the past year, patient has experienced: no history of falling in past year      Urinary Incontinence Screening:   Patient has not leaked urine accidently in the last six months. Home Safety:  Patient does not have trouble with stairs inside or outside of their home. Patient has working smoke alarms and has working carbon monoxide detector. Home safety hazards include: loose rugs on the floor. Nutrition:   Current diet is Regular and Limited junk food. Medications:   Patient is currently taking over-the-counter supplements. OTC medications include: see medication list. Patient is able to manage medications.      Activities of Daily Living (ADLs)/Instrumental Activities of Daily Living (IADLs):   Walk and transfer into and out of bed and chair?: Yes  Dress and groom yourself?: Yes    Bathe or shower yourself?: Yes    Feed yourself? Yes  Do your laundry/housekeeping?: Yes  Manage your money, pay your bills and track your expenses?: Yes  Make your own meals?: Yes    Do your own shopping?: Yes    Previous Hospitalizations:   Any hospitalizations or ED visits within the last 12 months?: No      Advance Care Planning:   Living will: Yes    Durable POA for healthcare: Yes    Advanced directive: Yes      Cognitive Screening:   Provider or family/friend/caregiver concerned regarding cognition?: No    PREVENTIVE SCREENINGS      Cardiovascular Screening:    General: Screening Not Indicated and History Lipid Disorder      Diabetes Screening:     General: Screening Current      Colorectal Cancer Screening:     General: Screening Current      Breast Cancer Screening:     General: History Breast Cancer      Cervical Cancer Screening:    General: Screening Not Indicated      Osteoporosis Screening:    General: Screening Not Indicated and History Osteoporosis      Abdominal Aortic Aneurysm (AAA) Screening:        General: Screening Not Indicated      Lung Cancer Screening:     General: Screening Not Indicated      Hepatitis C Screening:    General: Screening Current    Screening, Brief Intervention, and Referral to Treatment (SBIRT)    Screening  Typical number of drinks in a day: 1  Typical number of drinks in a week: 4  Interpretation: Low risk drinking behavior. AUDIT-C Screenin) How often did you have a drink containing alcohol in the past year? monthly or less  2) How many drinks did you have on a typical day when you were drinking in the past year?  1 to 2  3) How often did you have 6 or more drinks on one occasion in the past year? never    AUDIT-C Score: 1  Interpretation: Score 0-2 (female): Negative screen for alcohol misuse    Single Item Drug Screening:  How often have you used an illegal drug (including marijuana) or a prescription medication for non-medical reasons in the past year? never    Single Item Drug Screen Score: 0  Interpretation: Negative screen for possible drug use disorder    No results found. Physical Exam:     /74 (BP Location: Left arm, Patient Position: Sitting, Cuff Size: Standard)   Pulse 70   Ht 5' (1.524 m)   Wt 63.1 kg (139 lb 3.2 oz)   LMP  (LMP Unknown)   SpO2 99%   BMI 27.19 kg/m²     Physical Exam  Constitutional:       General: She is not in acute distress. Appearance: She is well-developed. She is not ill-appearing, toxic-appearing or diaphoretic. HENT:      Right Ear: External ear normal. There is no impacted cerumen. Left Ear: External ear normal. There is no impacted cerumen. Eyes:      Conjunctiva/sclera: Conjunctivae normal.   Cardiovascular:      Rate and Rhythm: Normal rate and regular rhythm. Heart sounds: Normal heart sounds. No murmur heard. Pulmonary:      Effort: Pulmonary effort is normal. No respiratory distress. Breath sounds: Normal breath sounds. No stridor. No wheezing or rales. Abdominal:      General: There is no distension. Palpations: Abdomen is soft. There is no mass. Tenderness: There is no abdominal tenderness. There is no guarding or rebound. Musculoskeletal:      Cervical back: Neck supple. Right lower leg: No edema. Left lower leg: No edema. Neurological:      Mental Status: She is alert and oriented to person, place, and time. Psychiatric:         Mood and Affect: Mood normal.         Behavior: Behavior normal.         Thought Content:  Thought content normal.         Judgment: Judgment normal.          Arun Guadarrama MD

## 2023-12-08 ENCOUNTER — APPOINTMENT (OUTPATIENT)
Dept: LAB | Facility: CLINIC | Age: 73
End: 2023-12-08
Payer: MEDICARE

## 2023-12-08 DIAGNOSIS — I10 ESSENTIAL HYPERTENSION: ICD-10-CM

## 2023-12-08 DIAGNOSIS — M81.8 OSTEOPOROSIS OF MULTIPLE SITES ASSOCIATED WITH MULTIPLE MYELOMA (HCC): ICD-10-CM

## 2023-12-08 DIAGNOSIS — C90.00 OSTEOPOROSIS OF MULTIPLE SITES ASSOCIATED WITH MULTIPLE MYELOMA (HCC): ICD-10-CM

## 2023-12-08 DIAGNOSIS — E78.2 MIXED HYPERLIPIDEMIA: ICD-10-CM

## 2023-12-08 DIAGNOSIS — C90.00 MULTIPLE MYELOMA NOT HAVING ACHIEVED REMISSION (HCC): ICD-10-CM

## 2023-12-08 DIAGNOSIS — S22.009A CLOSED FRACTURE OF MULTIPLE THORACIC VERTEBRAE, INITIAL ENCOUNTER (HCC): ICD-10-CM

## 2023-12-08 DIAGNOSIS — D69.6 THROMBOCYTOPENIA (HCC): ICD-10-CM

## 2023-12-08 LAB
CHOLEST SERPL-MCNC: 174 MG/DL
HDLC SERPL-MCNC: 45 MG/DL
LDLC SERPL CALC-MCNC: 98 MG/DL (ref 0–100)
NONHDLC SERPL-MCNC: 129 MG/DL
TRIGL SERPL-MCNC: 157 MG/DL
URATE SERPL-MCNC: 3.9 MG/DL (ref 2–7.5)

## 2023-12-08 PROCEDURE — 84550 ASSAY OF BLOOD/URIC ACID: CPT

## 2023-12-08 PROCEDURE — 80061 LIPID PANEL: CPT

## 2023-12-12 ENCOUNTER — HOSPITAL ENCOUNTER (OUTPATIENT)
Dept: INFUSION CENTER | Facility: CLINIC | Age: 73
Discharge: HOME | End: 2023-12-12
Payer: MEDICARE

## 2023-12-12 VITALS
BODY MASS INDEX: 26.7 KG/M2 | OXYGEN SATURATION: 98 % | HEIGHT: 60 IN | RESPIRATION RATE: 18 BRPM | TEMPERATURE: 97.3 F | WEIGHT: 136 LBS | SYSTOLIC BLOOD PRESSURE: 152 MMHG | DIASTOLIC BLOOD PRESSURE: 83 MMHG | HEART RATE: 71 BPM

## 2023-12-12 DIAGNOSIS — C90.00 OSTEOPOROSIS OF MULTIPLE SITES ASSOCIATED WITH MULTIPLE MYELOMA (HCC): ICD-10-CM

## 2023-12-12 DIAGNOSIS — S22.009A CLOSED FRACTURE OF MULTIPLE THORACIC VERTEBRAE, INITIAL ENCOUNTER (HCC): Primary | ICD-10-CM

## 2023-12-12 DIAGNOSIS — C90.00 MULTIPLE MYELOMA NOT HAVING ACHIEVED REMISSION (HCC): ICD-10-CM

## 2023-12-12 DIAGNOSIS — M81.8 OSTEOPOROSIS OF MULTIPLE SITES ASSOCIATED WITH MULTIPLE MYELOMA (HCC): ICD-10-CM

## 2023-12-12 PROCEDURE — 96365 THER/PROPH/DIAG IV INF INIT: CPT

## 2023-12-12 RX ORDER — SODIUM CHLORIDE 9 MG/ML
20 INJECTION, SOLUTION INTRAVENOUS ONCE
Status: COMPLETED | OUTPATIENT
Start: 2023-12-12 | End: 2023-12-12

## 2023-12-12 RX ORDER — SODIUM CHLORIDE 9 MG/ML
20 INJECTION, SOLUTION INTRAVENOUS ONCE
OUTPATIENT
Start: 2024-03-01

## 2023-12-12 RX ADMIN — ZOLEDRONIC ACID 3.5 MG: 4 INJECTION, SOLUTION, CONCENTRATE INTRAVENOUS at 14:24

## 2023-12-12 RX ADMIN — SODIUM CHLORIDE 20 ML/HR: 0.9 INJECTION, SOLUTION INTRAVENOUS at 14:15

## 2023-12-12 NOTE — PROGRESS NOTES
Patient arrived for Zometa infusion. Offers no complaints. Calcium from 12/8 resulted at 9.4. Patients creatinine clearance today 50.1. Patient received 3.5mg per pharmacy protocol. Patient tolerated well. Patient confirmed next appointment 3/5/24 at 1pm at the [facility]. Print out declined.

## 2023-12-13 PROCEDURE — 84165 PROTEIN E-PHORESIS SERUM: CPT | Performed by: STUDENT IN AN ORGANIZED HEALTH CARE EDUCATION/TRAINING PROGRAM

## 2023-12-13 PROCEDURE — 86334 IMMUNOFIX E-PHORESIS SERUM: CPT | Performed by: STUDENT IN AN ORGANIZED HEALTH CARE EDUCATION/TRAINING PROGRAM

## 2023-12-19 ENCOUNTER — CLINICAL SUPPORT (OUTPATIENT)
Dept: INTERNAL MEDICINE CLINIC | Facility: CLINIC | Age: 73
End: 2023-12-19
Payer: MEDICARE

## 2023-12-19 DIAGNOSIS — Z23 ENCOUNTER FOR IMMUNIZATION: Primary | ICD-10-CM

## 2023-12-19 PROCEDURE — 90632 HEPA VACCINE ADULT IM: CPT

## 2023-12-19 PROCEDURE — G0009 ADMIN PNEUMOCOCCAL VACCINE: HCPCS

## 2023-12-19 PROCEDURE — G0010 ADMIN HEPATITIS B VACCINE: HCPCS

## 2023-12-19 PROCEDURE — 90677 PCV20 VACCINE IM: CPT

## 2023-12-22 ENCOUNTER — OFFICE VISIT (OUTPATIENT)
Dept: HEMATOLOGY ONCOLOGY | Facility: CLINIC | Age: 73
End: 2023-12-22
Payer: MEDICARE

## 2023-12-22 VITALS
TEMPERATURE: 97.5 F | BODY MASS INDEX: 27.09 KG/M2 | WEIGHT: 138 LBS | RESPIRATION RATE: 17 BRPM | HEIGHT: 60 IN | OXYGEN SATURATION: 97 % | SYSTOLIC BLOOD PRESSURE: 130 MMHG | HEART RATE: 81 BPM | DIASTOLIC BLOOD PRESSURE: 80 MMHG

## 2023-12-22 DIAGNOSIS — Z12.31 SCREENING MAMMOGRAM FOR HIGH-RISK PATIENT: ICD-10-CM

## 2023-12-22 DIAGNOSIS — Z85.3 HISTORY OF BREAST CANCER: ICD-10-CM

## 2023-12-22 DIAGNOSIS — Z17.0 MALIGNANT NEOPLASM OF RIGHT BREAST IN FEMALE, ESTROGEN RECEPTOR POSITIVE, UNSPECIFIED SITE OF BREAST: ICD-10-CM

## 2023-12-22 DIAGNOSIS — I10 ESSENTIAL HYPERTENSION: ICD-10-CM

## 2023-12-22 DIAGNOSIS — M81.8 OSTEOPOROSIS OF MULTIPLE SITES ASSOCIATED WITH MULTIPLE MYELOMA (HCC): ICD-10-CM

## 2023-12-22 DIAGNOSIS — C50.911 MALIGNANT NEOPLASM OF RIGHT BREAST IN FEMALE, ESTROGEN RECEPTOR POSITIVE, UNSPECIFIED SITE OF BREAST: ICD-10-CM

## 2023-12-22 DIAGNOSIS — C90.00 OSTEOPOROSIS OF MULTIPLE SITES ASSOCIATED WITH MULTIPLE MYELOMA (HCC): ICD-10-CM

## 2023-12-22 DIAGNOSIS — C90.00 MULTIPLE MYELOMA NOT HAVING ACHIEVED REMISSION (HCC): Primary | ICD-10-CM

## 2023-12-22 PROCEDURE — 99214 OFFICE O/P EST MOD 30 MIN: CPT | Performed by: INTERNAL MEDICINE

## 2023-12-22 NOTE — PROGRESS NOTES
HPI:  Patient is here with her .  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  Bone marrow test showed 15% plasma cells and there was IgG lambda spike on SPEP.  After a period of observation she was given induction systemic therapy, RVD in 2020. On 08/28/2020 she had autologous stem cell transplant.    She has received posttransplant vaccinations.  Patient was on maintenance Revlimid until December 2021  but that was stopped because of prolonged profound neutropenia and counts were not recovering.  She still has leukopenia with neutropenia but no febrile neutropenia.  There is an improvement in leukopenia/neutropenia.  No frequent or severe infections.  She has instructions about febrile neutropenia, prevention and management.  She had thrombocytopenia but platelet count has recovered .   She remains under surveillance for treated multiple myeloma and  probably has very good partial remission.  She is taking baby aspirin daily and aspirin  is not giving her stomach symptoms and no bleeding.  She is not on acyclovir anymore.    She has been taking calcium with vitamin-D and has been on Zometa  once every 3 months.  She has less tiredness.  Has arthritic symptoms and occasionally low back discomfort/spasm.  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 Positive hormone receptors and negative her 2. Patient had right mastectomy.  She had adjuvant tamoxifen for 5 years..  She goes for left mammography  Patient was recently seen by Dr. Flores at [place].  Patient states doctor was pleased with the results.  She had blood work at [place] and CT myeloma scan here               Current Outpatient Medications:     aspirin (ECOTRIN LOW STRENGTH) 81 mg EC tablet, Take 81 mg by mouth daily, Disp: , Rfl:     Calcium Citrate-Vitamin D (CALCIUM CITRATE + D  PO), Take 1 tablet by mouth 2 (two) times a day  , Disp: , Rfl:     escitalopram (LEXAPRO) 10 mg tablet, TAKE 1 TABLET BY MOUTH EVERY DAY., Disp: 90 tablet, Rfl: 3    metoprolol tartrate (LOPRESSOR) 50 mg tablet, TAKE 1 1/2 TABLETS BY MOUTH TWO TIMES DAILY, Disp: 270 tablet, Rfl: 3    tiZANidine (ZANAFLEX) 4 mg tablet, Take 1 tablet (4 mg total) by mouth 3 (three) times a day, Disp: 30 tablet, Rfl: 0    Allergies   Allergen Reactions    Ibuprofen Abdominal Pain and Tachycardia     Reaction Date: 14Jun2011;        Oncology History   Malignant neoplasm of right breast (HCC)   9/2000 Surgery    Right breast mastectomy     8/13/2012 Initial Diagnosis    Malignant neoplasm of right breast (HCC)      Chemotherapy    Tamoxifen for 5 years     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HCC) (Resolved)   12/18/2018 Initial Diagnosis    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HCC)     12/18/2018 Biopsy    Final Diagnosis   A. Bone, L-4, core biopsy:  - Fragments of trabecular bone with changes compatible with prior fracture site.  - 10% lambda predominant plasmacytosis. See note.  - Hematopoietic marrow with trilineage hematopoiesis.  - No epithelial elements identified, confirmed with negative keratin AE1/AE3 stains.           2/1/2019 - 3/7/2019 Radiation    Plan ID Energy Fractions Dose per Fraction (cGy) Dose Correction (cGy) Total Dose Delivered (cGy) Elapsed Days   L3_L5 Spine 10X/6X 25 / 25 180 0 4,500 34          Multiple myeloma not having achieved remission (HCC)   7/23/2019 Initial Diagnosis    Multiple myeloma not having achieved remission (HCC)     3/16/2020 - 8/3/2020 Chemotherapy    bortezomib (VELCADE) subcutaneous injection 2.1 mg, 1.3 mg/m2 = 2.1 mg, Subcutaneous, Once, 3 of 4 cycles  Administration: 2.1 mg (3/16/2020), 2.1 mg (4/6/2020), 2.1 mg (3/23/2020), 2.1 mg (3/30/2020), 2.1 mg (4/20/2020), 2.1 mg (5/11/2020), 2.1 mg (4/27/2020), 2.1 mg (5/4/2020), 2.1 mg (6/30/2020), 2.1 mg (7/21/2020), 2.1 mg (7/7/2020), 2.1 mg (7/14/2020)         ROS:  12/22/23  Reviewed 12 systems: See symptoms in HPI.   Presently no other neurological, cardiac, pulmonary, GI and  symptoms other than mentioned in HPI.  Other symptoms are in HPI.  No other symptoms like   fever, chills, bleeding, bone pains, skin rash, weight loss, night sweats,  weakness, numbness,  claudication and gait problem. No frequent infections.  Not unusually sensitive to heat or cold. No swelling of the ankles. No swollen glands.  Patient is  anxious.       /80 (BP Location: Left arm, Patient Position: Sitting, Cuff Size: Adult)   Pulse 81   Temp 97.5 °F (36.4 °C)   Resp 17   Ht 5' (1.524 m)   Wt 62.6 kg (138 lb)   LMP  (LMP Unknown)   SpO2 97%   BMI 26.95 kg/m²     Physical Exam:   Patient is alert and oriented.  Patient is not in distress.  Vital signs as above.  There is no icterus.  There is no oral thrush.  There is no palpable neck mass.  Clear lung fields.  Heart rate is regular.  Soft and nontender abdomen.  No palp abdominal mass.  There is no ascites.  There is no edema of the ankles.  There is no calf tenderness.  There is no focal neurological deficit, no skin rash, there is no palpable lymphadenopathy in the neck and axillary areas, , no clubbing.    Patient is anxious.  Performance status 1.    IMAGING:  MPRESSION:     WHOLE BODY LOW DOSE CT FOR EVALUATION OF MULTIPLE MYELOMA:     OSTEOLYTIC LESIONS: None.  PROBABLY MALIGNANT VERTEBRAL FRACTURE: Multiple thoracolumbar compression fracture deformities are visualized but these are likely due to underlying osteoporosis.  SUSPICIOUS PERIPHERAL MEDULLARY PATTERN: No.     Additional findings as above.                    Workstation performed: [id number]        Imaging    CT low dose whole body myeloma scan (Order: [id number]) - 11/30/2023      LABS:      Results for orders placed or performed in visit on 12/08/23   Uric acid   Result Value Ref Range    Uric Acid 3.9 2.0 - 7.5 mg/dL   Lipid panel   Result Value Ref Range    Cholesterol 174  See Comment mg/dL    Triglycerides 157 (H) See Comment mg/dL    HDL, Direct 45 (L) >=50 mg/dL    LDL Calculated 98 0 - 100 mg/dL    Non-HDL-Chol (CHOL-HDL) 129 mg/dl     *Note: Due to a large number of results and/or encounters for the requested time period, some results have not been displayed. A complete set of results can be found in Results Review.     Labs, Imaging, & Other studies:   All pertinent labs and imaging studies were personally reviewed    Lab Results   Component Value Date     11/28/2017    K 3.8 12/08/2023     12/08/2023    CO2 27 12/08/2023    ANIONGAP 9 09/02/2014    BUN 20 12/08/2023    CREATININE 0.73 12/08/2023    GLUCOSE 93 09/02/2014    GLUF 98 12/08/2023    CALCIUM 9.4 12/08/2023    CORRECTEDCA 9.5 09/21/2020    AST 31 12/08/2023    ALT 29 12/08/2023    ALKPHOS 60 12/08/2023    PROT 8.0 09/02/2014    BILITOT 0.32 09/02/2014    EGFR 83 12/08/2023   [facility]  Outside Information  CBC AND DIFFERENTIAL  Status: Final result         suggestion  Result Information displayed in this report will not trend and may not trigger automated decision support.      Contains abnormal data CBC AND DIFFERENTIAL  Order: [id number]  Component  Ref Range & Units 12/13/23  2:01 PM   White Blood Cells  4.0 - 11.0 THO/uL 3.0 Low    Red Blood Cells  3.80 - 5.30 MIL/uL 4.07   Hemoglobin  12.0 - 16.0 g/dL 13.0   Hematocrit  36 - 46 % 36   MCV  80 - 100 fL 89   MCH  27 - 33 pg 32   MCHC  31 - 36 g/dL 36   RDW  11.5 - 14.5 % 15.5 High    Platelets  150 - 400 THO/uL 156     [facility]  Outside Information  MANUAL LEUKOCYTE DIFFERENTIAL  Status: Final result         suggestion  Information displayed in this report may not trend or trigger automated decision support.      Contains abnormal data MANUAL LEUKOCYTE DIFFERENTIAL  Order: [id number]  Component  Ref Range & Units 12/13/23  2:01 PM   % Segmented Neutrophils Manual  % 50.0   % Band Neutrophils  0.0 - 5.0 % 0.0    % Lymphocytes Manual  % 36.5   % Lymphocytes Reactive  0.0 - 3.0 % 0.0   % Monocytes Manual  % 11.6   % Eosinophils Manual  % 1.9   % Basophils Manual  % 0.0   # Segmented Neutrophils Manual  1.80 - 7.50 THO/uL 1.49 Low    # Band Neutrophils  THO/uL 0.00   # Lymphocytes Manual  1.00 - 5.00 THO/uL 1.08   # Lymphocytes Reactive  THO/uL 0.00   # Monocytes Manual  0.10 - 1.00 THO/uL 0.34   # Eosinophils Manual  0.00 - 0.40 THO/uL 0.06   # Basophils Manual  0.00 - 0.20 THO/uL 0.00   % Other Cells  0.0 - 0.0 % 0.0   Platelet Estimate Normal   RBC Morphology See Below Abnormal    Ovalocytes SLIGHT   Tear Drop Cells PRESENT   ANC Manual Diff  1.80 - 7.50 THO/uL 1.49 Low      Specimen Collected: 12/13/23  2:01 PM    Performed by: [facility] LABORATORY Last Resulted: 12/13/23  3:39 PM   Received From: [facility]  Result Received: 12/18/23  8:20 AM     [facility]  Outside Information  SERUM PROTEIN ELECTROPHORESIS  Status: Final result         suggestion  Information displayed in this report may not trend or trigger automated decision support.     SERUM PROTEIN ELECTROPHORESIS  Order: [id number]  Component  Ref Range & Units 12/13/23  2:01 PM   SPEP TP  6.1 - 8.5 g/dL 7.2   Albumin Fraction  3.5 - 5.8 g/dL 4.5   Alpha1-Globulin Frac  0.2 - 0.4 g/dL 0.3   Alpha2-Globulin Frac  0.5 - 0.8 g/dL 0.8   Beta-Globulin Fracti  0.6 - 1.0 g/dL 0.8   Gamma-Globulin Fract  0.7 - 1.2 g/dL 0.9   Serum Protein Electrophoresis Interp Serum capillary electrophoresis shows no overt evidence of a paraprotein.  Immunofixation has not been performed but may be if clinically indicated.    The absence of a paraprotein does not rule out the presence of a plasma cell  neoplasm. If a monoclonal protein is suspected, urine collection for the  detection of Bence Cortes proteins is recommended.    If you have any questions about the protein electrophoresis report, please call  the Clinical  Chemistry resident on call.   Comment: Verified by: Rossy Leary MD             (Electronic Signature)  Date/Time Verified: 12.14.2023 17:18 PM   SPEP INTERPRETATION Normal   Alpha Int Normal   Narrative    Test performed at [facility], [address] [place] [address] Systems  Outside Information  COMPREHENSIVE METABOLIC PANEL  Status: Final result         suggestion  Result Information displayed in this report will not trend and may not trigger automated decision support.     COMPREHENSIVE METABOLIC PANEL  Order: [id number]  Component  Ref Range & Units 12/13/23  2:01 PM   Glucose  70 - 99 mg/dL 99   Urea Nitrogen  8 - 20 mg/dL 17   Creatinine  0.44 - 1.03 mg/dL 0.87   Sodium  136 - 144 mmol/L 140   Potassium  3.6 - 5.1 mmol/L 4.3   Chloride  101 - 111 mmol/L 104   Carbon Dioxide  22 - 32 mmol/L 28   Anion Gap  3 - 12 8   Comment: With hypoalbuminemia, anion gap correction is suggested using Na - (Cl+CO2) +  2.5*(4 - Albumin).  The lab reports Na - (Cl+CO2).   Calcium  8.9 - 10.3 mg/dL 9.5   Protein, Total  6.1 - 7.9 g/dL 7.2   Albumin  3.5 - 5.1 g/dL 4.9   ALT  14 - 54 U/L 21   AST  15 - 41 U/L 20   Alkaline Phosphatase  38 - 126 U/L 72   Bilirubin, Total  0.3 - 1.2 mg/dL 0.6   Narrative    Test performed at [facility], [address] [place] [address]  RETICULOCYTE COUNT  Order: [id number]  Component  Ref Range & Units 12/13/23  2:01 PM   RETICULOCYTE COUNT  % 1.4   Abs Retic  19.0 - 106.0 THO/uL 59.1   Narrative    Test performed at [facility], [address] [place] [address]    TSH, 3RD GENERATION  Status: Final result         suggestion  Result Information displayed in this report will not trend and may not trigger automated decision support.     TSH, 3RD GENERATION  Order: [id number]  Component  Ref Range & Units 12/13/23  2:01 PM   TSH  0.45 -  5.33 uIU/mL 2.06   Narrative    Test performed at [address]  Specimen Collected: 12/13/23  2:01 PM   ains abnormal data LACTATE DEHYDROGENASE  Order: [id number]  Component  Ref Range & Units 12/13/23  2:01 PM   LACTATE DEHYDROGENASE  98 - 192 U/L 87 Low    Narrative     suggestion  Information displayed in this report may not trend or trigger automated decision support.     QUANTITATIVE IMMUNOGLOBULINS (IGA, IGG, IGM)  Order: [id number]  Component  Ref Range & Units 12/13/23  2:01 PM   IMMUNOGLOBULIN A  50 - 500 mg/dL 93   IMMUNOGLOBULIN G  650 - 2000 mg/dL 886   IMMUNOGLOBULIN M  40 - 270 mg/dL 57   Comment: Undetected antigen excess is a rare event but cannot be excluded in the  Immunoglobulins Quantitative assay. Antigen excess can be evaluated in the  laboratory by retesting the sample at higher dilutions.  Please contact the  laboratory if you wish to have the sample retested at higher dilutions or if you  have any questions or concerns about the result.   Narrative  FERRITIN  Status: Final result         suggestion  Information displayed in this report may not trend or trigger automated decision support.     FERRITIN  Order: [id number]  Component  Ref Range & Units 12/13/23  2:01 PM   FERRITIN  11.0 - 306.8 ng/mL 117.4   Narrative    Test performed at [place] [id number]  Test performed at [address] [id number]  Test performed at [address]    Reviewed test results  and discussed with patient.    Assessment and plan:   Patient is here with her .  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  Bone marrow test showed 15% plasma cells and there was IgG lambda spike on SPEP.  After a period  of observation she was given induction systemic therapy, RVD in 2020. On 08/28/2020 she had autologous stem cell transplant.    She has received posttransplant vaccinations.  Patient was on maintenance Revlimid until December 2021  but that was stopped because of prolonged profound neutropenia and counts were not recovering.  She still has leukopenia with neutropenia but no febrile neutropenia.  There is an improvement in leukopenia/neutropenia.  No frequent or severe infections.  She has instructions about febrile neutropenia, prevention and management.  She had thrombocytopenia but platelet count has recovered .   She remains under surveillance for treated multiple myeloma and  probably has very good partial remission.  She is taking baby aspirin daily and aspirin  is not giving her stomach symptoms and no bleeding.  She is not on acyclovir anymore.    She has been taking calcium with vitamin-D and has been on Zometa  once every 3 months.  She has less tiredness.  Has arthritic symptoms and occasionally low back discomfort/spasm.  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 Positive hormone receptors and negative her 2. Patient had right mastectomy.  She had adjuvant tamoxifen for 5 years..  She goes for left mammography  Patient was recently seen by Dr. Flores at [place].  Patient states doctor was pleased with the results.  She had blood work at [place] and CT myeloma scan here        Physical examination and test results are as recorded and discussed.  Myeloma appears to be in very good partial remission.  She remains under surveillance.  She is not on maintenance Revlimid because of persistent neutropenia.  There is some improvement in blood counts.  No febrile neutropenia and she has instructions to prevent infections and to report to emergency room with febrile neutropenia and other medical emergencies.  She does not have problem with her  teeth for Zometa and that is being continued.  Goal is prolongation of survival and prevention of febrile neutropenia.  She will continue to follow with myeloma specialist at [place] and she will ask about repeating bone marrow test.  Goal is also cure from breast cancer.    All discussed in detail.  Questions answered.  Discussed the importance of self-breast examination, eating healthy foods, staying active and health screening tests.  Patient is capable of self-care..  Discussed precautions against coronavirus and other infections .    .  She follows with her gynecologist for GYN examination and examination of breast .     1. Multiple myeloma not having achieved remission (HCC)    - Beta 2 microglobulin, serum; Standing  - CBC and differential; Standing  - Comprehensive metabolic panel; Standing  - IgG, IgA, IgM; Standing  - Immunoglobulin free LT chains blood; Standing  - LD,Blood; Standing  - Protein electrophoresis, serum; Standing  - Beta 2 microglobulin, serum  - CBC and differential  - Comprehensive metabolic panel  - IgG, IgA, IgM  - Immunoglobulin free LT chains blood  - LD,Blood  - Protein electrophoresis, serum    2. Osteoporosis of multiple sites associated with multiple myeloma (HCC)    - Comprehensive metabolic panel; Standing  - Comprehensive metabolic panel    3. Malignant neoplasm of right breast in female, estrogen receptor positive, unspecified site of breast       4. Essential hypertension      5. Screening mammogram for high-risk patient      6. History of breast cancer        Patient has standing orders for blood work and Zometa every 3 months.  Visit in 6 months.  She will have Zometa on 3/5/2024 and the next one will be in the last week of May 2024.          I used a dictation device to dictate this note and there could be mistakes in my note and she may call my office for that.                    Patient voiced understanding and agreed      Counseling / Coordination of Care  .  Provided  counseling and support

## 2023-12-22 NOTE — PATIENT INSTRUCTIONS
Patient has standing orders for blood work and Zometa every 3 months.  Visit in 6 months.  She will have Zometa on 3/5/2024 and the next one will be in the last week of May 2024.

## 2024-01-03 ENCOUNTER — OFFICE VISIT (OUTPATIENT)
Dept: URGENT CARE | Facility: CLINIC | Age: 74
End: 2024-01-03
Payer: MEDICARE

## 2024-01-03 VITALS — OXYGEN SATURATION: 98 % | HEART RATE: 96 BPM | SYSTOLIC BLOOD PRESSURE: 141 MMHG | DIASTOLIC BLOOD PRESSURE: 65 MMHG

## 2024-01-03 DIAGNOSIS — H66.012 NON-RECURRENT ACUTE SUPPURATIVE OTITIS MEDIA OF LEFT EAR WITH SPONTANEOUS RUPTURE OF TYMPANIC MEMBRANE: Primary | ICD-10-CM

## 2024-01-03 PROCEDURE — 99213 OFFICE O/P EST LOW 20 MIN: CPT | Performed by: NURSE PRACTITIONER

## 2024-01-03 PROCEDURE — G0463 HOSPITAL OUTPT CLINIC VISIT: HCPCS | Performed by: NURSE PRACTITIONER

## 2024-01-03 RX ORDER — AMOXICILLIN 500 MG/1
500 CAPSULE ORAL EVERY 12 HOURS SCHEDULED
Qty: 20 CAPSULE | Refills: 0 | Status: SHIPPED | OUTPATIENT
Start: 2024-01-03 | End: 2024-01-13

## 2024-01-03 RX ORDER — FLUTICASONE PROPIONATE 50 MCG
1 SPRAY, SUSPENSION (ML) NASAL DAILY
Qty: 9.9 ML | Refills: 0 | Status: SHIPPED | OUTPATIENT
Start: 2024-01-03

## 2024-01-03 RX ORDER — OFLOXACIN 3 MG/ML
10 SOLUTION AURICULAR (OTIC) DAILY
Qty: 5 ML | Refills: 0 | Status: SHIPPED | OUTPATIENT
Start: 2024-01-03

## 2024-01-03 RX ORDER — DIAZEPAM 5 MG/1
5 TABLET ORAL EVERY 12 HOURS PRN
COMMUNITY
Start: 2023-12-13

## 2024-01-03 NOTE — PROGRESS NOTES
[facility]        NAME: Chelita Seymour is a 73 y.o. female  : 1950    MRN: [id number]  DATE: January 3, 2024  TIME: 12:22 PM    Assessment and Plan   Non-recurrent acute suppurative otitis media of left ear with spontaneous rupture of tympanic membrane [H66.012]  1. Non-recurrent acute suppurative otitis media of left ear with spontaneous rupture of tympanic membrane  fluticasone (FLONASE) 50 mcg/act nasal spray    ofloxacin (FLOXIN) 0.3 % otic solution    amoxicillin (AMOXIL) 500 mg capsule            Patient Instructions       Follow up with PCP in 3-5 days.  Proceed to  ER if symptoms worsen.    Chief Complaint     Chief Complaint   Patient presents with    Earache     Cold since last week but last night the left ear blocked up. Some discharge noted. Having hearing changes. Using OTC meds for the cough and cold sx. Covid test negative. Some chills recently but no fever         History of Present Illness       Patient is a 73-year-old female presenting with left ear pain.  She states that she has had cold-like symptoms including congestion, cough, and headache for the past several days.  Her left ear became painful last night.  She did notice some drainage from the ear along with decreased hearing.  Negative home COVID test.  She is taking over-the-counter medications.    Earache   Associated symptoms include coughing, ear discharge, headaches, hearing loss and rhinorrhea. Pertinent negatives include no sore throat.       Review of Systems   Review of Systems   Constitutional:  Negative for activity change, chills and fever.   HENT:  Positive for congestion, ear discharge, ear pain, hearing loss and rhinorrhea. Negative for sore throat.    Respiratory:  Positive for cough.    Neurological:  Positive for headaches.         Current Medications       Current Outpatient Medications:     amoxicillin (AMOXIL) 500 mg capsule, Take 1 capsule (500 mg total) by mouth every 12 (twelve) hours for 10 days,  Disp: 20 capsule, Rfl: 0    aspirin (ECOTRIN LOW STRENGTH) 81 mg EC tablet, Take 81 mg by mouth daily, Disp: , Rfl:     Calcium Citrate-Vitamin D (CALCIUM CITRATE + D PO), Take 1 tablet by mouth 2 (two) times a day  , Disp: , Rfl:     diazepam (VALIUM) 5 mg tablet, Take 5 mg by mouth every 12 (twelve) hours as needed, Disp: , Rfl:     escitalopram (LEXAPRO) 10 mg tablet, TAKE 1 TABLET BY MOUTH EVERY DAY., Disp: 90 tablet, Rfl: 3    fluticasone (FLONASE) 50 mcg/act nasal spray, 1 spray into each nostril daily, Disp: 9.9 mL, Rfl: 0    metoprolol tartrate (LOPRESSOR) 50 mg tablet, TAKE 1 1/2 TABLETS BY MOUTH TWO TIMES DAILY, Disp: 270 tablet, Rfl: 3    ofloxacin (FLOXIN) 0.3 % otic solution, Administer 10 drops into the left ear daily, Disp: 5 mL, Rfl: 0    tiZANidine (ZANAFLEX) 4 mg tablet, Take 1 tablet (4 mg total) by mouth 3 (three) times a day, Disp: 30 tablet, Rfl: 0    Current Allergies     Allergies as of 01/03/2024 - Reviewed 01/03/2024   Allergen Reaction Noted    Ibuprofen Abdominal Pain and Tachycardia 04/21/2012            The following portions of the patient's history were reviewed and updated as appropriate: allergies, current medications, past family history, past medical history, past social history, past surgical history and problem list.     Past Medical History:   Diagnosis Date    Anxiety     Aortic valve disorder     Breast CA ([facility])     2000-R mastectomy-took tamoxifen/femira    Cancer ([facility]) 2000    Cardiac dysrhythmia     Depression     Diverticulitis of colon 2022    Diverticulitis of large intestine with perforation 5/31/2022    Heart murmur     History of spinal fracture     8 since 2016-1 fall related  spinal bone marrow bx today 12/18/2018    Hypertension     Multiple myeloma ([facility]) 2019    Osteopenia 08/13/2012    Description: DEXA 9/2009; late 2011 stable.    Osteoporosis     Palpitations     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[facility]) 01/11/2019    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    Scoliosis     Squamous cell carcinoma  of skin     Stem cell transplant candidate 2020    Tachycardia 12/10/2018    Urinary tract infection     Multiple times/none recent       Past Surgical History:   Procedure Laterality Date    APPENDECTOMY  01/2011    BREAST LUMPECTOMY      CHEILECTOMY Right     Resolved:  2006, Bunion correction    COLONOSCOPY  07/2010    Diverticula; recheck 5 yrs    CT BONE MARROW BIOPSY AND ASPIRATION  07/09/2019    DILATION AND CURETTAGE, DIAGNOSTIC / THERAPEUTIC      HYSTERECTOMY  2019    IR BIOPSY BONE  12/18/2018    LYMPH NODE BIOPSY  09/2000    MASTECTOMY Right 2000    OOPHORECTOMY Bilateral 2019    OH LAPS TOTAL HYSTERECT 250 GM/< W/RMVL TUBE/OVARY N/A 04/04/2019    Procedure: ROBOTIC TOTAL LAPAROSCOPIC HYSTERECTOMY, BILATERAL SALPINGO-OOPHORECTOMY;  Surgeon: Fletcher Ordonez MD PhD;  Location: [facility];  Service: Gynecology Oncology       Family History   Problem Relation Age of Onset    Diabetes Mother     Osteoporosis Mother     Heart disease Father     Breast cancer Sister 61    BRCA1 Negative Sister     Breast cancer Sister 66    Breast cancer Sister     Cancer Sister     Stroke Maternal Grandmother     No Known Problems Maternal Grandfather     No Known Problems Paternal Grandmother     No Known Problems Paternal Grandfather     Heart attack Family     Other Family         Benign brain tumor    Club foot Family          Medications have been verified.        Objective   /65   Pulse 96   LMP  (LMP Unknown)   SpO2 98%        Physical Exam     Physical Exam  Vitals reviewed.   Constitutional:       General: She is awake. She is not in acute distress.     Appearance: Normal appearance. She is normal weight.   HENT:      Right Ear: Hearing, tympanic membrane, ear canal and external ear normal.      Left Ear: Hearing normal. Drainage present. No tenderness. Tympanic membrane is perforated and erythematous.      Ears:      Comments: Small amount of bloody discharge at the proximal canal.  TM perforation at the  5:00 spot.     Nose: Nose normal.      Mouth/Throat:      Lips: Pink.      Pharynx: Oropharynx is clear.   Cardiovascular:      Rate and Rhythm: Normal rate and regular rhythm.      Heart sounds: Normal heart sounds, S1 normal and S2 normal.   Pulmonary:      Effort: Pulmonary effort is normal.      Breath sounds: Normal breath sounds. No decreased breath sounds, wheezing or rhonchi.   Skin:     General: Skin is warm and moist.   Neurological:      General: No focal deficit present.      Mental Status: She is alert and oriented to person, place, and time.   Psychiatric:         Behavior: Behavior is cooperative.

## 2024-02-28 DIAGNOSIS — I10 ESSENTIAL HYPERTENSION: ICD-10-CM

## 2024-02-28 RX ORDER — METOPROLOL TARTRATE 50 MG/1
TABLET, FILM COATED ORAL
Qty: 270 TABLET | Refills: 3 | Status: SHIPPED | OUTPATIENT
Start: 2024-02-28

## 2024-03-01 ENCOUNTER — APPOINTMENT (OUTPATIENT)
Dept: LAB | Facility: CLINIC | Age: 74
End: 2024-03-01
Payer: MEDICARE

## 2024-03-01 DIAGNOSIS — C90.00 OSTEOPOROSIS OF MULTIPLE SITES ASSOCIATED WITH MULTIPLE MYELOMA (HCC): ICD-10-CM

## 2024-03-01 DIAGNOSIS — M81.8 OSTEOPOROSIS OF MULTIPLE SITES ASSOCIATED WITH MULTIPLE MYELOMA (HCC): ICD-10-CM

## 2024-03-01 DIAGNOSIS — C90.00 MULTIPLE MYELOMA NOT HAVING ACHIEVED REMISSION (HCC): ICD-10-CM

## 2024-03-01 DIAGNOSIS — S22.009A CLOSED FRACTURE OF MULTIPLE THORACIC VERTEBRAE, INITIAL ENCOUNTER (HCC): ICD-10-CM

## 2024-03-01 LAB
ALBUMIN SERPL BCP-MCNC: 4.4 G/DL (ref 3.5–5)
ALP SERPL-CCNC: 61 U/L (ref 34–104)
ALT SERPL W P-5'-P-CCNC: 14 U/L (ref 7–52)
ANION GAP SERPL CALCULATED.3IONS-SCNC: 11 MMOL/L
AST SERPL W P-5'-P-CCNC: 19 U/L (ref 13–39)
BASOPHILS # BLD AUTO: 0 THOUSANDS/ÂΜL (ref 0–0.1)
BASOPHILS NFR BLD AUTO: 0 % (ref 0–1)
BILIRUB SERPL-MCNC: 0.73 MG/DL (ref 0.2–1)
BUN SERPL-MCNC: 15 MG/DL (ref 5–25)
CALCIUM SERPL-MCNC: 9.5 MG/DL (ref 8.4–10.2)
CHLORIDE SERPL-SCNC: 104 MMOL/L (ref 96–108)
CO2 SERPL-SCNC: 24 MMOL/L (ref 21–32)
CREAT SERPL-MCNC: 0.73 MG/DL (ref 0.6–1.3)
EOSINOPHIL # BLD AUTO: 0.01 THOUSAND/ÂΜL (ref 0–0.61)
EOSINOPHIL NFR BLD AUTO: 0 % (ref 0–6)
ERYTHROCYTE [DISTWIDTH] IN BLOOD BY AUTOMATED COUNT: 15.4 % (ref 11.6–15.1)
GFR SERPL CREATININE-BSD FRML MDRD: 83 ML/MIN/1.73SQ M
GLUCOSE P FAST SERPL-MCNC: 100 MG/DL (ref 65–99)
HCT VFR BLD AUTO: 38.3 % (ref 34.8–46.1)
HGB BLD-MCNC: 12.2 G/DL (ref 11.5–15.4)
IGA SERPL-MCNC: 108 MG/DL (ref 66–433)
IGG SERPL-MCNC: 1029 MG/DL (ref 635–1741)
IGM SERPL-MCNC: 67 MG/DL (ref 45–281)
IMM GRANULOCYTES # BLD AUTO: 0 THOUSAND/UL (ref 0–0.2)
IMM GRANULOCYTES NFR BLD AUTO: 0 % (ref 0–2)
LDH SERPL-CCNC: 125 U/L (ref 140–271)
LYMPHOCYTES # BLD AUTO: 1.25 THOUSANDS/ÂΜL (ref 0.6–4.47)
LYMPHOCYTES NFR BLD AUTO: 41 % (ref 14–44)
MCH RBC QN AUTO: 30.6 PG (ref 26.8–34.3)
MCHC RBC AUTO-ENTMCNC: 31.9 G/DL (ref 31.4–37.4)
MCV RBC AUTO: 96 FL (ref 82–98)
MONOCYTES # BLD AUTO: 0.46 THOUSAND/ÂΜL (ref 0.17–1.22)
MONOCYTES NFR BLD AUTO: 15 % (ref 4–12)
NEUTROPHILS # BLD AUTO: 1.32 THOUSANDS/ÂΜL (ref 1.85–7.62)
NEUTS SEG NFR BLD AUTO: 44 % (ref 43–75)
NRBC BLD AUTO-RTO: 0 /100 WBCS
PLATELET # BLD AUTO: 143 THOUSANDS/UL (ref 149–390)
PMV BLD AUTO: 9.9 FL (ref 8.9–12.7)
POTASSIUM SERPL-SCNC: 4.2 MMOL/L (ref 3.5–5.3)
PROT SERPL-MCNC: 7 G/DL (ref 6.4–8.4)
RBC # BLD AUTO: 3.99 MILLION/UL (ref 3.81–5.12)
SODIUM SERPL-SCNC: 139 MMOL/L (ref 135–147)
WBC # BLD AUTO: 3.04 THOUSAND/UL (ref 4.31–10.16)

## 2024-03-02 LAB
KAPPA LC FREE SER-MCNC: 14.2 MG/L (ref 3.3–19.4)
KAPPA LC FREE/LAMBDA FREE SER: 0.05 {RATIO} (ref 0.26–1.65)
LAMBDA LC FREE SERPL-MCNC: 304.1 MG/L (ref 5.7–26.3)

## 2024-03-04 LAB — B2 MICROGLOB SERPL-MCNC: 2.3 MG/L (ref 0.6–2.4)

## 2024-03-05 ENCOUNTER — HOSPITAL ENCOUNTER (OUTPATIENT)
Dept: INFUSION CENTER | Facility: CLINIC | Age: 74
Discharge: HOME | End: 2024-03-05
Payer: MEDICARE

## 2024-03-05 VITALS
WEIGHT: 138 LBS | BODY MASS INDEX: 26.95 KG/M2 | RESPIRATION RATE: 16 BRPM | SYSTOLIC BLOOD PRESSURE: 130 MMHG | DIASTOLIC BLOOD PRESSURE: 84 MMHG | TEMPERATURE: 97.2 F | OXYGEN SATURATION: 97 %

## 2024-03-05 DIAGNOSIS — S22.009A CLOSED FRACTURE OF MULTIPLE THORACIC VERTEBRAE, INITIAL ENCOUNTER (HCC): Primary | ICD-10-CM

## 2024-03-05 DIAGNOSIS — C90.00 OSTEOPOROSIS OF MULTIPLE SITES ASSOCIATED WITH MULTIPLE MYELOMA (HCC): ICD-10-CM

## 2024-03-05 DIAGNOSIS — C90.00 MULTIPLE MYELOMA NOT HAVING ACHIEVED REMISSION (HCC): ICD-10-CM

## 2024-03-05 DIAGNOSIS — M81.8 OSTEOPOROSIS OF MULTIPLE SITES ASSOCIATED WITH MULTIPLE MYELOMA (HCC): ICD-10-CM

## 2024-03-05 LAB
ALBUMIN SERPL ELPH-MCNC: 4.16 G/DL (ref 3.2–5.1)
ALBUMIN SERPL ELPH-MCNC: 62.1 % (ref 48–70)
ALPHA1 GLOB SERPL ELPH-MCNC: 0.28 G/DL (ref 0.15–0.47)
ALPHA1 GLOB SERPL ELPH-MCNC: 4.2 % (ref 1.8–7)
ALPHA2 GLOB SERPL ELPH-MCNC: 0.64 G/DL (ref 0.42–1.04)
ALPHA2 GLOB SERPL ELPH-MCNC: 9.6 % (ref 5.9–14.9)
BETA GLOB ABNORMAL SERPL ELPH-MCNC: 0.39 G/DL (ref 0.31–0.57)
BETA1 GLOB SERPL ELPH-MCNC: 5.8 % (ref 4.7–7.7)
BETA2 GLOB SERPL ELPH-MCNC: 4.6 % (ref 3.1–7.9)
BETA2+GAMMA GLOB SERPL ELPH-MCNC: 0.31 G/DL (ref 0.2–0.58)
GAMMA GLOB ABNORMAL SERPL ELPH-MCNC: 0.92 G/DL (ref 0.4–1.66)
GAMMA GLOB SERPL ELPH-MCNC: 13.7 % (ref 6.9–22.3)
IGG/ALB SER: 1.64 {RATIO} (ref 1.1–1.8)
PROT PATTERN SERPL ELPH-IMP: NORMAL
PROT SERPL-MCNC: 6.7 G/DL (ref 6.4–8.2)

## 2024-03-05 PROCEDURE — 84165 PROTEIN E-PHORESIS SERUM: CPT | Performed by: STUDENT IN AN ORGANIZED HEALTH CARE EDUCATION/TRAINING PROGRAM

## 2024-03-05 PROCEDURE — 96365 THER/PROPH/DIAG IV INF INIT: CPT

## 2024-03-05 RX ORDER — SODIUM CHLORIDE 9 MG/ML
20 INJECTION, SOLUTION INTRAVENOUS ONCE
OUTPATIENT
Start: 2024-05-24

## 2024-03-05 RX ORDER — SODIUM CHLORIDE 9 MG/ML
20 INJECTION, SOLUTION INTRAVENOUS ONCE
Status: COMPLETED | OUTPATIENT
Start: 2024-03-05 | End: 2024-03-05

## 2024-03-05 RX ADMIN — ZOLEDRONIC ACID 3.5 MG: 4 INJECTION, SOLUTION, CONCENTRATE INTRAVENOUS at 13:56

## 2024-03-05 RX ADMIN — SODIUM CHLORIDE 20 ML/HR: 0.9 INJECTION, SOLUTION INTRAVENOUS at 13:35

## 2024-03-05 NOTE — PROGRESS NOTES
Patient here for zometa. Labs reviewed, from 3/1 Ca 9.5 and CrCl 50.5. Patient resting with no complaints, denied recent dental work. Vitals stable. PIV placed without issue. Callbell within reach.

## 2024-03-05 NOTE — PROGRESS NOTES
Patient tolerated infusion without complications. Patient aware of next appointment on 5/28 at 1100 declined  AVS.

## 2024-05-14 ENCOUNTER — APPOINTMENT (OUTPATIENT)
Dept: LAB | Facility: CLINIC | Age: 74
End: 2024-05-14
Payer: MEDICARE

## 2024-05-16 PROCEDURE — 84165 PROTEIN E-PHORESIS SERUM: CPT | Performed by: STUDENT IN AN ORGANIZED HEALTH CARE EDUCATION/TRAINING PROGRAM

## 2024-05-16 PROCEDURE — 86334 IMMUNOFIX E-PHORESIS SERUM: CPT | Performed by: STUDENT IN AN ORGANIZED HEALTH CARE EDUCATION/TRAINING PROGRAM

## 2024-05-23 ENCOUNTER — OFFICE VISIT (OUTPATIENT)
Dept: HEMATOLOGY ONCOLOGY | Facility: CLINIC | Age: 74
End: 2024-05-23
Payer: MEDICARE

## 2024-05-23 VITALS
RESPIRATION RATE: 17 BRPM | WEIGHT: 136 LBS | HEART RATE: 67 BPM | OXYGEN SATURATION: 97 % | HEIGHT: 60 IN | BODY MASS INDEX: 26.7 KG/M2 | SYSTOLIC BLOOD PRESSURE: 120 MMHG | TEMPERATURE: 97.8 F | DIASTOLIC BLOOD PRESSURE: 64 MMHG

## 2024-05-23 DIAGNOSIS — Z12.31 SCREENING MAMMOGRAM FOR HIGH-RISK PATIENT: ICD-10-CM

## 2024-05-23 DIAGNOSIS — R09.89 SUSPECTED DVT (DEEP VEIN THROMBOSIS): ICD-10-CM

## 2024-05-23 DIAGNOSIS — C90.00 MULTIPLE MYELOMA NOT HAVING ACHIEVED REMISSION (HCC): Primary | ICD-10-CM

## 2024-05-23 DIAGNOSIS — C50.911 MALIGNANT NEOPLASM OF RIGHT BREAST IN FEMALE, ESTROGEN RECEPTOR POSITIVE, UNSPECIFIED SITE OF BREAST (HCC): ICD-10-CM

## 2024-05-23 DIAGNOSIS — Z17.0 MALIGNANT NEOPLASM OF RIGHT BREAST IN FEMALE, ESTROGEN RECEPTOR POSITIVE, UNSPECIFIED SITE OF BREAST (HCC): ICD-10-CM

## 2024-05-23 DIAGNOSIS — I10 ESSENTIAL HYPERTENSION: ICD-10-CM

## 2024-05-23 DIAGNOSIS — C50.911 MALIGNANT NEOPLASM OF RIGHT BREAST IN FEMALE, ESTROGEN RECEPTOR POSITIVE, UNSPECIFIED SITE OF BREAST (HCC): Primary | ICD-10-CM

## 2024-05-23 DIAGNOSIS — Z94.84 H/O STEM CELL TRANSPLANT (HCC): ICD-10-CM

## 2024-05-23 DIAGNOSIS — I82.442: ICD-10-CM

## 2024-05-23 DIAGNOSIS — C90.00 OSTEOPOROSIS OF MULTIPLE SITES ASSOCIATED WITH MULTIPLE MYELOMA (HCC): ICD-10-CM

## 2024-05-23 DIAGNOSIS — M81.8 OSTEOPOROSIS OF MULTIPLE SITES ASSOCIATED WITH MULTIPLE MYELOMA (HCC): ICD-10-CM

## 2024-05-23 DIAGNOSIS — Z17.0 MALIGNANT NEOPLASM OF RIGHT BREAST IN FEMALE, ESTROGEN RECEPTOR POSITIVE, UNSPECIFIED SITE OF BREAST (HCC): Primary | ICD-10-CM

## 2024-05-23 PROCEDURE — G2211 COMPLEX E/M VISIT ADD ON: HCPCS | Performed by: INTERNAL MEDICINE

## 2024-05-23 PROCEDURE — 99214 OFFICE O/P EST MOD 30 MIN: CPT | Performed by: INTERNAL MEDICINE

## 2024-05-23 NOTE — PROGRESS NOTES
HPI: Continuation of care  for multiple myeloma.  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  Bone marrow test showed 15% plasma cells and there was IgG lambda spike on SPEP.  After a period of observation she was given induction systemic therapy, RVD in 2020. On 08/28/2020 she had autologous stem cell transplant.    She has received posttransplant vaccinations.  Patient was on maintenance Revlimid until December 2021  but that was stopped because of  profound neutropenia and counts were not recovering.  Revlimid was discontinued and plan on surveillance.  This was under the supervision of myeloma specialist at [place].  There is some improvement in leukopenia/neutropenia.  No frequent or severe infections.  She has instructions about febrile neutropenia, prevention and management.  She had thrombocytopenia but platelet count has recovered .      She is taking baby aspirin daily and aspirin  is not giving her stomach symptoms and no bleeding.  She is not on acyclovir anymore.    She has been taking calcium with vitamin-D and has been on Zometa  once every 3 months.  She has less tiredness.  Has arthritic symptoms and occasionally low back discomfort/spasm.  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 Positive hormone receptors and negative her 2. Patient had right mastectomy.  She had adjuvant tamoxifen for 5 years..  She goes for left mammography.  She has requested 6 mastectomy bras without prosthesis.   Free light chain ratio has changed to 0.04.  Patient tells me she will be getting bone marrow test at [place] next week.  Patient had some swelling of the left lower leg and pain in the shin area last week and there is much less swelling now and no pain.              Current Outpatient Medications:   •  aspirin (ECOTRIN LOW STRENGTH) 81 mg EC tablet, Take 81 mg by mouth  daily, Disp: , Rfl:   •  Calcium Citrate-Vitamin D (CALCIUM CITRATE + D PO), Take 1 tablet by mouth 2 (two) times a day  , Disp: , Rfl:   •  diazepam (VALIUM) 5 mg tablet, Take 5 mg by mouth every 12 (twelve) hours as needed, Disp: , Rfl:   •  escitalopram (LEXAPRO) 10 mg tablet, TAKE 1 TABLET BY MOUTH EVERY DAY., Disp: 90 tablet, Rfl: 3  •  metoprolol tartrate (LOPRESSOR) 50 mg tablet, TAKE 1 AND 1/2 TABLETS BY MOUTH TWO TIMES DAILY, Disp: 270 tablet, Rfl: 3  •  tiZANidine (ZANAFLEX) 4 mg tablet, Take 1 tablet (4 mg total) by mouth 3 (three) times a day, Disp: 30 tablet, Rfl: 0  •  fluticasone (FLONASE) 50 mcg/act nasal spray, 1 spray into each nostril daily, Disp: 9.9 mL, Rfl: 0  •  ofloxacin (FLOXIN) 0.3 % otic solution, Administer 10 drops into the left ear daily, Disp: 5 mL, Rfl: 0    Allergies   Allergen Reactions   • Ibuprofen Abdominal Pain and Tachycardia     Reaction Date: 14Jun2011;        Oncology History   Malignant neoplasm of right breast (HCC) (Resolved)   9/2000 Surgery    Right breast mastectomy     8/13/2012 Initial Diagnosis    Malignant neoplasm of right breast (HCC)      Chemotherapy    Tamoxifen for 5 years     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HCC) (Resolved)   12/18/2018 Initial Diagnosis    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HCC)     12/18/2018 Biopsy    Final Diagnosis   A. Bone, L-4, core biopsy:  - Fragments of trabecular bone with changes compatible with prior fracture site.  - 10% lambda predominant plasmacytosis. See note.  - Hematopoietic marrow with trilineage hematopoiesis.  - No epithelial elements identified, confirmed with negative keratin AE1/AE3 stains.         2/1/2019 - 3/7/2019 Radiation    Plan ID Energy Fractions Dose per Fraction (cGy) Dose Correction (cGy) Total Dose Delivered (cGy) Elapsed Days   L3_L5 Spine 10X/6X 25 / 25 180 0 4,500 34        Multiple myeloma not having achieved remission ([facility])   7/23/2019 Initial Diagnosis    Multiple myeloma not having achieved remission ([facility])     3/16/2020 - 8/3/2020  Chemotherapy    bortezomib (VELCADE) subcutaneous injection 2.1 mg, 1.3 mg/m2 = 2.1 mg, Subcutaneous, Once, 3 of 4 cycles  Administration: 2.1 mg (3/16/2020), 2.1 mg (2020), 2.1 mg (3/23/2020), 2.1 mg (3/30/2020), 2.1 mg (2020), 2.1 mg (2020), 2.1 mg (2020), 2.1 mg (2020), 2.1 mg (2020), 2.1 mg (2020), 2.1 mg (2020), 2.1 mg (2020)     2023 -  Cancer Staged    Staging form: Plasma Cell Myeloma and Disorders, AJCC 8th Edition  - Clinical stage from 2023: RISS Stage I (Beta-2-microglobulin (mg/L): 2.1, Albumin (g/dL): 4.3, ISS: Stage I, High-risk cytogenetics: Absent, LDH: Normal) - Signed by Chris Davis MD on 2023  Stage prefix: Initial diagnosis  Beta 2 microglobulin range (mg/L): Less than 3.5  Albumin range (g/dL): Greater than or equal to 3.5  Cytogenetics: No abnormalities  Lactate dehydrogenase (LDH) (U/L): 70  Serum calcium level: Normal  Serum creatinine level: Normal  Bone disease on imaging: Present  Number of bone lesions identified on imagin  Hemoglobin (Hgb) (g/dL): 12.9  Pretreatment monoclonal protein level in serum (M spike) (g/dL): 1.1           ROS:  24 Reviewed 12 systems: See symptoms in HPI.   Presently no other neurological, cardiac, pulmonary, GI and  symptoms other than mentioned in HPI.  Other symptoms are in HPI.  No fever, chills, bleeding, skin rash, weight loss, night sweats,  weakness, numbness,  claudication and gait problem. No frequent infections.  Not unusually sensitive to heat or cold. No swelling of the ankles. No swollen glands.  Patient is  anxious.       /64 (BP Location: Right arm, Patient Position: Sitting, Cuff Size: Adult)   Pulse 67   Temp 97.8 °F (36.6 °C)   Resp 17   Ht 5' (1.524 m)   Wt 61.7 kg (136 lb)   LMP  (LMP Unknown)   SpO2 97%   BMI 26.56 kg/m²     Physical Exam:   Alert and oriented and not in distress.  Vitals are above.  No icterus.  No oral thrush.  No palpable neck  mass.  Lung fields are clear to percussion and auscultation.  Regular heart rate.  No palpable abdominal mass.  Abdomen is soft and nontender.  No ascites.  There is edema of ankles.  No calf tenderness.  No focal neurological deficit, no skin rash, there is no palpable lymphadenopathy in the neck and axillary areas,  no clubbing.    Patient is anxious.  Performance status 1.    IMAGING:  MPRESSION:     WHOLE BODY LOW DOSE CT FOR EVALUATION OF MULTIPLE MYELOMA:     OSTEOLYTIC LESIONS: None.  PROBABLY MALIGNANT VERTEBRAL FRACTURE: Multiple thoracolumbar compression fracture deformities are visualized but these are likely due to underlying osteoporosis.  SUSPICIOUS PERIPHERAL MEDULLARY PATTERN: No.     Additional findings as above.                    Workstation performed: [id number]        Imaging    CT low dose whole body myeloma scan (Order: [id number]) - 11/30/2023      LABS:      Results for orders placed or performed in visit on 03/15/24   Beta 2 microglobulin, serum   Result Value Ref Range    Beta-2 Microglobulin 2.0 0.6 - 2.4 mg/L   CBC and differential   Result Value Ref Range    WBC 3.28 (L) 4.31 - 10.16 Thousand/uL    RBC 4.05 3.81 - 5.12 Million/uL    Hemoglobin 12.5 11.5 - 15.4 g/dL    Hematocrit 37.5 34.8 - 46.1 %    MCV 93 82 - 98 fL    MCH 30.9 26.8 - 34.3 pg    MCHC 33.3 31.4 - 37.4 g/dL    RDW 14.1 11.6 - 15.1 %    MPV 9.9 8.9 - 12.7 fL    Platelets 152 149 - 390 Thousands/uL    nRBC 0 /100 WBCs    Segmented % 40 (L) 43 - 75 %    Immature Grans % 1 0 - 2 %    Lymphocytes % 39 14 - 44 %    Monocytes % 20 (H) 4 - 12 %    Eosinophils Relative 0 0 - 6 %    Basophils Relative 0 0 - 1 %    Absolute Neutrophils 1.31 (L) 1.85 - 7.62 Thousands/µL    Absolute Immature Grans 0.03 0.00 - 0.20 Thousand/uL    Absolute Lymphocytes 1.29 0.60 - 4.47 Thousands/µL    Absolute Monocytes 0.64 0.17 - 1.22 Thousand/µL    Eosinophils Absolute 0.01 0.00 - 0.61 Thousand/µL    Basophils Absolute 0.00 0.00 - 0.10 Thousands/µL    Comprehensive metabolic panel   Result Value Ref Range    Sodium 140 135 - 147 mmol/L    Potassium 4.3 3.5 - 5.3 mmol/L    Chloride 103 96 - 108 mmol/L    CO2 27 21 - 32 mmol/L    ANION GAP 10 4 - 13 mmol/L    BUN 16 5 - 25 mg/dL    Creatinine 0.70 0.60 - 1.30 mg/dL    Glucose, Fasting 93 65 - 99 mg/dL    Calcium 9.3 8.4 - 10.2 mg/dL    AST 20 13 - 39 U/L    ALT 15 7 - 52 U/L    Alkaline Phosphatase 55 34 - 104 U/L    Total Protein 6.9 6.4 - 8.4 g/dL    Albumin 4.3 3.5 - 5.0 g/dL    Total Bilirubin 0.63 0.20 - 1.00 mg/dL    eGFR 87 ml/min/1.73sq m   IgG, IgA, IgM   Result Value Ref Range     66 - 433 mg/dL     635 - 1,741 mg/dL    IGM 49 45 - 281 mg/dL   Immunoglobulin free LT chains blood   Result Value Ref Range    Ig Kappa Free Light Chain 12.0 3.3 - 19.4 mg/L    Ig Lambda Free Light Chain 274.2 (H) 5.7 - 26.3 mg/L    Kappa/Lambda FluidC Ratio 0.04 (L) 0.26 - 1.65   LD,Blood   Result Value Ref Range    LD 98 (L) 140 - 271 U/L   Protein electrophoresis, serum   Result Value Ref Range    A/G Ratio 1.58 1.10 - 1.80    Albumin Electrophoresis 61.3 48.0 - 70.0 %    Albumin CONC 3.98 3.20 - 5.10 g/dl    Alpha 1 4.5 1.8 - 7.0 %    ALPHA 1 CONC 0.29 0.15 - 0.47 g/dL    Alpha 2 10.2 5.9 - 14.9 %    ALPHA 2 CONC 0.66 0.42 - 1.04 g/dL    Beta-1 6.3 4.7 - 7.7 %    BETA 1 CONC 0.41 0.31 - 0.57 g/dL    Beta-2 4.7 3.1 - 7.9 %    BETA 2 CONC 0.31 0.20 - 0.58 g/dL    Gamma Globulin 13.0 6.9 - 22.3 %    GAMMA CONC 0.85 0.40 - 1.66 g/dL    Total Protein 6.5 6.4 - 8.2 g/dL    SPEP Interpretation See Comment    Immunofixation, Serum(Reflex Only-Do Not Order)   Result Value Ref Range    Immunofixation Interpretation See Comment      *Note: Due to a large number of results and/or encounters for the requested time period, some results have not been displayed. A complete set of results can be found in Results Review.     Labs, Imaging, & Other studies:   All pertinent labs and imaging studies were personally  reviewed    Lab Results   Component Value Date     11/28/2017    K 4.3 05/14/2024     05/14/2024    CO2 27 05/14/2024    ANIONGAP 9 09/02/2014    BUN 16 05/14/2024    CREATININE 0.70 05/14/2024    GLUCOSE 93 09/02/2014    GLUF 93 05/14/2024    CALCIUM 9.3 05/14/2024    CORRECTEDCA 9.5 09/21/2020    AST 20 05/14/2024    ALT 15 05/14/2024    ALKPHOS 55 05/14/2024    PROT 8.0 09/02/2014    BILITOT 0.32 09/02/2014    EGFR 87 05/14/2024   Casual Steps  Outside Information  CBC AND DIFFERENTIAL  Status: Final result      TSH, 3RD GENERATION  Order: [id number]  Component  Ref Range & Units 12/13/23  2:01 PM   TSH  0.45 - 5.33 uIU/mL 2.06   Narrative    Test performed at [facility], [address]  Specimen Collected: 12/13/23  2:01 PM   ains abnormal data LACTATE DEHYDROGENASE  Order: [id number]  Component  Ref Range & Units 12/13/23  2:01 PM   LACTATE DEHYDROGENASE  98 - 192 U/L 87 Low    Narrative     suggestion  Information displayed in this report may not trend or trigger automated decision support.     QUANTITATIVE IMMUNOGLOBULINS (IGA, IGG, IGM)  Order: [id number]  Component  Ref Range & Units 12/13/23  2:01 PM   IMMUNOGLOBULIN A  50 - 500 mg/dL 93   IMMUNOGLOBULIN G  650 - 2000 mg/dL 886   IMMUNOGLOBULIN M  40 - 270 mg/dL 57   Comment: Undetected antigen excess is a rare event but cannot be excluded in the  Immunoglobulins Quantitative assay. Antigen excess can be evaluated in the  laboratory by retesting the sample at higher dilutions.  Please contact the  laboratory if you wish to have the sample retested at higher dilutions or if you  have any questions or concerns about the result.   Narrative  FERRITIN  Status: Final result         suggestion  Information displayed in this report may not trend or trigger automated decision support.     FERRITIN  Order: [id number]  Component  Ref Range & Units 12/13/23  2:01 PM   FERRITIN  11.0 - 306.8 ng/mL 117.4    Narrative        Reviewed test results  and discussed with patient.    Assessment and plan:  Continuation of care  for multiple myeloma.  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  Bone marrow test showed 15% plasma cells and there was IgG lambda spike on SPEP.  After a period of observation she was given induction systemic therapy, RVD in 2020. On 08/28/2020 she had autologous stem cell transplant.    She has received posttransplant vaccinations.  Patient was on maintenance Revlimid until December 2021  but that was stopped because of  profound neutropenia and counts were not recovering.  Revlimid was discontinued and plan on surveillance.  This was under the supervision of myeloma specialist at [place].  There is some improvement in leukopenia/neutropenia.  No frequent or severe infections.  She has instructions about febrile neutropenia, prevention and management.  She had thrombocytopenia but platelet count has recovered .      She is taking baby aspirin daily and aspirin  is not giving her stomach symptoms and no bleeding.  She is not on acyclovir anymore.    She has been taking calcium with vitamin-D and has been on Zometa  once every 3 months.  She has less tiredness.  Has arthritic symptoms and occasionally low back discomfort/spasm.  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 Positive hormone receptors and negative her 2. Patient had right mastectomy.  She had adjuvant tamoxifen for 5 years..  She goes for left mammography.  She has requested 6 mastectomy bras without prosthesis.   Free light chain ratio has changed to 0.04.  Patient tells me she will be getting bone marrow test at [place] next week.  Patient had some swelling of the left lower leg and pain in the shin area last week and there is much less swelling now and no pain.           Physical examination  and test results are as recorded and discussed.  Myeloma appears to be in very good partial remission except there is some change in free light chain ratio, 0.04.  Patient will have bone marrow test at [place] next week and instructions from Dr. Uriostegui at [place].  Patient has instructions to prevent infections and to report to emergency room with febrile neutropenia and other medical emergencies.  She does not have problem with her teeth for Zometa and that is being continued.  Goal is prolongation of survival and prevention of febrile neutropenia.  She will continue to follow with myeloma specialist at [place] . Goal is prolongation of survival from multiple myeloma and  cure from breast cancer.  Patient will have venous Doppler study of left leg    All discussed in detail.  Questions answered.  Discussed the importance of self-breast examination, eating healthy foods, staying active and health screening tests.  Patient is capable of self-care..  Discussed precautions against coronavirus and other infections .    .  She follows with her gynecologist for GYN examination and examination of breast .     1. Multiple myeloma not having achieved remission (HCC)    - Beta 2 microglobulin, serum; Future  - CBC and differential; Future  - Comprehensive metabolic panel; Future  - IgG, IgA, IgM; Future  - Immunoglobulin free LT chains blood; Future  - LD,Blood; Future  - Protein electrophoresis, serum; Future  - Protein electrophoresis, urine; Future  - Immunofixation,Urine(Reflex Only-Do Not Order); Future    2. Malignant neoplasm of right breast in female, estrogen receptor positive, unspecified site of breast ([facility])      3. Suspected DVT (deep vein thrombosis)    - VAS VENOUS DUPLEX -LOWER LIMB UNILATERAL; Future    4. Osteoporosis of multiple sites associated with multiple myeloma ([facility])      5. Essential hypertension      6. Screening mammogram for high-risk patient      7. H/O stem cell transplant ([facility])      8. Acute embolism  and thrombosis of left tibial vein (HCC)    - VAS VENOUS DUPLEX -LOWER LIMB UNILATERAL; Future    Venous Doppler study today.  Blood work and urine test prior to next visit in 6 months.  Zometa at the infusion center next week and every 3 months        I used a dictation device to dictate this note and there could be mistakes in my note and she may call my office for that.                    Patient voiced understanding and agreed      Counseling / Coordination of Care  .  Provided counseling and support

## 2024-05-23 NOTE — PATIENT INSTRUCTIONS
Venous Doppler study today.  Blood work and urine test prior to next visit in 6 months.  Zometa at the infusion center next week and every 3 months

## 2024-05-24 ENCOUNTER — HOSPITAL ENCOUNTER (OUTPATIENT)
Dept: NON INVASIVE DIAGNOSTICS | Facility: CLINIC | Age: 74
Discharge: HOME | End: 2024-05-24
Payer: MEDICARE

## 2024-05-24 DIAGNOSIS — I82.442: ICD-10-CM

## 2024-05-24 DIAGNOSIS — R09.89 SUSPECTED DVT (DEEP VEIN THROMBOSIS): ICD-10-CM

## 2024-05-24 PROCEDURE — 93971 EXTREMITY STUDY: CPT | Performed by: SURGERY

## 2024-05-24 PROCEDURE — 93971 EXTREMITY STUDY: CPT

## 2024-05-28 ENCOUNTER — HOSPITAL ENCOUNTER (OUTPATIENT)
Dept: INFUSION CENTER | Facility: CLINIC | Age: 74
Discharge: HOME | End: 2024-05-28
Payer: MEDICARE

## 2024-05-28 VITALS
OXYGEN SATURATION: 98 % | HEART RATE: 68 BPM | TEMPERATURE: 97.2 F | HEIGHT: 60 IN | RESPIRATION RATE: 18 BRPM | WEIGHT: 136.5 LBS | SYSTOLIC BLOOD PRESSURE: 125 MMHG | BODY MASS INDEX: 26.8 KG/M2 | DIASTOLIC BLOOD PRESSURE: 74 MMHG

## 2024-05-28 DIAGNOSIS — S22.009A CLOSED FRACTURE OF MULTIPLE THORACIC VERTEBRAE, INITIAL ENCOUNTER (HCC): Primary | ICD-10-CM

## 2024-05-28 DIAGNOSIS — C90.00 OSTEOPOROSIS OF MULTIPLE SITES ASSOCIATED WITH MULTIPLE MYELOMA (HCC): ICD-10-CM

## 2024-05-28 DIAGNOSIS — M81.8 OSTEOPOROSIS OF MULTIPLE SITES ASSOCIATED WITH MULTIPLE MYELOMA (HCC): ICD-10-CM

## 2024-05-28 DIAGNOSIS — C90.00 MULTIPLE MYELOMA NOT HAVING ACHIEVED REMISSION (HCC): ICD-10-CM

## 2024-05-28 RX ORDER — SODIUM CHLORIDE 9 MG/ML
20 INJECTION, SOLUTION INTRAVENOUS ONCE
OUTPATIENT
Start: 2024-08-16

## 2024-05-28 RX ORDER — SODIUM CHLORIDE 9 MG/ML
20 INJECTION, SOLUTION INTRAVENOUS ONCE
Status: COMPLETED | OUTPATIENT
Start: 2024-05-28 | End: 2024-05-28

## 2024-05-28 RX ADMIN — SODIUM CHLORIDE 20 ML/HR: 0.9 INJECTION, SOLUTION INTRAVENOUS at 11:04

## 2024-05-28 RX ADMIN — ZOLEDRONIC ACID 3.5 MG: 4 INJECTION, SOLUTION, CONCENTRATE INTRAVENOUS at 11:29

## 2024-05-28 NOTE — PROGRESS NOTES
Patient tolerated Zometa today without issue. PIV removed intact, coban wrap in place. Patient will be away for next due Zometa - next appt confirmed with patient 10/3 at 1100, declines AVS.

## 2024-05-31 ENCOUNTER — HOSPITAL ENCOUNTER (OUTPATIENT)
Dept: RADIOLOGY | Age: 74
Discharge: HOME | End: 2024-05-31
Payer: MEDICARE

## 2024-05-31 DIAGNOSIS — Z12.31 VISIT FOR SCREENING MAMMOGRAM: ICD-10-CM

## 2024-05-31 PROCEDURE — 77063 BREAST TOMOSYNTHESIS BI: CPT

## 2024-05-31 PROCEDURE — 77067 SCR MAMMO BI INCL CAD: CPT

## 2024-08-11 DIAGNOSIS — F41.9 ANXIETY: ICD-10-CM

## 2024-08-12 RX ORDER — ESCITALOPRAM OXALATE 10 MG/1
TABLET ORAL
Qty: 90 TABLET | Refills: 3 | Status: SHIPPED | OUTPATIENT
Start: 2024-08-12

## 2024-08-21 DIAGNOSIS — M81.8 OSTEOPOROSIS OF MULTIPLE SITES ASSOCIATED WITH MULTIPLE MYELOMA (HCC): ICD-10-CM

## 2024-08-21 DIAGNOSIS — C90.00 MULTIPLE MYELOMA NOT HAVING ACHIEVED REMISSION (HCC): Primary | ICD-10-CM

## 2024-08-21 DIAGNOSIS — S22.009A CLOSED FRACTURE OF MULTIPLE THORACIC VERTEBRAE, INITIAL ENCOUNTER (HCC): ICD-10-CM

## 2024-08-21 DIAGNOSIS — C90.00 OSTEOPOROSIS OF MULTIPLE SITES ASSOCIATED WITH MULTIPLE MYELOMA (HCC): ICD-10-CM

## 2024-09-24 DIAGNOSIS — S22.009A CLOSED FRACTURE OF MULTIPLE THORACIC VERTEBRAE, INITIAL ENCOUNTER (HCC): ICD-10-CM

## 2024-09-24 DIAGNOSIS — C90.00 MULTIPLE MYELOMA NOT HAVING ACHIEVED REMISSION (HCC): Primary | ICD-10-CM

## 2024-09-24 DIAGNOSIS — C90.00 OSTEOPOROSIS OF MULTIPLE SITES ASSOCIATED WITH MULTIPLE MYELOMA (HCC): ICD-10-CM

## 2024-09-24 DIAGNOSIS — M81.8 OSTEOPOROSIS OF MULTIPLE SITES ASSOCIATED WITH MULTIPLE MYELOMA (HCC): ICD-10-CM

## 2024-09-24 RX ORDER — SODIUM CHLORIDE 9 MG/ML
20 INJECTION, SOLUTION INTRAVENOUS ONCE
Status: CANCELLED | OUTPATIENT
Start: 2024-09-26

## 2024-09-25 ENCOUNTER — TELEPHONE (OUTPATIENT)
Dept: INFUSION CENTER | Facility: CLINIC | Age: 74
End: 2024-09-25

## 2024-09-26 ENCOUNTER — TELEPHONE (OUTPATIENT)
Dept: INFUSION CENTER | Facility: CLINIC | Age: 74
End: 2024-09-26

## 2024-09-26 ENCOUNTER — HOSPITAL ENCOUNTER (OUTPATIENT)
Dept: INFUSION CENTER | Facility: CLINIC | Age: 74
Discharge: HOME | End: 2024-09-26
Payer: MEDICARE

## 2024-09-26 VITALS
OXYGEN SATURATION: 97 % | TEMPERATURE: 97.8 F | RESPIRATION RATE: 18 BRPM | BODY MASS INDEX: 26.95 KG/M2 | WEIGHT: 138 LBS | SYSTOLIC BLOOD PRESSURE: 148 MMHG | HEART RATE: 69 BPM | DIASTOLIC BLOOD PRESSURE: 84 MMHG

## 2024-09-26 DIAGNOSIS — S22.009A CLOSED FRACTURE OF MULTIPLE THORACIC VERTEBRAE, INITIAL ENCOUNTER (HCC): ICD-10-CM

## 2024-09-26 DIAGNOSIS — C90.00 OSTEOPOROSIS OF MULTIPLE SITES ASSOCIATED WITH MULTIPLE MYELOMA (HCC): ICD-10-CM

## 2024-09-26 DIAGNOSIS — M81.8 OSTEOPOROSIS OF MULTIPLE SITES ASSOCIATED WITH MULTIPLE MYELOMA (HCC): ICD-10-CM

## 2024-09-26 DIAGNOSIS — C90.00 MULTIPLE MYELOMA NOT HAVING ACHIEVED REMISSION (HCC): Primary | ICD-10-CM

## 2024-09-26 PROCEDURE — 96365 THER/PROPH/DIAG IV INF INIT: CPT

## 2024-09-26 RX ORDER — SODIUM CHLORIDE 9 MG/ML
20 INJECTION, SOLUTION INTRAVENOUS ONCE
OUTPATIENT
Start: 2024-12-19

## 2024-09-26 RX ORDER — SODIUM CHLORIDE 9 MG/ML
20 INJECTION, SOLUTION INTRAVENOUS ONCE
Status: COMPLETED | OUTPATIENT
Start: 2024-09-26 | End: 2024-09-26

## 2024-09-26 RX ADMIN — SODIUM CHLORIDE 20 ML/HR: 0.9 INJECTION, SOLUTION INTRAVENOUS at 10:20

## 2024-09-26 RX ADMIN — ZOLEDRONIC ACID 3.5 MG: 4 INJECTION, SOLUTION, CONCENTRATE INTRAVENOUS at 10:20

## 2024-09-26 NOTE — TELEPHONE ENCOUNTER
Pt was at [facility] for Zometa today and made her next appt for 1/27/25, however, is due at the end of December 2024. Called and spoke with pt, and she said she wanted to push it out a month.

## 2024-09-26 NOTE — PROGRESS NOTES
Patient to infusion center for treatment with Zometa. Patient offers no complaints. VSS. Labs from 9/25/2024 reviewed and within parameters to treat. Patient confirms taking calcium and vitamin D supplementation. Peripheral IV inserted without incident.

## 2024-09-26 NOTE — PROGRESS NOTES
Patient tolerated treatment without incident. Peripheral IV removed. Next appointment confirmed for 1/27/2025 at 1300. Patient aware this date is past ordered date of every 84 days, requesting this time due to other appointments.  AVS offered and declined.

## 2024-12-02 ENCOUNTER — APPOINTMENT (OUTPATIENT)
Dept: LAB | Facility: CLINIC | Age: 74
End: 2024-12-02
Payer: MEDICARE

## 2024-12-02 DIAGNOSIS — C90.00 MULTIPLE MYELOMA NOT HAVING ACHIEVED REMISSION (HCC): ICD-10-CM

## 2024-12-02 PROCEDURE — 86335 IMMUNFIX E-PHORSIS/URINE/CSF: CPT

## 2024-12-02 PROCEDURE — 84166 PROTEIN E-PHORESIS/URINE/CSF: CPT

## 2024-12-03 LAB
ALBUMIN UR ELPH-MCNC: 100 %
ALPHA1 GLOB MFR UR ELPH: 0 %
ALPHA2 GLOB MFR UR ELPH: 0 %
B-GLOBULIN MFR UR ELPH: 0 %
GAMMA GLOB MFR UR ELPH: 0 %
INTERPRETATION UR IFE-IMP: NORMAL
PROT PATTERN UR ELPH-IMP: NORMAL
PROT UR-MCNC: 8.6 MG/DL

## 2024-12-03 PROCEDURE — 86334 IMMUNOFIX E-PHORESIS SERUM: CPT | Performed by: PATHOLOGY

## 2024-12-03 PROCEDURE — 84165 PROTEIN E-PHORESIS SERUM: CPT | Performed by: PATHOLOGY

## 2024-12-03 PROCEDURE — 84166 PROTEIN E-PHORESIS/URINE/CSF: CPT | Performed by: PATHOLOGY

## 2024-12-05 ENCOUNTER — OFFICE VISIT (OUTPATIENT)
Dept: HEMATOLOGY ONCOLOGY | Facility: CLINIC | Age: 74
End: 2024-12-05
Payer: MEDICARE

## 2024-12-05 VITALS
OXYGEN SATURATION: 96 % | RESPIRATION RATE: 16 BRPM | DIASTOLIC BLOOD PRESSURE: 84 MMHG | HEIGHT: 60 IN | WEIGHT: 139 LBS | BODY MASS INDEX: 27.29 KG/M2 | HEART RATE: 94 BPM | SYSTOLIC BLOOD PRESSURE: 138 MMHG | TEMPERATURE: 97.7 F

## 2024-12-05 DIAGNOSIS — I10 ESSENTIAL HYPERTENSION: ICD-10-CM

## 2024-12-05 DIAGNOSIS — Z85.3 HISTORY OF BREAST CANCER: ICD-10-CM

## 2024-12-05 DIAGNOSIS — T45.1X5A CHEMOTHERAPY INDUCED NEUTROPENIA (HCC): ICD-10-CM

## 2024-12-05 DIAGNOSIS — D70.1 CHEMOTHERAPY INDUCED NEUTROPENIA (HCC): ICD-10-CM

## 2024-12-05 DIAGNOSIS — C90.00 MULTIPLE MYELOMA NOT HAVING ACHIEVED REMISSION (HCC): Primary | ICD-10-CM

## 2024-12-05 DIAGNOSIS — C50.911 MALIGNANT NEOPLASM OF RIGHT BREAST IN FEMALE, ESTROGEN RECEPTOR POSITIVE, UNSPECIFIED SITE OF BREAST (HCC): ICD-10-CM

## 2024-12-05 DIAGNOSIS — Z17.0 MALIGNANT NEOPLASM OF RIGHT BREAST IN FEMALE, ESTROGEN RECEPTOR POSITIVE, UNSPECIFIED SITE OF BREAST (HCC): ICD-10-CM

## 2024-12-05 DIAGNOSIS — Z94.84 H/O STEM CELL TRANSPLANT (HCC): ICD-10-CM

## 2024-12-05 PROCEDURE — 99214 OFFICE O/P EST MOD 30 MIN: CPT | Performed by: INTERNAL MEDICINE

## 2024-12-05 PROCEDURE — G2211 COMPLEX E/M VISIT ADD ON: HCPCS | Performed by: INTERNAL MEDICINE

## 2024-12-05 NOTE — PATIENT INSTRUCTIONS
Patient has standing orders for blood work and Zometa.  Ordered CT myeloma.  Follow-up in 3 months.

## 2024-12-06 ENCOUNTER — OFFICE VISIT (OUTPATIENT)
Dept: INTERNAL MEDICINE CLINIC | Facility: CLINIC | Age: 74
End: 2024-12-06
Payer: MEDICARE

## 2024-12-06 VITALS
DIASTOLIC BLOOD PRESSURE: 70 MMHG | BODY MASS INDEX: 27.05 KG/M2 | HEART RATE: 74 BPM | WEIGHT: 137.8 LBS | SYSTOLIC BLOOD PRESSURE: 132 MMHG | OXYGEN SATURATION: 98 % | TEMPERATURE: 98.4 F | HEIGHT: 60 IN

## 2024-12-06 DIAGNOSIS — I10 ESSENTIAL HYPERTENSION: ICD-10-CM

## 2024-12-06 DIAGNOSIS — Z12.31 BREAST CANCER SCREENING BY MAMMOGRAM: ICD-10-CM

## 2024-12-06 DIAGNOSIS — Z00.00 MEDICARE ANNUAL WELLNESS VISIT, SUBSEQUENT: ICD-10-CM

## 2024-12-06 DIAGNOSIS — E78.2 MIXED HYPERLIPIDEMIA: Primary | ICD-10-CM

## 2024-12-06 DIAGNOSIS — C90.00 MULTIPLE MYELOMA NOT HAVING ACHIEVED REMISSION (HCC): ICD-10-CM

## 2024-12-06 DIAGNOSIS — Z23 ENCOUNTER FOR IMMUNIZATION: ICD-10-CM

## 2024-12-06 PROBLEM — D69.6 THROMBOCYTOPENIA (HCC): Status: RESOLVED | Noted: 2022-01-06 | Resolved: 2024-12-06

## 2024-12-06 PROCEDURE — 90662 IIV NO PRSV INCREASED AG IM: CPT

## 2024-12-06 PROCEDURE — 99214 OFFICE O/P EST MOD 30 MIN: CPT | Performed by: INTERNAL MEDICINE

## 2024-12-06 PROCEDURE — G0439 PPPS, SUBSEQ VISIT: HCPCS | Performed by: INTERNAL MEDICINE

## 2024-12-06 PROCEDURE — 90471 IMMUNIZATION ADMIN: CPT

## 2024-12-06 NOTE — ASSESSMENT & PLAN NOTE
A little high today  Instructed to check blood pressure regularly goal less than 140/90  Metoprolol may need to be raised to 100 mg twice daily

## 2024-12-06 NOTE — PROGRESS NOTES
Name: Chelita Seymour      : 1950      MRN: [id number]  Encounter Provider: Lea Reyes, MD  Encounter Date: 2024   Encounter department: [facility]    Assessment & Plan  Mixed hyperlipidemia    Orders:    Lipid Panel with Direct LDL reflex; Future    Essential hypertension  A little high today  Instructed to check blood pressure regularly goal less than 140/90  Metoprolol may need to be raised to 100 mg twice daily       Multiple myeloma not having achieved remission (HCC)  Scheduled for CT  Maintained on Zometa       Encounter for immunization  Commended RSV at the pharmacy  Orders:    influenza vaccine, high-dose, PF 0.5 mL (Fluzone High Dose)    Medicare annual wellness visit, subsequent         Breast cancer screening by mammogram    Orders:    Mammo screening left w 3d and cad; Future       Preventive health issues were discussed with patient, and age appropriate screening tests were ordered as noted in patient's After Visit Summary. Personalized health advice and appropriate referrals for health education or preventive services given if needed, as noted in patient's After Visit Summary.    History of Present Illness     HPI   Patient Care Team:  Lea Reyes, MD as PCP - General  Lore Barkley PA-C (Hematology and Oncology)  Monique Mann MD (Radiation Oncology)  Chris Davis MD (Hematology and Oncology)  Sheila Fink MD (Cardiology)    Review of Systems   Constitutional:  Negative for unexpected weight change.   Respiratory:  Negative for shortness of breath.    Cardiovascular:  Negative for chest pain and palpitations.   Gastrointestinal:  Negative for abdominal pain, constipation and diarrhea.   Musculoskeletal:  Positive for back pain.   Neurological:  Negative for dizziness and headaches.     Medical History Reviewed by provider this encounter:  Tobacco  Allergies  Meds  Problems  Med Hx  Surg Hx  Fam Hx       Annual Wellness Visit Questionnaire    Chelita is here for her Subsequent Wellness visit.     Health Risk Assessment:   Patient rates overall health as very good. Patient feels that their physical health rating is slightly better. Patient is very satisfied with their life. Eyesight was rated as slightly worse. Hearing was rated as same. Patient feels that their emotional and mental health rating is same. Patients states they are never, rarely angry. Patient states they are sometimes unusually tired/fatigued. Pain experienced in the last 7 days has been some. Patient's pain rating has been 1/10. Patient states that she has experienced no weight loss or gain in last 6 months.     Depression Screening:   PHQ-2 Score: 0      Fall Risk Screening:   In the past year, patient has experienced: no history of falling in past year      Urinary Incontinence Screening:   Patient has leaked urine accidently in the last six months.     Home Safety:  Patient does not have trouble with stairs inside or outside of their home. Patient has working smoke alarms and has working carbon monoxide detector. Home safety hazards include: none.     Nutrition:   Current diet is Regular and Limited junk food.     Medications:   Patient is currently taking over-the-counter supplements. OTC medications include: see medication list. Patient is able to manage medications.     Activities of Daily Living (ADLs)/Instrumental Activities of Daily Living (IADLs):   Walk and transfer into and out of bed and chair?: Yes  Dress and groom yourself?: Yes    Bathe or shower yourself?: Yes    Feed yourself? Yes  Do your laundry/housekeeping?: Yes  Manage your money, pay your bills and track your expenses?: Yes  Make your own meals?: Yes    Do your own shopping?: Yes    Previous Hospitalizations:   Any hospitalizations or ED visits within the last 12 months?: No      Advance Care Planning:   Living will: Yes    Durable POA for healthcare: Yes    Advanced directive: Yes      Cognitive Screening:    Provider or family/friend/caregiver concerned regarding cognition?: No    PREVENTIVE SCREENINGS      Cardiovascular Screening:    General: Screening Not Indicated and History Lipid Disorder    Due for: Lipid Panel      Diabetes Screening:     General: Screening Current      Colorectal Cancer Screening:     General: Screening Current      Breast Cancer Screening:     General: History Breast Cancer      Cervical Cancer Screening:    General: Screening Not Indicated      Osteoporosis Screening:    General: Screening Not Indicated and History Osteoporosis      Abdominal Aortic Aneurysm (AAA) Screening:        General: Screening Not Indicated      Lung Cancer Screening:     General: Screening Not Indicated      Hepatitis C Screening:    General: Screening Current    Screening, Brief Intervention, and Referral to Treatment (SBIRT)    Screening  Typical number of drinks in a day: 1  Typical number of drinks in a week: 4  Interpretation: Low risk drinking behavior.    AUDIT-C Screenin) How often did you have a drink containing alcohol in the past year? 2 to 3 times a week  2) How many drinks did you have on a typical day when you were drinking in the past year? 1 to 2  3) How often did you have 6 or more drinks on one occasion in the past year? never    AUDIT-C Score: 3  Interpretation: Score 3-12 (female): POSITIVE screen for alcohol misuse    AUDIT Screenin) How often during the last year have you found that you were not able to stop drinking once you had started? 0 - never  5) How often during the last year have you failed to do what was normally expected from you because of drinking? 0 - never  6) How often during the last year have you needed a first drink in the morning to get yourself going after a heavy drinking session? 0 - never  7) How often during the last year have you had a feeling of guilt or remorse after drinking? 0 - never  8) How often during the last year have you been unable to remember what  happened the night before because you had been drinking? 0 - never  9) Have you or someone else been injured as a result of your drinking? 0 - no  10) Has a relative or friend or a doctor or another health worker been concerned about your drinking or suggested you cut down? 0 - no    AUDIT Score: 3  Interpretation: Low risk alcohol consumption    Single Item Drug Screening:  How often have you used an illegal drug (including marijuana) or a prescription medication for non-medical reasons in the past year? never    Single Item Drug Screen Score: 0  Interpretation: Negative screen for possible drug use disorder    Social Drivers of Health     Financial Resource Strain: Low Risk  (11/28/2023)    Overall Financial Resource Strain (CARDIA)     Difficulty of Paying Living Expenses: Not hard at all   Food Insecurity: No Food Insecurity (11/29/2024)    Hunger Vital Sign     Worried About Running Out of Food in the Last Year: Never true     Ran Out of Food in the Last Year: Never true   Transportation Needs: No Transportation Needs (11/29/2024)    PRAPARE - Transportation     Lack of Transportation (Medical): No     Lack of Transportation (Non-Medical): No   Housing Stability: Low Risk  (12/6/2024)    Housing Stability Vital Sign     Unable to Pay for Housing in the Last Year: No     Number of Times Moved in the Last Year: 0     Homeless in the Last Year: No   Utilities: Not At Risk (11/29/2024)    [facility] Utilities     Threatened with loss of utilities: No     No results found.    Objective   /70   Pulse 74   Temp 98.4 °F (36.9 °C) (Tympanic)   Ht 5' (1.524 m)   Wt 62.5 kg (137 lb 12.8 oz)   LMP  (LMP Unknown)   SpO2 98%   BMI 26.91 kg/m²     Physical Exam  Constitutional:       General: She is not in acute distress.     Appearance: She is well-developed. She is not ill-appearing, toxic-appearing or diaphoretic.   HENT:      Right Ear: External ear normal. There is no impacted cerumen.      Left Ear: External ear  normal. There is no impacted cerumen.   Eyes:      Conjunctiva/sclera: Conjunctivae normal.   Cardiovascular:      Rate and Rhythm: Normal rate and regular rhythm.      Heart sounds: Normal heart sounds. No murmur heard.  Pulmonary:      Effort: Pulmonary effort is normal. No respiratory distress.      Breath sounds: Normal breath sounds. No stridor. No wheezing or rales.   Abdominal:      General: There is no distension.      Palpations: Abdomen is soft. There is no mass.      Tenderness: There is no abdominal tenderness. There is no guarding or rebound.   Musculoskeletal:      Cervical back: Neck supple.      Right lower leg: No edema.      Left lower leg: No edema.   Neurological:      Mental Status: She is alert and oriented to person, place, and time.   Psychiatric:         Mood and Affect: Mood normal.         Behavior: Behavior normal.         Thought Content: Thought content normal.         Judgment: Judgment normal.

## 2024-12-06 NOTE — PATIENT INSTRUCTIONS
Medicare Preventive Visit Patient Instructions  Thank you for completing your Welcome to Medicare Visit or Medicare Annual Wellness Visit today. Your next wellness visit will be due in one year (12/7/2025).  The screening/preventive services that you may require over the next 5-10 years are detailed below. Some tests may not apply to you based off risk factors and/or age. Screening tests ordered at today's visit but not completed yet may show as past due. Also, please note that scanned in results may not display below.  Preventive Screenings:  Service Recommendations Previous Testing/Comments   Colorectal Cancer Screening  * Colonoscopy    * Fecal Occult Blood Test (FOBT)/Fecal Immunochemical Test (FIT)  * Fecal DNA/Cologuard Test  * Flexible Sigmoidoscopy Age: 45-75 years old   Colonoscopy: every 10 years (may be performed more frequently if at higher risk)  OR  FOBT/FIT: every 1 year  OR  Cologuard: every 3 years  OR  Sigmoidoscopy: every 5 years  Screening may be recommended earlier than age 45 if at higher risk for colorectal cancer. Also, an individualized decision between you and your healthcare provider will decide whether screening between the ages of 76-85 would be appropriate. Colonoscopy: 01/20/2022  FOBT/FIT: Not on file  Cologuard: Not on file  Sigmoidoscopy: Not on file          Breast Cancer Screening Age: 40+ years old  Frequency: every 1-2 years  Not required if history of left and right mastectomy Mammogram: 05/31/2024        Cervical Cancer Screening Between the ages of 21-29, pap smear recommended once every 3 years.   Between the ages of 30-65, can perform pap smear with HPV co-testing every 5 years.   Recommendations may differ for women with a history of total hysterectomy, cervical cancer, or abnormal pap smears in past. Pap Smear: 08/21/2019        Hepatitis C Screening Once for adults born between 1945 and 1965  More frequently in patients at high risk for Hepatitis C Hep C Antibody:  11/07/2018        Diabetes Screening 1-2 times per year if you're at risk for diabetes or have pre-diabetes Fasting glucose: 108 mg/dL (12/2/2024)  A1C: No results in last 5 years (No results in last 5 years)      Cholesterol Screening Once every 5 years if you don't have a lipid disorder. May order more often based on risk factors. Lipid panel: 12/08/2023          Other Preventive Screenings Covered by Medicare:  Abdominal Aortic Aneurysm (AAA) Screening: covered once if your at risk. You're considered to be at risk if you have a family history of AAA.  Lung Cancer Screening: covers low dose CT scan once per year if you meet all of the following conditions: (1) Age 55-77; (2) No signs or symptoms of lung cancer; (3) Current smoker or have quit smoking within the last 15 years; (4) You have a tobacco smoking history of at least 20 pack years (packs per day multiplied by number of years you smoked); (5) You get a written order from a healthcare provider.  Glaucoma Screening: covered annually if you're considered high risk: (1) You have diabetes OR (2) Family history of glaucoma OR (3)  aged 50 and older OR (4)  American aged 65 and older  Osteoporosis Screening: covered every 2 years if you meet one of the following conditions: (1) You're estrogen deficient and at risk for osteoporosis based off medical history and other findings; (2) Have a vertebral abnormality; (3) On glucocorticoid therapy for more than 3 months; (4) Have primary hyperparathyroidism; (5) On osteoporosis medications and need to assess response to drug therapy.   Last bone density test (DXA Scan): 01/23/2020.  HIV Screening: covered annually if you're between the age of 15-65. Also covered annually if you are younger than 15 and older than 65 with risk factors for HIV infection. For pregnant patients, it is covered up to 3 times per pregnancy.    Immunizations:  Immunization Recommendations   Influenza Vaccine Annual  influenza vaccination during flu season is recommended for all persons aged >= 6 months who do not have contraindications   Pneumococcal Vaccine   * Pneumococcal conjugate vaccine = PCV13 (Prevnar 13), PCV15 (Vaxneuvance), PCV20 (Prevnar 20)  * Pneumococcal polysaccharide vaccine = PPSV23 (Pneumovax) Adults 19-65 yo with certain risk factors or if 65+ yo  If never received any pneumonia vaccine: recommend Prevnar 20 (PCV20)  Give PCV20 if previously received 1 dose of PCV13 or PPSV23   Hepatitis B Vaccine 3 dose series if at intermediate or high risk (ex: diabetes, end stage renal disease, liver disease)   Respiratory syncytial virus (RSV) Vaccine - COVERED BY MEDICARE PART D  * RSVPreF3 (Arexvy) CDC recommends that adults 60 years of age and older may receive a single dose of RSV vaccine using shared clinical decision-making (SCDM)   Tetanus (Td) Vaccine - COST NOT COVERED BY MEDICARE PART B Following completion of primary series, a booster dose should be given every 10 years to maintain immunity against tetanus. Td may also be given as tetanus wound prophylaxis.   Tdap Vaccine - COST NOT COVERED BY MEDICARE PART B Recommended at least once for all adults. For pregnant patients, recommended with each pregnancy.   Shingles Vaccine (Shingrix) - COST NOT COVERED BY MEDICARE PART B  2 shot series recommended in those 19 years and older who have or will have weakened immune systems or those 50 years and older     Health Maintenance Due:      Topic Date Due   • Breast Cancer Screening: Mammogram  05/31/2025   • Colorectal Cancer Screening  01/20/2032   • Hepatitis C Screening  Completed     Immunizations Due:      Topic Date Due   • IPV Vaccine (3 of 3 - Adult catch-up series) 11/12/2022   • Influenza Vaccine (1) 09/01/2024   • COVID-19 Vaccine (5 - 2024-25 season) 09/01/2024     Advance Directives   What are advance directives?  Advance directives are legal documents that state your wishes and plans for medical care.  These plans are made ahead of time in case you lose your ability to make decisions for yourself. Advance directives can apply to any medical decision, such as the treatments you want, and if you want to donate organs.   What are the types of advance directives?  There are many types of advance directives, and each state has rules about how to use them. You may choose a combination of any of the following:  Living will:  This is a written record of the treatment you want. You can also choose which treatments you do not want, which to limit, and which to stop at a certain time. This includes surgery, medicine, IV fluid, and tube feedings.   Durable power of  for healthcare (DPAHC):  This is a written record that states who you want to make healthcare choices for you when you are unable to make them for yourself. This person, called a proxy, is usually a family member or a friend. You may choose more than 1 proxy.  Do not resuscitate (DNR) order:  A DNR order is used in case your heart stops beating or you stop breathing. It is a request not to have certain forms of treatment, such as CPR. A DNR order may be included in other types of advance directives.  Medical directive:  This covers the care that you want if you are in a coma, near death, or unable to make decisions for yourself. You can list the treatments you want for each condition. Treatment may include pain medicine, surgery, blood transfusions, dialysis, IV or tube feedings, and a ventilator (breathing machine).  Values history:  This document has questions about your views, beliefs, and how you feel and think about life. This information can help others choose the care that you would choose.  Why are advance directives important?  An advance directive helps you control your care. Although spoken wishes may be used, it is better to have your wishes written down. Spoken wishes can be misunderstood, or not followed. Treatments may be given even if you  do not want them. An advance directive may make it easier for your family to make difficult choices about your care.   Urinary Incontinence   Urinary incontinence (UI)  is when you lose control of your bladder. UI develops because your bladder cannot store or empty urine properly. The 3 most common types of UI are stress incontinence, urge incontinence, or both.  Medicines:   May be given to help strengthen your bladder control. Report any side effects of medication to your healthcare provider.  Do pelvic muscle exercises often:  Your pelvic muscles help you stop urinating. Squeeze these muscles tight for 5 seconds, then relax for 5 seconds. Gradually work up to squeezing for 10 seconds. Do 3 sets of 15 repetitions a day, or as directed. This will help strengthen your pelvic muscles and improve bladder control.  Train your bladder:  Go to the bathroom at set times, such as every 2 hours, even if you do not feel the urge to go. You can also try to hold your urine when you feel the urge to go. For example, hold your urine for 5 minutes when you feel the urge to go. As that becomes easier, hold your urine for 10 minutes.   Self-care:   Keep a UI record.  Write down how often you leak urine and how much you leak. Make a note of what you were doing when you leaked urine.  Drink liquids as directed. You may need to limit the amount of liquid you drink to help control your urine leakage. Do not drink any liquid right before you go to bed. Limit or do not have drinks that contain caffeine or alcohol.   Prevent constipation.  Eat a variety of high-fiber foods. Good examples are high-fiber cereals, beans, vegetables, and whole-grain breads. Walking is the best way to trigger your intestines to have a bowel movement.  Exercise regularly and maintain a healthy weight.  Weight loss and exercise will decrease pressure on your bladder and help you control your leakage.   Use a catheter as directed  to help empty your bladder. A  catheter is a tiny, plastic tube that is put into your bladder to drain your urine.   Go to behavior therapy as directed.  Behavior therapy may be used to help you learn to control your urge to urinate.    Weight Management   Why it is important to manage your weight:  Being overweight increases your risk of health conditions such as heart disease, high blood pressure, type 2 diabetes, and certain types of cancer. It can also increase your risk for osteoarthritis, sleep apnea, and other respiratory problems. Aim for a slow, steady weight loss. Even a small amount of weight loss can lower your risk of health problems.  How to lose weight safely:  A safe and healthy way to lose weight is to eat fewer calories and get regular exercise. You can lose up about 1 pound a week by decreasing the number of calories you eat by 500 calories each day.   Healthy meal plan for weight management:  A healthy meal plan includes a variety of foods, contains fewer calories, and helps you stay healthy. A healthy meal plan includes the following:  Eat whole-grain foods more often.  A healthy meal plan should contain fiber. Fiber is the part of grains, fruits, and vegetables that is not broken down by your body. Whole-grain foods are healthy and provide extra fiber in your diet. Some examples of whole-grain foods are whole-wheat breads and pastas, oatmeal, brown rice, and bulgur.  Eat a variety of vegetables every day.  Include dark, leafy greens such as spinach, kale, josemanuel greens, and mustard greens. Eat yellow and orange vegetables such as carrots, sweet potatoes, and winter squash.   Eat a variety of fruits every day.  Choose fresh or canned fruit (canned in its own juice or light syrup) instead of juice. Fruit juice has very little or no fiber.  Eat low-fat dairy foods.  Drink fat-free (skim) milk or 1% milk. Eat fat-free yogurt and low-fat cottage cheese. Try low-fat cheeses such as mozzarella and other reduced-fat  "cheeses.  Choose meat and other protein foods that are low in fat.  Choose beans or other legumes such as split peas or lentils. Choose fish, skinless poultry (chicken or turkey), or lean cuts of red meat (beef or pork). Before you cook meat or poultry, cut off any visible fat.   Use less fat and oil.  Try baking foods instead of frying them. Add less fat, such as margarine, sour cream, regular salad dressing and mayonnaise to foods. Eat fewer high-fat foods. Some examples of high-fat foods include french fries, doughnuts, ice cream, and cakes.  Eat fewer sweets.  Limit foods and drinks that are high in sugar. This includes candy, cookies, regular soda, and sweetened drinks.  Exercise:  Exercise at least 30 minutes per day on most days of the week. Some examples of exercise include walking, biking, dancing, and swimming. You can also fit in more physical activity by taking the stairs instead of the elevator or parking farther away from stores. Ask your healthcare provider about the best exercise plan for you.   Alcohol Use and Your Health    Drinking too much can harm your health.  Excessive alcohol use leads to about 88,000 death in the [place] each year, and shortens the life of those who diet by almost 30 years.  Further, excessive drinking cost the economy $249 billion in 2010.  Most excessive drinkers are not alcohol dependent.    Excessive alcohol use has immediate effects that increase the risk of many harmful health conditions.  These are most often the result of binge drinking.  Over time, excessive alcohol use can lead to the development of chronic diseases and other series health problems.    What is considered a \"drink\"?        Excessive alcohol use includes:  Binge Drinking: For women, 4 or more drinks consumed on one occasion. For men, 5 or more drinks consumed on one occasion.  Heavy Drinking: For women, 8 or more drinks per week. For men, 15 or more drinks per week  Any alcohol used by pregnant " women  Any alcohol used by those under the age of 21 years    If you choose to drink, do so in moderation:  Do not drink at all if you are under the age of 21, or if you are or may be pregnant, or have health problems that could be made worse by drinking.  For women, up to 1 drink per day  For men, up to 2 drinks a day    No one should begin drinking or drink more frequently based on potential health benefits    Short-Term Health Risks:  Injuries: motor vehicle crashes, falls, drownings, burns  Violence: homicide, suicide, sexual assault, intimate partner violence  Alcohol poisoning  Reproductive health: risky sexual behaviors, unintended prengnacy, sexually transmitted diseases, miscarriage, stillbirth, fetal alcohol syndrome    Long-Term Health Risks:  Chronic diseases: high blood pressure, heart disease, stroke, liver disease, digestive problems  Cancers: breast, mouth and throat, liver, colon  Learning and memory problems: dementia, poor school performance  Mental health: depression, anxiety, insomnia  Social problems: lost productivity, family problems, unemployment  Alcohol dependence    For support and more information:  Substance Abuse and Mental Health Services Administration  [address]  [place] [address]  Web Address: http://www.[facility]sa.gov    Alcoholics Anonymous        Web Address: http://www.aa.org    https://www.cdc.gov/alcohol/fact-sheets/alcohol-use.htm     © Copyright [employer] 2018 Information is for End User's use only and may not be sold, redistributed or otherwise used for commercial purposes. All illustrations and images included in CareNotes® are the copyrighted property of [employer] or [employer]

## 2024-12-07 NOTE — ASSESSMENT & PLAN NOTE
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 Positive hormone receptors and negative her 2. Patient had right mastectomy.  She had adjuvant tamoxifen for 5 years..  She goes for left mammography.

## 2024-12-07 NOTE — ASSESSMENT & PLAN NOTE
Patient follows with specialist at [facility].  Most recent bone marrow showed 15% plasma cells.  Free light chain ratio 0.03.  Patient's myeloma is relapsing.  She could be considered in the category of smoldering myeloma at this time.  She has follow-up appointment with specialist at [facility] and she will come back with recommendations when to retreat and the regimen like D Kd or DPD.  Patient had RVD and stem cell transplant previously and was on maintenance Revlimid but that had to be discontinued in December 2021 because of profound neutropenia and counts were not recovering.  She will probably need colony-stimulating factor with chemotherapy at this time to minimize risk of profound neutropenia and febrile neutropenia.    Multiple myeloma was diagnosed in 2019 when she had presented with L4 lumbar spine compression fracture that was radiated.  Induction systemic therapy with RVD was started in 2020.  Patient has been taking baby aspirin.  She is taking calcium, vitamin D and Zometa.  She has some tiredness, arthritic symptoms and occasionally low back discomfort.  Orders:    Beta 2 microglobulin, serum; Standing    CBC and differential; Standing    Comprehensive metabolic panel; Standing    IgG, IgA, IgM; Standing    Immunoglobulin free LT chains blood; Standing    LD,Blood; Standing    Protein electrophoresis, serum; Standing    Uric acid; Standing    CT low dose whole body myeloma scan; Future

## 2024-12-07 NOTE — PROGRESS NOTES
Name: Chelita Seymour      : 1950      MRN: [id number]  Encounter Provider: Chris Davis MD  Encounter Date: 2024   Encounter department: [facility] [place]  :  Assessment & Plan  Multiple myeloma not having achieved remission (HCC)  Patient follows with specialist at [place].  Most recent bone marrow showed 15% plasma cells.  Free light chain ratio 0.03.  Patient's myeloma is relapsing.  She could be considered in the category of smoldering myeloma at this time.  She has follow-up appointment with specialist at [place] and she will come back with recommendations when to retreat and the regimen like D Kd or DPD.  Patient had RVD and stem cell transplant previously and was on maintenance Revlimid but that had to be discontinued in 2021 because of profound neutropenia and counts were not recovering.  She will probably need colony-stimulating factor with chemotherapy at this time to minimize risk of profound neutropenia and febrile neutropenia.    Multiple myeloma was diagnosed in  when she had presented with L4 lumbar spine compression fracture that was radiated.  Induction systemic therapy with RVD was started in .  Patient has been taking baby aspirin.  She is taking calcium, vitamin D and Zometa.  She has some tiredness, arthritic symptoms and occasionally low back discomfort.  Orders:    Beta 2 microglobulin, serum; Standing    CBC and differential; Standing    Comprehensive metabolic panel; Standing    IgG, IgA, IgM; Standing    Immunoglobulin free LT chains blood; Standing    LD,Blood; Standing    Protein electrophoresis, serum; Standing    Uric acid; Standing    CT low dose whole body myeloma scan; Future    Malignant neoplasm of right breast in female, estrogen receptor positive, unspecified site of breast (HCC)  Patient has remote past history of stage I, grade 1, 0.8 cm invasive tubular carcinoma of right breast in . Positive hormone receptors  and negative her 2. Patient had right mastectomy.  She had adjuvant tamoxifen for 5 years..  She goes for left mammography.         Essential hypertension  Being managed by PCP       H/O stem cell transplant (HCC)  See above in myeloma section       History of breast cancer  See above in breast section       Chemotherapy induced neutropenia (HCC)  Leukopenia is persisting.  Neutropenia is persisting.       Patient has standing orders for blood work and Zometa.  Ordered CT myeloma.  Follow-up in 3 months.  I discussed all the above with patient in detail.  She understand myeloma with progression she will be needing treatments again.  The final recommendation will come from specialist at [place].  Discussed the importance of self breast examination, eating healthy foods, activities as tolerated and health screening test.  Goal will be to manage patient's multiple myeloma to prolong survival.  Patient is capable of self-care.  Provided counseling and support.  Patient will continue to follow with her primary physician and other consultants.  I used a dictation device to dictate this note and there could be mistakes in my note and for that patient may contact my office.    History of Present Illness   Chief Complaint   Patient presents with    Follow-up   Chelita Seymour is a 74 y.o. female.  This visit for relapsing IgG lambda multiple myeloma and remote past history of breast cancer.  See above and myeloma section.  Patient has some tiredness, arthritis and low back pain.  See below for detail.    Pertinent Medical History   Patient follows with specialist at [place].  Patient has been treated for IgG lambda multiple myeloma.  Most recent bone marrow showed 15% plasma cells.  Free light chain ratio 0.03.  Patient's myeloma is relapsing.  She could be considered in the category of smoldering myeloma at this time.  She has follow-up appointment with specialist at [place] and she will come back with recommendations when to  retreat and the regimen like D Kd or DPD.  Patient had RVD and stem cell transplant previously and was on maintenance Revlimid but that had to be discontinued in December 2021 because of profound neutropenia and counts were not recovering.  She will probably need colony-stimulating factor with chemotherapy at this time to minimize risk of profound neutropenia and febrile neutropenia.   IgG lambda multiple myeloma was diagnosed in 2019 when she had presented with L4 lumbar spine compression fracture that was radiated.  Induction systemic therapy with RVD was started in 2020.  Patient has been taking baby aspirin.  She is taking calcium, vitamin D and Zometa.  She has some tiredness, arthritic symptoms and occasionally low back discomfort.    Oncology History   Oncology History   Malignant neoplasm of right breast (HCC) (Resolved)   9/2000 Surgery    Right breast mastectomy     8/13/2012 Initial Diagnosis    Malignant neoplasm of right breast (HCC)      Chemotherapy    Tamoxifen for 5 years     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HCC) (Resolved)   12/18/2018 Initial Diagnosis    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HCC)     12/18/2018 Biopsy    Final Diagnosis   A. Bone, L-4, core biopsy:  - Fragments of trabecular bone with changes compatible with prior fracture site.  - 10% lambda predominant plasmacytosis. See note.  - Hematopoietic marrow with trilineage hematopoiesis.  - No epithelial elements identified, confirmed with negative keratin AE1/AE3 stains.           2/1/2019 - 3/7/2019 Radiation    Plan ID Energy Fractions Dose per Fraction (cGy) Dose Correction (cGy) Total Dose Delivered (cGy) Elapsed Days   L3_L5 Spine 10X/6X 25 / 25 180 0 4,500 34          Multiple myeloma not having achieved remission (HCC)   7/23/2019 Initial Diagnosis    Multiple myeloma not having achieved remission (HCC)     3/16/2020 - 8/3/2020 Chemotherapy    bortezomib (VELCADE) subcutaneous injection 2.1 mg, 1.3 mg/m2 = 2.1 mg, Subcutaneous, Once, 3 of 4 cycles  Administration: 2.1  mg (3/16/2020), 2.1 mg (2020), 2.1 mg (3/23/2020), 2.1 mg (3/30/2020), 2.1 mg (2020), 2.1 mg (2020), 2.1 mg (2020), 2.1 mg (2020), 2.1 mg (2020), 2.1 mg (2020), 2.1 mg (2020), 2.1 mg (2020)     2023 -  Cancer Staged    Staging form: Plasma Cell Myeloma and Disorders, AJCC 8th Edition  - Clinical stage from 2023: RISS Stage I (Beta-2-microglobulin (mg/L): 2.1, Albumin (g/dL): 4.3, ISS: Stage I, High-risk cytogenetics: Absent, LDH: Normal) - Signed by Chris Davis MD on 2023  Stage prefix: Initial diagnosis  Beta 2 microglobulin range (mg/L): Less than 3.5  Albumin range (g/dL): Greater than or equal to 3.5  Cytogenetics: No abnormalities  Lactate dehydrogenase (LDH) (U/L): 70  Serum calcium level: Normal  Serum creatinine level: Normal  Bone disease on imaging: Present  Number of bone lesions identified on imagin  Hemoglobin (Hgb) (g/dL): 12.9  Pretreatment monoclonal protein level in serum (M spike) (g/dL): 1.1       Malignant neoplasm of right breast in female, estrogen receptor positive, unspecified site of breast (HCC)   2024 Initial Diagnosis    Malignant neoplasm of right breast in female, estrogen receptor positive, unspecified site of breast (HCC)     2024 -  Cancer Staged    Staging form: Breast, AJCC 8th Edition  - Clinical stage from 2024: Stage IA (cT1, cN0, cM0, G1, ER+, KS+, HER2-) - Signed by Chris Davis MD on 2024  Stage prefix: Initial diagnosis  Histologic grading system: 3 grade system          Review of Systems  Reviewed 12 systems.  There is no other neurological, cardiac, pulmonary, GI and  symptoms other than mentioned in HPI.  Symptoms are in HPI.  No fevers, chills, bleeding, skin rash, weight loss, night sweats, and there is no swelling of the ankles and no swollen glands.      Objective   /84 (BP Location: Left arm, Patient Position: Sitting, Cuff Size: Adult)   Pulse 94   Temp 97.7 °F (36.5  "°C) (Temporal)   Resp 16   Ht 5' (1.524 m)   Wt 63 kg (139 lb)   LMP  (LMP Unknown)   SpO2 96%   BMI 27.15 kg/m²     ECOG   1  Physical Exam  Constitutional:       Appearance: Normal appearance. She is normal weight.   HENT:      Head: Normocephalic and atraumatic.      Mouth/Throat:      Mouth: Mucous membranes are moist.      Comments: No oral thrush.  Eyes:      General: No scleral icterus.  Cardiovascular:      Rate and Rhythm: Normal rate and regular rhythm.      Heart sounds: Normal heart sounds. No murmur heard.  Pulmonary:      Effort: Pulmonary effort is normal. No respiratory distress.      Breath sounds: Normal breath sounds. No rhonchi or rales.   Abdominal:      General: Abdomen is flat. There is no distension.      Palpations: Abdomen is soft. There is no mass.      Tenderness: There is no abdominal tenderness.   Musculoskeletal:         General: Normal range of motion.      Cervical back: Normal range of motion.      Right lower leg: No edema.      Left lower leg: No edema.      Comments: No lymphadenopathy in the axillary areas.  No lymphedema.  No clubbing.   Lymphadenopathy:      Cervical: No cervical adenopathy.   Skin:     Findings: No bruising or rash.   Neurological:      General: No focal deficit present.      Mental Status: She is alert and oriented to person, place, and time.      Motor: No weakness.      Gait: Gait normal.   Psychiatric:         Mood and Affect: Mood normal.         Behavior: Behavior normal.         Thought Content: Thought content normal.         Judgment: Judgment normal.         Labs: I have reviewed the following labs:  No results found for: \"CBCPLATDIFF\"  Lab Results   Component Value Date/Time    Potassium 3.7 12/02/2024 11:47 AM    Potassium 4.2 09/25/2024 02:22 PM    Chloride 101 12/02/2024 11:47 AM    Chloride 105 09/25/2024 02:22 PM    Carbon Dioxide 27 09/25/2024 02:22 PM    CO2 25 12/02/2024 11:47 AM    BUN 15 12/02/2024 11:47 AM    BUN 18 09/25/2024 " 02:22 PM    Creatinine 0.71 12/02/2024 11:47 AM    Creatinine 0.69 09/25/2024 02:22 PM    Glucose, Fasting 108 (H) 12/02/2024 11:47 AM    Calcium 9.4 12/02/2024 11:47 AM    Calcium 9.4 09/25/2024 02:22 PM    AST 18 12/02/2024 11:47 AM    ASPAR AMINOTRANSFERASE 19 09/25/2024 02:22 PM    ALT 14 12/02/2024 11:47 AM    ALANINE AMINOTRANSFERASE 14 09/25/2024 02:22 PM    Alkaline Phosphatase 55 12/02/2024 11:47 AM    Alkaline Phosphatase 65 09/25/2024 02:22 PM    eGFRcr 92 09/25/2024 02:22 PM    eGFR 85 12/02/2024 11:47 AM     Lab Results   Component Value Date    WBC 3.80 (L) 12/02/2024    RBC 4.09 12/02/2024    HGB 12.5 12/02/2024    HCT 37.8 12/02/2024    MCV 92 12/02/2024    MCH 30.6 12/02/2024    RDW 14.5 12/02/2024     12/02/2024    NEUTOPHILPCT 47 12/02/2024    LYMPHOPCT 35 12/02/2024    MONOPCT 16 (H) 12/02/2024    EOSPCT 1 12/02/2024    BASOPCT 0 12/02/2024    IMMGRANS 1 12/02/2024      Immunofixation,Urine(Reflex Only-Do Not Order)  Status: Final result     Immunofixation,Urine(Reflex Only-Do Not Order)  Order: [id number] - Reflex for Order [id number]   Status: Final result       Next appt: 01/06/2025 at 11:15 AM in Radiology (BE CT [facility] HOLD)       Dx: Multiple myeloma not having achieved ...    Test Result Released: Yes (seen)    0 Result Notes      Component  Ref Range & Units (hover) 12/2/24  2:06 PM   Immunofixation Interpretation See Comment   Comment: Urine immunofixation shows no monoclonal immunoglobulins.  Reviewed by:  Marlene May MD **Electronic Signature**              Narrative    Urine immunofixation shows no monoclonal immunoglobulins.  Reviewed by:  Marlene May MD **Electronic Signature**   Specimen Collected: 12/02/24  2:06 PM Last Resulted: 12/03/24  2:48 PM       Immunofixation, Serum(Reflex Only-Do Not Order)  Status: Final result     Immunofixation, Serum(Reflex Only-Do Not Order)  Order: [id number] - Reflex for Order [id number]   Status: Final result       Next appt: 01/06/2025 at 11:15 AM in  Radiology (BE CT [facility] HOLD)       Dx: Multiple myeloma not having achieved ...    Test Result Released: Yes (seen)    0 Result Notes   important suggestion  Newer results are available. Click to view them now.         Component  Ref Range & Units (hover) 12/2/24 11:47 AM   Immunofixation Interpretation See Comment   Comment: Serum immunofixation shows a persistent faint monoclonal gammopathy identified as  free-lambda light chains in the beta region, which is too small to accurately measure by densitometry and only detectable by immunofixation.  Reviewed by:  Marlene May MD **Electronic Signature**             Component  Ref Range & Units (hover) 12/2/24 11:47 AM 5/14/24  9:43 AM 3/1/24 11:03 AM 12/8/23  8:21 AM 9/13/23  7:47 AM 6/1/23 10:18 AM 3/10/23 10:17 AM    Low  98 Low  125 Low  105 Low  113 Low  100 R 121 R             Specimen Collected: 12/02/24 11:47 AM Last Resulted: 12/02/24  6:56 PM                   Component  Ref Range & Units (hover) 12/2/24 11:47 AM 5/14/24  9:43 AM 3/1/24 11:03 AM 12/8/23  8:21 AM 9/13/23  7:47 AM 6/1/23 10:18 AM 3/10/23 10:17 AM   Ig Nina Free Light Chain 9.4 12.0 14.2 10.9 10.5 12.6 14.5   Ig Lambda Free Light Chain 314.9 High  274.2 High  304.1 High  175.4 High  166.1 High  167.7 High  141.1 High    Kappa/Lambda FluidC Ratio 0.03 Low  0.04 Low  0.05 Low  0.06 Low  0.06 Low  0.08 Low  0.10 Low               Narrative    Performed at:  [address] [place] [address]  [id number]  : Zaria Hernandez MD, Phone:  [phone]   Specimen Collected: 12/02/24 11:47 AM Last Resulted: 12/04/24  8:06 PM                   Component  Ref Range & Units (hover) 12/2/24 11:47 AM 5/14/24  9:43 AM 3/1/24 11:03 AM 12/8/23  8:21 AM 9/13/23  7:47 AM 6/1/23 10:18 AM 3/10/23 10:17 AM   IGA 99 110 108 78 79 84.0 R 90.0 R    954 1,029 795 886 947.0 R 1,040.0 R   IGM 48 49 67 31 Low  42 Low  44.0 R 47.0 R           Beta 2 microglobulin, serum  Status: Final result      Beta 2 microglobulin, serum  Order: [id number]   Status: Final result       Next appt: 01/06/2025 at 11:15 AM in Radiology (BE CT [facility] HOLD)       Dx: Multiple myeloma not having achieved ...    Test Result Released: Yes (seen)    0 Result Notes            Component  Ref Range & Units (hover) 12/2/24 11:47 AM 5/14/24  9:43 AM 3/1/24 11:03 AM 12/8/23  8:21 AM 9/13/23  7:47 AM 6/1/23 10:18 AM 3/10/23 10:17 AM   Beta-2 Microglobulin 1.7 2.0 CM 2.3 CM 2.1 CM 1.7 CM 2.0 CM 1.8 CM   Comment: Siemens Wasabi 3Dulite 2000 Immunochemiluminometric assay (ICMA)  Values obtained with different assay methods or kits cannot be used  interchangeably. Results cannot be interpreted as absolute evidence  of the presence or absence of malignant disease.          IMPRESSION:  No mammographic evidence of malignancy.           ASSESSMENT/BI-RADS CATEGORY:  Left: 1 - Negative  Overall: 1 - Negative     RECOMMENDATION:       - Routine screening mammogram in 1 year for the left breast.     Workstation ID: [id number]              Imaging    Mammo screening left w 3d & cad (Order: [id number]) - 5/31/2024

## 2024-12-09 ENCOUNTER — TELEPHONE (OUTPATIENT)
Age: 74
End: 2024-12-09

## 2024-12-09 DIAGNOSIS — Z17.0 MALIGNANT NEOPLASM OF RIGHT BREAST IN FEMALE, ESTROGEN RECEPTOR POSITIVE, UNSPECIFIED SITE OF BREAST (HCC): ICD-10-CM

## 2024-12-09 DIAGNOSIS — C90.00 MULTIPLE MYELOMA NOT HAVING ACHIEVED REMISSION (HCC): Primary | ICD-10-CM

## 2024-12-09 DIAGNOSIS — C50.911 MALIGNANT NEOPLASM OF RIGHT BREAST IN FEMALE, ESTROGEN RECEPTOR POSITIVE, UNSPECIFIED SITE OF BREAST (HCC): ICD-10-CM

## 2024-12-09 NOTE — TELEPHONE ENCOUNTER
Dania called in needing Dr. Davis to send a script to her for a breast prosthesis bra for the pt. Dania provided a fax number of [phone]. ICD code given was C50.911 Z17.0. Thank you.

## 2024-12-09 NOTE — TELEPHONE ENCOUNTER
Dania calling in again from [facility] for her mastectomy supplies.  Dania is calling in requesting a new order be faxed over as she did not receive the order for the breast prosthesis. Quantity 1. Dx code C50.911 Z17.0. Please fax to [phone].    Thank you.

## 2024-12-10 DIAGNOSIS — C50.911 MALIGNANT NEOPLASM OF RIGHT BREAST IN FEMALE, ESTROGEN RECEPTOR POSITIVE, UNSPECIFIED SITE OF BREAST (HCC): Primary | ICD-10-CM

## 2024-12-10 DIAGNOSIS — Z17.0 MALIGNANT NEOPLASM OF RIGHT BREAST IN FEMALE, ESTROGEN RECEPTOR POSITIVE, UNSPECIFIED SITE OF BREAST (HCC): Primary | ICD-10-CM

## 2025-01-03 ENCOUNTER — HOSPITAL ENCOUNTER (OUTPATIENT)
Dept: RADIOLOGY | Age: 75
Discharge: HOME | End: 2025-01-03
Payer: MEDICARE

## 2025-01-03 DIAGNOSIS — C90.00 MULTIPLE MYELOMA NOT HAVING ACHIEVED REMISSION (HCC): ICD-10-CM

## 2025-01-03 PROCEDURE — 76497 UNLISTED CT PROCEDURE: CPT

## 2025-01-14 ENCOUNTER — OFFICE VISIT (OUTPATIENT)
Dept: INTERNAL MEDICINE CLINIC | Facility: CLINIC | Age: 75
End: 2025-01-14
Payer: MEDICARE

## 2025-01-14 VITALS
DIASTOLIC BLOOD PRESSURE: 70 MMHG | HEART RATE: 80 BPM | BODY MASS INDEX: 26.82 KG/M2 | TEMPERATURE: 96.4 F | OXYGEN SATURATION: 97 % | HEIGHT: 60 IN | SYSTOLIC BLOOD PRESSURE: 120 MMHG | WEIGHT: 136.6 LBS

## 2025-01-14 DIAGNOSIS — T45.1X5A CHEMOTHERAPY INDUCED NEUTROPENIA (HCC): ICD-10-CM

## 2025-01-14 DIAGNOSIS — D70.1 CHEMOTHERAPY INDUCED NEUTROPENIA (HCC): ICD-10-CM

## 2025-01-14 DIAGNOSIS — R10.30 LOWER ABDOMINAL PAIN: Primary | ICD-10-CM

## 2025-01-14 DIAGNOSIS — Z94.84 H/O STEM CELL TRANSPLANT (HCC): ICD-10-CM

## 2025-01-14 PROBLEM — I82.442: Status: RESOLVED | Noted: 2024-05-23 | Resolved: 2025-01-14

## 2025-01-14 PROCEDURE — 99214 OFFICE O/P EST MOD 30 MIN: CPT | Performed by: INTERNAL MEDICINE

## 2025-01-14 NOTE — PROGRESS NOTES
Name: Chelita Seymour      : 1950      MRN: [id number]  Encounter Provider: Lea Reyes, MD  Encounter Date: 2025   Encounter department: [facility]    Assessment & Plan  Lower abdominal pain  Could be diverticulitis since she has similar symptoms to 2 years ago but the pain is very mild this time and she is improving so we agreed to continue to observe  If pain continues or worsens in the next 3 to 4 days then we can start an antibiotic but I would likely have her submit a urine sample prior  She was unable to provide it today Orders:  •  UA w Reflex to Microscopic w Reflex to Culture; Future    H/O stem cell transplant ([facility])  2020  Unchanged thoracolumbar compression fractures on recent CT scan       Chemotherapy induced neutropenia ([facility])  Persistent neutropenia, stable and monitored by hematology          History of Present Illness     1 week of lower abd cramps with small caliber stools, constipation mild nausea  No fever chills vomiting blood in the stool  Similar episodes 2 years ago when she was diagnosed with diverticulitis although then she had diarrhea fever and bloody stools  The pain is mild and is actually improving  She has been following a bland diet    Abdominal Pain  Pertinent negatives include no dysuria, fever or frequency.     Review of Systems   Constitutional:  Negative for fever.   Respiratory:  Negative for shortness of breath.    Cardiovascular:  Negative for chest pain and palpitations.   Genitourinary:  Negative for dysuria, frequency and urgency.     Past Medical History:   Diagnosis Date   • Anxiety    • Aortic valve disorder    • Breast CA ([facility])     -R mastectomy-took tamoxifen/femira   • Cancer ([facility])    • Cardiac dysrhythmia    • Depression    • Diverticulitis of colon    • Diverticulitis of large intestine with perforation 2022   • Heart murmur    • History of spinal fracture     8 since - fall related  spinal bone marrow  bx today 12/18/2018   • Hypertension    • Multiple myeloma (HCC) 2019   • Osteopenia 08/13/2012    Description: DEXA 9/2009; late 2011 stable.   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   • 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   • Scoliosis    • Squamous cell carcinoma of skin    • Stem cell transplant candidate 2020   • Tachycardia 12/10/2018   • Urinary tract infection     Multiple times/none recent     Past Surgical History:   Procedure Laterality Date   • APPENDECTOMY  01/2011   • BREAST LUMPECTOMY     • CHEILECTOMY Right     Resolved:  2006, Bunion correction   • COLONOSCOPY  07/2010    Diverticula; recheck 5 yrs   • CT BONE MARROW BIOPSY AND ASPIRATION  07/09/2019   • DILATION AND CURETTAGE, DIAGNOSTIC / THERAPEUTIC     • HYSTERECTOMY  2019   • IR BIOPSY BONE  12/18/2018   • LYMPH NODE BIOPSY  09/2000   • MASTECTOMY Right 2000   • OOPHORECTOMY Bilateral 2019   • WA LAPS TOTAL HYSTERECT 250 GM/< W/RMVL TUBE/OVARY N/A 04/04/2019    Procedure: ROBOTIC TOTAL LAPAROSCOPIC HYSTERECTOMY, BILATERAL SALPINGO-OOPHORECTOMY;  Surgeon: Fletcher Ordonez MD PhD;  Location: [facility];  Service: Gynecology Oncology     Family History   Problem Relation Age of Onset   • Diabetes Mother    • Osteoporosis Mother    • Heart disease Father    • Breast cancer Sister 66   • BRCA1 Negative Sister    • Breast cancer Sister         had breast cancer x2   • Cancer Sister    • Stroke Maternal Grandmother    • No Known Problems Maternal Grandfather    • No Known Problems Paternal Grandmother    • No Known Problems Paternal Grandfather    • Heart attack Family    • Club foot Family    • No Known Problems Maternal Aunt    • Breast cancer Sister      Social History     Tobacco Use   • Smoking status: Never   • Smokeless tobacco: Never   Vaping Use   • Vaping status: Never Used   Substance and Sexual Activity   • Alcohol use: Yes     Alcohol/week: 5.0 standard drinks of alcohol     Types: 5 Glasses of wine per week      Comment: socially   • Drug use: No   • Sexual activity: Not Currently     Partners: Male     Birth control/protection: Post-menopausal     Comment: hysterectomy     Current Outpatient Medications on File Prior to Visit   Medication Sig   • aspirin (ECOTRIN LOW STRENGTH) 81 mg EC tablet Take 81 mg by mouth daily   • Calcium Citrate-Vitamin D (CALCIUM CITRATE + D PO) Take 1 tablet by mouth 2 (two) times a day     • diazepam (VALIUM) 5 mg tablet Take 5 mg by mouth every 12 (twelve) hours as needed   • escitalopram (LEXAPRO) 10 mg tablet TAKE 1 TABLET BY MOUTH EVERY DAY   • metoprolol tartrate (LOPRESSOR) 50 mg tablet TAKE 1 AND 1/2 TABLETS BY MOUTH TWO TIMES DAILY     Allergies   Allergen Reactions   • Ibuprofen Abdominal Pain and Tachycardia     Reaction Date: 14Jun2011;      Immunization History   Administered Date(s) Administered   • COVID-19 MODERNA VACC 0.5 ML IM 01/25/2021, 02/22/2021, 08/16/2021   • COVID-19 Moderna Vac BIVALENT 12 Yr+ IM 0.5 ML 12/02/2022   • DTaP 5 01/21/2022, 03/31/2022, 05/12/2022   • Hep A, adult 01/04/2023, 12/19/2023   • Hep B, adult 01/04/2023, 02/06/2023, 12/05/2023   • Hib (PRP-T) 02/07/2022, 04/28/2022, 05/26/2022   • INFLUENZA 11/30/2022   • IPV 01/21/2022, 03/31/2022, 05/12/2022   • Influenza Split High Dose Preservative Free IM 12/06/2024   • Influenza, high dose seasonal 0.7 mL 11/19/2018, 11/21/2019, 11/25/2020, 11/29/2021, 11/30/2022, 12/05/2023   • Meningococcal MCV4P 02/07/2022, 04/28/2022   • Pneumococcal Conjugate 13-Valent 11/17/2017, 12/09/2021, 01/13/2022, 03/17/2022   • Pneumococcal Conjugate Vaccine 20-valent (Pcv20), Polysace 12/19/2023   • Pneumococcal Polysaccharide PPV23 11/19/2018   • Zoster Vaccine Recombinant 01/22/2020, 07/08/2020     Objective   /70 (BP Location: Left arm, Patient Position: Sitting, Cuff Size: Large)   Pulse 80   Temp (!) 96.4 °F (35.8 °C) (Tympanic)   Ht 5' (1.524 m)   Wt 62 kg (136 lb 9.6 oz)   LMP  (LMP Unknown)   SpO2 97%   BMI  26.68 kg/m²     Physical Exam  Constitutional:       General: She is not in acute distress.     Appearance: She is well-developed. She is not ill-appearing, toxic-appearing or diaphoretic.   HENT:      Right Ear: External ear normal. There is no impacted cerumen.      Left Ear: External ear normal. There is no impacted cerumen.   Eyes:      Conjunctiva/sclera: Conjunctivae normal.   Cardiovascular:      Rate and Rhythm: Normal rate and regular rhythm.      Heart sounds: Normal heart sounds. No murmur heard.  Pulmonary:      Effort: Pulmonary effort is normal. No respiratory distress.      Breath sounds: Normal breath sounds. No stridor. No wheezing or rales.   Abdominal:      General: There is no distension.      Palpations: Abdomen is soft. There is no mass.      Tenderness: There is abdominal tenderness in the right lower quadrant and suprapubic area. There is no guarding or rebound.   Musculoskeletal:      Cervical back: Neck supple.      Right lower leg: No edema.      Left lower leg: No edema.   Neurological:      Mental Status: She is alert and oriented to person, place, and time.   Psychiatric:         Mood and Affect: Mood normal.         Behavior: Behavior normal.         Thought Content: Thought content normal.         Judgment: Judgment normal.

## 2025-01-23 DIAGNOSIS — Z90.721 S/P HYSTERECTOMY WITH OOPHORECTOMY: Primary | ICD-10-CM

## 2025-01-23 DIAGNOSIS — Z90.710 S/P HYSTERECTOMY WITH OOPHORECTOMY: Primary | ICD-10-CM

## 2025-01-23 DIAGNOSIS — M81.8 OSTEOPOROSIS OF MULTIPLE SITES ASSOCIATED WITH MULTIPLE MYELOMA (HCC): ICD-10-CM

## 2025-01-23 DIAGNOSIS — S22.009A CLOSED FRACTURE OF MULTIPLE THORACIC VERTEBRAE, INITIAL ENCOUNTER (HCC): ICD-10-CM

## 2025-01-23 DIAGNOSIS — C90.00 OSTEOPOROSIS OF MULTIPLE SITES ASSOCIATED WITH MULTIPLE MYELOMA (HCC): ICD-10-CM

## 2025-01-23 DIAGNOSIS — C90.00 MULTIPLE MYELOMA NOT HAVING ACHIEVED REMISSION (HCC): ICD-10-CM

## 2025-01-23 RX ORDER — SODIUM CHLORIDE 9 MG/ML
20 INJECTION, SOLUTION INTRAVENOUS ONCE
OUTPATIENT
Start: 2025-01-27

## 2025-01-27 ENCOUNTER — HOSPITAL ENCOUNTER (OUTPATIENT)
Dept: INFUSION CENTER | Facility: CLINIC | Age: 75
Discharge: HOME | End: 2025-01-27
Payer: MEDICARE

## 2025-01-27 VITALS
SYSTOLIC BLOOD PRESSURE: 151 MMHG | TEMPERATURE: 97.1 F | HEIGHT: 60 IN | OXYGEN SATURATION: 96 % | WEIGHT: 136.5 LBS | DIASTOLIC BLOOD PRESSURE: 73 MMHG | BODY MASS INDEX: 26.8 KG/M2 | HEART RATE: 66 BPM

## 2025-01-27 DIAGNOSIS — C90.00 OSTEOPOROSIS OF MULTIPLE SITES ASSOCIATED WITH MULTIPLE MYELOMA (HCC): ICD-10-CM

## 2025-01-27 DIAGNOSIS — Z90.710 S/P HYSTERECTOMY WITH OOPHORECTOMY: ICD-10-CM

## 2025-01-27 DIAGNOSIS — S22.009A CLOSED FRACTURE OF MULTIPLE THORACIC VERTEBRAE, INITIAL ENCOUNTER (HCC): ICD-10-CM

## 2025-01-27 DIAGNOSIS — Z90.721 S/P HYSTERECTOMY WITH OOPHORECTOMY: ICD-10-CM

## 2025-01-27 DIAGNOSIS — C90.00 MULTIPLE MYELOMA NOT HAVING ACHIEVED REMISSION (HCC): Primary | ICD-10-CM

## 2025-01-27 DIAGNOSIS — M81.8 OSTEOPOROSIS OF MULTIPLE SITES ASSOCIATED WITH MULTIPLE MYELOMA (HCC): ICD-10-CM

## 2025-01-27 PROCEDURE — 96365 THER/PROPH/DIAG IV INF INIT: CPT

## 2025-01-27 RX ORDER — SODIUM CHLORIDE 9 MG/ML
20 INJECTION, SOLUTION INTRAVENOUS ONCE
OUTPATIENT
Start: 2025-04-21

## 2025-01-27 RX ORDER — SODIUM CHLORIDE 9 MG/ML
20 INJECTION, SOLUTION INTRAVENOUS ONCE
Status: COMPLETED | OUTPATIENT
Start: 2025-01-27 | End: 2025-01-27

## 2025-01-27 RX ADMIN — SODIUM CHLORIDE 20 ML/HR: 9 INJECTION, SOLUTION INTRAVENOUS at 13:15

## 2025-01-27 RX ADMIN — ZOLEDRONIC ACID 3.3 MG: 4 INJECTION, SOLUTION, CONCENTRATE INTRAVENOUS at 13:29

## 2025-01-27 NOTE — PROGRESS NOTES
Patient to infusion center for treatment with Zometa. Patient offers no complaints. VSS. Labs from 1/23/2025 reviewed and within parameters to treat. Peripheral IV inserted without incident.

## 2025-01-27 NOTE — PROGRESS NOTES
Pt tolerated treatment without incident. Confirmed next apt for 4/21/25 @ 2:30pm @ [facility].

## 2025-01-28 ENCOUNTER — TELEPHONE (OUTPATIENT)
Age: 75
End: 2025-01-28

## 2025-01-28 NOTE — TELEPHONE ENCOUNTER
Pt would like a call back from Dr. Davis to discuss Dr. Flores's recommendation of her getting on some sort of drug regimen or steroids based on her last lab results. Pt wasn't really sure of the name. She stated that Dr. Flores was to reach out to Dr. Davis about this matter but she isn't sure if this occurred. Pt would like a call back to discuss at [phone]. Thank you.

## 2025-01-29 NOTE — TELEPHONE ENCOUNTER
This patient will be starting Pomalyst  Are you able to enroll her?  She is coming to office tomorrow

## 2025-01-30 ENCOUNTER — TELEPHONE (OUTPATIENT)
Dept: HEMATOLOGY ONCOLOGY | Facility: CLINIC | Age: 75
End: 2025-01-30

## 2025-01-30 ENCOUNTER — OFFICE VISIT (OUTPATIENT)
Dept: HEMATOLOGY ONCOLOGY | Facility: CLINIC | Age: 75
End: 2025-01-30
Payer: MEDICARE

## 2025-01-30 VITALS
OXYGEN SATURATION: 96 % | BODY MASS INDEX: 26.5 KG/M2 | HEART RATE: 74 BPM | TEMPERATURE: 97.8 F | DIASTOLIC BLOOD PRESSURE: 80 MMHG | HEIGHT: 60 IN | SYSTOLIC BLOOD PRESSURE: 130 MMHG | RESPIRATION RATE: 18 BRPM | WEIGHT: 135 LBS

## 2025-01-30 DIAGNOSIS — S22.009A CLOSED FRACTURE OF MULTIPLE THORACIC VERTEBRAE, INITIAL ENCOUNTER (HCC): ICD-10-CM

## 2025-01-30 DIAGNOSIS — C90.00 OSTEOPOROSIS OF MULTIPLE SITES ASSOCIATED WITH MULTIPLE MYELOMA (HCC): ICD-10-CM

## 2025-01-30 DIAGNOSIS — I10 ESSENTIAL HYPERTENSION: ICD-10-CM

## 2025-01-30 DIAGNOSIS — T45.1X5A CHEMOTHERAPY INDUCED NEUTROPENIA (HCC): ICD-10-CM

## 2025-01-30 DIAGNOSIS — C90.00 MULTIPLE MYELOMA NOT HAVING ACHIEVED REMISSION (HCC): Primary | ICD-10-CM

## 2025-01-30 DIAGNOSIS — E55.9 VITAMIN D DEFICIENCY: ICD-10-CM

## 2025-01-30 DIAGNOSIS — M81.8 OSTEOPOROSIS OF MULTIPLE SITES ASSOCIATED WITH MULTIPLE MYELOMA (HCC): ICD-10-CM

## 2025-01-30 DIAGNOSIS — R11.2 NAUSEA AND VOMITING, UNSPECIFIED VOMITING TYPE: Primary | ICD-10-CM

## 2025-01-30 DIAGNOSIS — Z94.84 H/O STEM CELL TRANSPLANT (HCC): ICD-10-CM

## 2025-01-30 DIAGNOSIS — Z17.0 MALIGNANT NEOPLASM OF RIGHT BREAST IN FEMALE, ESTROGEN RECEPTOR POSITIVE, UNSPECIFIED SITE OF BREAST (HCC): ICD-10-CM

## 2025-01-30 DIAGNOSIS — D70.1 CHEMOTHERAPY INDUCED NEUTROPENIA (HCC): ICD-10-CM

## 2025-01-30 DIAGNOSIS — C50.911 MALIGNANT NEOPLASM OF RIGHT BREAST IN FEMALE, ESTROGEN RECEPTOR POSITIVE, UNSPECIFIED SITE OF BREAST (HCC): ICD-10-CM

## 2025-01-30 PROCEDURE — 99215 OFFICE O/P EST HI 40 MIN: CPT | Performed by: INTERNAL MEDICINE

## 2025-01-30 PROCEDURE — G2211 COMPLEX E/M VISIT ADD ON: HCPCS | Performed by: INTERNAL MEDICINE

## 2025-01-30 RX ORDER — DEXAMETHASONE 4 MG/1
TABLET ORAL
Qty: 60 TABLET | Refills: 1 | Status: SHIPPED | OUTPATIENT
Start: 2025-01-30

## 2025-01-30 RX ORDER — ACYCLOVIR 400 MG/1
400 TABLET ORAL 2 TIMES DAILY
Qty: 60 TABLET | Refills: 2 | Status: SHIPPED | OUTPATIENT
Start: 2025-01-30 | End: 2025-04-30

## 2025-01-30 RX ORDER — ALLOPURINOL 100 MG/1
200 TABLET ORAL DAILY
Qty: 60 TABLET | Refills: 1 | Status: SHIPPED | OUTPATIENT
Start: 2025-01-30

## 2025-01-30 NOTE — PATIENT INSTRUCTIONS
Informed consent for Darzalex Pomalyst and Decadron.  Prescribed Decadron 4 mg tablets and she will take 5 tablets 1 day a week the day of Darzalex injection.  She will take Decadron with food.  1 tablet of Pepcid daily.  Allopurinol 100 mg 2 tablets daily for first 2 months.  Acyclovir 400 mg twice a day to continue.  Please continue taking aspirin with food.  Blood work 1 or 2 days prior to each Darzalex injection every week to start.  Myeloma blood work once every 4 weeks.  Continuing with Zometa every 3 months.  Follow-up in 2 months.

## 2025-01-30 NOTE — ASSESSMENT & PLAN NOTE
Disease has relapsed.  Patient was recently seen by myeloma specialist at [place] and patient is not be restarted on systemic therapy, daratumumab, Pomalyst and Decadron and she will be continued on Zometa.  She has been taking vitamin D and calcium.  Orders:  •  CBC and differential; Standing  •  Comprehensive metabolic panel; Standing  •  Beta 2 microglobulin, serum; Standing  •  IgG, IgA, IgM; Standing  •  Immunoglobulin free LT chains blood; Standing  •  LD,Blood; Standing  •  Protein electrophoresis, serum; Standing  •  Uric acid; Standing  •  allopurinol (ZYLOPRIM) 100 mg tablet; Take 2 tablets (200 mg total) by mouth daily  •  acyclovir (ZOVIRAX) 400 MG tablet; Take 1 tablet (400 mg total) by mouth 2 (two) times a day  •  dexamethasone (DECADRON) 4 mg tablet; 5 tablets every week with food, the day of Darzalex chemotherapy.

## 2025-01-30 NOTE — PROGRESS NOTES
Name: Chelita Seymour      : 1950      MRN: [id number]  Encounter Provider: Chris Davis MD  Encounter Date: 2025   Encounter department: [facility]  :  Assessment & Plan  Multiple myeloma not having achieved remission (HCC)  Disease has relapsed.  Patient was recently seen by myeloma specialist at [place] and patient is not be restarted on systemic therapy, daratumumab, Pomalyst and Decadron and she will be continued on Zometa.  She has been taking vitamin D and calcium.  Orders:  •  CBC and differential; Standing  •  Comprehensive metabolic panel; Standing  •  Beta 2 microglobulin, serum; Standing  •  IgG, IgA, IgM; Standing  •  Immunoglobulin free LT chains blood; Standing  •  LD,Blood; Standing  •  Protein electrophoresis, serum; Standing  •  Uric acid; Standing  •  allopurinol (ZYLOPRIM) 100 mg tablet; Take 2 tablets (200 mg total) by mouth daily  •  acyclovir (ZOVIRAX) 400 MG tablet; Take 1 tablet (400 mg total) by mouth 2 (two) times a day  •  dexamethasone (DECADRON) 4 mg tablet; 5 tablets every week with food, the day of Darzalex chemotherapy.    Malignant neoplasm of right breast in female, estrogen receptor positive, unspecified site of breast (HCC)  Has been in remission       Essential hypertension  Being managed by PCP       H/O stem cell transplant (HCC)  She had it for multiple myeloma       Osteoporosis of multiple sites associated with multiple myeloma (HCC)  Patient is on Zometa       Closed fracture of multiple thoracic vertebrae, initial encounter ([facility])  Patient had radiation       Chemotherapy induced neutropenia (HCC)  To be monitored       Vitamin D deficiency  Patient has been taking vitamin D  Orders:  •  Vitamin D 25 hydroxy; Future      Discussed relapsed IgG lambda multiple myeloma and to start systemic therapy as outlined by Dr. Flores, myeloma specialist at [place].  Patient received detailed information on Darzalex, Pomalyst and  Decadron verbally and in printed form and she signed informed consent.  Informed consent for Darzalex Pomalyst and Decadron.  Prescribed Decadron 4 mg tablets and she will take 5 tablets 1 day a week the day of Darzalex injection.  She will take Decadron with food.  1 tablet of Pepcid daily.  Allopurinol 100 mg 2 tablets daily for first 2 months.  Acyclovir 400 mg twice a day to continue.  Please continue taking aspirin with food.  Blood work 1 or 2 days prior to each Darzalex injection every week to start.  Myeloma blood work once every 4 weeks.  Continuing with Zometa every 3 months.  Follow-up in 2 months.  I suggested self breast examination, eating healthy foods, staying active but to avoid falls and trauma.  Suggested health screening tests. Patient to continue to follow-up with primary physician and other consultants.  Provided counseling and support.  I used a dictation device to dictate this note and there could be mistakes in my note and for that patient may contact my office.      History of Present Illness   Chief Complaint   Patient presents with   • Follow-up   Patient is here with her .  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  She had presented with L4 lumbar spine compression fracture.  In 2020 patient was started on RVD systemic chemotherapy for IgG lambda multiple myeloma.  She had good response and had stem cell transplant at [place] followed by maintenance Revlimid but that had to be discontinued in December 2021 because of profound neutropenia and blood counts were not recovering.      Most recent bone marrow showed 15% plasma cells.  Free light chain ratio 0.03.  Patient's myeloma was relapsing.  I discussed the options of DKd or DPD systemic therapy.  Patient was recently evaluated by specialist at [place] and came back with recommendation of DPD and to continue Zometa.  See his notes below.  Patient has tiredness.  Arthritic symptoms.   Occasionally muscle spasm.  Low back pain.  IMPRESSION:     IgG lambda (mostly lambda) myeloma: The patient has a worsening neutropenia and an increase in her lambda light chain consistent with progression. In light of that, my inclination would be for her to initiate therapy. My inclination would be for the Tabby-Pom-Dex regimen with daratumumab weekly for 8 weeks and then every other week, pomalidomide 2 mg daily 3 out of every 4 weeks with Decadron 20 mg with each dose of daratumumab. If this is not successful, I would consider the Ash-Car-Dex regimen given that she has an 11;14 translocation. I will refer her to Dr. Ya at the [facility] for an evaluation.  Compression fracture/L4 plasmacytoma: This has been radiated, and she will continue Zometa every 3 months.  Right breast cancer: She has had definitive therapy with mastectomy.  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  Muscle spasms: These persist, and she will continue baclofen and consider Valium 5 mg b.i.d. if they continue.  Escherichia coli bacteremia: This has resolved, and she has completed a course of cefpodoxime.  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   COVID Vaccination: A COVID antibody analysis is POSITIVE for IgG antibodies consistent with vaccination.   Leukopenia/neutropenia: This has progressed and, as above, my inclination would be for her to start the Tabby-Pom-Dex regimen and use G-CSF as needed. She will return here in two months for followup.    Trae Flores MD  Professor of Medicine   Oncology History   Cancer Staging   Malignant neoplasm of right breast in female, estrogen receptor positive, unspecified site of breast (HCC)  Staging form: Breast, AJCC 8th Edition  - Clinical stage from 7/7/2024: Stage IA (cT1, cN0, cM0, G1, ER+, DC+, HER2-) - Signed by Chris Davis MD on 7/7/2024  Stage prefix: Initial  diagnosis  Histologic grading system: 3 grade system    Multiple myeloma not having achieved remission (HCC)  Staging form: Plasma Cell Myeloma and Disorders, AJCC 8th Edition  - Clinical stage from 2023: RISS Stage I (Beta-2-microglobulin (mg/L): 2.1, Albumin (g/dL): 4.3, ISS: Stage I, High-risk cytogenetics: Absent, LDH: Normal) - Signed by Chris Davis MD on 2023  Stage prefix: Initial diagnosis  Beta 2 microglobulin range (mg/L): Less than 3.5  Albumin range (g/dL): Greater than or equal to 3.5  Cytogenetics: No abnormalities  Lactate dehydrogenase (LDH) (U/L): 70  Serum calcium level: Normal  Serum creatinine level: Normal  Bone disease on imaging: Present  Number of bone lesions identified on imagin  Hemoglobin (Hgb) (g/dL): 12.9  Pretreatment monoclonal protein level in serum (M spike) (g/dL): 1.1  Oncology History   Malignant neoplasm of right breast (HCC) (Resolved)   2000 Surgery    Right breast mastectomy     2012 Initial Diagnosis    Malignant neoplasm of right breast (HCC)      Chemotherapy    Tamoxifen for 5 years     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HCC) (Resolved)   2018 Initial Diagnosis    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HCC)     2018 Biopsy    Final Diagnosis   A. Bone, L-4, core biopsy:  - Fragments of trabecular bone with changes compatible with prior fracture site.  - 10% lambda predominant plasmacytosis. See note.  - Hematopoietic marrow with trilineage hematopoiesis.  - No epithelial elements identified, confirmed with negative keratin AE1/AE3 stains.         2019 - 3/7/2019 Radiation    Plan ID Energy Fractions Dose per Fraction (cGy) Dose Correction (cGy) Total Dose Delivered (cGy) Elapsed Days   L3_L5 Spine 10X/6X 25 / 25 180 0 4,500 34        Multiple myeloma not having achieved remission (HCC)   2019 Initial Diagnosis    Multiple myeloma not having achieved remission (HCC)     3/16/2020 - 8/3/2020 Chemotherapy    bortezomib (VELCADE) subcutaneous injection 2.1 mg, 1.3 mg/m2 =  2.1 mg, Subcutaneous, Once, 3 of 4 cycles  Administration: 2.1 mg (3/16/2020), 2.1 mg (2020), 2.1 mg (3/23/2020), 2.1 mg (3/30/2020), 2.1 mg (2020), 2.1 mg (2020), 2.1 mg (2020), 2.1 mg (2020), 2.1 mg (2020), 2.1 mg (2020), 2.1 mg (2020), 2.1 mg (2020)     2023 -  Cancer Staged    Staging form: Plasma Cell Myeloma and Disorders, AJCC 8th Edition  - Clinical stage from 2023: RISS Stage I (Beta-2-microglobulin (mg/L): 2.1, Albumin (g/dL): 4.3, ISS: Stage I, High-risk cytogenetics: Absent, LDH: Normal) - Signed by Chris Davis MD on 2023  Stage prefix: Initial diagnosis  Beta 2 microglobulin range (mg/L): Less than 3.5  Albumin range (g/dL): Greater than or equal to 3.5  Cytogenetics: No abnormalities  Lactate dehydrogenase (LDH) (U/L): 70  Serum calcium level: Normal  Serum creatinine level: Normal  Bone disease on imaging: Present  Number of bone lesions identified on imagin  Hemoglobin (Hgb) (g/dL): 12.9  Pretreatment monoclonal protein level in serum (M spike) (g/dL): 1.1       2025 -  Chemotherapy    alteplase (CATHFLO), 2 mg, Intracatheter, Every 1 Minute as needed, 0 of 12 cycles  daratumumab-hyaluronidase (DARZALEX FASPRO), 1,800 mg, Subcutaneous (abdomen only), Once, 0 of 12 cycles     Malignant neoplasm of right breast in female, estrogen receptor positive, unspecified site of breast (HCC)   2024 Initial Diagnosis    Malignant neoplasm of right breast in female, estrogen receptor positive, unspecified site of breast (HCC)     2024 -  Cancer Staged    Staging form: Breast, AJCC 8th Edition  - Clinical stage from 2024: Stage IA (cT1, cN0, cM0, G1, ER+, UT+, HER2-) - Signed by Chris Davis MD on 2024  Stage prefix: Initial diagnosis  Histologic grading system: 3 grade system          Pertinent Medical History   2025:   See details in HPI and impression section         Review of Systems   Reviewed 12 systems: See  symptoms in HPI   No  fever, chills, bleeding,  skin rash, weight loss, night sweats,  weakness, numbness, claudication and gait problem. No frequent infections.  Not unusually sensitive to heat or cold. No swelling of the ankles. No swollen glands.  Patient is anxious.   Presently no other neurological, cardiac, pulmonary, GI and  symptoms other than mentioned in HPI. .  Other symptoms are in HPI.        Objective   /80 (BP Location: Left arm, Patient Position: Sitting, Cuff Size: Adult)   Pulse 74   Temp 97.8 °F (36.6 °C) (Temporal)   Resp 18   Ht 5' (1.524 m)   Wt 61.2 kg (135 lb)   LMP  (LMP Unknown)   SpO2 96%   BMI 26.37 kg/m²     Pain Screening:  Pain Score: 0-No pain  ECOG   1  Physical Exam  Vitals reviewed.   Constitutional:       General: She is not in acute distress.     Appearance: Normal appearance. She is not ill-appearing.   HENT:      Head: Normocephalic and atraumatic.      Mouth/Throat:      Comments: No thrush.  Eyes:      General: No scleral icterus.  Cardiovascular:      Rate and Rhythm: Normal rate and regular rhythm.      Heart sounds: Normal heart sounds. No murmur heard.  Pulmonary:      Effort: Pulmonary effort is normal. No respiratory distress.      Breath sounds: Normal breath sounds. No rhonchi or rales.   Abdominal:      General: Abdomen is flat. There is no distension.      Palpations: Abdomen is soft. There is no mass.      Tenderness: There is no abdominal tenderness.      Comments: No ascites.    Musculoskeletal:         General: No swelling.      Cervical back: Normal range of motion.      Right lower leg: No edema.      Left lower leg: No edema.      Comments: No calf tenderness.   Lymphadenopathy:      Cervical: No cervical adenopathy.      Upper Body:      Right upper body: No supraclavicular or axillary adenopathy.      Left upper body: No supraclavicular or axillary adenopathy.   Skin:     Coloration: Skin is not jaundiced or pale.      Findings: No bruising  or rash.      Nails: There is no clubbing.   Neurological:      General: No focal deficit present.      Mental Status: She is alert and oriented to person, place, and time.      Motor: No weakness.      Coordination: Coordination normal.      Gait: Gait normal.   Psychiatric:         Behavior: Behavior normal.         Thought Content: Thought content normal.      Comments: Anxious         Labs: I have reviewed the following labs:  Lab Results   Component Value Date/Time    WBC 3.80 (L) 12/02/2024 11:47 AM    RBC 4.09 12/02/2024 11:47 AM    Hemoglobin 12.5 12/02/2024 11:47 AM    Hematocrit 37.8 12/02/2024 11:47 AM    MCV 92 12/02/2024 11:47 AM    MCH 30.6 12/02/2024 11:47 AM    RDW 14.5 12/02/2024 11:47 AM    Platelets 167 12/02/2024 11:47 AM    Segmented % 47 12/02/2024 11:47 AM    Lymphocytes % 35 12/02/2024 11:47 AM    Monocytes % 16 (H) 12/02/2024 11:47 AM    Eosinophils Relative 1 12/02/2024 11:47 AM    Basophils Relative 0 12/02/2024 11:47 AM    Immature Grans % 1 12/02/2024 11:47 AM    Absolute Neutrophils 1.76 (L) 12/02/2024 11:47 AM     Lab Results   Component Value Date/Time    Potassium 4.1 01/23/2025 02:58 PM    Chloride 105 01/23/2025 02:58 PM    Carbon Dioxide 26 01/23/2025 02:58 PM    BUN 15 01/23/2025 02:58 PM    Creatinine 0.76 01/23/2025 02:58 PM    Glucose, Fasting 108 (H) 12/02/2024 11:47 AM    Calcium 9.4 01/23/2025 02:58 PM    ASPAR AMINOTRANSFERASE 18 01/23/2025 02:58 PM    ALANINE AMINOTRANSFERASE 13 (L) 01/23/2025 02:58 PM    Alkaline Phosphatase 82 01/23/2025 02:58 PM    Protein, Total 7.2 01/23/2025 02:58 PM    ALBUMIN 4.5 01/23/2025 02:58 PM    Total Bilirubin 0.5 01/23/2025 02:58 PM    eGFRcr 83 01/23/2025 02:58 PM     SERUM PROTEIN ELECTROPHORESIS  Status: Final result         suggestion  Information displayed in this report may not trend or trigger automated decision support.     SERUM PROTEIN ELECTROPHORESIS  Order: [id number]  Component  Ref Range & Units 1/23/25  2:58 PM   SPEP  TP  6.1 - 8.5 g/dL 7.2   Albumin Fraction  3.5 - 5.8 g/dL 4.3   Alpha1-Globulin Frac  0.2 - 0.4 g/dL 0.4   Alpha2-Globulin Frac  0.5 - 0.8 g/dL 0.8   Beta-Globulin Fracti  0.6 - 1.0 g/dL 0.8   Gamma-Globulin Fract  0.7 - 1.2 g/dL 0.9   Serum Protein Electrophoresis Interp Serum capillary electrophoresis shows no overt evidence of a paraprotein.  Immunofixation has not been performed but may be if clinically indicated.    The absence of a paraprotein does not rule out the presence of a plasma cell  neoplasm. If a monoclonal protein is suspected, urine collection for the  detection of Bence Cortes proteins is recommended.    If you have any questions about the protein electrophoresis report, please call  the Clinical Chemistry resident on call.   Comment: Verified by: Anusha Salazar MD PhD             (Electronic Signature)   ains abnormal data KAPPA-LAMBDA QNT FLC W-RATIO  Order: [id number]  Component  Ref Range & Units 1/23/25  2:58 PM   KAPPA QNT FREE LIGHT CHAINS  3.30 - 19.40 mg/L 12.05   LAMBDA QNT FREE LIGHT CHAINS  5.71 - 26.30 mg/L 420.21 High    K:L Ratio  0.26 - 1.65 0.03 Low    L:K Ratio 34.872       IMPRESSION:  No mammographic evidence of malignancy.           ASSESSMENT/BI-RADS CATEGORY:  Left: 1 - Negative  Overall: 1 - Negative     RECOMMENDATION:       - Routine screening mammogram in 1 year for the left breast.     Workstation ID: [id number]              Imaging    Mammo screening left w 3d & cad (Order: [id number]) - 5/31/2024  MPRESSION:     WHOLE BODY LOW DOSE CT FOR EVALUATION OF MULTIPLE MYELOMA:     OSTEOLYTIC LESIONS: None.  PROBABLY MALIGNANT VERTEBRAL FRACTURE: None. Unchanged multilevel thoracolumbar compression fracture deformities, likely secondary to osteoporosis.  SUSPICIOUS PERIPHERAL MEDULLARY PATTERN: No.                          Workstation performed: [id number]        Imaging    CT low dose whole body myeloma scan (Order: [id number]) - 1/3/2025

## 2025-01-31 ENCOUNTER — DOCUMENTATION (OUTPATIENT)
Age: 75
End: 2025-01-31

## 2025-01-31 ENCOUNTER — DOCUMENTATION (OUTPATIENT)
Dept: HEMATOLOGY ONCOLOGY | Facility: CLINIC | Age: 75
End: 2025-01-31

## 2025-01-31 DIAGNOSIS — C90.00 MULTIPLE MYELOMA NOT HAVING ACHIEVED REMISSION (HCC): Primary | ICD-10-CM

## 2025-01-31 RX ORDER — ONDANSETRON 4 MG/1
4 TABLET, FILM COATED ORAL EVERY 8 HOURS PRN
Qty: 30 TABLET | Refills: 1 | Status: SHIPPED | OUTPATIENT
Start: 2025-01-31

## 2025-01-31 NOTE — TELEPHONE ENCOUNTER
Called pt to schedule C1. She verbalized understanding of appt dates and times. She will also reference [facility] for appts.

## 2025-01-31 NOTE — PROGRESS NOTES
Received request for funding of new oral chemo medication Pomalyst  Oop $ 1903.44  Funding available through [facility]  Patient enrolled    ID# [id number]  CARD  [id number]  [facility] [id number]  PCN  [id number]  [facility]   [id number]  AMT [id number]  EFF 1-1-25  THRU 12-31-25    Funding information forwarded to [facility] for processing

## 2025-01-31 NOTE — PROGRESS NOTES
Received request from clinical for patient to start on Pomalyst 2mg daily for 3 weeks on 1 week off. Auth has been submitted and this has been approved.    CaseId:[id number];Status:Approved;Review Type:Prior Auth;Coverage Start Date:01/01/2025;Coverage End Date:01/31/2026;  Authorization Expiration Date: 1/30/2026    BIN:       [id number]  PCN:      [id number]  ID:          [id number]  GRP:      [id number]

## 2025-02-04 ENCOUNTER — APPOINTMENT (OUTPATIENT)
Dept: LAB | Facility: CLINIC | Age: 75
End: 2025-02-04
Payer: MEDICARE

## 2025-02-04 DIAGNOSIS — E55.9 VITAMIN D DEFICIENCY: ICD-10-CM

## 2025-02-04 DIAGNOSIS — R10.30 LOWER ABDOMINAL PAIN: ICD-10-CM

## 2025-02-04 DIAGNOSIS — C90.00 MULTIPLE MYELOMA NOT HAVING ACHIEVED REMISSION (HCC): Primary | ICD-10-CM

## 2025-02-04 DIAGNOSIS — C90.00 MULTIPLE MYELOMA NOT HAVING ACHIEVED REMISSION (HCC): ICD-10-CM

## 2025-02-04 DIAGNOSIS — E78.2 MIXED HYPERLIPIDEMIA: ICD-10-CM

## 2025-02-04 LAB
25(OH)D3 SERPL-MCNC: 46.8 NG/ML (ref 30–100)
ABO GROUP BLD: NORMAL
ALBUMIN SERPL BCG-MCNC: 4.5 G/DL (ref 3.5–5)
ALP SERPL-CCNC: 70 U/L (ref 34–104)
ALT SERPL W P-5'-P-CCNC: 15 U/L (ref 7–52)
ANION GAP SERPL CALCULATED.3IONS-SCNC: 11 MMOL/L (ref 4–13)
AST SERPL W P-5'-P-CCNC: 20 U/L (ref 13–39)
BASOPHILS # BLD AUTO: 0 THOUSANDS/ΜL (ref 0–0.1)
BASOPHILS NFR BLD AUTO: 0 % (ref 0–1)
BILIRUB SERPL-MCNC: 0.45 MG/DL (ref 0.2–1)
BLD GP AB SCN SERPL QL: POSITIVE
BUN SERPL-MCNC: 16 MG/DL (ref 5–25)
CALCIUM SERPL-MCNC: 9.8 MG/DL (ref 8.4–10.2)
CHLORIDE SERPL-SCNC: 103 MMOL/L (ref 96–108)
CHOLEST SERPL-MCNC: 158 MG/DL (ref ?–200)
CO2 SERPL-SCNC: 26 MMOL/L (ref 21–32)
CREAT SERPL-MCNC: 0.74 MG/DL (ref 0.6–1.3)
EOSINOPHIL # BLD AUTO: 0.01 THOUSAND/ΜL (ref 0–0.61)
EOSINOPHIL NFR BLD AUTO: 0 % (ref 0–6)
ERYTHROCYTE [DISTWIDTH] IN BLOOD BY AUTOMATED COUNT: 14.8 % (ref 11.6–15.1)
GFR SERPL CREATININE-BSD FRML MDRD: 81 ML/MIN/1.73SQ M
GLUCOSE P FAST SERPL-MCNC: 97 MG/DL (ref 65–99)
HCT VFR BLD AUTO: 36.7 % (ref 34.8–46.1)
HDLC SERPL-MCNC: 39 MG/DL
HGB BLD-MCNC: 11.9 G/DL (ref 11.5–15.4)
IGA SERPL-MCNC: 99 MG/DL (ref 66–433)
IGG SERPL-MCNC: 880 MG/DL (ref 635–1741)
IGM SERPL-MCNC: 70 MG/DL (ref 45–281)
IMM GRANULOCYTES # BLD AUTO: 0.03 THOUSAND/UL (ref 0–0.2)
IMM GRANULOCYTES NFR BLD AUTO: 1 % (ref 0–2)
LDH SERPL-CCNC: 101 U/L (ref 140–271)
LDLC SERPL CALC-MCNC: 79 MG/DL (ref 0–100)
LYMPHOCYTES # BLD AUTO: 1.06 THOUSANDS/ΜL (ref 0.6–4.47)
LYMPHOCYTES NFR BLD AUTO: 39 % (ref 14–44)
MCH RBC QN AUTO: 30 PG (ref 26.8–34.3)
MCHC RBC AUTO-ENTMCNC: 32.4 G/DL (ref 31.4–37.4)
MCV RBC AUTO: 92 FL (ref 82–98)
MONOCYTES # BLD AUTO: 0.46 THOUSAND/ΜL (ref 0.17–1.22)
MONOCYTES NFR BLD AUTO: 17 % (ref 4–12)
NEUTROPHILS # BLD AUTO: 1.19 THOUSANDS/ΜL (ref 1.85–7.62)
NEUTS SEG NFR BLD AUTO: 43 % (ref 43–75)
NRBC BLD AUTO-RTO: 0 /100 WBCS
PLATELET # BLD AUTO: 170 THOUSANDS/UL (ref 149–390)
PMV BLD AUTO: 9.5 FL (ref 8.9–12.7)
POTASSIUM SERPL-SCNC: 4 MMOL/L (ref 3.5–5.3)
PROT SERPL-MCNC: 7.2 G/DL (ref 6.4–8.4)
RBC # BLD AUTO: 3.97 MILLION/UL (ref 3.81–5.12)
RH BLD: POSITIVE
SODIUM SERPL-SCNC: 140 MMOL/L (ref 135–147)
SPECIMEN EXPIRATION DATE: NORMAL
TRIGL SERPL-MCNC: 201 MG/DL (ref ?–150)
URATE SERPL-MCNC: 3.5 MG/DL (ref 2–7.5)
WBC # BLD AUTO: 2.75 THOUSAND/UL (ref 4.31–10.16)

## 2025-02-04 PROCEDURE — 83521 IG LIGHT CHAINS FREE EACH: CPT

## 2025-02-04 PROCEDURE — 84550 ASSAY OF BLOOD/URIC ACID: CPT

## 2025-02-04 PROCEDURE — 86870 RBC ANTIBODY IDENTIFICATION: CPT

## 2025-02-04 PROCEDURE — 82232 ASSAY OF BETA-2 PROTEIN: CPT

## 2025-02-04 PROCEDURE — 82784 ASSAY IGA/IGD/IGG/IGM EACH: CPT

## 2025-02-04 PROCEDURE — 82306 VITAMIN D 25 HYDROXY: CPT

## 2025-02-04 PROCEDURE — 86850 RBC ANTIBODY SCREEN: CPT

## 2025-02-04 PROCEDURE — 80061 LIPID PANEL: CPT

## 2025-02-04 PROCEDURE — 85025 COMPLETE CBC W/AUTO DIFF WBC: CPT

## 2025-02-04 PROCEDURE — 83615 LACTATE (LD) (LDH) ENZYME: CPT

## 2025-02-04 PROCEDURE — 36415 COLL VENOUS BLD VENIPUNCTURE: CPT

## 2025-02-04 PROCEDURE — 80053 COMPREHEN METABOLIC PANEL: CPT

## 2025-02-04 PROCEDURE — 86900 BLOOD TYPING SEROLOGIC ABO: CPT

## 2025-02-04 PROCEDURE — 84165 PROTEIN E-PHORESIS SERUM: CPT

## 2025-02-04 PROCEDURE — 86901 BLOOD TYPING SEROLOGIC RH(D): CPT

## 2025-02-04 RX ORDER — SODIUM CHLORIDE 9 MG/ML
20 INJECTION, SOLUTION INTRAVENOUS ONCE
Status: CANCELLED | OUTPATIENT
Start: 2025-02-13

## 2025-02-04 RX ORDER — FAMOTIDINE 20 MG/1
20 TABLET, FILM COATED ORAL 2 TIMES DAILY
Status: CANCELLED
Start: 2025-02-06

## 2025-02-04 RX ORDER — FAMOTIDINE 20 MG/1
20 TABLET, FILM COATED ORAL 2 TIMES DAILY
Start: 2025-02-20

## 2025-02-04 RX ORDER — SODIUM CHLORIDE 9 MG/ML
20 INJECTION, SOLUTION INTRAVENOUS ONCE
Status: CANCELLED | OUTPATIENT
Start: 2025-02-06

## 2025-02-04 RX ORDER — DEXAMETHASONE 4 MG/1
20 TABLET ORAL ONCE
OUTPATIENT
Start: 2025-02-27

## 2025-02-04 RX ORDER — DEXAMETHASONE 4 MG/1
20 TABLET ORAL ONCE
OUTPATIENT
Start: 2025-02-20

## 2025-02-04 RX ORDER — FAMOTIDINE 20 MG/1
20 TABLET, FILM COATED ORAL 2 TIMES DAILY
Start: 2025-02-27

## 2025-02-04 RX ORDER — DIPHENHYDRAMINE HCL 25 MG
25 TABLET ORAL ONCE
OUTPATIENT
Start: 2025-02-20

## 2025-02-04 RX ORDER — ACETAMINOPHEN 325 MG/1
650 TABLET ORAL ONCE
OUTPATIENT
Start: 2025-02-20

## 2025-02-04 RX ORDER — ACETAMINOPHEN 325 MG/1
650 TABLET ORAL ONCE
Status: CANCELLED | OUTPATIENT
Start: 2025-02-06

## 2025-02-04 RX ORDER — ACETAMINOPHEN 325 MG/1
650 TABLET ORAL ONCE
OUTPATIENT
Start: 2025-02-27

## 2025-02-04 RX ORDER — ACETAMINOPHEN 325 MG/1
650 TABLET ORAL ONCE
Status: CANCELLED | OUTPATIENT
Start: 2025-02-13

## 2025-02-04 RX ORDER — FAMOTIDINE 20 MG/1
20 TABLET, FILM COATED ORAL 2 TIMES DAILY
Status: CANCELLED
Start: 2025-02-13

## 2025-02-04 RX ORDER — DIPHENHYDRAMINE HCL 25 MG
25 TABLET ORAL ONCE
OUTPATIENT
Start: 2025-02-27

## 2025-02-05 ENCOUNTER — TELEPHONE (OUTPATIENT)
Dept: HEMATOLOGY ONCOLOGY | Facility: CLINIC | Age: 75
End: 2025-02-05

## 2025-02-05 DIAGNOSIS — T45.1X5A CHEMOTHERAPY INDUCED NEUTROPENIA (HCC): Primary | ICD-10-CM

## 2025-02-05 DIAGNOSIS — D70.1 CHEMOTHERAPY INDUCED NEUTROPENIA (HCC): Primary | ICD-10-CM

## 2025-02-05 LAB
ALBUMIN SERPL ELPH-MCNC: 4.15 G/DL (ref 3.2–5.1)
ALBUMIN SERPL ELPH-MCNC: 61 % (ref 48–70)
ALPHA1 GLOB SERPL ELPH-MCNC: 0.34 G/DL (ref 0.15–0.47)
ALPHA1 GLOB SERPL ELPH-MCNC: 5 % (ref 1.8–7)
ALPHA2 GLOB SERPL ELPH-MCNC: 0.76 G/DL (ref 0.42–1.04)
ALPHA2 GLOB SERPL ELPH-MCNC: 11.2 % (ref 5.9–14.9)
BETA GLOB ABNORMAL SERPL ELPH-MCNC: 0.43 G/DL (ref 0.31–0.57)
BETA1 GLOB SERPL ELPH-MCNC: 6.3 % (ref 4.7–7.7)
BETA2 GLOB SERPL ELPH-MCNC: 4.9 % (ref 3.1–7.9)
BETA2+GAMMA GLOB SERPL ELPH-MCNC: 0.33 G/DL (ref 0.2–0.58)
GAMMA GLOB ABNORMAL SERPL ELPH-MCNC: 0.79 G/DL (ref 0.4–1.66)
GAMMA GLOB SERPL ELPH-MCNC: 11.6 % (ref 6.9–22.3)
IGG/ALB SER: 1.56 {RATIO} (ref 1.1–1.8)
KAPPA LC FREE SER-MCNC: 12 MG/L (ref 3.3–19.4)
KAPPA LC FREE/LAMBDA FREE SER: 0.03 {RATIO} (ref 0.26–1.65)
LAMBDA LC FREE SERPL-MCNC: 437.3 MG/L (ref 5.7–26.3)
PROT PATTERN SERPL ELPH-IMP: NORMAL
PROT SERPL-MCNC: 6.8 G/DL (ref 6.4–8.2)

## 2025-02-05 PROCEDURE — 84165 PROTEIN E-PHORESIS SERUM: CPT | Performed by: PATHOLOGY

## 2025-02-05 NOTE — TELEPHONE ENCOUNTER
Patient to be started on weekly granix once she starts Pomalyst    Message sent to patient to confirm start date

## 2025-02-06 ENCOUNTER — HOSPITAL ENCOUNTER (OUTPATIENT)
Dept: INFUSION CENTER | Facility: CLINIC | Age: 75
Discharge: HOME | End: 2025-02-06
Payer: MEDICARE

## 2025-02-06 VITALS
TEMPERATURE: 97.9 F | SYSTOLIC BLOOD PRESSURE: 124 MMHG | RESPIRATION RATE: 18 BRPM | WEIGHT: 136 LBS | HEIGHT: 60 IN | BODY MASS INDEX: 26.7 KG/M2 | OXYGEN SATURATION: 97 % | DIASTOLIC BLOOD PRESSURE: 78 MMHG | HEART RATE: 79 BPM

## 2025-02-06 DIAGNOSIS — S22.009A CLOSED FRACTURE OF MULTIPLE THORACIC VERTEBRAE, INITIAL ENCOUNTER (HCC): ICD-10-CM

## 2025-02-06 DIAGNOSIS — C90.00 MULTIPLE MYELOMA NOT HAVING ACHIEVED REMISSION (HCC): ICD-10-CM

## 2025-02-06 DIAGNOSIS — M81.8 OSTEOPOROSIS OF MULTIPLE SITES ASSOCIATED WITH MULTIPLE MYELOMA (HCC): ICD-10-CM

## 2025-02-06 DIAGNOSIS — C90.00 MULTIPLE MYELOMA NOT HAVING ACHIEVED REMISSION (HCC): Primary | ICD-10-CM

## 2025-02-06 DIAGNOSIS — C90.00 OSTEOPOROSIS OF MULTIPLE SITES ASSOCIATED WITH MULTIPLE MYELOMA (HCC): ICD-10-CM

## 2025-02-06 DIAGNOSIS — Z90.710 S/P HYSTERECTOMY WITH OOPHORECTOMY: Primary | ICD-10-CM

## 2025-02-06 DIAGNOSIS — Z90.721 S/P HYSTERECTOMY WITH OOPHORECTOMY: Primary | ICD-10-CM

## 2025-02-06 LAB — B2 MICROGLOB SERPL-MCNC: 2.3 MG/L (ref 0.6–2.4)

## 2025-02-06 PROCEDURE — 96401 CHEMO ANTI-NEOPL SQ/IM: CPT

## 2025-02-06 PROCEDURE — 96365 THER/PROPH/DIAG IV INF INIT: CPT

## 2025-02-06 PROCEDURE — 96367 TX/PROPH/DG ADDL SEQ IV INF: CPT

## 2025-02-06 RX ORDER — SODIUM CHLORIDE 9 MG/ML
20 INJECTION, SOLUTION INTRAVENOUS ONCE
Status: COMPLETED | OUTPATIENT
Start: 2025-02-06 | End: 2025-02-06

## 2025-02-06 RX ORDER — FAMOTIDINE 20 MG/1
20 TABLET, FILM COATED ORAL 2 TIMES DAILY
Status: DISCONTINUED | OUTPATIENT
Start: 2025-02-06 | End: 2025-02-09 | Stop reason: HOSPADM

## 2025-02-06 RX ORDER — ACETAMINOPHEN 325 MG/1
650 TABLET ORAL ONCE
Status: COMPLETED | OUTPATIENT
Start: 2025-02-06 | End: 2025-02-06

## 2025-02-06 RX ADMIN — DIPHENHYDRAMINE HYDROCHLORIDE 25 MG: 50 INJECTION, SOLUTION INTRAMUSCULAR; INTRAVENOUS at 13:18

## 2025-02-06 RX ADMIN — DEXAMETHASONE SODIUM PHOSPHATE 20 MG: 10 INJECTION, SOLUTION INTRAMUSCULAR; INTRAVENOUS at 12:55

## 2025-02-06 RX ADMIN — SODIUM CHLORIDE 20 ML/HR: 0.9 INJECTION, SOLUTION INTRAVENOUS at 12:52

## 2025-02-06 RX ADMIN — FAMOTIDINE 20 MG: 20 TABLET, FILM COATED ORAL at 12:53

## 2025-02-06 RX ADMIN — ACETAMINOPHEN 650 MG: 325 TABLET ORAL at 12:53

## 2025-02-06 RX ADMIN — DARATUMUMAB AND HYALURONIDASE-FIHJ (HUMAN RECOMBINANT) 1800 MG: 1800; 30000 INJECTION SUBCUTANEOUS at 14:41

## 2025-02-06 NOTE — PROGRESS NOTES
Observation period complete, pt tolerated without complaints. No redness, irritation at injection sites. Pt to return 2/13 at 3pm. Aware to get labs done prior to treatment.

## 2025-02-06 NOTE — PROGRESS NOTES
Pt at [facility] for Darzalex Faspro. Today is day 1, cycle 1 for pt. Labs reviewed from 2/4 and meet parameters to tx. Pt has no further questions at this time. Call bell within reach.

## 2025-02-07 ENCOUNTER — TELEPHONE (OUTPATIENT)
Age: 75
End: 2025-02-07

## 2025-02-07 NOTE — TELEPHONE ENCOUNTER
Patient called, asking for an update on her Pomalyst. She had thought she was being set up for a negrita. She had gotten a call from the pharmacy, that was a recording, asking for her to contact them to set up a payment plan prior to them sending out her medication. She would like a call back advising her the status of the medication, and what she is in need of doing next

## 2025-02-08 DIAGNOSIS — I10 ESSENTIAL HYPERTENSION: ICD-10-CM

## 2025-02-10 ENCOUNTER — PATIENT MESSAGE (OUTPATIENT)
Dept: HEMATOLOGY ONCOLOGY | Facility: CLINIC | Age: 75
End: 2025-02-10

## 2025-02-10 NOTE — TELEPHONE ENCOUNTER
Patient still hasn't heard anything back yet regarding this.  She needs to speak with with someone asap because she is extremely frustrated that nobody is updating her

## 2025-02-11 ENCOUNTER — APPOINTMENT (OUTPATIENT)
Dept: LAB | Facility: CLINIC | Age: 75
End: 2025-02-11
Payer: MEDICARE

## 2025-02-11 DIAGNOSIS — C90.00 MULTIPLE MYELOMA NOT HAVING ACHIEVED REMISSION (HCC): ICD-10-CM

## 2025-02-11 LAB
ALBUMIN SERPL BCG-MCNC: 4.2 G/DL (ref 3.5–5)
ALP SERPL-CCNC: 60 U/L (ref 34–104)
ALT SERPL W P-5'-P-CCNC: 19 U/L (ref 7–52)
ANION GAP SERPL CALCULATED.3IONS-SCNC: 10 MMOL/L (ref 4–13)
AST SERPL W P-5'-P-CCNC: 18 U/L (ref 13–39)
BASOPHILS # BLD AUTO: 0 THOUSANDS/ΜL (ref 0–0.1)
BASOPHILS NFR BLD AUTO: 0 % (ref 0–1)
BILIRUB SERPL-MCNC: 0.51 MG/DL (ref 0.2–1)
BLOOD GROUP ANTIBODIES SERPL: NORMAL
BLOOD GROUP ANTIBODIES SERPL: NORMAL
BUN SERPL-MCNC: 13 MG/DL (ref 5–25)
CALCIUM SERPL-MCNC: 9.1 MG/DL (ref 8.4–10.2)
CHLORIDE SERPL-SCNC: 103 MMOL/L (ref 96–108)
CO2 SERPL-SCNC: 25 MMOL/L (ref 21–32)
CREAT SERPL-MCNC: 0.76 MG/DL (ref 0.6–1.3)
EOSINOPHIL # BLD AUTO: 0.02 THOUSAND/ΜL (ref 0–0.61)
EOSINOPHIL NFR BLD AUTO: 1 % (ref 0–6)
ERYTHROCYTE [DISTWIDTH] IN BLOOD BY AUTOMATED COUNT: 15.1 % (ref 11.6–15.1)
GFR SERPL CREATININE-BSD FRML MDRD: 78 ML/MIN/1.73SQ M
GLUCOSE P FAST SERPL-MCNC: 108 MG/DL (ref 65–99)
HCT VFR BLD AUTO: 34.1 % (ref 34.8–46.1)
HGB BLD-MCNC: 11.4 G/DL (ref 11.5–15.4)
IMM GRANULOCYTES # BLD AUTO: 0.06 THOUSAND/UL (ref 0–0.2)
IMM GRANULOCYTES NFR BLD AUTO: 2 % (ref 0–2)
LYMPHOCYTES # BLD AUTO: 1.53 THOUSANDS/ΜL (ref 0.6–4.47)
LYMPHOCYTES NFR BLD AUTO: 39 % (ref 14–44)
MCH RBC QN AUTO: 30.2 PG (ref 26.8–34.3)
MCHC RBC AUTO-ENTMCNC: 33.4 G/DL (ref 31.4–37.4)
MCV RBC AUTO: 91 FL (ref 82–98)
MONOCYTES # BLD AUTO: 0.55 THOUSAND/ΜL (ref 0.17–1.22)
MONOCYTES NFR BLD AUTO: 14 % (ref 4–12)
NEUTROPHILS # BLD AUTO: 1.79 THOUSANDS/ΜL (ref 1.85–7.62)
NEUTS SEG NFR BLD AUTO: 44 % (ref 43–75)
NRBC BLD AUTO-RTO: 0 /100 WBCS
PLATELET # BLD AUTO: 187 THOUSANDS/UL (ref 149–390)
PMV BLD AUTO: 9.7 FL (ref 8.9–12.7)
POTASSIUM SERPL-SCNC: 3.8 MMOL/L (ref 3.5–5.3)
PROT SERPL-MCNC: 6.4 G/DL (ref 6.4–8.4)
RBC # BLD AUTO: 3.77 MILLION/UL (ref 3.81–5.12)
SODIUM SERPL-SCNC: 138 MMOL/L (ref 135–147)
WBC # BLD AUTO: 3.95 THOUSAND/UL (ref 4.31–10.16)

## 2025-02-11 PROCEDURE — 36415 COLL VENOUS BLD VENIPUNCTURE: CPT

## 2025-02-11 PROCEDURE — 85025 COMPLETE CBC W/AUTO DIFF WBC: CPT

## 2025-02-11 PROCEDURE — 80053 COMPREHEN METABOLIC PANEL: CPT

## 2025-02-12 ENCOUNTER — RESULTS FOLLOW-UP (OUTPATIENT)
Dept: INTERNAL MEDICINE CLINIC | Facility: CLINIC | Age: 75
End: 2025-02-12

## 2025-02-13 ENCOUNTER — HOSPITAL ENCOUNTER (OUTPATIENT)
Dept: INFUSION CENTER | Facility: CLINIC | Age: 75
Discharge: HOME | End: 2025-02-13
Payer: MEDICARE

## 2025-02-13 VITALS
HEART RATE: 108 BPM | RESPIRATION RATE: 18 BRPM | OXYGEN SATURATION: 98 % | SYSTOLIC BLOOD PRESSURE: 139 MMHG | DIASTOLIC BLOOD PRESSURE: 80 MMHG | TEMPERATURE: 97.7 F

## 2025-02-13 DIAGNOSIS — C90.00 MULTIPLE MYELOMA NOT HAVING ACHIEVED REMISSION (HCC): Primary | ICD-10-CM

## 2025-02-13 PROCEDURE — 96365 THER/PROPH/DIAG IV INF INIT: CPT

## 2025-02-13 PROCEDURE — 96367 TX/PROPH/DG ADDL SEQ IV INF: CPT

## 2025-02-13 PROCEDURE — 96401 CHEMO ANTI-NEOPL SQ/IM: CPT

## 2025-02-13 RX ORDER — SODIUM CHLORIDE 9 MG/ML
20 INJECTION, SOLUTION INTRAVENOUS ONCE
Status: COMPLETED | OUTPATIENT
Start: 2025-02-13 | End: 2025-02-13

## 2025-02-13 RX ORDER — FAMOTIDINE 20 MG/1
20 TABLET, FILM COATED ORAL 2 TIMES DAILY
Status: DISCONTINUED | OUTPATIENT
Start: 2025-02-13 | End: 2025-02-16 | Stop reason: HOSPADM

## 2025-02-13 RX ORDER — ACETAMINOPHEN 325 MG/1
650 TABLET ORAL ONCE
Status: COMPLETED | OUTPATIENT
Start: 2025-02-13 | End: 2025-02-13

## 2025-02-13 RX ADMIN — DARATUMUMAB AND HYALURONIDASE-FIHJ (HUMAN RECOMBINANT) 1800 MG: 1800; 30000 INJECTION SUBCUTANEOUS at 16:55

## 2025-02-13 RX ADMIN — FAMOTIDINE 20 MG: 20 TABLET, FILM COATED ORAL at 15:15

## 2025-02-13 RX ADMIN — DIPHENHYDRAMINE HYDROCHLORIDE 25 MG: 50 INJECTION, SOLUTION INTRAMUSCULAR; INTRAVENOUS at 15:35

## 2025-02-13 RX ADMIN — ACETAMINOPHEN 650 MG: 325 TABLET ORAL at 15:15

## 2025-02-13 RX ADMIN — DEXAMETHASONE SODIUM PHOSPHATE 20 MG: 10 INJECTION, SOLUTION INTRAMUSCULAR; INTRAVENOUS at 15:15

## 2025-02-13 RX ADMIN — SODIUM CHLORIDE 20 ML/HR: 0.9 INJECTION, SOLUTION INTRAVENOUS at 14:55

## 2025-02-13 NOTE — PROGRESS NOTES
Observation complete: No adverse reactions noted: Verified follow up appt with patient ( 02/20/25 ): AVS offered and declined

## 2025-02-13 NOTE — TELEPHONE ENCOUNTER
Call placed to patient confirmed she received her Pomalyst.  Stated she received it yesterday and took her first dose this morning.          Granix injections to be coordinated

## 2025-02-13 NOTE — TELEPHONE ENCOUNTER
Per Dr. Davis, day of the week for granix does not matter. Plan is in beacon for Q7 days.     Infusion: please coordinate/ schedule injections. Patient currently comes to infusion weekly for darzalex, if you want to just add on to that appointment for first couple doses and adjust as needed for schedule changes. Thank you!

## 2025-02-13 NOTE — PROGRESS NOTES
Darzalex FasPro per MD order: Injections ( x2 ) given in Abdomen without incident: No adverse reactions noted: Observation period to begin

## 2025-02-13 NOTE — PROGRESS NOTES
Patient to Infusion Center for Darzalex FasPro: Offers no complaints at present time: Lab work ( 02/11/25 ) reviewed: Within parameters to treat: Left Hand PIV initiated without incident

## 2025-02-14 DIAGNOSIS — I10 ESSENTIAL HYPERTENSION: ICD-10-CM

## 2025-02-14 RX ORDER — METOPROLOL TARTRATE 50 MG
TABLET ORAL
Qty: 270 TABLET | Refills: 3 | Status: SHIPPED | OUTPATIENT
Start: 2025-02-14

## 2025-02-14 RX ORDER — METOPROLOL TARTRATE 50 MG
75 TABLET ORAL 2 TIMES DAILY
Qty: 270 TABLET | Refills: 3 | OUTPATIENT
Start: 2025-02-14

## 2025-02-15 LAB — SCAN RESULT: NORMAL

## 2025-02-17 RX ORDER — FAMOTIDINE 20 MG/1
20 TABLET, FILM COATED ORAL ONCE
Status: CANCELLED
Start: 2025-02-20 | End: 2025-02-20

## 2025-02-18 ENCOUNTER — APPOINTMENT (OUTPATIENT)
Dept: LAB | Facility: CLINIC | Age: 75
End: 2025-02-18
Payer: MEDICARE

## 2025-02-18 DIAGNOSIS — C90.00 MULTIPLE MYELOMA NOT HAVING ACHIEVED REMISSION (HCC): ICD-10-CM

## 2025-02-18 LAB
ALBUMIN SERPL BCG-MCNC: 4.2 G/DL (ref 3.5–5)
ALP SERPL-CCNC: 61 U/L (ref 34–104)
ALT SERPL W P-5'-P-CCNC: 15 U/L (ref 7–52)
ANION GAP SERPL CALCULATED.3IONS-SCNC: 9 MMOL/L (ref 4–13)
AST SERPL W P-5'-P-CCNC: 15 U/L (ref 13–39)
BASOPHILS # BLD AUTO: 0.01 THOUSANDS/ΜL (ref 0–0.1)
BASOPHILS NFR BLD AUTO: 0 % (ref 0–1)
BILIRUB SERPL-MCNC: 0.78 MG/DL (ref 0.2–1)
BUN SERPL-MCNC: 15 MG/DL (ref 5–25)
CALCIUM SERPL-MCNC: 9.6 MG/DL (ref 8.4–10.2)
CHLORIDE SERPL-SCNC: 100 MMOL/L (ref 96–108)
CO2 SERPL-SCNC: 28 MMOL/L (ref 21–32)
CREAT SERPL-MCNC: 0.74 MG/DL (ref 0.6–1.3)
EOSINOPHIL # BLD AUTO: 0.02 THOUSAND/ΜL (ref 0–0.61)
EOSINOPHIL NFR BLD AUTO: 1 % (ref 0–6)
ERYTHROCYTE [DISTWIDTH] IN BLOOD BY AUTOMATED COUNT: 15.9 % (ref 11.6–15.1)
GFR SERPL CREATININE-BSD FRML MDRD: 81 ML/MIN/1.73SQ M
GLUCOSE P FAST SERPL-MCNC: 103 MG/DL (ref 65–99)
HCT VFR BLD AUTO: 35.7 % (ref 34.8–46.1)
HGB BLD-MCNC: 12 G/DL (ref 11.5–15.4)
IMM GRANULOCYTES # BLD AUTO: 0.04 THOUSAND/UL (ref 0–0.2)
IMM GRANULOCYTES NFR BLD AUTO: 1 % (ref 0–2)
LYMPHOCYTES # BLD AUTO: 0.93 THOUSANDS/ΜL (ref 0.6–4.47)
LYMPHOCYTES NFR BLD AUTO: 26 % (ref 14–44)
MCH RBC QN AUTO: 30.9 PG (ref 26.8–34.3)
MCHC RBC AUTO-ENTMCNC: 33.6 G/DL (ref 31.4–37.4)
MCV RBC AUTO: 92 FL (ref 82–98)
MONOCYTES # BLD AUTO: 0.32 THOUSAND/ΜL (ref 0.17–1.22)
MONOCYTES NFR BLD AUTO: 9 % (ref 4–12)
NEUTROPHILS # BLD AUTO: 2.33 THOUSANDS/ΜL (ref 1.85–7.62)
NEUTS SEG NFR BLD AUTO: 63 % (ref 43–75)
NRBC BLD AUTO-RTO: 0 /100 WBCS
PLATELET # BLD AUTO: 113 THOUSANDS/UL (ref 149–390)
PMV BLD AUTO: 10.1 FL (ref 8.9–12.7)
POTASSIUM SERPL-SCNC: 4.1 MMOL/L (ref 3.5–5.3)
PROT SERPL-MCNC: 6.5 G/DL (ref 6.4–8.4)
RBC # BLD AUTO: 3.88 MILLION/UL (ref 3.81–5.12)
SODIUM SERPL-SCNC: 137 MMOL/L (ref 135–147)
WBC # BLD AUTO: 3.65 THOUSAND/UL (ref 4.31–10.16)

## 2025-02-18 PROCEDURE — 36415 COLL VENOUS BLD VENIPUNCTURE: CPT

## 2025-02-18 PROCEDURE — 85025 COMPLETE CBC W/AUTO DIFF WBC: CPT

## 2025-02-18 PROCEDURE — 80053 COMPREHEN METABOLIC PANEL: CPT

## 2025-02-20 ENCOUNTER — TELEPHONE (OUTPATIENT)
Dept: HEMATOLOGY ONCOLOGY | Facility: CLINIC | Age: 75
End: 2025-02-20

## 2025-02-20 ENCOUNTER — HOSPITAL ENCOUNTER (OUTPATIENT)
Dept: INFUSION CENTER | Facility: CLINIC | Age: 75
Discharge: HOME | End: 2025-02-20
Payer: MEDICARE

## 2025-02-20 VITALS
OXYGEN SATURATION: 99 % | SYSTOLIC BLOOD PRESSURE: 128 MMHG | TEMPERATURE: 97 F | HEART RATE: 89 BPM | RESPIRATION RATE: 18 BRPM | DIASTOLIC BLOOD PRESSURE: 77 MMHG

## 2025-02-20 DIAGNOSIS — C90.00 MULTIPLE MYELOMA NOT HAVING ACHIEVED REMISSION (HCC): Primary | ICD-10-CM

## 2025-02-20 PROCEDURE — 96401 CHEMO ANTI-NEOPL SQ/IM: CPT

## 2025-02-20 RX ORDER — FAMOTIDINE 20 MG/1
20 TABLET, FILM COATED ORAL ONCE
Status: CANCELLED
Start: 2025-02-27 | End: 2025-02-27

## 2025-02-20 RX ORDER — FAMOTIDINE 20 MG/1
20 TABLET, FILM COATED ORAL ONCE
COMMUNITY

## 2025-02-20 RX ORDER — DIPHENHYDRAMINE HCL 25 MG
25 TABLET ORAL ONCE
Status: COMPLETED | OUTPATIENT
Start: 2025-02-20 | End: 2025-02-20

## 2025-02-20 RX ORDER — ACETAMINOPHEN 325 MG/1
650 TABLET ORAL ONCE
Status: COMPLETED | OUTPATIENT
Start: 2025-02-20 | End: 2025-02-20

## 2025-02-20 RX ADMIN — DIPHENHYDRAMINE HYDROCHLORIDE 25 MG: 25 TABLET ORAL at 15:55

## 2025-02-20 RX ADMIN — ACETAMINOPHEN 650 MG: 325 TABLET ORAL at 15:55

## 2025-02-20 RX ADMIN — DARATUMUMAB AND HYALURONIDASE-FIHJ (HUMAN RECOMBINANT) 1800 MG: 1800; 30000 INJECTION SUBCUTANEOUS at 17:01

## 2025-02-20 NOTE — PROGRESS NOTES
Patient arrives for D15 C1 Darzalex Faspro. Patient reports difficulty sleeping with the steroids, takes 20 mg in AM as prescribed every AM of chemotherapy and has an additional 20 mg that she gets as a premed for treatment. Patient also endorses vocal hoarseness. Gayathri HIDALGO in Dr Davis's office made aware. Labs reviewed from 2/18/25 and are within parameters for treatment today. Patient resting on recliner chair, call bell within reach.     Per Dr Davis, cancel the 20 mg Decadron that is ordered at infusion center for today and all future cycles. Patient will continue to take the 20 mg Decadron at home the AM of chemotherapy. Also noted that 20 mg Pepcid is ordered as a premed - patient takes this at home every AM. Per Dr Davis, cancel the 20 mg Pepcid that is ordered at the infusion center for today and all future cycles. Patient will continue to take the 20 mg Pepcid at home every day.

## 2025-02-20 NOTE — PROGRESS NOTES
"Received message from Ratna RN at [facility] infusion in regard to patient's concern of sleeping.    \"She takes 20 mg decadron in AM of chemo day, then gets another 20 mg decadron here for a premed. She says it really causes trouble sleeping\"    Per Dr. Davis, hold the dexamethasone at the infusion and continue the dose at home.  "

## 2025-02-20 NOTE — PROGRESS NOTES
Patient tolerated Darzalex x2 - x1 L abd, x1 R abd without issue, gauze and bandaids in place. Patient aware of Granix injection tomorrow at 1700, declines AVS.

## 2025-02-21 ENCOUNTER — HOSPITAL ENCOUNTER (OUTPATIENT)
Dept: INFUSION CENTER | Facility: CLINIC | Age: 75
End: 2025-02-21
Payer: MEDICARE

## 2025-02-21 DIAGNOSIS — D70.1 CHEMOTHERAPY INDUCED NEUTROPENIA (HCC): Primary | ICD-10-CM

## 2025-02-21 DIAGNOSIS — T45.1X5A CHEMOTHERAPY INDUCED NEUTROPENIA (HCC): Primary | ICD-10-CM

## 2025-02-21 PROCEDURE — 96372 THER/PROPH/DIAG INJ SC/IM: CPT

## 2025-02-21 RX ADMIN — TBO-FILGRASTIM 300 MCG: 300 INJECTION, SOLUTION SUBCUTANEOUS at 17:02

## 2025-02-21 NOTE — PROGRESS NOTES
Patient to infusion for Granix.  She offers no complaints.  She tolerated injection in Left arm.  Next appointment confirmed 2/27 1300, she declined AVS

## 2025-02-25 ENCOUNTER — APPOINTMENT (OUTPATIENT)
Dept: LAB | Facility: CLINIC | Age: 75
End: 2025-02-25
Payer: MEDICARE

## 2025-02-25 DIAGNOSIS — C90.00 MULTIPLE MYELOMA NOT HAVING ACHIEVED REMISSION (HCC): ICD-10-CM

## 2025-02-25 LAB
ALBUMIN SERPL BCG-MCNC: 4 G/DL (ref 3.5–5)
ALP SERPL-CCNC: 62 U/L (ref 34–104)
ALT SERPL W P-5'-P-CCNC: 17 U/L (ref 7–52)
ANION GAP SERPL CALCULATED.3IONS-SCNC: 10 MMOL/L (ref 4–13)
AST SERPL W P-5'-P-CCNC: 17 U/L (ref 13–39)
BASOPHILS # BLD AUTO: 0 THOUSANDS/ÂΜL (ref 0–0.1)
BASOPHILS NFR BLD AUTO: 0 % (ref 0–1)
BILIRUB SERPL-MCNC: 0.62 MG/DL (ref 0.2–1)
BUN SERPL-MCNC: 13 MG/DL (ref 5–25)
CALCIUM SERPL-MCNC: 9.3 MG/DL (ref 8.4–10.2)
CHLORIDE SERPL-SCNC: 104 MMOL/L (ref 96–108)
CO2 SERPL-SCNC: 25 MMOL/L (ref 21–32)
CREAT SERPL-MCNC: 0.69 MG/DL (ref 0.6–1.3)
EOSINOPHIL # BLD AUTO: 0 THOUSAND/ÂΜL (ref 0–0.61)
EOSINOPHIL NFR BLD AUTO: 0 % (ref 0–6)
ERYTHROCYTE [DISTWIDTH] IN BLOOD BY AUTOMATED COUNT: 15.8 % (ref 11.6–15.1)
GFR SERPL CREATININE-BSD FRML MDRD: 87 ML/MIN/1.73SQ M
GLUCOSE P FAST SERPL-MCNC: 115 MG/DL (ref 65–99)
HCT VFR BLD AUTO: 33.6 % (ref 34.8–46.1)
HGB BLD-MCNC: 11.2 G/DL (ref 11.5–15.4)
IMM GRANULOCYTES # BLD AUTO: 0.02 THOUSAND/UL (ref 0–0.2)
IMM GRANULOCYTES NFR BLD AUTO: 1 % (ref 0–2)
LYMPHOCYTES # BLD AUTO: 0.73 THOUSANDS/ÂΜL (ref 0.6–4.47)
LYMPHOCYTES NFR BLD AUTO: 24 % (ref 14–44)
MCH RBC QN AUTO: 30.9 PG (ref 26.8–34.3)
MCHC RBC AUTO-ENTMCNC: 33.3 G/DL (ref 31.4–37.4)
MCV RBC AUTO: 93 FL (ref 82–98)
MONOCYTES # BLD AUTO: 0.36 THOUSAND/ÂΜL (ref 0.17–1.22)
MONOCYTES NFR BLD AUTO: 12 % (ref 4–12)
NEUTROPHILS # BLD AUTO: 1.91 THOUSANDS/ÂΜL (ref 1.85–7.62)
NEUTS SEG NFR BLD AUTO: 63 % (ref 43–75)
NRBC BLD AUTO-RTO: 0 /100 WBCS
PLATELET # BLD AUTO: 47 THOUSANDS/UL (ref 149–390)
PMV BLD AUTO: 10.9 FL (ref 8.9–12.7)
POTASSIUM SERPL-SCNC: 4 MMOL/L (ref 3.5–5.3)
PROT SERPL-MCNC: 6.3 G/DL (ref 6.4–8.4)
RBC # BLD AUTO: 3.63 MILLION/UL (ref 3.81–5.12)
SODIUM SERPL-SCNC: 139 MMOL/L (ref 135–147)
WBC # BLD AUTO: 3.02 THOUSAND/UL (ref 4.31–10.16)

## 2025-02-25 PROCEDURE — 85025 COMPLETE CBC W/AUTO DIFF WBC: CPT

## 2025-02-25 PROCEDURE — 80053 COMPREHEN METABOLIC PANEL: CPT

## 2025-02-25 PROCEDURE — 36415 COLL VENOUS BLD VENIPUNCTURE: CPT

## 2025-02-26 ENCOUNTER — RESULTS FOLLOW-UP (OUTPATIENT)
Dept: HEMATOLOGY ONCOLOGY | Facility: CLINIC | Age: 75
End: 2025-02-26

## 2025-02-26 NOTE — TELEPHONE ENCOUNTER
Spoke with patient who advised that she is on day 13 of 21 day cycle of Pomalyst. No change per Maxi COREA. Patient's treatment for tomorrow will be cancelled and she will resume on 3/6 as scheduled. She will need to have granix on Friday as scheduled.    Patient verbalized understanding and agreed to plan.

## 2025-02-26 NOTE — TELEPHONE ENCOUNTER
----- Message from Lore Barkley PA-C sent at 2/26/2025 10:05 AM EST -----  Platelets have dropped to 47,000.   Please confirm yesterday she took day 21 of Pomalyst  Hold lalo tomorrow.   Repeat CBC prior to tx on 3/6/25.

## 2025-02-27 ENCOUNTER — HOSPITAL ENCOUNTER (OUTPATIENT)
Dept: INFUSION CENTER | Facility: CLINIC | Age: 75
End: 2025-02-27

## 2025-02-28 ENCOUNTER — HOSPITAL ENCOUNTER (OUTPATIENT)
Dept: INFUSION CENTER | Facility: CLINIC | Age: 75
End: 2025-02-28
Payer: MEDICARE

## 2025-02-28 DIAGNOSIS — T45.1X5A CHEMOTHERAPY INDUCED NEUTROPENIA (HCC): Primary | ICD-10-CM

## 2025-02-28 DIAGNOSIS — D70.1 CHEMOTHERAPY INDUCED NEUTROPENIA (HCC): Primary | ICD-10-CM

## 2025-02-28 PROCEDURE — 96372 THER/PROPH/DIAG INJ SC/IM: CPT

## 2025-02-28 RX ADMIN — TBO-FILGRASTIM 300 MCG: 300 INJECTION, SOLUTION SUBCUTANEOUS at 16:11

## 2025-02-28 NOTE — PROGRESS NOTES
Patient to infusion for Granix.  She offers no complaints.  She tolerated injection in Right Arm.  Next appointment confirmed 3/6 0830, she declined AVS

## 2025-03-02 RX ORDER — ACETAMINOPHEN 325 MG/1
650 TABLET ORAL ONCE
Status: CANCELLED | OUTPATIENT
Start: 2025-03-06

## 2025-03-02 RX ORDER — DIPHENHYDRAMINE HCL 25 MG
25 TABLET ORAL ONCE
OUTPATIENT
Start: 2025-03-20

## 2025-03-02 RX ORDER — ACETAMINOPHEN 325 MG/1
650 TABLET ORAL ONCE
OUTPATIENT
Start: 2025-03-27

## 2025-03-02 RX ORDER — DIPHENHYDRAMINE HCL 25 MG
25 TABLET ORAL ONCE
OUTPATIENT
Start: 2025-03-27

## 2025-03-02 RX ORDER — DIPHENHYDRAMINE HCL 25 MG
25 TABLET ORAL ONCE
Status: CANCELLED | OUTPATIENT
Start: 2025-03-13

## 2025-03-02 RX ORDER — DIPHENHYDRAMINE HCL 25 MG
25 TABLET ORAL ONCE
Status: CANCELLED | OUTPATIENT
Start: 2025-03-06

## 2025-03-02 RX ORDER — ACETAMINOPHEN 325 MG/1
650 TABLET ORAL ONCE
Status: CANCELLED | OUTPATIENT
Start: 2025-03-13

## 2025-03-02 RX ORDER — ACETAMINOPHEN 325 MG/1
650 TABLET ORAL ONCE
OUTPATIENT
Start: 2025-03-20

## 2025-03-04 ENCOUNTER — APPOINTMENT (OUTPATIENT)
Dept: LAB | Facility: CLINIC | Age: 75
End: 2025-03-04
Payer: MEDICARE

## 2025-03-04 DIAGNOSIS — C90.00 MULTIPLE MYELOMA NOT HAVING ACHIEVED REMISSION (HCC): ICD-10-CM

## 2025-03-04 LAB
ALBUMIN SERPL BCG-MCNC: 4 G/DL (ref 3.5–5)
ALP SERPL-CCNC: 66 U/L (ref 34–104)
ALT SERPL W P-5'-P-CCNC: 14 U/L (ref 7–52)
ANION GAP SERPL CALCULATED.3IONS-SCNC: 8 MMOL/L (ref 4–13)
ANISOCYTOSIS BLD QL SMEAR: PRESENT
AST SERPL W P-5'-P-CCNC: 13 U/L (ref 13–39)
BASOPHILS # BLD MANUAL: 0.01 THOUSAND/UL (ref 0–0.1)
BASOPHILS NFR MAR MANUAL: 1 % (ref 0–1)
BILIRUB SERPL-MCNC: 0.68 MG/DL (ref 0.2–1)
BUN SERPL-MCNC: 14 MG/DL (ref 5–25)
CALCIUM SERPL-MCNC: 9.5 MG/DL (ref 8.4–10.2)
CHLORIDE SERPL-SCNC: 105 MMOL/L (ref 96–108)
CO2 SERPL-SCNC: 27 MMOL/L (ref 21–32)
CREAT SERPL-MCNC: 0.65 MG/DL (ref 0.6–1.3)
EOSINOPHIL # BLD MANUAL: 0 THOUSAND/UL (ref 0–0.4)
EOSINOPHIL NFR BLD MANUAL: 0 % (ref 0–6)
ERYTHROCYTE [DISTWIDTH] IN BLOOD BY AUTOMATED COUNT: 16.7 % (ref 11.6–15.1)
GFR SERPL CREATININE-BSD FRML MDRD: 88 ML/MIN/1.73SQ M
GLUCOSE P FAST SERPL-MCNC: 102 MG/DL (ref 65–99)
HCT VFR BLD AUTO: 32.3 % (ref 34.8–46.1)
HGB BLD-MCNC: 10.8 G/DL (ref 11.5–15.4)
IGA SERPL-MCNC: 27 MG/DL (ref 66–433)
IGG SERPL-MCNC: 527 MG/DL (ref 635–1741)
IGM SERPL-MCNC: 72 MG/DL (ref 45–281)
LDH SERPL-CCNC: 85 U/L (ref 140–271)
LYMPHOCYTES # BLD AUTO: 0.96 THOUSAND/UL (ref 0.6–4.47)
LYMPHOCYTES # BLD AUTO: 63 % (ref 14–44)
MCH RBC QN AUTO: 31.4 PG (ref 26.8–34.3)
MCHC RBC AUTO-ENTMCNC: 33.4 G/DL (ref 31.4–37.4)
MCV RBC AUTO: 94 FL (ref 82–98)
MONOCYTES # BLD AUTO: 0.16 THOUSAND/UL (ref 0–1.22)
MONOCYTES NFR BLD: 13 % (ref 4–12)
NEUTROPHILS # BLD MANUAL: 0.09 THOUSAND/UL (ref 1.85–7.62)
NEUTS SEG NFR BLD AUTO: 7 % (ref 43–75)
OVALOCYTES BLD QL SMEAR: PRESENT
PLATELET # BLD AUTO: 88 THOUSANDS/UL (ref 149–390)
PLATELET BLD QL SMEAR: ABNORMAL
PMV BLD AUTO: 11 FL (ref 8.9–12.7)
POIKILOCYTOSIS BLD QL SMEAR: PRESENT
POTASSIUM SERPL-SCNC: 4.2 MMOL/L (ref 3.5–5.3)
PROT SERPL-MCNC: 6.6 G/DL (ref 6.4–8.4)
RBC # BLD AUTO: 3.44 MILLION/UL (ref 3.81–5.12)
RBC MORPH BLD: PRESENT
SODIUM SERPL-SCNC: 140 MMOL/L (ref 135–147)
URATE SERPL-MCNC: 2.3 MG/DL (ref 2–7.5)
VARIANT LYMPHS # BLD AUTO: 16 %
WBC # BLD AUTO: 1.22 THOUSAND/UL (ref 4.31–10.16)

## 2025-03-04 PROCEDURE — 36415 COLL VENOUS BLD VENIPUNCTURE: CPT

## 2025-03-04 PROCEDURE — 80053 COMPREHEN METABOLIC PANEL: CPT

## 2025-03-04 PROCEDURE — 84165 PROTEIN E-PHORESIS SERUM: CPT

## 2025-03-04 PROCEDURE — 85027 COMPLETE CBC AUTOMATED: CPT

## 2025-03-04 PROCEDURE — 82232 ASSAY OF BETA-2 PROTEIN: CPT

## 2025-03-04 PROCEDURE — 83521 IG LIGHT CHAINS FREE EACH: CPT

## 2025-03-04 PROCEDURE — 83615 LACTATE (LD) (LDH) ENZYME: CPT

## 2025-03-04 PROCEDURE — 84550 ASSAY OF BLOOD/URIC ACID: CPT

## 2025-03-04 PROCEDURE — 85007 BL SMEAR W/DIFF WBC COUNT: CPT

## 2025-03-04 PROCEDURE — 82784 ASSAY IGA/IGD/IGG/IGM EACH: CPT

## 2025-03-04 PROCEDURE — 86334 IMMUNOFIX E-PHORESIS SERUM: CPT

## 2025-03-05 ENCOUNTER — HOSPITAL ENCOUNTER (OUTPATIENT)
Dept: INFUSION CENTER | Facility: CLINIC | Age: 75
Discharge: HOME | End: 2025-03-05
Payer: MEDICARE

## 2025-03-05 ENCOUNTER — TELEPHONE (OUTPATIENT)
Dept: HEMATOLOGY ONCOLOGY | Facility: CLINIC | Age: 75
End: 2025-03-05

## 2025-03-05 DIAGNOSIS — T45.1X5A CHEMOTHERAPY INDUCED NEUTROPENIA (HCC): Primary | ICD-10-CM

## 2025-03-05 DIAGNOSIS — D70.1 CHEMOTHERAPY INDUCED NEUTROPENIA (HCC): Primary | ICD-10-CM

## 2025-03-05 LAB
KAPPA LC FREE SER-MCNC: 9.4 MG/L (ref 3.3–19.4)
KAPPA LC FREE/LAMBDA FREE SER: 0.05 {RATIO} (ref 0.26–1.65)
LAMBDA LC FREE SERPL-MCNC: 198.5 MG/L (ref 5.7–26.3)

## 2025-03-05 PROCEDURE — 96372 THER/PROPH/DIAG INJ SC/IM: CPT

## 2025-03-05 RX ADMIN — TBO-FILGRASTIM 300 MCG: 300 INJECTION, SOLUTION SUBCUTANEOUS at 16:30

## 2025-03-05 NOTE — PROGRESS NOTES
Patient infusion for Granix.  She offers no complaint.  She tolerated injection in Left Arm.  Next appointment confirmed for tomorrow 5/6 1430 for additional granix injection. She declined AVS

## 2025-03-05 NOTE — TELEPHONE ENCOUNTER
Messaging with Kaylie HIDALGO re: pt is scheduled for tx on 3/5 and Granix on 3/6. Is she still to get tx?

## 2025-03-05 NOTE — TELEPHONE ENCOUNTER
Patient needs to be scheduled for granix today and tomorrow per Dr. Davis  Patient aware and orders are in

## 2025-03-05 NOTE — TELEPHONE ENCOUNTER
I spoke with patient.  She has very low ANC.  She will stop Pomalyst.  She has instructions about febrile neutropenia.  She will be receiving Granix today and tomorrow. I have spoken with my nurse

## 2025-03-06 ENCOUNTER — HOSPITAL ENCOUNTER (OUTPATIENT)
Dept: INFUSION CENTER | Facility: CLINIC | Age: 75
Discharge: HOME | End: 2025-03-06
Payer: MEDICARE

## 2025-03-06 ENCOUNTER — HOSPITAL ENCOUNTER (OUTPATIENT)
Dept: INFUSION CENTER | Facility: CLINIC | Age: 75
End: 2025-03-06

## 2025-03-06 DIAGNOSIS — D70.1 CHEMOTHERAPY INDUCED NEUTROPENIA (HCC): Primary | ICD-10-CM

## 2025-03-06 DIAGNOSIS — T45.1X5A CHEMOTHERAPY INDUCED NEUTROPENIA (HCC): Primary | ICD-10-CM

## 2025-03-06 LAB
ALBUMIN SERPL ELPH-MCNC: 3.82 G/DL (ref 3.2–5.1)
ALBUMIN SERPL ELPH-MCNC: 63.7 % (ref 48–70)
ALPHA1 GLOB SERPL ELPH-MCNC: 0.36 G/DL (ref 0.15–0.47)
ALPHA1 GLOB SERPL ELPH-MCNC: 6 % (ref 1.8–7)
ALPHA2 GLOB SERPL ELPH-MCNC: 0.67 G/DL (ref 0.42–1.04)
ALPHA2 GLOB SERPL ELPH-MCNC: 11.2 % (ref 5.9–14.9)
B2 MICROGLOB SERPL-MCNC: 2.3 MG/L (ref 0.6–2.4)
BETA GLOB ABNORMAL SERPL ELPH-MCNC: 0.39 G/DL (ref 0.31–0.57)
BETA1 GLOB SERPL ELPH-MCNC: 6.5 % (ref 4.7–7.7)
BETA2 GLOB SERPL ELPH-MCNC: 4.4 % (ref 3.1–7.9)
BETA2+GAMMA GLOB SERPL ELPH-MCNC: 0.26 G/DL (ref 0.2–0.58)
GAMMA GLOB ABNORMAL SERPL ELPH-MCNC: 0.49 G/DL (ref 0.4–1.66)
GAMMA GLOB SERPL ELPH-MCNC: 8.2 % (ref 6.9–22.3)
IGG/ALB SER: 1.75 {RATIO} (ref 1.1–1.8)
INTERPRETATION UR IFE-IMP: NORMAL
PROT PATTERN SERPL ELPH-IMP: ABNORMAL
PROT SERPL-MCNC: 6 G/DL (ref 6.4–8.2)

## 2025-03-06 PROCEDURE — 96372 THER/PROPH/DIAG INJ SC/IM: CPT

## 2025-03-06 PROCEDURE — 84165 PROTEIN E-PHORESIS SERUM: CPT | Performed by: STUDENT IN AN ORGANIZED HEALTH CARE EDUCATION/TRAINING PROGRAM

## 2025-03-06 PROCEDURE — 86334 IMMUNOFIX E-PHORESIS SERUM: CPT | Performed by: STUDENT IN AN ORGANIZED HEALTH CARE EDUCATION/TRAINING PROGRAM

## 2025-03-06 RX ADMIN — TBO-FILGRASTIM 300 MCG: 300 INJECTION, SOLUTION SUBCUTANEOUS at 14:25

## 2025-03-06 NOTE — PROGRESS NOTES
Pt here for granix, injection given in R arm, tolerated well with no complaints at this time. Next appt 3/13 at 1100 at [facility]. Declined AVS.

## 2025-03-07 ENCOUNTER — HOSPITAL ENCOUNTER (OUTPATIENT)
Dept: INFUSION CENTER | Facility: CLINIC | Age: 75
End: 2025-03-07

## 2025-03-07 DIAGNOSIS — D70.1 CHEMOTHERAPY INDUCED NEUTROPENIA (HCC): Primary | ICD-10-CM

## 2025-03-07 DIAGNOSIS — T45.1X5A CHEMOTHERAPY INDUCED NEUTROPENIA (HCC): Primary | ICD-10-CM

## 2025-03-11 ENCOUNTER — APPOINTMENT (OUTPATIENT)
Dept: LAB | Facility: CLINIC | Age: 75
End: 2025-03-11
Payer: MEDICARE

## 2025-03-11 DIAGNOSIS — C90.00 MULTIPLE MYELOMA NOT HAVING ACHIEVED REMISSION (HCC): ICD-10-CM

## 2025-03-11 LAB
ALBUMIN SERPL BCG-MCNC: 4.4 G/DL (ref 3.5–5)
ALP SERPL-CCNC: 75 U/L (ref 34–104)
ALT SERPL W P-5'-P-CCNC: 13 U/L (ref 7–52)
ANION GAP SERPL CALCULATED.3IONS-SCNC: 8 MMOL/L (ref 4–13)
AST SERPL W P-5'-P-CCNC: 14 U/L (ref 13–39)
BILIRUB SERPL-MCNC: 0.63 MG/DL (ref 0.2–1)
BUN SERPL-MCNC: 13 MG/DL (ref 5–25)
CALCIUM SERPL-MCNC: 9.8 MG/DL (ref 8.4–10.2)
CHLORIDE SERPL-SCNC: 102 MMOL/L (ref 96–108)
CO2 SERPL-SCNC: 27 MMOL/L (ref 21–32)
CREAT SERPL-MCNC: 0.7 MG/DL (ref 0.6–1.3)
GFR SERPL CREATININE-BSD FRML MDRD: 86 ML/MIN/1.73SQ M
GLUCOSE P FAST SERPL-MCNC: 119 MG/DL (ref 65–99)
POTASSIUM SERPL-SCNC: 4.1 MMOL/L (ref 3.5–5.3)
PROT SERPL-MCNC: 6.7 G/DL (ref 6.4–8.4)
SODIUM SERPL-SCNC: 137 MMOL/L (ref 135–147)

## 2025-03-11 PROCEDURE — 85007 BL SMEAR W/DIFF WBC COUNT: CPT

## 2025-03-11 PROCEDURE — 80053 COMPREHEN METABOLIC PANEL: CPT

## 2025-03-11 PROCEDURE — 85060 BLOOD SMEAR INTERPRETATION: CPT | Performed by: STUDENT IN AN ORGANIZED HEALTH CARE EDUCATION/TRAINING PROGRAM

## 2025-03-11 PROCEDURE — 85027 COMPLETE CBC AUTOMATED: CPT

## 2025-03-11 PROCEDURE — 36415 COLL VENOUS BLD VENIPUNCTURE: CPT

## 2025-03-12 LAB
ANISOCYTOSIS BLD QL SMEAR: PRESENT
BASOPHILS # BLD MANUAL: 0 THOUSAND/UL (ref 0–0.1)
BASOPHILS NFR MAR MANUAL: 0 % (ref 0–1)
DIFFERENTIAL COMMENT: ABNORMAL
EOSINOPHIL # BLD MANUAL: 0.05 THOUSAND/UL (ref 0–0.4)
EOSINOPHIL NFR BLD MANUAL: 1 % (ref 0–6)
ERYTHROCYTE [DISTWIDTH] IN BLOOD BY AUTOMATED COUNT: 17.1 % (ref 11.6–15.1)
HCT VFR BLD AUTO: 34.7 % (ref 34.8–46.1)
HGB BLD-MCNC: 11.2 G/DL (ref 11.5–15.4)
LYMPHOCYTES # BLD AUTO: 2.57 THOUSAND/UL (ref 0.6–4.47)
LYMPHOCYTES # BLD AUTO: 36 % (ref 14–44)
MCH RBC QN AUTO: 30.8 PG (ref 26.8–34.3)
MCHC RBC AUTO-ENTMCNC: 32.3 G/DL (ref 31.4–37.4)
MCV RBC AUTO: 95 FL (ref 82–98)
METAMYELOCYTE ABSOLUTE CT: 0.05 THOUSAND/UL (ref 0–0.1)
METAMYELOCYTES NFR BLD MANUAL: 1 % (ref 0–1)
MONOCYTES # BLD AUTO: 0.89 THOUSAND/UL (ref 0–1.22)
MONOCYTES NFR BLD: 18 % (ref 4–12)
NEUTROPHILS # BLD MANUAL: 1.38 THOUSAND/UL (ref 1.85–7.62)
NEUTS BAND NFR BLD MANUAL: 2 % (ref 0–8)
NEUTS SEG NFR BLD AUTO: 26 % (ref 43–75)
OVALOCYTES BLD QL SMEAR: PRESENT
PATHOLOGY REVIEW: YES
PLATELET # BLD AUTO: 268 THOUSANDS/UL (ref 149–390)
PLATELET BLD QL SMEAR: ADEQUATE
PMV BLD AUTO: 9.9 FL (ref 8.9–12.7)
POIKILOCYTOSIS BLD QL SMEAR: PRESENT
POLYCHROMASIA BLD QL SMEAR: PRESENT
RBC # BLD AUTO: 3.64 MILLION/UL (ref 3.81–5.12)
RBC MORPH BLD: PRESENT
VARIANT LYMPHS # BLD AUTO: 16 %
WBC # BLD AUTO: 4.94 THOUSAND/UL (ref 4.31–10.16)

## 2025-03-13 ENCOUNTER — HOSPITAL ENCOUNTER (OUTPATIENT)
Dept: INFUSION CENTER | Facility: CLINIC | Age: 75
Discharge: HOME | End: 2025-03-13
Payer: MEDICARE

## 2025-03-13 VITALS
RESPIRATION RATE: 20 BRPM | TEMPERATURE: 97.6 F | HEART RATE: 79 BPM | DIASTOLIC BLOOD PRESSURE: 67 MMHG | OXYGEN SATURATION: 97 % | SYSTOLIC BLOOD PRESSURE: 115 MMHG

## 2025-03-13 DIAGNOSIS — C90.00 MULTIPLE MYELOMA NOT HAVING ACHIEVED REMISSION (HCC): Primary | ICD-10-CM

## 2025-03-13 PROCEDURE — 96401 CHEMO ANTI-NEOPL SQ/IM: CPT

## 2025-03-13 RX ORDER — DIPHENHYDRAMINE HCL 25 MG
25 TABLET ORAL ONCE
Status: COMPLETED | OUTPATIENT
Start: 2025-03-13 | End: 2025-03-13

## 2025-03-13 RX ORDER — ACETAMINOPHEN 325 MG/1
650 TABLET ORAL ONCE
Status: COMPLETED | OUTPATIENT
Start: 2025-03-13 | End: 2025-03-13

## 2025-03-13 RX ADMIN — DIPHENHYDRAMINE HYDROCHLORIDE 25 MG: 25 TABLET ORAL at 11:11

## 2025-03-13 RX ADMIN — ACETAMINOPHEN 650 MG: 325 TABLET ORAL at 11:11

## 2025-03-13 RX ADMIN — DARATUMUMAB AND HYALURONIDASE-FIHJ (HUMAN RECOMBINANT) 1800 MG: 1800; 30000 INJECTION SUBCUTANEOUS at 12:20

## 2025-03-13 NOTE — PROGRESS NOTES
Patient tolerated treatment well with no adverse reactions. Injections given in b/l abdominal tissue. Declined AVS. Confirmed next appointment at the [facility] for 03/14/2025 @ 1500 for weekly Granix injection. Patient ambulatory at discharge.

## 2025-03-13 NOTE — PROGRESS NOTES
Patient is here for Darzalex Faspro. Labs reviewed from 3/11, within treatment parameters. Patient resting with no complains. Will continue to monitor.

## 2025-03-13 NOTE — PROGRESS NOTES
"Patient presents tot  Infusion Center for the treatment of Darzalex Faspro. Labs from 03/11/2025 reviewed and within parameters for treatment. Patient offers concerns of fatigue and a \"generalized achy\" feeling. She is resting comfortably in the chair, call bell within reach.  "

## 2025-03-14 ENCOUNTER — HOSPITAL ENCOUNTER (OUTPATIENT)
Dept: INFUSION CENTER | Facility: CLINIC | Age: 75
End: 2025-03-14
Payer: MEDICARE

## 2025-03-14 VITALS — TEMPERATURE: 97.1 F

## 2025-03-14 DIAGNOSIS — D70.1 CHEMOTHERAPY INDUCED NEUTROPENIA (HCC): Primary | ICD-10-CM

## 2025-03-14 DIAGNOSIS — C90.00 MULTIPLE MYELOMA NOT HAVING ACHIEVED REMISSION (HCC): ICD-10-CM

## 2025-03-14 DIAGNOSIS — T45.1X5A CHEMOTHERAPY INDUCED NEUTROPENIA (HCC): Primary | ICD-10-CM

## 2025-03-14 PROCEDURE — 96372 THER/PROPH/DIAG INJ SC/IM: CPT

## 2025-03-14 RX ORDER — ALLOPURINOL 100 MG/1
200 TABLET ORAL DAILY
Qty: 60 TABLET | Refills: 1 | Status: SHIPPED | OUTPATIENT
Start: 2025-03-14

## 2025-03-14 RX ADMIN — TBO-FILGRASTIM 300 MCG: 300 INJECTION, SOLUTION SUBCUTANEOUS at 15:05

## 2025-03-14 NOTE — PROGRESS NOTES
Pt to clinic for granix injection. Pt tolerated in right arm. Next appointment March 20 at 9:00 for treatment. Pt aware to get labs prior to that appointment.

## 2025-03-17 ENCOUNTER — APPOINTMENT (OUTPATIENT)
Dept: LAB | Facility: CLINIC | Age: 75
End: 2025-03-17
Payer: MEDICARE

## 2025-03-17 DIAGNOSIS — C90.00 MULTIPLE MYELOMA NOT HAVING ACHIEVED REMISSION (HCC): ICD-10-CM

## 2025-03-17 LAB
ALBUMIN SERPL BCG-MCNC: 4.1 G/DL (ref 3.5–5)
ALP SERPL-CCNC: 110 U/L (ref 34–104)
ALT SERPL W P-5'-P-CCNC: 21 U/L (ref 7–52)
ANION GAP SERPL CALCULATED.3IONS-SCNC: 11 MMOL/L (ref 4–13)
ANISOCYTOSIS BLD QL SMEAR: PRESENT
AST SERPL W P-5'-P-CCNC: 16 U/L (ref 13–39)
BASOPHILS # BLD MANUAL: 0 THOUSAND/UL (ref 0–0.1)
BASOPHILS NFR MAR MANUAL: 0 % (ref 0–1)
BILIRUB SERPL-MCNC: 0.43 MG/DL (ref 0.2–1)
BUN SERPL-MCNC: 11 MG/DL (ref 5–25)
CALCIUM SERPL-MCNC: 9.4 MG/DL (ref 8.4–10.2)
CHLORIDE SERPL-SCNC: 104 MMOL/L (ref 96–108)
CO2 SERPL-SCNC: 24 MMOL/L (ref 21–32)
CREAT SERPL-MCNC: 0.63 MG/DL (ref 0.6–1.3)
EOSINOPHIL # BLD MANUAL: 0 THOUSAND/UL (ref 0–0.4)
EOSINOPHIL NFR BLD MANUAL: 0 % (ref 0–6)
ERYTHROCYTE [DISTWIDTH] IN BLOOD BY AUTOMATED COUNT: 18.1 % (ref 11.6–15.1)
GFR SERPL CREATININE-BSD FRML MDRD: 89 ML/MIN/1.73SQ M
GLUCOSE P FAST SERPL-MCNC: 79 MG/DL (ref 65–99)
HCT VFR BLD AUTO: 32.9 % (ref 34.8–46.1)
HGB BLD-MCNC: 10.7 G/DL (ref 11.5–15.4)
LYMPHOCYTES # BLD AUTO: 1.44 THOUSAND/UL (ref 0.6–4.47)
LYMPHOCYTES # BLD AUTO: 10 % (ref 14–44)
MCH RBC QN AUTO: 31.3 PG (ref 26.8–34.3)
MCHC RBC AUTO-ENTMCNC: 32.5 G/DL (ref 31.4–37.4)
MCV RBC AUTO: 96 FL (ref 82–98)
MONOCYTES # BLD AUTO: 0.52 THOUSAND/UL (ref 0–1.22)
MONOCYTES NFR BLD: 4 % (ref 4–12)
NEUTROPHILS # BLD MANUAL: 11.09 THOUSAND/UL (ref 1.85–7.62)
NEUTS SEG NFR BLD AUTO: 85 % (ref 43–75)
PLATELET # BLD AUTO: 355 THOUSANDS/UL (ref 149–390)
PLATELET BLD QL SMEAR: ADEQUATE
PMV BLD AUTO: 9.5 FL (ref 8.9–12.7)
POIKILOCYTOSIS BLD QL SMEAR: PRESENT
POLYCHROMASIA BLD QL SMEAR: PRESENT
POTASSIUM SERPL-SCNC: 4 MMOL/L (ref 3.5–5.3)
PROT SERPL-MCNC: 6.5 G/DL (ref 6.4–8.4)
RBC # BLD AUTO: 3.42 MILLION/UL (ref 3.81–5.12)
RBC MORPH BLD: PRESENT
SODIUM SERPL-SCNC: 139 MMOL/L (ref 135–147)
VARIANT LYMPHS # BLD AUTO: 1 %
WBC # BLD AUTO: 13.05 THOUSAND/UL (ref 4.31–10.16)

## 2025-03-17 PROCEDURE — 36415 COLL VENOUS BLD VENIPUNCTURE: CPT

## 2025-03-17 PROCEDURE — 85007 BL SMEAR W/DIFF WBC COUNT: CPT

## 2025-03-17 PROCEDURE — 80053 COMPREHEN METABOLIC PANEL: CPT

## 2025-03-17 PROCEDURE — 85027 COMPLETE CBC AUTOMATED: CPT

## 2025-03-20 ENCOUNTER — HOSPITAL ENCOUNTER (OUTPATIENT)
Dept: INFUSION CENTER | Facility: CLINIC | Age: 75
Discharge: HOME | End: 2025-03-20
Payer: MEDICARE

## 2025-03-20 VITALS
OXYGEN SATURATION: 97 % | SYSTOLIC BLOOD PRESSURE: 136 MMHG | TEMPERATURE: 96.3 F | RESPIRATION RATE: 18 BRPM | DIASTOLIC BLOOD PRESSURE: 72 MMHG | HEART RATE: 71 BPM

## 2025-03-20 DIAGNOSIS — C90.00 MULTIPLE MYELOMA NOT HAVING ACHIEVED REMISSION (HCC): Primary | ICD-10-CM

## 2025-03-20 PROCEDURE — 96401 CHEMO ANTI-NEOPL SQ/IM: CPT

## 2025-03-20 RX ORDER — DIPHENHYDRAMINE HCL 25 MG
25 TABLET ORAL ONCE
Status: COMPLETED | OUTPATIENT
Start: 2025-03-20 | End: 2025-03-20

## 2025-03-20 RX ORDER — ACETAMINOPHEN 325 MG/1
650 TABLET ORAL ONCE
Status: COMPLETED | OUTPATIENT
Start: 2025-03-20 | End: 2025-03-20

## 2025-03-20 RX ADMIN — ACETAMINOPHEN 650 MG: 325 TABLET ORAL at 09:02

## 2025-03-20 RX ADMIN — DARATUMUMAB AND HYALURONIDASE-FIHJ (HUMAN RECOMBINANT) 1800 MG: 1800; 30000 INJECTION SUBCUTANEOUS at 10:06

## 2025-03-20 RX ADMIN — DIPHENHYDRAMINE HYDROCHLORIDE 25 MG: 25 TABLET ORAL at 09:02

## 2025-03-20 NOTE — PROGRESS NOTES
Pt given Darzalex as ordered in abdomen without incident. Confirmed pts apt for tomorrow @ 12:30pm @ [facility]. [facility] AVS.

## 2025-03-20 NOTE — PROGRESS NOTES
Patient presents to Infusion Center for Darzalex Faspro. Patient offers no complaints at this time. Labs reviewed from 3/17- within parameters for treatment today.

## 2025-03-21 ENCOUNTER — HOSPITAL ENCOUNTER (OUTPATIENT)
Dept: INFUSION CENTER | Facility: CLINIC | Age: 75
Discharge: HOME | End: 2025-03-21
Payer: MEDICARE

## 2025-03-21 VITALS — TEMPERATURE: 97.7 F

## 2025-03-21 DIAGNOSIS — T45.1X5A CHEMOTHERAPY INDUCED NEUTROPENIA (HCC): Primary | ICD-10-CM

## 2025-03-21 DIAGNOSIS — D70.1 CHEMOTHERAPY INDUCED NEUTROPENIA (HCC): Primary | ICD-10-CM

## 2025-03-21 PROCEDURE — 96372 THER/PROPH/DIAG INJ SC/IM: CPT

## 2025-03-21 RX ADMIN — TBO-FILGRASTIM 300 MCG: 300 INJECTION, SOLUTION SUBCUTANEOUS at 12:32

## 2025-03-21 NOTE — PLAN OF CARE
Problem: Knowledge Deficit  Goal: Patient/family/caregiver demonstrates understanding of disease process, treatment plan, medications, and discharge instructions  Description: Complete learning assessment and assess knowledge base.Interventions:- Provide teaching at level of understanding- Provide teaching via preferred learning methods  Outcome: Completed      [id number]

## 2025-03-24 ENCOUNTER — APPOINTMENT (OUTPATIENT)
Dept: LAB | Facility: CLINIC | Age: 75
End: 2025-03-24
Payer: MEDICARE

## 2025-03-24 DIAGNOSIS — C90.00 MULTIPLE MYELOMA NOT HAVING ACHIEVED REMISSION (HCC): ICD-10-CM

## 2025-03-24 LAB
ACANTHOCYTES BLD QL SMEAR: PRESENT
ALBUMIN SERPL BCG-MCNC: 4.5 G/DL (ref 3.5–5)
ALP SERPL-CCNC: 99 U/L (ref 34–104)
ALT SERPL W P-5'-P-CCNC: 19 U/L (ref 7–52)
ANION GAP SERPL CALCULATED.3IONS-SCNC: 11 MMOL/L (ref 4–13)
ANISOCYTOSIS BLD QL SMEAR: PRESENT
AST SERPL W P-5'-P-CCNC: 19 U/L (ref 13–39)
BASOPHILS # BLD MANUAL: 0 THOUSAND/UL (ref 0–0.1)
BASOPHILS NFR MAR MANUAL: 0 % (ref 0–1)
BILIRUB SERPL-MCNC: 0.53 MG/DL (ref 0.2–1)
BUN SERPL-MCNC: 12 MG/DL (ref 5–25)
CALCIUM SERPL-MCNC: 9.6 MG/DL (ref 8.4–10.2)
CHLORIDE SERPL-SCNC: 101 MMOL/L (ref 96–108)
CO2 SERPL-SCNC: 25 MMOL/L (ref 21–32)
CREAT SERPL-MCNC: 0.7 MG/DL (ref 0.6–1.3)
EOSINOPHIL # BLD MANUAL: 0.14 THOUSAND/UL (ref 0–0.4)
EOSINOPHIL NFR BLD MANUAL: 1 % (ref 0–6)
ERYTHROCYTE [DISTWIDTH] IN BLOOD BY AUTOMATED COUNT: 17.9 % (ref 11.6–15.1)
GFR SERPL CREATININE-BSD FRML MDRD: 86 ML/MIN/1.73SQ M
GLUCOSE P FAST SERPL-MCNC: 115 MG/DL (ref 65–99)
HCT VFR BLD AUTO: 35 % (ref 34.8–46.1)
HGB BLD-MCNC: 11.7 G/DL (ref 11.5–15.4)
LYMPHOCYTES # BLD AUTO: 1.94 THOUSAND/UL (ref 0.6–4.47)
LYMPHOCYTES # BLD AUTO: 10 % (ref 14–44)
MCH RBC QN AUTO: 31.7 PG (ref 26.8–34.3)
MCHC RBC AUTO-ENTMCNC: 33.4 G/DL (ref 31.4–37.4)
MCV RBC AUTO: 95 FL (ref 82–98)
MONOCYTES # BLD AUTO: 1.11 THOUSAND/UL (ref 0–1.22)
MONOCYTES NFR BLD: 8 % (ref 4–12)
NEUTROPHILS # BLD MANUAL: 10.66 THOUSAND/UL (ref 1.85–7.62)
NEUTS SEG NFR BLD AUTO: 77 % (ref 43–75)
OVALOCYTES BLD QL SMEAR: PRESENT
PLATELET # BLD AUTO: 410 THOUSANDS/UL (ref 149–390)
PLATELET BLD QL SMEAR: ABNORMAL
PMV BLD AUTO: 9.6 FL (ref 8.9–12.7)
POIKILOCYTOSIS BLD QL SMEAR: PRESENT
POTASSIUM SERPL-SCNC: 4 MMOL/L (ref 3.5–5.3)
PROT SERPL-MCNC: 6.7 G/DL (ref 6.4–8.4)
RBC # BLD AUTO: 3.69 MILLION/UL (ref 3.81–5.12)
RBC MORPH BLD: PRESENT
SODIUM SERPL-SCNC: 137 MMOL/L (ref 135–147)
VARIANT LYMPHS # BLD AUTO: 4 %
WBC # BLD AUTO: 13.84 THOUSAND/UL (ref 4.31–10.16)

## 2025-03-24 PROCEDURE — 36415 COLL VENOUS BLD VENIPUNCTURE: CPT

## 2025-03-24 PROCEDURE — 85007 BL SMEAR W/DIFF WBC COUNT: CPT

## 2025-03-24 PROCEDURE — 85027 COMPLETE CBC AUTOMATED: CPT

## 2025-03-24 PROCEDURE — 80053 COMPREHEN METABOLIC PANEL: CPT

## 2025-03-27 ENCOUNTER — HOSPITAL ENCOUNTER (OUTPATIENT)
Dept: INFUSION CENTER | Facility: CLINIC | Age: 75
Discharge: HOME | End: 2025-03-27
Payer: MEDICARE

## 2025-03-27 VITALS
HEART RATE: 74 BPM | RESPIRATION RATE: 18 BRPM | DIASTOLIC BLOOD PRESSURE: 65 MMHG | OXYGEN SATURATION: 98 % | TEMPERATURE: 97 F | SYSTOLIC BLOOD PRESSURE: 103 MMHG

## 2025-03-27 DIAGNOSIS — C90.00 MULTIPLE MYELOMA NOT HAVING ACHIEVED REMISSION (HCC): Primary | ICD-10-CM

## 2025-03-27 PROCEDURE — 96401 CHEMO ANTI-NEOPL SQ/IM: CPT

## 2025-03-27 RX ORDER — ACETAMINOPHEN 325 MG/1
650 TABLET ORAL ONCE
Status: COMPLETED | OUTPATIENT
Start: 2025-03-27 | End: 2025-03-27

## 2025-03-27 RX ORDER — DIPHENHYDRAMINE HCL 25 MG
25 TABLET ORAL ONCE
Status: COMPLETED | OUTPATIENT
Start: 2025-03-27 | End: 2025-03-27

## 2025-03-27 RX ADMIN — DARATUMUMAB AND HYALURONIDASE-FIHJ (HUMAN RECOMBINANT) 1800 MG: 1800; 30000 INJECTION SUBCUTANEOUS at 11:24

## 2025-03-27 RX ADMIN — DIPHENHYDRAMINE HYDROCHLORIDE 25 MG: 25 TABLET ORAL at 10:22

## 2025-03-27 RX ADMIN — ACETAMINOPHEN 650 MG: 325 TABLET ORAL at 10:22

## 2025-03-27 NOTE — PROGRESS NOTES
Pt arrives to infusion center for Darzalex Faspro. Offers no complaints. Labs from 3/24 are within tx parameters. Pt sitting comfortably in chair, wheels locked, call bell within reach.

## 2025-03-27 NOTE — PROGRESS NOTES
Injection given in b/l abdomen with CD, RN without issue. Pt confirms next appointment on 4/03 0900@ AN. AVS declined.

## 2025-03-28 ENCOUNTER — HOSPITAL ENCOUNTER (OUTPATIENT)
Dept: INFUSION CENTER | Facility: CLINIC | Age: 75
End: 2025-03-28

## 2025-03-31 ENCOUNTER — APPOINTMENT (OUTPATIENT)
Dept: LAB | Facility: CLINIC | Age: 75
End: 2025-03-31
Payer: MEDICARE

## 2025-03-31 DIAGNOSIS — C90.00 MULTIPLE MYELOMA NOT HAVING ACHIEVED REMISSION (HCC): ICD-10-CM

## 2025-03-31 LAB
ACANTHOCYTES BLD QL SMEAR: PRESENT
ALBUMIN SERPL BCG-MCNC: 4.4 G/DL (ref 3.5–5)
ALP SERPL-CCNC: 77 U/L (ref 34–104)
ALT SERPL W P-5'-P-CCNC: 15 U/L (ref 7–52)
ANION GAP SERPL CALCULATED.3IONS-SCNC: 13 MMOL/L (ref 4–13)
ANISOCYTOSIS BLD QL SMEAR: PRESENT
AST SERPL W P-5'-P-CCNC: 16 U/L (ref 13–39)
BASOPHILS # BLD MANUAL: 0 THOUSAND/UL (ref 0–0.1)
BASOPHILS NFR MAR MANUAL: 0 % (ref 0–1)
BILIRUB SERPL-MCNC: 0.48 MG/DL (ref 0.2–1)
BUN SERPL-MCNC: 12 MG/DL (ref 5–25)
CALCIUM SERPL-MCNC: 9.7 MG/DL (ref 8.4–10.2)
CHLORIDE SERPL-SCNC: 103 MMOL/L (ref 96–108)
CO2 SERPL-SCNC: 25 MMOL/L (ref 21–32)
CREAT SERPL-MCNC: 0.73 MG/DL (ref 0.6–1.3)
EOSINOPHIL # BLD MANUAL: 0.05 THOUSAND/UL (ref 0–0.4)
EOSINOPHIL NFR BLD MANUAL: 1 % (ref 0–6)
ERYTHROCYTE [DISTWIDTH] IN BLOOD BY AUTOMATED COUNT: 19 % (ref 11.6–15.1)
GFR SERPL CREATININE-BSD FRML MDRD: 82 ML/MIN/1.73SQ M
GLUCOSE P FAST SERPL-MCNC: 121 MG/DL (ref 65–99)
HCT VFR BLD AUTO: 35.9 % (ref 34.8–46.1)
HGB BLD-MCNC: 11.4 G/DL (ref 11.5–15.4)
IGA SERPL-MCNC: 20 MG/DL (ref 66–433)
IGG SERPL-MCNC: 487 MG/DL (ref 635–1741)
IGM SERPL-MCNC: 31 MG/DL (ref 45–281)
LDH SERPL-CCNC: 89 U/L (ref 140–271)
LYMPHOCYTES # BLD AUTO: 1.77 THOUSAND/UL (ref 0.6–4.47)
LYMPHOCYTES # BLD AUTO: 34 % (ref 14–44)
MCH RBC QN AUTO: 31.1 PG (ref 26.8–34.3)
MCHC RBC AUTO-ENTMCNC: 31.8 G/DL (ref 31.4–37.4)
MCV RBC AUTO: 98 FL (ref 82–98)
MONOCYTES # BLD AUTO: 1.1 THOUSAND/UL (ref 0–1.22)
MONOCYTES NFR BLD: 21 % (ref 4–12)
NEUTROPHILS # BLD MANUAL: 2.3 THOUSAND/UL (ref 1.85–7.62)
NEUTS SEG NFR BLD AUTO: 44 % (ref 43–75)
OVALOCYTES BLD QL SMEAR: PRESENT
PLATELET # BLD AUTO: 354 THOUSANDS/UL (ref 149–390)
PLATELET BLD QL SMEAR: ADEQUATE
PMV BLD AUTO: 10.1 FL (ref 8.9–12.7)
POIKILOCYTOSIS BLD QL SMEAR: PRESENT
POTASSIUM SERPL-SCNC: 3.6 MMOL/L (ref 3.5–5.3)
PROT SERPL-MCNC: 6.6 G/DL (ref 6.4–8.4)
RBC # BLD AUTO: 3.67 MILLION/UL (ref 3.81–5.12)
RBC MORPH BLD: PRESENT
SODIUM SERPL-SCNC: 141 MMOL/L (ref 135–147)
URATE SERPL-MCNC: 3 MG/DL (ref 2–7.5)
WBC # BLD AUTO: 5.22 THOUSAND/UL (ref 4.31–10.16)

## 2025-03-31 PROCEDURE — 82784 ASSAY IGA/IGD/IGG/IGM EACH: CPT

## 2025-03-31 PROCEDURE — 84165 PROTEIN E-PHORESIS SERUM: CPT

## 2025-03-31 PROCEDURE — 36415 COLL VENOUS BLD VENIPUNCTURE: CPT

## 2025-03-31 PROCEDURE — 84550 ASSAY OF BLOOD/URIC ACID: CPT

## 2025-03-31 PROCEDURE — 85007 BL SMEAR W/DIFF WBC COUNT: CPT

## 2025-03-31 PROCEDURE — 82232 ASSAY OF BETA-2 PROTEIN: CPT

## 2025-03-31 PROCEDURE — 80053 COMPREHEN METABOLIC PANEL: CPT

## 2025-03-31 PROCEDURE — 83521 IG LIGHT CHAINS FREE EACH: CPT

## 2025-03-31 PROCEDURE — 85027 COMPLETE CBC AUTOMATED: CPT

## 2025-03-31 PROCEDURE — 83615 LACTATE (LD) (LDH) ENZYME: CPT

## 2025-03-31 RX ORDER — DIPHENHYDRAMINE HCL 25 MG
25 TABLET ORAL ONCE
OUTPATIENT
Start: 2025-04-17

## 2025-03-31 RX ORDER — DIPHENHYDRAMINE HCL 25 MG
25 TABLET ORAL ONCE
Status: CANCELLED | OUTPATIENT
Start: 2025-04-03

## 2025-03-31 RX ORDER — ACETAMINOPHEN 325 MG/1
650 TABLET ORAL ONCE
Status: CANCELLED | OUTPATIENT
Start: 2025-04-03

## 2025-03-31 RX ORDER — ACETAMINOPHEN 325 MG/1
650 TABLET ORAL ONCE
OUTPATIENT
Start: 2025-04-17

## 2025-04-01 DIAGNOSIS — C90.00 MULTIPLE MYELOMA NOT HAVING ACHIEVED REMISSION (HCC): Primary | ICD-10-CM

## 2025-04-01 LAB
KAPPA LC FREE SER-MCNC: 4.5 MG/L (ref 3.3–19.4)
KAPPA LC FREE/LAMBDA FREE SER: 0.02 {RATIO} (ref 0.26–1.65)
LAMBDA LC FREE SERPL-MCNC: 228.6 MG/L (ref 5.7–26.3)

## 2025-04-02 LAB
ALBUMIN SERPL ELPH-MCNC: 4.02 G/DL (ref 3.2–5.1)
ALBUMIN SERPL ELPH-MCNC: 64.8 % (ref 48–70)
ALPHA1 GLOB SERPL ELPH-MCNC: 0.32 G/DL (ref 0.15–0.47)
ALPHA1 GLOB SERPL ELPH-MCNC: 5.1 % (ref 1.8–7)
ALPHA2 GLOB SERPL ELPH-MCNC: 0.75 G/DL (ref 0.42–1.04)
ALPHA2 GLOB SERPL ELPH-MCNC: 12.1 % (ref 5.9–14.9)
B2 MICROGLOB SERPL-MCNC: 1.9 MG/L (ref 0.6–2.4)
BETA GLOB ABNORMAL SERPL ELPH-MCNC: 0.41 G/DL (ref 0.31–0.57)
BETA1 GLOB SERPL ELPH-MCNC: 6.6 % (ref 4.7–7.7)
BETA2 GLOB SERPL ELPH-MCNC: 4.3 % (ref 3.1–7.9)
BETA2+GAMMA GLOB SERPL ELPH-MCNC: 0.27 G/DL (ref 0.2–0.58)
GAMMA GLOB ABNORMAL SERPL ELPH-MCNC: 0.44 G/DL (ref 0.4–1.66)
GAMMA GLOB SERPL ELPH-MCNC: 7.1 % (ref 6.9–22.3)
IGG/ALB SER: 1.84 {RATIO} (ref 1.1–1.8)
M PROTEIN 1 MFR SERPL ELPH: 1.9 %
M PROTEIN 1 SERPL ELPH-MCNC: 0.12 G/DL
PROT PATTERN SERPL ELPH-IMP: ABNORMAL
PROT SERPL-MCNC: 6.2 G/DL (ref 6.4–8.2)

## 2025-04-02 PROCEDURE — 84165 PROTEIN E-PHORESIS SERUM: CPT | Performed by: STUDENT IN AN ORGANIZED HEALTH CARE EDUCATION/TRAINING PROGRAM

## 2025-04-03 ENCOUNTER — HOSPITAL ENCOUNTER (OUTPATIENT)
Dept: INFUSION CENTER | Facility: CLINIC | Age: 75
Discharge: HOME | End: 2025-04-03
Payer: MEDICARE

## 2025-04-03 ENCOUNTER — OFFICE VISIT (OUTPATIENT)
Dept: HEMATOLOGY ONCOLOGY | Facility: CLINIC | Age: 75
End: 2025-04-03
Payer: MEDICARE

## 2025-04-03 VITALS
BODY MASS INDEX: 26.7 KG/M2 | RESPIRATION RATE: 18 BRPM | TEMPERATURE: 97.3 F | OXYGEN SATURATION: 97 % | DIASTOLIC BLOOD PRESSURE: 73 MMHG | HEIGHT: 60 IN | SYSTOLIC BLOOD PRESSURE: 120 MMHG | HEART RATE: 74 BPM | WEIGHT: 136 LBS

## 2025-04-03 VITALS
TEMPERATURE: 97.3 F | RESPIRATION RATE: 18 BRPM | OXYGEN SATURATION: 97 % | DIASTOLIC BLOOD PRESSURE: 73 MMHG | SYSTOLIC BLOOD PRESSURE: 120 MMHG | HEART RATE: 74 BPM

## 2025-04-03 DIAGNOSIS — Z94.84 H/O STEM CELL TRANSPLANT (HCC): ICD-10-CM

## 2025-04-03 DIAGNOSIS — D70.1 CHEMOTHERAPY INDUCED NEUTROPENIA (HCC): ICD-10-CM

## 2025-04-03 DIAGNOSIS — C90.00 MULTIPLE MYELOMA NOT HAVING ACHIEVED REMISSION (HCC): Primary | ICD-10-CM

## 2025-04-03 DIAGNOSIS — Z85.3 HISTORY OF BREAST CANCER: ICD-10-CM

## 2025-04-03 DIAGNOSIS — T45.1X5A CHEMOTHERAPY INDUCED NEUTROPENIA (HCC): ICD-10-CM

## 2025-04-03 DIAGNOSIS — S22.009A CLOSED FRACTURE OF MULTIPLE THORACIC VERTEBRAE, INITIAL ENCOUNTER (HCC): ICD-10-CM

## 2025-04-03 DIAGNOSIS — M81.8 OSTEOPOROSIS OF MULTIPLE SITES ASSOCIATED WITH MULTIPLE MYELOMA (HCC): ICD-10-CM

## 2025-04-03 DIAGNOSIS — I10 ESSENTIAL HYPERTENSION: ICD-10-CM

## 2025-04-03 DIAGNOSIS — Z90.710 S/P HYSTERECTOMY WITH OOPHORECTOMY: ICD-10-CM

## 2025-04-03 DIAGNOSIS — Z90.721 S/P HYSTERECTOMY WITH OOPHORECTOMY: ICD-10-CM

## 2025-04-03 DIAGNOSIS — C90.00 OSTEOPOROSIS OF MULTIPLE SITES ASSOCIATED WITH MULTIPLE MYELOMA (HCC): ICD-10-CM

## 2025-04-03 PROCEDURE — 96401 CHEMO ANTI-NEOPL SQ/IM: CPT

## 2025-04-03 PROCEDURE — 99214 OFFICE O/P EST MOD 30 MIN: CPT | Performed by: INTERNAL MEDICINE

## 2025-04-03 PROCEDURE — G2211 COMPLEX E/M VISIT ADD ON: HCPCS | Performed by: INTERNAL MEDICINE

## 2025-04-03 RX ORDER — DIPHENHYDRAMINE HCL 25 MG
25 TABLET ORAL ONCE
Status: COMPLETED | OUTPATIENT
Start: 2025-04-03 | End: 2025-04-03

## 2025-04-03 RX ORDER — ACETAMINOPHEN 325 MG/1
650 TABLET ORAL ONCE
Status: COMPLETED | OUTPATIENT
Start: 2025-04-03 | End: 2025-04-03

## 2025-04-03 RX ADMIN — ACETAMINOPHEN 650 MG: 325 TABLET ORAL at 09:23

## 2025-04-03 RX ADMIN — DARATUMUMAB AND HYALURONIDASE-FIHJ (HUMAN RECOMBINANT) 1800 MG: 1800; 30000 INJECTION SUBCUTANEOUS at 10:25

## 2025-04-03 RX ADMIN — DIPHENHYDRAMINE HYDROCHLORIDE 25 MG: 25 TABLET ORAL at 09:23

## 2025-04-03 NOTE — PROGRESS NOTES
Name: Chelita Seymour      : 1950      MRN: [id number]  Encounter Provider: Chris Davis MD  Encounter Date: 4/3/2025   Encounter department: [facility] [place]  :  Assessment & Plan  Multiple myeloma not having achieved remission (HCC)  Presently patient is on Darzalex, Pomalyst and Decadron.  Also she gets Granix because of profound neutropenia on Pomalyst.  Follows close she is being decreased to 1 mg daily, 3 weeks on and 1 week off.  In addition patient is on acyclovir and allopurinol.  She is on baby aspirin.  She is on Zometa.       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  She received radiation therapy.  In  she was started on RVD systemic chemotherapy for IgG lambda multiple myeloma followed by stem cell transplant and the Revlimid maintenance but that had to be discontinued in 2021 because of profound neutropenia and her blood counts were not recovering soon enough.       Chemotherapy induced neutropenia (HCC)  Counts are being monitored.  She has been receiving Granix.       S/P hysterectomy with oophorectomy  History of ISABELA and oophorectomy       Osteoporosis of multiple sites associated with multiple myeloma (HCC)  Patient has been on Zometa       Closed fracture of multiple thoracic vertebrae, initial encounter ([facility])  She had radiation       Essential hypertension  Being managed by PCP       History of breast cancer  History of right breast cancer.  No recurrence.           Patient has standing orders for blood work and Darzalex.  She takes Decadron at home the day she takes Darzalex.  She will be starting 1 mg Pomalyst hopefully next week.  Granix is on hold.  She will be going to [place] sometime in 2025 and will stay there for 4-5 months and has physician in 1 month.  Follow-up in 7 weeks.  No change in other medications.  She is on Zometa once every 3 months and she  will skip that when she is in [place].  Goal is response and prolongation of survival.  Patient is capable of self-care.  All discussed in detail.  Questions answered.  I suggested self breast examination, eating healthy foods, staying active but to avoid falls and trauma.  Suggested health screening tests. Patient to continue to follow-up with primary physician and other consultants.  Provided counseling and support.  I used a dictation device to dictate this note and there could be mistakes in my note and for that patient may contact my office.        History of Present Illness   Chief Complaint   Patient presents with    Follow-up   Patient is here with her .  Follow-up history of plasmacytoma and 2018 and she had radiation to her lumbar spine for that.  IgG lambda multiple myeloma was diagnosed in 2020 and patient had RVD induction chemotherapy followed by stem cell transplant and then Revlimid maintenance but that had to be discontinued in December 2021 because of profound neutropenia and counts were not recovering soon enough.  Now she has relapsed multiple myeloma and she is on DPD, and Darzalex, Pomalyst and Decadron in addition to baby aspirin, acyclovir, allopurinol and Zometa.  She was on 2 mg Pomalyst a day, 3 weeks on and 1 week off and again she developed profound neutropenia and received Granix injections.  Pomalyst was put on hold and now she will restart that but at a lower dose of 1 mg a day, 3 weeks on and 1 week off schedule and if needed we might have to lower the schedule to 2 weeks on and 2 weeks off and if needed she will have Granix.  Patient has some tiredness and arthritic symptoms and chronic low back discomfort.  Oncology History   Cancer Staging   Malignant neoplasm of right breast in female, estrogen receptor positive, unspecified site of breast (HCC)  Staging form: Breast, AJCC 8th Edition  - Clinical stage from 7/7/2024: Stage IA (cT1, cN0, cM0, G1, ER+, VT+, HER2-) - Signed  by Chris Davis MD on 2024  Stage prefix: Initial diagnosis  Histologic grading system: 3 grade system    Multiple myeloma not having achieved remission (HCC)  Staging form: Plasma Cell Myeloma and Disorders, AJCC 8th Edition  - Clinical stage from 2023: RISS Stage I (Beta-2-microglobulin (mg/L): 2.1, Albumin (g/dL): 4.3, ISS: Stage I, High-risk cytogenetics: Absent, LDH: Normal) - Signed by Chris Davis MD on 2023  Stage prefix: Initial diagnosis  Beta 2 microglobulin range (mg/L): Less than 3.5  Albumin range (g/dL): Greater than or equal to 3.5  Cytogenetics: No abnormalities  Lactate dehydrogenase (LDH) (U/L): 70  Serum calcium level: Normal  Serum creatinine level: Normal  Bone disease on imaging: Present  Number of bone lesions identified on imagin  Hemoglobin (Hgb) (g/dL): 12.9  Pretreatment monoclonal protein level in serum (M spike) (g/dL): 1.1  Oncology History   Malignant neoplasm of right breast (HCC) (Resolved)   2000 Surgery    Right breast mastectomy     2012 Initial Diagnosis    Malignant neoplasm of right breast (HCC)      Chemotherapy    Tamoxifen for 5 years     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HCC) (Resolved)   2018 Initial Diagnosis    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HCC)     2018 Biopsy    Final Diagnosis   A. Bone, L-4, core biopsy:  - Fragments of trabecular bone with changes compatible with prior fracture site.  - 10% lambda predominant plasmacytosis. See note.  - Hematopoietic marrow with trilineage hematopoiesis.  - No epithelial elements identified, confirmed with negative keratin AE1/AE3 stains.           2019 - 3/7/2019 Radiation    Plan ID Energy Fractions Dose per Fraction (cGy) Dose Correction (cGy) Total Dose Delivered (cGy) Elapsed Days   L3_L5 Spine 10X/6X 25 / 25 180 0 4,500 34          Multiple myeloma not having achieved remission (HCC)   2019 Initial Diagnosis    Multiple myeloma not having achieved remission (HCC)     3/16/2020 - 8/3/2020 Chemotherapy     bortezomib (VELCADE) subcutaneous injection 2.1 mg, 1.3 mg/m2 = 2.1 mg, Subcutaneous, Once, 3 of 4 cycles  Administration: 2.1 mg (3/16/2020), 2.1 mg (2020), 2.1 mg (3/23/2020), 2.1 mg (3/30/2020), 2.1 mg (2020), 2.1 mg (2020), 2.1 mg (2020), 2.1 mg (2020), 2.1 mg (2020), 2.1 mg (2020), 2.1 mg (2020), 2.1 mg (2020)     2023 -  Cancer Staged    Staging form: Plasma Cell Myeloma and Disorders, AJCC 8th Edition  - Clinical stage from 2023: RISS Stage I (Beta-2-microglobulin (mg/L): 2.1, Albumin (g/dL): 4.3, ISS: Stage I, High-risk cytogenetics: Absent, LDH: Normal) - Signed by Chris Davis MD on 2023  Stage prefix: Initial diagnosis  Beta 2 microglobulin range (mg/L): Less than 3.5  Albumin range (g/dL): Greater than or equal to 3.5  Cytogenetics: No abnormalities  Lactate dehydrogenase (LDH) (U/L): 70  Serum calcium level: Normal  Serum creatinine level: Normal  Bone disease on imaging: Present  Number of bone lesions identified on imagin  Hemoglobin (Hgb) (g/dL): 12.9  Pretreatment monoclonal protein level in serum (M spike) (g/dL): 1.1       2025 -  Chemotherapy    daratumumab-hyaluronidase (DARZALEX FASPRO), 1,800 mg, 3 of 12 cycles  Administration: 1,800 mg (2025), 1,800 mg (2025), 1,800 mg (2025), 1,800 mg (3/13/2025), 1,800 mg (3/27/2025), 1,800 mg (4/3/2025), 1,800 mg (3/20/2025)     Malignant neoplasm of right breast in female, estrogen receptor positive, unspecified site of breast (HCC)   2024 Initial Diagnosis    Malignant neoplasm of right breast in female, estrogen receptor positive, unspecified site of breast (HCC)     2024 -  Cancer Staged    Staging form: Breast, AJCC 8th Edition  - Clinical stage from 2024: Stage IA (cT1, cN0, cM0, G1, ER+, UT+, HER2-) - Signed by Chris Davis MD on 2024  Stage prefix: Initial diagnosis  Histologic grading system: 3 grade system          Pertinent Medical History    See details in HPI  04/03/25:      Review of Systems  Reviewed 12 systems.  Symptoms are in HPI.  No other neurological, cardiac, pulmonary, GI and  symptoms other than listed in HPI.  No fevers, chills, bleeding, skin rash, weight loss, night sweats and no swelling of the ankles and no swollen glands.  Other symptoms are in HPI.      Objective   /73 (Patient Position: Sitting)   Pulse 74   Temp (!) 97.3 °F (36.3 °C) (Temporal)   Resp 18   Ht 5' (1.524 m)   Wt 61.7 kg (136 lb)   LMP  (LMP Unknown)   SpO2 97%   BMI 26.56 kg/m²     Pain Screening:  Pain Score: 0-No pain  ECOG   1  Physical Exam  Vitals reviewed.   Constitutional:       General: She is not in acute distress.     Appearance: Normal appearance. She is not ill-appearing.   HENT:      Head: Normocephalic and atraumatic.      Mouth/Throat:      Comments: No thrush.  Eyes:      General: No scleral icterus.     Conjunctiva/sclera: Conjunctivae normal.   Cardiovascular:      Rate and Rhythm: Normal rate and regular rhythm.      Heart sounds: Normal heart sounds. No murmur heard.  Pulmonary:      Effort: Pulmonary effort is normal. No respiratory distress.      Breath sounds: Normal breath sounds. No rhonchi or rales.   Abdominal:      General: Abdomen is flat. There is no distension.      Palpations: Abdomen is soft. There is no mass.      Tenderness: There is no abdominal tenderness.      Comments: No ascites.    Musculoskeletal:         General: No swelling or tenderness. Normal range of motion.      Cervical back: Normal range of motion. No rigidity or tenderness.      Right lower leg: No edema.      Left lower leg: No edema.      Comments: No calf tenderness.   Lymphadenopathy:      Cervical: No cervical adenopathy.      Upper Body:      Right upper body: No supraclavicular or axillary adenopathy.      Left upper body: No supraclavicular or axillary adenopathy.   Skin:     Coloration: Skin is not jaundiced or pale.      Findings: No  bruising or rash.      Nails: There is no clubbing.   Neurological:      General: No focal deficit present.      Mental Status: She is alert and oriented to person, place, and time.      Motor: No weakness.      Coordination: Coordination normal.      Gait: Gait normal.   Psychiatric:         Behavior: Behavior normal.         Thought Content: Thought content normal.      Comments: Anxious         Labs: I have reviewed the following labs:  Lab Results   Component Value Date/Time    WBC 5.22 03/31/2025 10:28 AM    RBC 3.67 (L) 03/31/2025 10:28 AM    Hemoglobin 11.4 (L) 03/31/2025 10:28 AM    Hematocrit 35.9 03/31/2025 10:28 AM    MCV 98 03/31/2025 10:28 AM    MCH 31.1 03/31/2025 10:28 AM    RDW 19.0 (H) 03/31/2025 10:28 AM    Platelets 354 03/31/2025 10:28 AM    Segmented % 63 02/25/2025 10:21 AM    Lymphocytes % 34 03/31/2025 10:28 AM    Lymphocytes % 24 02/25/2025 10:21 AM    Monocytes % 21 (H) 03/31/2025 10:28 AM    Monocytes % 12 02/25/2025 10:21 AM    Eosinophils % 1 03/31/2025 10:28 AM    Eosinophils Relative 0 02/25/2025 10:21 AM    Basophils % 0 03/31/2025 10:28 AM    Basophils Relative 0 02/25/2025 10:21 AM    Immature Grans % 1 02/25/2025 10:21 AM    Absolute Neutrophils 1.91 02/25/2025 10:21 AM     Lab Results   Component Value Date/Time    Potassium 3.6 03/31/2025 10:28 AM    Potassium 4.1 01/23/2025 02:58 PM    Chloride 103 03/31/2025 10:28 AM    Chloride 105 01/23/2025 02:58 PM    Carbon Dioxide 26 01/23/2025 02:58 PM    CO2 25 03/31/2025 10:28 AM    BUN 12 03/31/2025 10:28 AM    BUN 15 01/23/2025 02:58 PM    Creatinine 0.73 03/31/2025 10:28 AM    Creatinine 0.76 01/23/2025 02:58 PM    Glucose, Fasting 121 (H) 03/31/2025 10:28 AM    Calcium 9.7 03/31/2025 10:28 AM    Calcium 9.4 01/23/2025 02:58 PM    AST 16 03/31/2025 10:28 AM    ASPAR AMINOTRANSFERASE 18 01/23/2025 02:58 PM    ALT 15 03/31/2025 10:28 AM    ALANINE AMINOTRANSFERASE 13 (L) 01/23/2025 02:58 PM    Alkaline Phosphatase 77 03/31/2025 10:28  AM    Alkaline Phosphatase 82 01/23/2025 02:58 PM    Total Protein 6.6 03/31/2025 10:28 AM    Total Protein 6.2 (L) 03/31/2025 10:28 AM    Protein, Total 7.2 01/23/2025 02:58 PM    Albumin 4.4 03/31/2025 10:28 AM    ALBUMIN 4.5 01/23/2025 02:58 PM    Total Bilirubin 0.48 03/31/2025 10:28 AM    Total Bilirubin 0.5 01/23/2025 02:58 PM    eGFRcr 83 01/23/2025 02:58 PM    eGFR 82 03/31/2025 10:28 AM   Protein electrophoresis, serum  Status: Final result      Contains abnormal data Protein electrophoresis, serum  Order: [id number]   Status: Final result       Next appt: 04/17/2025 at 12:00 PM in Infusion Therapy ([facility] INF BED 1)       Dx: Multiple myeloma not having achieved ...    Test Result Released: Yes (seen)    0 Result Notes            Component  Ref Range & Units (hover) 3/31/25 10:28 AM 3/31/25 10:28 AM 3/24/25 10:53 AM 3/17/25 11:22 AM 3/11/25 10:01 AM 3/4/25  9:59 AM 3/4/25  9:59 AM   A/G Ratio 1.84 High      1.75    Albumin % 64.8     63.7    Albumin 4.02     3.82    Alpha-1 Globulin % 5.1     6.0    Alpha-1 Globulin 0.32     0.36    Alpha-2 Globulin % 12.1     11.2    Alpha-2 Globulin 0.75     0.67    Beta-1 Globulin % 6.6     6.5    Beta-1 Globulin 0.41     0.39    Beta-2 Globulin % 4.3     4.4    Beta-2 Globulin 0.27     0.26    Gamma Globulin % 7.1     8.2    GAMMA CONC 0.44     0.49    M Peak ID 1 1.90         M-Agustín 0.12         Total Protein 6.2 Low  6.6 R 6.7 R 6.5 R 6.7 R 6.0 Low  6.6 R   SPEP Interpretation See Comment     See Comment CM    Comment: 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 Small peaks in the beta region are best quantitatively  followed by UPEP, serum immunoglobulin heavy chain levels (IgA, IgG, IgM, IgD, IgE) and serum and urine free light chains (kappa, lambda). Reviewed by: Tong Mcgregor MD **Electronic Signature**             Specimen Collected: 03/31/25 10:28 AM               Component  Ref Range & Units (hover) 3/31/25 10:28 AM 3/4/25  9:59 AM 2/4/25 10:16 AM 12/2/24 11:47 AM 5/14/24  9:43 AM 3/1/24 11:03 AM 12/8/23  8:21 AM   Ig [place] Free Light Chain 4.5 9.4 12.0 9.4 12.0 14.2 10.9   Ig Lambda Free Light Chain 228.6 High  198.5 High  437.3 High  314.9 High  274.2 High  304.1 High  175.4 High    Kappa/Lambda FluidC Ratio 0.02 Low  0.05 Low  0.03 Low  0.03 Low  0.04 Low  0.05 Low  0.06 Low                          Component  Ref Range & Units (hover) 3/31/25 10:28 AM 3/4/25  9:59 AM 2/4/25 10:16 AM 12/2/24 11:47 AM 5/14/24  9:43 AM 3/1/24 11:03 AM 12/8/23  8:21 AM   IGA 20 Low  27 Low  99 99 110 108 78    Low  527 Low  880 868 954 1,029 795   IGM 31 Low  72 70 48 49 67 31 Low              Specimen Collected: 03/31/25 10:28 AM Last Resulted: 03/31/25  2:55 PM         CT low dose whole body myeloma scan  Status: Final result     PACS Images - GE     Show images for CT low dose whole body myeloma scan  PACS Images - Sectra     Show images for CT low dose whole body myeloma scan  Study Result    Narrative & Impression   WHOLE BODY LOW DOSE CT WITHOUT IV CONTRAST (MULTIPLE MYELOMA PROTOCOL.)     INDICATION:   C90.00: Multiple myeloma not having achieved remission.     COMPARISON: CT low-dose whole-body 11/30/2023     TECHNIQUE: Low radiation dose thin section CT examination of the whole body was performed without intravenous or oral contrast according to a protocol specifically designed to evaluate for possible multiple myeloma. Scan included the head, cervical   spine, chest, abdomen, pelvis, as well as the humeri and femurs.  Axial, sagittal, and coronal 2D reformatted images were created from the source data and  submitted for interpretation.  Evaluation for pathology in nonosseous structures is limited.     Radiation dose length product (DLP) for this visit:  365.58 mGy-cm .  This examination, like all CT scans performed in the [facility], was performed utilizing techniques to minimize radiation dose exposure, including the use of iterative   reconstruction and automated exposure control.     Enteric contrast was not administered.     FINDINGS:     Reporting of Bone findings below are based on the 2014 International Myeloma Working Group (IMWG) recommendations.     BONE FINDINGS:  SUSPICIOUS OSTEOLYTIC LESIONS: None.  VERTEBRAL COMPRESSION FRACTURES: There are no potentially acute/malignant compression fracture. There are chronic, benign compression fracture(s) at: T8-L5, not appreciably changed  PERIPHERAL MEDULLARY PATTERN: Mixed pattern.     VISUALIZED BRAIN: No acute abnormalities are seen.     HEAD/NECK: Unremarkable.     CHEST     LUNGS: No focal airspace opacity. No tracheal or endobronchial lesion.     PLEURA: No pleural effusion. No pneumothorax.     HEART/GREAT VESSELS: Heart is unremarkable for patient's age.  No thoracic aortic aneurysm.     MEDIASTINUM AND RUDY:  Unremarkable.     ABDOMEN     LIVER/BILIARY TREE:  There are one or more hepatic simple cyst(s) present.  No CT evidence of suspicious solid hepatic mass.  Normal hepatic contours.  No biliary dilatation.     GALLBLADDER:  No calcified gallstones. No pericholecystic inflammatory change.     SPLEEN:  Unremarkable.     PANCREAS: Unchanged small linear calcification in the pancreatic body, otherwise unremarkable.     ADRENAL GLANDS:  Unremarkable.     KIDNEYS/URETERS:  Unremarkable. No hydronephrosis.     STOMACH AND BOWEL: Small hiatal hernia. No disproportionate dilation of the small or large bowel. Colonic diverticulosis.     APPENDIX: Absent     ABDOMINOPELVIC CAVITY:  No ascites.  No pneumoperitoneum.  No lymphadenopathy.      VESSELS:  Atherosclerotic changes are present.  No evidence of aneurysm.     PELVIS     REPRODUCTIVE ORGANS: Surgical changes of prior hysterectomy.     URINARY BLADDER:  Unremarkable.     ABDOMINAL WALL/INGUINAL REGIONS:  Unremarkable.     IMPRESSION:     WHOLE BODY LOW DOSE CT FOR EVALUATION OF MULTIPLE MYELOMA:     OSTEOLYTIC LESIONS: None.  PROBABLY MALIGNANT VERTEBRAL FRACTURE: None. Unchanged multilevel thoracolumbar compression fracture deformities, likely secondary to osteoporosis.  SUSPICIOUS PERIPHERAL MEDULLARY PATTERN: No.                          Workstation performed: [id number]        Imaging    CT low dose whole body myeloma scan (Order: [id number]) - 1/3/2025

## 2025-04-03 NOTE — ASSESSMENT & PLAN NOTE
Being managed by PCP       
Counts are being monitored.  She has been receiving Granix.       
History of ISABELA and oophorectomy       
History of right breast cancer.  No recurrence.       
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  She received radiation therapy.  In 2020 she was started on RVD systemic chemotherapy for IgG lambda multiple myeloma followed by stem cell transplant and the Revlimid maintenance but that had to be discontinued in December 2021 because of profound neutropenia and her blood counts were not recovering soon enough.       
Patient has been on Zometa       
Presently patient is on Darzalex, Pomalyst and Decadron.  Also she gets Granix because of profound neutropenia on Pomalyst.  Follows close she is being decreased to 1 mg daily, 3 weeks on and 1 week off.  In addition patient is on acyclovir and allopurinol.  She is on baby aspirin.  She is on Zometa.       
She had radiation       
Detail Level: Detailed
General Sunscreen Counseling: I recommended a broad spectrum sunscreen with a SPF of 30 or higher.  I explained that SPF 30 sunscreens block approximately 97 percent of the sun's harmful rays.  Sunscreens should be applied at least 15 minutes prior to expected sun exposure and then every 2 hours after that as long as sun exposure continues. If swimming or exercising sunscreen should be reapplied every 45 minutes to an hour after getting wet or sweating.  One ounce, or the equivalent of a shot glass full of sunscreen, is adequate to protect the skin not covered by a bathing suit. I also recommended a lip balm with a sunscreen as well. Sun protective clothing can be used in lieu of sunscreen but must be worn the entire time you are exposed to the sun's rays.

## 2025-04-03 NOTE — PROGRESS NOTES
Pt arrives to infusion center for Darzalex Faspro. Offers no complaints. Labs from 3/31 are within tx parameters. Pt sitting comfortably in chair, wheels locked, call bell within reach.

## 2025-04-03 NOTE — PATIENT INSTRUCTIONS
Patient has standing orders for blood work and Darzalex.  She takes Decadron at home the day she takes Darzalex.  She will be starting 1 mg Pomalyst hopefully next week.  Granix is on hold.  She will be going to [place] sometime in June 2025 and will stay there for 4-5 months and has physician in 1 month.  Follow-up in 7 weeks  She is on Zometa once every 3 months and she will skip that when she is in [place]

## 2025-04-03 NOTE — PROGRESS NOTES
Injections given in b/l abdomen with GEOVANNA RN without issue. Pt confirms next appointment on 4/21 @1219 TAHIRA SAMANO declined.

## 2025-04-04 ENCOUNTER — HOSPITAL ENCOUNTER (OUTPATIENT)
Dept: INFUSION CENTER | Facility: CLINIC | Age: 75
End: 2025-04-04

## 2025-04-13 DIAGNOSIS — C90.00 MULTIPLE MYELOMA NOT HAVING ACHIEVED REMISSION (HCC): ICD-10-CM

## 2025-04-14 RX ORDER — ACYCLOVIR 400 MG/1
400 TABLET ORAL 2 TIMES DAILY
Qty: 60 TABLET | Refills: 5 | Status: SHIPPED | OUTPATIENT
Start: 2025-04-14 | End: 2025-06-13

## 2025-04-15 ENCOUNTER — TELEPHONE (OUTPATIENT)
Dept: HEMATOLOGY ONCOLOGY | Facility: CLINIC | Age: 75
End: 2025-04-15

## 2025-04-15 ENCOUNTER — APPOINTMENT (OUTPATIENT)
Dept: LAB | Facility: CLINIC | Age: 75
End: 2025-04-15
Payer: MEDICARE

## 2025-04-15 LAB
ALBUMIN SERPL BCG-MCNC: 4.3 G/DL (ref 3.5–5)
ALP SERPL-CCNC: 56 U/L (ref 34–104)
ALT SERPL W P-5'-P-CCNC: 12 U/L (ref 7–52)
ANION GAP SERPL CALCULATED.3IONS-SCNC: 7 MMOL/L (ref 4–13)
AST SERPL W P-5'-P-CCNC: 16 U/L (ref 13–39)
BASOPHILS # BLD AUTO: 0 THOUSANDS/ÂΜL (ref 0–0.1)
BASOPHILS NFR BLD AUTO: 0 % (ref 0–1)
BILIRUB SERPL-MCNC: 0.46 MG/DL (ref 0.2–1)
BUN SERPL-MCNC: 10 MG/DL (ref 5–25)
CALCIUM SERPL-MCNC: 9.1 MG/DL (ref 8.4–10.2)
CHLORIDE SERPL-SCNC: 105 MMOL/L (ref 96–108)
CO2 SERPL-SCNC: 26 MMOL/L (ref 21–32)
CREAT SERPL-MCNC: 0.67 MG/DL (ref 0.6–1.3)
EOSINOPHIL # BLD AUTO: 0 THOUSAND/ÂΜL (ref 0–0.61)
EOSINOPHIL NFR BLD AUTO: 0 % (ref 0–6)
ERYTHROCYTE [DISTWIDTH] IN BLOOD BY AUTOMATED COUNT: 18.3 % (ref 11.6–15.1)
GFR SERPL CREATININE-BSD FRML MDRD: 88 ML/MIN/1.73SQ M
GLUCOSE P FAST SERPL-MCNC: 93 MG/DL (ref 65–99)
HCT VFR BLD AUTO: 34.5 % (ref 34.8–46.1)
HGB BLD-MCNC: 11.6 G/DL (ref 11.5–15.4)
IMM GRANULOCYTES # BLD AUTO: 0.02 THOUSAND/UL (ref 0–0.2)
IMM GRANULOCYTES NFR BLD AUTO: 1 % (ref 0–2)
LYMPHOCYTES # BLD AUTO: 0.89 THOUSANDS/ÂΜL (ref 0.6–4.47)
LYMPHOCYTES NFR BLD AUTO: 46 % (ref 14–44)
MCH RBC QN AUTO: 32.4 PG (ref 26.8–34.3)
MCHC RBC AUTO-ENTMCNC: 33.6 G/DL (ref 31.4–37.4)
MCV RBC AUTO: 96 FL (ref 82–98)
MONOCYTES # BLD AUTO: 0.2 THOUSAND/ÂΜL (ref 0.17–1.22)
MONOCYTES NFR BLD AUTO: 11 % (ref 4–12)
NEUTROPHILS # BLD AUTO: 0.8 THOUSANDS/ÂΜL (ref 1.85–7.62)
NEUTS SEG NFR BLD AUTO: 42 % (ref 43–75)
NRBC BLD AUTO-RTO: 0 /100 WBCS
PLATELET # BLD AUTO: 150 THOUSANDS/UL (ref 149–390)
PMV BLD AUTO: 9.7 FL (ref 8.9–12.7)
POTASSIUM SERPL-SCNC: 4.2 MMOL/L (ref 3.5–5.3)
PROT SERPL-MCNC: 6 G/DL (ref 6.4–8.4)
RBC # BLD AUTO: 3.58 MILLION/UL (ref 3.81–5.12)
SODIUM SERPL-SCNC: 138 MMOL/L (ref 135–147)
WBC # BLD AUTO: 1.91 THOUSAND/UL (ref 4.31–10.16)

## 2025-04-15 PROCEDURE — 80053 COMPREHEN METABOLIC PANEL: CPT

## 2025-04-15 PROCEDURE — 85025 COMPLETE CBC W/AUTO DIFF WBC: CPT

## 2025-04-15 NOTE — TELEPHONE ENCOUNTER
Spoke with patient to make her aware of ANC 0.80  Granix was on hold as per last office visit with Dr. Davis  She started Pomalyst 1mg on 4/9, 3 weeks on and 1 week off  She will continue getting labs every week  I advised patient to go early in the week so that granix can be scheduled as needed. Will decide on granix next week depending on lab results.    Patient will granix twice this week, on Thursday with treatment and Wednesday OR Friday.    Please schedule patient for granix. She said that a [employer] message can be sent.    She verbalized understanding and agreed to plan

## 2025-04-17 ENCOUNTER — HOSPITAL ENCOUNTER (OUTPATIENT)
Dept: INFUSION CENTER | Facility: CLINIC | Age: 75
End: 2025-04-17

## 2025-04-17 ENCOUNTER — HOSPITAL ENCOUNTER (OUTPATIENT)
Dept: INFUSION CENTER | Facility: CLINIC | Age: 75
Discharge: HOME | End: 2025-04-17
Payer: MEDICARE

## 2025-04-17 DIAGNOSIS — C90.00 MULTIPLE MYELOMA NOT HAVING ACHIEVED REMISSION (HCC): Primary | ICD-10-CM

## 2025-04-17 DIAGNOSIS — T45.1X5A CHEMOTHERAPY INDUCED NEUTROPENIA (HCC): ICD-10-CM

## 2025-04-17 DIAGNOSIS — D70.1 CHEMOTHERAPY INDUCED NEUTROPENIA (HCC): Primary | ICD-10-CM

## 2025-04-17 DIAGNOSIS — D70.1 CHEMOTHERAPY INDUCED NEUTROPENIA (HCC): ICD-10-CM

## 2025-04-17 DIAGNOSIS — T45.1X5A CHEMOTHERAPY INDUCED NEUTROPENIA (HCC): Primary | ICD-10-CM

## 2025-04-17 PROCEDURE — 96372 THER/PROPH/DIAG INJ SC/IM: CPT

## 2025-04-17 PROCEDURE — 96401 CHEMO ANTI-NEOPL SQ/IM: CPT

## 2025-04-17 RX ORDER — DIPHENHYDRAMINE HCL 25 MG
25 TABLET ORAL ONCE
Status: COMPLETED | OUTPATIENT
Start: 2025-04-17 | End: 2025-04-17

## 2025-04-17 RX ORDER — ACETAMINOPHEN 325 MG/1
650 TABLET ORAL ONCE
Status: COMPLETED | OUTPATIENT
Start: 2025-04-17 | End: 2025-04-17

## 2025-04-17 RX ADMIN — ACETAMINOPHEN 650 MG: 325 TABLET ORAL at 12:17

## 2025-04-17 RX ADMIN — DARATUMUMAB AND HYALURONIDASE-FIHJ (HUMAN RECOMBINANT) 1800 MG: 1800; 30000 INJECTION SUBCUTANEOUS at 13:41

## 2025-04-17 RX ADMIN — DIPHENHYDRAMINE HYDROCHLORIDE 25 MG: 25 TABLET ORAL at 12:17

## 2025-04-17 RX ADMIN — TBO-FILGRASTIM 300 MCG: 300 INJECTION, SOLUTION SUBCUTANEOUS at 13:36

## 2025-04-17 NOTE — PROGRESS NOTES
Patient tolerated treatment well, Darzalex Faspro given in b/l abdomen and Granix given in left arm. Confirmed next appt for 4/18 @ 1400.   Declined AVS.

## 2025-04-17 NOTE — PROGRESS NOTES
Pt arrives to infusion center for Darzalex Faspro and Granix. Offers no complaints. Labs from 4/15- no tx parameters for today. ANC 0.80- MD aware. Pt sitting comfortably in chair, wheels locked, call bell within reach.

## 2025-04-18 ENCOUNTER — HOSPITAL ENCOUNTER (OUTPATIENT)
Dept: INFUSION CENTER | Facility: CLINIC | Age: 75
End: 2025-04-18
Payer: MEDICARE

## 2025-04-18 VITALS — TEMPERATURE: 98.2 F

## 2025-04-18 DIAGNOSIS — D70.1 CHEMOTHERAPY INDUCED NEUTROPENIA (HCC): Primary | ICD-10-CM

## 2025-04-18 DIAGNOSIS — T45.1X5A CHEMOTHERAPY INDUCED NEUTROPENIA (HCC): Primary | ICD-10-CM

## 2025-04-18 PROCEDURE — 96372 THER/PROPH/DIAG INJ SC/IM: CPT

## 2025-04-18 RX ADMIN — TBO-FILGRASTIM 300 MCG: 300 INJECTION, SOLUTION SUBCUTANEOUS at 14:23

## 2025-04-18 NOTE — PROGRESS NOTES
Patient presents to the Infusion Center for a Granix injection. She offers no concerns at this time. Injection given in her Right arm without incident. Declined AVS. Confirmed next appointment at the [facility] for 04/21/2025 @ 1430 for Zometa and a Granix injection. Patient ambulatory at discharge.

## 2025-04-21 ENCOUNTER — APPOINTMENT (OUTPATIENT)
Dept: LAB | Facility: CLINIC | Age: 75
End: 2025-04-21
Payer: MEDICARE

## 2025-04-21 ENCOUNTER — HOSPITAL ENCOUNTER (OUTPATIENT)
Dept: INFUSION CENTER | Facility: CLINIC | Age: 75
Discharge: HOME | End: 2025-04-21
Payer: MEDICARE

## 2025-04-21 VITALS
BODY MASS INDEX: 26.7 KG/M2 | HEIGHT: 60 IN | OXYGEN SATURATION: 98 % | SYSTOLIC BLOOD PRESSURE: 146 MMHG | RESPIRATION RATE: 18 BRPM | TEMPERATURE: 97.6 F | HEART RATE: 70 BPM | WEIGHT: 136 LBS | DIASTOLIC BLOOD PRESSURE: 75 MMHG

## 2025-04-21 DIAGNOSIS — S22.009A CLOSED FRACTURE OF MULTIPLE THORACIC VERTEBRAE, INITIAL ENCOUNTER (HCC): ICD-10-CM

## 2025-04-21 DIAGNOSIS — C90.00 MULTIPLE MYELOMA NOT HAVING ACHIEVED REMISSION (HCC): ICD-10-CM

## 2025-04-21 DIAGNOSIS — Z90.721 S/P HYSTERECTOMY WITH OOPHORECTOMY: ICD-10-CM

## 2025-04-21 DIAGNOSIS — C90.00 OSTEOPOROSIS OF MULTIPLE SITES ASSOCIATED WITH MULTIPLE MYELOMA (HCC): ICD-10-CM

## 2025-04-21 DIAGNOSIS — Z90.710 S/P HYSTERECTOMY WITH OOPHORECTOMY: ICD-10-CM

## 2025-04-21 DIAGNOSIS — D70.1 CHEMOTHERAPY INDUCED NEUTROPENIA (HCC): Primary | ICD-10-CM

## 2025-04-21 DIAGNOSIS — T45.1X5A CHEMOTHERAPY INDUCED NEUTROPENIA (HCC): Primary | ICD-10-CM

## 2025-04-21 DIAGNOSIS — M81.8 OSTEOPOROSIS OF MULTIPLE SITES ASSOCIATED WITH MULTIPLE MYELOMA (HCC): ICD-10-CM

## 2025-04-21 LAB
ALBUMIN SERPL BCG-MCNC: 4.1 G/DL (ref 3.5–5)
ALP SERPL-CCNC: 59 U/L (ref 34–104)
ALT SERPL W P-5'-P-CCNC: 14 U/L (ref 7–52)
ANION GAP SERPL CALCULATED.3IONS-SCNC: 8 MMOL/L (ref 4–13)
AST SERPL W P-5'-P-CCNC: 12 U/L (ref 13–39)
BASOPHILS # BLD AUTO: 0.01 THOUSANDS/ÂΜL (ref 0–0.1)
BASOPHILS NFR BLD AUTO: 0 % (ref 0–1)
BILIRUB SERPL-MCNC: 0.46 MG/DL (ref 0.2–1)
BUN SERPL-MCNC: 12 MG/DL (ref 5–25)
CALCIUM SERPL-MCNC: 9.1 MG/DL (ref 8.4–10.2)
CHLORIDE SERPL-SCNC: 103 MMOL/L (ref 96–108)
CO2 SERPL-SCNC: 26 MMOL/L (ref 21–32)
CREAT SERPL-MCNC: 0.63 MG/DL (ref 0.6–1.3)
EOSINOPHIL # BLD AUTO: 0 THOUSAND/ÂΜL (ref 0–0.61)
EOSINOPHIL NFR BLD AUTO: 0 % (ref 0–6)
ERYTHROCYTE [DISTWIDTH] IN BLOOD BY AUTOMATED COUNT: 17.7 % (ref 11.6–15.1)
GFR SERPL CREATININE-BSD FRML MDRD: 89 ML/MIN/1.73SQ M
GLUCOSE P FAST SERPL-MCNC: 102 MG/DL (ref 65–99)
HCT VFR BLD AUTO: 35.9 % (ref 34.8–46.1)
HGB BLD-MCNC: 12 G/DL (ref 11.5–15.4)
IMM GRANULOCYTES # BLD AUTO: 0.02 THOUSAND/UL (ref 0–0.2)
IMM GRANULOCYTES NFR BLD AUTO: 0 % (ref 0–2)
LYMPHOCYTES # BLD AUTO: 1.32 THOUSANDS/ÂΜL (ref 0.6–4.47)
LYMPHOCYTES NFR BLD AUTO: 29 % (ref 14–44)
MCH RBC QN AUTO: 32.3 PG (ref 26.8–34.3)
MCHC RBC AUTO-ENTMCNC: 33.4 G/DL (ref 31.4–37.4)
MCV RBC AUTO: 97 FL (ref 82–98)
MONOCYTES # BLD AUTO: 0.61 THOUSAND/ÂΜL (ref 0.17–1.22)
MONOCYTES NFR BLD AUTO: 13 % (ref 4–12)
NEUTROPHILS # BLD AUTO: 2.58 THOUSANDS/ÂΜL (ref 1.85–7.62)
NEUTS SEG NFR BLD AUTO: 58 % (ref 43–75)
NRBC BLD AUTO-RTO: 0 /100 WBCS
PLATELET # BLD AUTO: 115 THOUSANDS/UL (ref 149–390)
PMV BLD AUTO: 10.5 FL (ref 8.9–12.7)
POTASSIUM SERPL-SCNC: 4.1 MMOL/L (ref 3.5–5.3)
PROT SERPL-MCNC: 5.8 G/DL (ref 6.4–8.4)
RBC # BLD AUTO: 3.71 MILLION/UL (ref 3.81–5.12)
SODIUM SERPL-SCNC: 137 MMOL/L (ref 135–147)
WBC # BLD AUTO: 4.54 THOUSAND/UL (ref 4.31–10.16)

## 2025-04-21 PROCEDURE — 96372 THER/PROPH/DIAG INJ SC/IM: CPT

## 2025-04-21 PROCEDURE — 80053 COMPREHEN METABOLIC PANEL: CPT

## 2025-04-21 PROCEDURE — 36415 COLL VENOUS BLD VENIPUNCTURE: CPT

## 2025-04-21 PROCEDURE — 96365 THER/PROPH/DIAG IV INF INIT: CPT

## 2025-04-21 PROCEDURE — 85025 COMPLETE CBC W/AUTO DIFF WBC: CPT

## 2025-04-21 RX ORDER — SODIUM CHLORIDE 9 MG/ML
20 INJECTION, SOLUTION INTRAVENOUS ONCE
OUTPATIENT
Start: 2025-07-14

## 2025-04-21 RX ORDER — SODIUM CHLORIDE 9 MG/ML
20 INJECTION, SOLUTION INTRAVENOUS ONCE
Status: COMPLETED | OUTPATIENT
Start: 2025-04-21 | End: 2025-04-21

## 2025-04-21 RX ADMIN — SODIUM CHLORIDE 20 ML/HR: 0.9 INJECTION, SOLUTION INTRAVENOUS at 14:35

## 2025-04-21 RX ADMIN — ZOLEDRONIC ACID 3.3 MG: 4 INJECTION, SOLUTION, CONCENTRATE INTRAVENOUS at 15:04

## 2025-04-21 RX ADMIN — TBO-FILGRASTIM 300 MCG: 300 INJECTION, SOLUTION SUBCUTANEOUS at 15:05

## 2025-04-21 NOTE — PROGRESS NOTES
Patient tolerated Zometa and Granix without incident. Next appt confirmed for 5/1 at 1300, AVS declined.

## 2025-04-21 NOTE — PLAN OF CARE
Problem: Knowledge Deficit  Goal: Patient/family/caregiver demonstrates understanding of disease process, treatment plan, medications, and discharge instructions  Description: Complete learning assessment and assess knowledge base.Interventions:- Provide teaching at level of understanding- Provide teaching via preferred learning methods  Outcome: Progressing     
.

## 2025-04-22 ENCOUNTER — TELEPHONE (OUTPATIENT)
Age: 75
End: 2025-04-22

## 2025-04-22 NOTE — TELEPHONE ENCOUNTER
Patient called, stating she is going out of state for the summer in [place] and she is going [facility] to get her treatments. They are asking for her recent CT scan from 1/3/25. Demographics, referral and office notes. They can all be faxed to them at      Attn: Gretchen Silva  Phone: [phone]  Fax: [phone]

## 2025-04-24 RX ORDER — DIPHENHYDRAMINE HCL 25 MG
25 TABLET ORAL ONCE
OUTPATIENT
Start: 2025-05-01

## 2025-04-24 RX ORDER — ACETAMINOPHEN 325 MG/1
650 TABLET ORAL ONCE
OUTPATIENT
Start: 2025-05-01

## 2025-04-24 RX ORDER — DIPHENHYDRAMINE HCL 25 MG
25 TABLET ORAL ONCE
OUTPATIENT
Start: 2025-05-22

## 2025-04-24 RX ORDER — ACETAMINOPHEN 325 MG/1
650 TABLET ORAL ONCE
OUTPATIENT
Start: 2025-05-22

## 2025-04-28 ENCOUNTER — APPOINTMENT (OUTPATIENT)
Dept: LAB | Facility: CLINIC | Age: 75
End: 2025-04-28
Payer: MEDICARE

## 2025-04-28 ENCOUNTER — TELEPHONE (OUTPATIENT)
Age: 75
End: 2025-04-28

## 2025-04-28 DIAGNOSIS — C90.00 MULTIPLE MYELOMA NOT HAVING ACHIEVED REMISSION (HCC): ICD-10-CM

## 2025-04-28 LAB
ALBUMIN SERPL BCG-MCNC: 4.3 G/DL (ref 3.5–5)
ALP SERPL-CCNC: 60 U/L (ref 34–104)
ALT SERPL W P-5'-P-CCNC: 14 U/L (ref 7–52)
ANION GAP SERPL CALCULATED.3IONS-SCNC: 8 MMOL/L (ref 4–13)
AST SERPL W P-5'-P-CCNC: 15 U/L (ref 13–39)
BASOPHILS # BLD MANUAL: 0 THOUSAND/UL (ref 0–0.1)
BASOPHILS NFR MAR MANUAL: 0 % (ref 0–1)
BILIRUB SERPL-MCNC: 0.51 MG/DL (ref 0.2–1)
BUN SERPL-MCNC: 14 MG/DL (ref 5–25)
CALCIUM SERPL-MCNC: 9 MG/DL (ref 8.4–10.2)
CHLORIDE SERPL-SCNC: 106 MMOL/L (ref 96–108)
CO2 SERPL-SCNC: 27 MMOL/L (ref 21–32)
CREAT SERPL-MCNC: 0.62 MG/DL (ref 0.6–1.3)
EOSINOPHIL # BLD MANUAL: 0.02 THOUSAND/UL (ref 0–0.4)
EOSINOPHIL NFR BLD MANUAL: 1 % (ref 0–6)
ERYTHROCYTE [DISTWIDTH] IN BLOOD BY AUTOMATED COUNT: 18.1 % (ref 11.6–15.1)
GFR SERPL CREATININE-BSD FRML MDRD: 90 ML/MIN/1.73SQ M
GLUCOSE P FAST SERPL-MCNC: 89 MG/DL (ref 65–99)
HCT VFR BLD AUTO: 36.8 % (ref 34.8–46.1)
HGB BLD-MCNC: 11.9 G/DL (ref 11.5–15.4)
LYMPHOCYTES # BLD AUTO: 1.17 THOUSAND/UL (ref 0.6–4.47)
LYMPHOCYTES # BLD AUTO: 53 % (ref 14–44)
MCH RBC QN AUTO: 32.1 PG (ref 26.8–34.3)
MCHC RBC AUTO-ENTMCNC: 32.3 G/DL (ref 31.4–37.4)
MCV RBC AUTO: 99 FL (ref 82–98)
MONOCYTES # BLD AUTO: 0.29 THOUSAND/UL (ref 0–1.22)
MONOCYTES NFR BLD: 13 % (ref 4–12)
NEUTROPHILS # BLD MANUAL: 0.73 THOUSAND/UL (ref 1.85–7.62)
NEUTS SEG NFR BLD AUTO: 33 % (ref 43–75)
PLATELET # BLD AUTO: 99 THOUSANDS/UL (ref 149–390)
PLATELET BLD QL SMEAR: ABNORMAL
PMV BLD AUTO: 10.3 FL (ref 8.9–12.7)
POTASSIUM SERPL-SCNC: 3.8 MMOL/L (ref 3.5–5.3)
PROT SERPL-MCNC: 6 G/DL (ref 6.4–8.4)
RBC # BLD AUTO: 3.71 MILLION/UL (ref 3.81–5.12)
RBC MORPH BLD: NORMAL
SODIUM SERPL-SCNC: 141 MMOL/L (ref 135–147)
WBC # BLD AUTO: 2.21 THOUSAND/UL (ref 4.31–10.16)

## 2025-04-28 PROCEDURE — 36415 COLL VENOUS BLD VENIPUNCTURE: CPT

## 2025-04-28 PROCEDURE — 85027 COMPLETE CBC AUTOMATED: CPT

## 2025-04-28 PROCEDURE — 80053 COMPREHEN METABOLIC PANEL: CPT

## 2025-04-28 PROCEDURE — 85007 BL SMEAR W/DIFF WBC COUNT: CPT

## 2025-04-28 NOTE — TELEPHONE ENCOUNTER
Chelita calling in stating she was notified by her insurance that the Pmalyst medication she was receiving for free has not been paid for by the foundation and is 60 days past due. Please call her to further discuss at [phone]. Thank you!

## 2025-04-29 ENCOUNTER — TELEPHONE (OUTPATIENT)
Dept: HEMATOLOGY ONCOLOGY | Facility: CLINIC | Age: 75
End: 2025-04-29

## 2025-04-29 DIAGNOSIS — C90.00 MULTIPLE MYELOMA NOT HAVING ACHIEVED REMISSION (HCC): Primary | ICD-10-CM

## 2025-04-29 NOTE — TELEPHONE ENCOUNTER
Spoke with patient to make her aware that she will not get chemotherapy treatment on Thursday. Delay by 1 week.  However, patient will need to have granix on Thursday  She took her last Pomalyst this morning and will start her off week beginning tomorrow. She knows not to take the pomalyst until next lab draw on Monday next week.    She verbalized understanding and agreed to plan

## 2025-04-30 ENCOUNTER — TELEPHONE (OUTPATIENT)
Dept: INFUSION CENTER | Facility: CLINIC | Age: 75
End: 2025-04-30

## 2025-04-30 NOTE — TELEPHONE ENCOUNTER
Reached out to Amanda ALMAZAN RN to confirm if pt could received Darzalex, Granix and Zometa on the same day. Amanda confirmed back that patient can receive all three on one day.

## 2025-04-30 NOTE — TELEPHONE ENCOUNTER
Pt's schedule has been adjusted. Called and spoke with pt. She understood that her schedule will change - [facility] will print her a new calendar and provide at next visit.

## 2025-05-01 ENCOUNTER — HOSPITAL ENCOUNTER (OUTPATIENT)
Dept: INFUSION CENTER | Facility: CLINIC | Age: 75
End: 2025-05-01
Attending: INTERNAL MEDICINE

## 2025-05-01 ENCOUNTER — HOSPITAL ENCOUNTER (OUTPATIENT)
Dept: INFUSION CENTER | Facility: CLINIC | Age: 75
Discharge: HOME | End: 2025-05-01
Attending: INTERNAL MEDICINE
Payer: MEDICARE

## 2025-05-01 VITALS — TEMPERATURE: 97.7 F

## 2025-05-01 DIAGNOSIS — T45.1X5A CHEMOTHERAPY INDUCED NEUTROPENIA (HCC): Primary | ICD-10-CM

## 2025-05-01 DIAGNOSIS — D70.1 CHEMOTHERAPY INDUCED NEUTROPENIA (HCC): Primary | ICD-10-CM

## 2025-05-01 PROCEDURE — 96372 THER/PROPH/DIAG INJ SC/IM: CPT

## 2025-05-01 RX ADMIN — TBO-FILGRASTIM 300 MCG: 300 INJECTION, SOLUTION SUBCUTANEOUS at 13:02

## 2025-05-01 NOTE — PROGRESS NOTES
Patient to Infusion Center for Granix injection. Patient offers no complaints at this time. Patient afebrile. Treatment tolerated without incident- Granix administered into R arm. Next appt confirmed with patient for 5/8 at 12:00 at [place]. Calendar print out of May and June appts provided to patient.

## 2025-05-05 ENCOUNTER — APPOINTMENT (OUTPATIENT)
Dept: LAB | Facility: CLINIC | Age: 75
End: 2025-05-05
Payer: MEDICARE

## 2025-05-05 DIAGNOSIS — C90.00 MULTIPLE MYELOMA NOT HAVING ACHIEVED REMISSION (HCC): ICD-10-CM

## 2025-05-05 LAB
ALBUMIN SERPL BCG-MCNC: 4.3 G/DL (ref 3.5–5)
ALP SERPL-CCNC: 65 U/L (ref 34–104)
ALT SERPL W P-5'-P-CCNC: 14 U/L (ref 7–52)
ANION GAP SERPL CALCULATED.3IONS-SCNC: 8 MMOL/L (ref 4–13)
AST SERPL W P-5'-P-CCNC: 15 U/L (ref 13–39)
BASOPHILS # BLD AUTO: 0.01 THOUSANDS/ÂΜL (ref 0–0.1)
BASOPHILS NFR BLD AUTO: 0 % (ref 0–1)
BILIRUB SERPL-MCNC: 0.53 MG/DL (ref 0.2–1)
BUN SERPL-MCNC: 11 MG/DL (ref 5–25)
CALCIUM SERPL-MCNC: 9.1 MG/DL (ref 8.4–10.2)
CHLORIDE SERPL-SCNC: 105 MMOL/L (ref 96–108)
CO2 SERPL-SCNC: 25 MMOL/L (ref 21–32)
CREAT SERPL-MCNC: 0.63 MG/DL (ref 0.6–1.3)
EOSINOPHIL # BLD AUTO: 0.01 THOUSAND/ÂΜL (ref 0–0.61)
EOSINOPHIL NFR BLD AUTO: 0 % (ref 0–6)
ERYTHROCYTE [DISTWIDTH] IN BLOOD BY AUTOMATED COUNT: 17.2 % (ref 11.6–15.1)
GFR SERPL CREATININE-BSD FRML MDRD: 89 ML/MIN/1.73SQ M
GLUCOSE P FAST SERPL-MCNC: 97 MG/DL (ref 65–99)
HCT VFR BLD AUTO: 35.7 % (ref 34.8–46.1)
HGB BLD-MCNC: 11.8 G/DL (ref 11.5–15.4)
IGA SERPL-MCNC: 19 MG/DL (ref 66–433)
IGG SERPL-MCNC: 411 MG/DL (ref 635–1741)
IGM SERPL-MCNC: 20 MG/DL (ref 45–281)
IMM GRANULOCYTES # BLD AUTO: 0.02 THOUSAND/UL (ref 0–0.2)
IMM GRANULOCYTES NFR BLD AUTO: 1 % (ref 0–2)
LDH SERPL-CCNC: 101 U/L (ref 140–271)
LYMPHOCYTES # BLD AUTO: 1.19 THOUSANDS/ÂΜL (ref 0.6–4.47)
LYMPHOCYTES NFR BLD AUTO: 37 % (ref 14–44)
MCH RBC QN AUTO: 32.5 PG (ref 26.8–34.3)
MCHC RBC AUTO-ENTMCNC: 33.1 G/DL (ref 31.4–37.4)
MCV RBC AUTO: 98 FL (ref 82–98)
MONOCYTES # BLD AUTO: 0.67 THOUSAND/ÂΜL (ref 0.17–1.22)
MONOCYTES NFR BLD AUTO: 21 % (ref 4–12)
NEUTROPHILS # BLD AUTO: 1.32 THOUSANDS/ÂΜL (ref 1.85–7.62)
NEUTS SEG NFR BLD AUTO: 41 % (ref 43–75)
NRBC BLD AUTO-RTO: 0 /100 WBCS
PLATELET # BLD AUTO: 151 THOUSANDS/UL (ref 149–390)
PMV BLD AUTO: 10 FL (ref 8.9–12.7)
POTASSIUM SERPL-SCNC: 4.2 MMOL/L (ref 3.5–5.3)
PROT SERPL-MCNC: 6.5 G/DL (ref 6.4–8.4)
RBC # BLD AUTO: 3.63 MILLION/UL (ref 3.81–5.12)
SODIUM SERPL-SCNC: 138 MMOL/L (ref 135–147)
URATE SERPL-MCNC: 2.4 MG/DL (ref 2–7.5)
WBC # BLD AUTO: 3.22 THOUSAND/UL (ref 4.31–10.16)

## 2025-05-05 PROCEDURE — 80053 COMPREHEN METABOLIC PANEL: CPT

## 2025-05-05 PROCEDURE — 84165 PROTEIN E-PHORESIS SERUM: CPT

## 2025-05-05 PROCEDURE — 83521 IG LIGHT CHAINS FREE EACH: CPT

## 2025-05-05 PROCEDURE — 82232 ASSAY OF BETA-2 PROTEIN: CPT

## 2025-05-05 PROCEDURE — 82784 ASSAY IGA/IGD/IGG/IGM EACH: CPT

## 2025-05-05 PROCEDURE — 85025 COMPLETE CBC W/AUTO DIFF WBC: CPT

## 2025-05-05 PROCEDURE — 36415 COLL VENOUS BLD VENIPUNCTURE: CPT

## 2025-05-05 PROCEDURE — 84550 ASSAY OF BLOOD/URIC ACID: CPT

## 2025-05-05 PROCEDURE — 83615 LACTATE (LD) (LDH) ENZYME: CPT

## 2025-05-06 LAB
ALBUMIN SERPL ELPH-MCNC: 4.01 G/DL (ref 3.2–5.1)
ALBUMIN SERPL ELPH-MCNC: 65.7 % (ref 48–70)
ALPHA1 GLOB SERPL ELPH-MCNC: 0.31 G/DL (ref 0.15–0.47)
ALPHA1 GLOB SERPL ELPH-MCNC: 5.1 % (ref 1.8–7)
ALPHA2 GLOB SERPL ELPH-MCNC: 0.72 G/DL (ref 0.42–1.04)
ALPHA2 GLOB SERPL ELPH-MCNC: 11.8 % (ref 5.9–14.9)
BETA GLOB ABNORMAL SERPL ELPH-MCNC: 0.4 G/DL (ref 0.31–0.57)
BETA1 GLOB SERPL ELPH-MCNC: 6.6 % (ref 4.7–7.7)
BETA2 GLOB SERPL ELPH-MCNC: 4.2 % (ref 3.1–7.9)
BETA2+GAMMA GLOB SERPL ELPH-MCNC: 0.26 G/DL (ref 0.2–0.58)
GAMMA GLOB ABNORMAL SERPL ELPH-MCNC: 0.4 G/DL (ref 0.4–1.66)
GAMMA GLOB SERPL ELPH-MCNC: 6.6 % (ref 6.9–22.3)
IGG/ALB SER: 1.92 {RATIO} (ref 1.1–1.8)
KAPPA LC FREE SER-MCNC: 6.2 MG/L (ref 3.3–19.4)
KAPPA LC FREE/LAMBDA FREE SER: 0.02 {RATIO} (ref 0.26–1.65)
LAMBDA LC FREE SERPL-MCNC: 261.2 MG/L (ref 5.7–26.3)
M PROTEIN 1 SERPL ELPH-MCNC: 0.08 G/DL
PROT SERPL-MCNC: 6.1 G/DL (ref 6.4–8.4)

## 2025-05-06 PROCEDURE — 84165 PROTEIN E-PHORESIS SERUM: CPT | Performed by: STUDENT IN AN ORGANIZED HEALTH CARE EDUCATION/TRAINING PROGRAM

## 2025-05-07 LAB — B2 MICROGLOB SERPL-MCNC: 1.9 MG/L (ref 0.6–2.4)

## 2025-05-08 ENCOUNTER — HOSPITAL ENCOUNTER (OUTPATIENT)
Dept: INFUSION CENTER | Facility: CLINIC | Age: 75
Discharge: HOME | End: 2025-05-08
Attending: INTERNAL MEDICINE
Payer: MEDICARE

## 2025-05-08 VITALS
HEART RATE: 100 BPM | SYSTOLIC BLOOD PRESSURE: 133 MMHG | DIASTOLIC BLOOD PRESSURE: 76 MMHG | RESPIRATION RATE: 18 BRPM | TEMPERATURE: 97.4 F | OXYGEN SATURATION: 98 %

## 2025-05-08 DIAGNOSIS — D70.1 CHEMOTHERAPY INDUCED NEUTROPENIA (HCC): ICD-10-CM

## 2025-05-08 DIAGNOSIS — C90.00 MULTIPLE MYELOMA NOT HAVING ACHIEVED REMISSION (HCC): Primary | ICD-10-CM

## 2025-05-08 DIAGNOSIS — T45.1X5A CHEMOTHERAPY INDUCED NEUTROPENIA (HCC): ICD-10-CM

## 2025-05-08 PROCEDURE — 96401 CHEMO ANTI-NEOPL SQ/IM: CPT

## 2025-05-08 PROCEDURE — 96372 THER/PROPH/DIAG INJ SC/IM: CPT

## 2025-05-08 RX ORDER — ACETAMINOPHEN 325 MG/1
650 TABLET ORAL ONCE
Status: COMPLETED | OUTPATIENT
Start: 2025-05-08 | End: 2025-05-08

## 2025-05-08 RX ORDER — DIPHENHYDRAMINE HCL 25 MG
25 TABLET ORAL ONCE
Status: COMPLETED | OUTPATIENT
Start: 2025-05-08 | End: 2025-05-08

## 2025-05-08 RX ADMIN — DARATUMUMAB AND HYALURONIDASE-FIHJ (HUMAN RECOMBINANT) 1800 MG: 1800; 30000 INJECTION SUBCUTANEOUS at 13:38

## 2025-05-08 RX ADMIN — TBO-FILGRASTIM 300 MCG: 300 INJECTION, SOLUTION SUBCUTANEOUS at 12:49

## 2025-05-08 RX ADMIN — DIPHENHYDRAMINE HYDROCHLORIDE 25 MG: 25 TABLET ORAL at 12:16

## 2025-05-08 RX ADMIN — ACETAMINOPHEN 650 MG: 325 TABLET ORAL at 12:16

## 2025-05-12 ENCOUNTER — APPOINTMENT (OUTPATIENT)
Dept: LAB | Facility: CLINIC | Age: 75
End: 2025-05-12
Payer: MEDICARE

## 2025-05-12 DIAGNOSIS — C90.00 MULTIPLE MYELOMA NOT HAVING ACHIEVED REMISSION (HCC): ICD-10-CM

## 2025-05-12 DIAGNOSIS — M81.8 OSTEOPOROSIS OF MULTIPLE SITES ASSOCIATED WITH MULTIPLE MYELOMA (HCC): ICD-10-CM

## 2025-05-12 DIAGNOSIS — D70.1 CHEMOTHERAPY INDUCED NEUTROPENIA (HCC): ICD-10-CM

## 2025-05-12 DIAGNOSIS — S22.009A CLOSED FRACTURE OF MULTIPLE THORACIC VERTEBRAE, INITIAL ENCOUNTER (HCC): ICD-10-CM

## 2025-05-12 DIAGNOSIS — T45.1X5A CHEMOTHERAPY INDUCED NEUTROPENIA (HCC): ICD-10-CM

## 2025-05-12 DIAGNOSIS — C90.00 OSTEOPOROSIS OF MULTIPLE SITES ASSOCIATED WITH MULTIPLE MYELOMA (HCC): ICD-10-CM

## 2025-05-12 LAB
ALBUMIN SERPL BCG-MCNC: 4.2 G/DL (ref 3.5–5)
ALP SERPL-CCNC: 60 U/L (ref 34–104)
ALT SERPL W P-5'-P-CCNC: 13 U/L (ref 7–52)
ANION GAP SERPL CALCULATED.3IONS-SCNC: 7 MMOL/L (ref 4–13)
AST SERPL W P-5'-P-CCNC: 14 U/L (ref 13–39)
BASOPHILS # BLD AUTO: 0 THOUSANDS/ÂΜL (ref 0–0.1)
BASOPHILS NFR BLD AUTO: 0 % (ref 0–1)
BILIRUB SERPL-MCNC: 0.56 MG/DL (ref 0.2–1)
BUN SERPL-MCNC: 17 MG/DL (ref 5–25)
CALCIUM SERPL-MCNC: 9.2 MG/DL (ref 8.4–10.2)
CHLORIDE SERPL-SCNC: 104 MMOL/L (ref 96–108)
CO2 SERPL-SCNC: 28 MMOL/L (ref 21–32)
CREAT SERPL-MCNC: 0.65 MG/DL (ref 0.6–1.3)
EOSINOPHIL # BLD AUTO: 0.01 THOUSAND/ÂΜL (ref 0–0.61)
EOSINOPHIL NFR BLD AUTO: 0 % (ref 0–6)
ERYTHROCYTE [DISTWIDTH] IN BLOOD BY AUTOMATED COUNT: 17 % (ref 11.6–15.1)
GFR SERPL CREATININE-BSD FRML MDRD: 88 ML/MIN/1.73SQ M
GLUCOSE P FAST SERPL-MCNC: 97 MG/DL (ref 65–99)
HCT VFR BLD AUTO: 35.4 % (ref 34.8–46.1)
HGB BLD-MCNC: 12 G/DL (ref 11.5–15.4)
IMM GRANULOCYTES # BLD AUTO: 0.02 THOUSAND/UL (ref 0–0.2)
IMM GRANULOCYTES NFR BLD AUTO: 1 % (ref 0–2)
LYMPHOCYTES # BLD AUTO: 1.22 THOUSANDS/ÂΜL (ref 0.6–4.47)
LYMPHOCYTES NFR BLD AUTO: 39 % (ref 14–44)
MCH RBC QN AUTO: 33 PG (ref 26.8–34.3)
MCHC RBC AUTO-ENTMCNC: 33.9 G/DL (ref 31.4–37.4)
MCV RBC AUTO: 97 FL (ref 82–98)
MONOCYTES # BLD AUTO: 0.53 THOUSAND/ÂΜL (ref 0.17–1.22)
MONOCYTES NFR BLD AUTO: 17 % (ref 4–12)
NEUTROPHILS # BLD AUTO: 1.38 THOUSANDS/ÂΜL (ref 1.85–7.62)
NEUTS SEG NFR BLD AUTO: 43 % (ref 43–75)
NRBC BLD AUTO-RTO: 0 /100 WBCS
PLATELET # BLD AUTO: 166 THOUSANDS/UL (ref 149–390)
PMV BLD AUTO: 10.4 FL (ref 8.9–12.7)
POTASSIUM SERPL-SCNC: 4 MMOL/L (ref 3.5–5.3)
PROT SERPL-MCNC: 6.1 G/DL (ref 6.4–8.4)
RBC # BLD AUTO: 3.64 MILLION/UL (ref 3.81–5.12)
SODIUM SERPL-SCNC: 139 MMOL/L (ref 135–147)
WBC # BLD AUTO: 3.16 THOUSAND/UL (ref 4.31–10.16)

## 2025-05-12 PROCEDURE — 36415 COLL VENOUS BLD VENIPUNCTURE: CPT

## 2025-05-12 PROCEDURE — 85025 COMPLETE CBC W/AUTO DIFF WBC: CPT

## 2025-05-12 PROCEDURE — 80053 COMPREHEN METABOLIC PANEL: CPT

## 2025-05-12 RX ORDER — ALLOPURINOL 100 MG/1
200 TABLET ORAL DAILY
Qty: 60 TABLET | Refills: 1 | Status: SHIPPED | OUTPATIENT
Start: 2025-05-12

## 2025-05-15 ENCOUNTER — HOSPITAL ENCOUNTER (OUTPATIENT)
Dept: INFUSION CENTER | Facility: CLINIC | Age: 75
Discharge: HOME | End: 2025-05-15
Attending: INTERNAL MEDICINE
Payer: MEDICARE

## 2025-05-15 DIAGNOSIS — D70.1 CHEMOTHERAPY INDUCED NEUTROPENIA (HCC): Primary | ICD-10-CM

## 2025-05-15 DIAGNOSIS — T45.1X5A CHEMOTHERAPY INDUCED NEUTROPENIA (HCC): Primary | ICD-10-CM

## 2025-05-15 RX ADMIN — TBO-FILGRASTIM 300 MCG: 300 INJECTION, SOLUTION SUBCUTANEOUS at 14:06

## 2025-05-15 NOTE — PROGRESS NOTES
Patient to infusion for Granix.  She tolerated injection in Left arm given by Tania Schmitt RN.  Next appointment confirmed 5/22 1530, she declined AVS

## 2025-05-16 ENCOUNTER — APPOINTMENT (OUTPATIENT)
Dept: LAB | Facility: CLINIC | Age: 75
End: 2025-05-16
Payer: MEDICARE

## 2025-05-16 DIAGNOSIS — C90.00 MULTIPLE MYELOMA NOT HAVING ACHIEVED REMISSION (HCC): ICD-10-CM

## 2025-05-16 LAB
ALBUMIN SERPL BCG-MCNC: 4.7 G/DL (ref 3.5–5)
ALP SERPL-CCNC: 67 U/L (ref 34–104)
ALT SERPL W P-5'-P-CCNC: 13 U/L (ref 7–52)
ANION GAP SERPL CALCULATED.3IONS-SCNC: 12 MMOL/L (ref 4–13)
AST SERPL W P-5'-P-CCNC: 16 U/L (ref 13–39)
BASOPHILS # BLD AUTO: 0.02 THOUSANDS/ÂΜL (ref 0–0.1)
BASOPHILS NFR BLD AUTO: 0 % (ref 0–1)
BILIRUB SERPL-MCNC: 0.71 MG/DL (ref 0.2–1)
BUN SERPL-MCNC: 13 MG/DL (ref 5–25)
CALCIUM SERPL-MCNC: 9.9 MG/DL (ref 8.4–10.2)
CHLORIDE SERPL-SCNC: 104 MMOL/L (ref 96–108)
CO2 SERPL-SCNC: 26 MMOL/L (ref 21–32)
CREAT SERPL-MCNC: 0.81 MG/DL (ref 0.6–1.3)
EOSINOPHIL # BLD AUTO: 0.06 THOUSAND/ÂΜL (ref 0–0.61)
EOSINOPHIL NFR BLD AUTO: 1 % (ref 0–6)
ERYTHROCYTE [DISTWIDTH] IN BLOOD BY AUTOMATED COUNT: 17.2 % (ref 11.6–15.1)
GFR SERPL CREATININE-BSD FRML MDRD: 72 ML/MIN/1.73SQ M
GLUCOSE P FAST SERPL-MCNC: 95 MG/DL (ref 65–99)
HCT VFR BLD AUTO: 37.9 % (ref 34.8–46.1)
HGB BLD-MCNC: 12.6 G/DL (ref 11.5–15.4)
IGA SERPL-MCNC: 20 MG/DL (ref 66–433)
IGG SERPL-MCNC: 451 MG/DL (ref 635–1741)
IGM SERPL-MCNC: 25 MG/DL (ref 45–281)
IMM GRANULOCYTES # BLD AUTO: 0.13 THOUSAND/UL (ref 0–0.2)
IMM GRANULOCYTES NFR BLD AUTO: 2 % (ref 0–2)
LDH SERPL-CCNC: 101 U/L (ref 140–271)
LYMPHOCYTES # BLD AUTO: 1.62 THOUSANDS/ÂΜL (ref 0.6–4.47)
LYMPHOCYTES NFR BLD AUTO: 19 % (ref 14–44)
MCH RBC QN AUTO: 32.9 PG (ref 26.8–34.3)
MCHC RBC AUTO-ENTMCNC: 33.2 G/DL (ref 31.4–37.4)
MCV RBC AUTO: 99 FL (ref 82–98)
MONOCYTES # BLD AUTO: 1.34 THOUSAND/ÂΜL (ref 0.17–1.22)
MONOCYTES NFR BLD AUTO: 16 % (ref 4–12)
NEUTROPHILS # BLD AUTO: 5.43 THOUSANDS/ÂΜL (ref 1.85–7.62)
NEUTS SEG NFR BLD AUTO: 62 % (ref 43–75)
NRBC BLD AUTO-RTO: 0 /100 WBCS
PLATELET # BLD AUTO: 182 THOUSANDS/UL (ref 149–390)
PMV BLD AUTO: 10.8 FL (ref 8.9–12.7)
POTASSIUM SERPL-SCNC: 4.3 MMOL/L (ref 3.5–5.3)
PROT SERPL-MCNC: 6.7 G/DL (ref 6.4–8.4)
RBC # BLD AUTO: 3.83 MILLION/UL (ref 3.81–5.12)
SODIUM SERPL-SCNC: 142 MMOL/L (ref 135–147)
URATE SERPL-MCNC: 2.4 MG/DL (ref 2–7.5)
WBC # BLD AUTO: 8.6 THOUSAND/UL (ref 4.31–10.16)

## 2025-05-16 PROCEDURE — 80053 COMPREHEN METABOLIC PANEL: CPT

## 2025-05-16 PROCEDURE — 85025 COMPLETE CBC W/AUTO DIFF WBC: CPT

## 2025-05-16 PROCEDURE — 84165 PROTEIN E-PHORESIS SERUM: CPT

## 2025-05-16 PROCEDURE — 83521 IG LIGHT CHAINS FREE EACH: CPT

## 2025-05-16 PROCEDURE — 36415 COLL VENOUS BLD VENIPUNCTURE: CPT

## 2025-05-16 PROCEDURE — 83615 LACTATE (LD) (LDH) ENZYME: CPT

## 2025-05-16 PROCEDURE — 82784 ASSAY IGA/IGD/IGG/IGM EACH: CPT

## 2025-05-16 PROCEDURE — 82232 ASSAY OF BETA-2 PROTEIN: CPT

## 2025-05-16 PROCEDURE — 84550 ASSAY OF BLOOD/URIC ACID: CPT

## 2025-05-17 LAB
KAPPA LC FREE SER-MCNC: 6.3 MG/L (ref 3.3–19.4)
KAPPA LC FREE/LAMBDA FREE SER: 0.03 {RATIO} (ref 0.26–1.65)
LAMBDA LC FREE SERPL-MCNC: 217.5 MG/L (ref 5.7–26.3)

## 2025-05-19 LAB
ALBUMIN SERPL ELPH-MCNC: 4.44 G/DL (ref 3.2–5.1)
ALBUMIN SERPL ELPH-MCNC: 67.2 % (ref 48–70)
ALPHA1 GLOB SERPL ELPH-MCNC: 0.33 G/DL (ref 0.15–0.47)
ALPHA1 GLOB SERPL ELPH-MCNC: 5 % (ref 1.8–7)
ALPHA2 GLOB SERPL ELPH-MCNC: 0.7 G/DL (ref 0.42–1.04)
ALPHA2 GLOB SERPL ELPH-MCNC: 10.6 % (ref 5.9–14.9)
BETA GLOB ABNORMAL SERPL ELPH-MCNC: 0.44 G/DL (ref 0.31–0.57)
BETA1 GLOB SERPL ELPH-MCNC: 6.7 % (ref 4.7–7.7)
BETA2 GLOB SERPL ELPH-MCNC: 3.9 % (ref 3.1–7.9)
BETA2+GAMMA GLOB SERPL ELPH-MCNC: 0.26 G/DL (ref 0.2–0.58)
GAMMA GLOB ABNORMAL SERPL ELPH-MCNC: 0.44 G/DL (ref 0.4–1.66)
GAMMA GLOB SERPL ELPH-MCNC: 6.6 % (ref 6.9–22.3)
IGG/ALB SER: 2.05 {RATIO} (ref 1.1–1.8)
M PROTEIN 1 SERPL ELPH-MCNC: 0.06 G/DL
PROT SERPL-MCNC: 6.6 G/DL (ref 6.4–8.4)

## 2025-05-19 PROCEDURE — 84165 PROTEIN E-PHORESIS SERUM: CPT | Performed by: PATHOLOGY

## 2025-05-20 DIAGNOSIS — C90.00 MULTIPLE MYELOMA NOT HAVING ACHIEVED REMISSION (HCC): Primary | ICD-10-CM

## 2025-05-22 ENCOUNTER — HOSPITAL ENCOUNTER (OUTPATIENT)
Dept: INFUSION CENTER | Facility: CLINIC | Age: 75
Discharge: HOME | End: 2025-05-22
Attending: INTERNAL MEDICINE
Payer: MEDICARE

## 2025-05-22 ENCOUNTER — OFFICE VISIT (OUTPATIENT)
Dept: HEMATOLOGY ONCOLOGY | Facility: CLINIC | Age: 75
End: 2025-05-22

## 2025-05-22 VITALS
HEIGHT: 60 IN | TEMPERATURE: 97.5 F | BODY MASS INDEX: 26.5 KG/M2 | DIASTOLIC BLOOD PRESSURE: 80 MMHG | WEIGHT: 135 LBS | RESPIRATION RATE: 18 BRPM | SYSTOLIC BLOOD PRESSURE: 134 MMHG | OXYGEN SATURATION: 97 % | HEART RATE: 83 BPM

## 2025-05-22 VITALS
SYSTOLIC BLOOD PRESSURE: 138 MMHG | OXYGEN SATURATION: 98 % | HEIGHT: 60 IN | DIASTOLIC BLOOD PRESSURE: 84 MMHG | WEIGHT: 137.5 LBS | HEART RATE: 83 BPM | BODY MASS INDEX: 27 KG/M2 | RESPIRATION RATE: 18 BRPM | TEMPERATURE: 96.9 F

## 2025-05-22 DIAGNOSIS — C90.00 MULTIPLE MYELOMA NOT HAVING ACHIEVED REMISSION (HCC): Primary | ICD-10-CM

## 2025-05-22 DIAGNOSIS — M81.8 OSTEOPOROSIS OF MULTIPLE SITES ASSOCIATED WITH MULTIPLE MYELOMA (HCC): ICD-10-CM

## 2025-05-22 DIAGNOSIS — D70.1 CHEMOTHERAPY INDUCED NEUTROPENIA (HCC): ICD-10-CM

## 2025-05-22 DIAGNOSIS — T45.1X5A CHEMOTHERAPY INDUCED NEUTROPENIA (HCC): ICD-10-CM

## 2025-05-22 DIAGNOSIS — E55.9 VITAMIN D DEFICIENCY: ICD-10-CM

## 2025-05-22 DIAGNOSIS — I10 ESSENTIAL HYPERTENSION: ICD-10-CM

## 2025-05-22 DIAGNOSIS — S22.009A CLOSED FRACTURE OF MULTIPLE THORACIC VERTEBRAE, INITIAL ENCOUNTER (HCC): ICD-10-CM

## 2025-05-22 DIAGNOSIS — C50.911 MALIGNANT NEOPLASM OF RIGHT BREAST IN FEMALE, ESTROGEN RECEPTOR POSITIVE, UNSPECIFIED SITE OF BREAST (HCC): ICD-10-CM

## 2025-05-22 DIAGNOSIS — Z17.0 MALIGNANT NEOPLASM OF RIGHT BREAST IN FEMALE, ESTROGEN RECEPTOR POSITIVE, UNSPECIFIED SITE OF BREAST (HCC): ICD-10-CM

## 2025-05-22 DIAGNOSIS — Z90.721 S/P HYSTERECTOMY WITH OOPHORECTOMY: ICD-10-CM

## 2025-05-22 DIAGNOSIS — C90.00 OSTEOPOROSIS OF MULTIPLE SITES ASSOCIATED WITH MULTIPLE MYELOMA (HCC): ICD-10-CM

## 2025-05-22 DIAGNOSIS — Z94.84 H/O STEM CELL TRANSPLANT (HCC): ICD-10-CM

## 2025-05-22 DIAGNOSIS — Z90.710 S/P HYSTERECTOMY WITH OOPHORECTOMY: ICD-10-CM

## 2025-05-22 LAB — B2 MICROGLOB SERPL-MCNC: 2 MG/L (ref 0.6–2.4)

## 2025-05-22 PROCEDURE — 96372 THER/PROPH/DIAG INJ SC/IM: CPT

## 2025-05-22 PROCEDURE — 96401 CHEMO ANTI-NEOPL SQ/IM: CPT

## 2025-05-22 RX ORDER — ACETAMINOPHEN 325 MG/1
650 TABLET ORAL ONCE
Status: COMPLETED | OUTPATIENT
Start: 2025-05-22 | End: 2025-05-22

## 2025-05-22 RX ORDER — DIPHENHYDRAMINE HCL 25 MG
25 TABLET ORAL ONCE
Status: COMPLETED | OUTPATIENT
Start: 2025-05-22 | End: 2025-05-22

## 2025-05-22 RX ADMIN — DARATUMUMAB AND HYALURONIDASE-FIHJ (HUMAN RECOMBINANT) 1800 MG: 1800; 30000 INJECTION SUBCUTANEOUS at 17:12

## 2025-05-22 RX ADMIN — TBO-FILGRASTIM 300 MCG: 300 INJECTION, SOLUTION SUBCUTANEOUS at 17:29

## 2025-05-22 RX ADMIN — DIPHENHYDRAMINE HYDROCHLORIDE 25 MG: 25 TABLET ORAL at 15:47

## 2025-05-22 RX ADMIN — ACETAMINOPHEN 650 MG: 325 TABLET ORAL at 15:47

## 2025-05-22 NOTE — PROGRESS NOTES
Name: Chelita Seymour      : 1950      MRN: [id number]  Encounter Provider: Chris Davis MD  Encounter Date: 2025   Encounter department: [facility] [place]  :  Assessment & Plan  Multiple myeloma not having achieved remission (HCC)  Patient is here with her .  She has relapsed multiple myeloma and has been on Darzalex, Pomalyst and Decadron and she also needs an Granix because of neutropenia in spite of loading dose of Pomalyst 1 mg daily, 3 weeks on and 1 week off.  She is also on acyclovir, allopurinol, baby aspirin and Zometa.  Has chronic low back discomfort.  Myeloma parameters are being monitored.       Chemotherapy induced neutropenia (HCC)  See above.       H/O stem cell transplant (HCC)  Disease has relapsed.       S/P hysterectomy with oophorectomy         Osteoporosis of multiple sites associated with multiple myeloma (HCC)  Patient has been on Zometa       Closed fracture of multiple thoracic vertebrae, initial encounter ([facility])  Patient had radiation       Essential hypertension  Being managed by PCP       Malignant neoplasm of right breast in female, estrogen receptor positive, unspecified site of breast (HCC)  She had treatment for right breast cancer.  No evidence of recurrent or metastatic breast cancer.       Vitamin D deficiency  Patient has been taking vitamin D         Patient has standing orders for blood work and chemotherapy.  She is going to be away for 6 weeks starting  and will have treatment in [place] and she will come back in 2 months   See diagnoses, orders and instructions above.  Patient follows with myeloma specialist at [place] and patient is receiving treatments under his directions.  Hematologically stable.  Condition discussed and explained.  Questions answered.  Goal is response and remission and prolongation of survival.  Patient is capable of self-care.  I suggested self breast examination, eating healthy foods,  staying active as tolerated but to avoid falls and trauma.  Suggested health screening tests. Patient to continue to follow-up with primary physician and other consultants.  Provided counseling and support.  I used a dictation device to dictate this note and there could be mistakes in my note and for that patient may contact my office.          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 She received radiation therapy. In 2020 she was started on RVD systemic chemotherapy for IgG lambda multiple myeloma followed by stem cell transplant and the Revlimid maintenance but that had to be discontinued in December 2021 because of profound neutropenia and her blood counts were not recovering soon enough.   Now she has relapsed multiple myeloma.  She has been on Darzalex, Pomalyst and Decadron and she also needs an Granix because of neutropenia in spite of loading dose of Pomalyst 1 mg daily, 3 weeks on and 1 week off.  She is also on acyclovir, allopurinol, baby aspirin and Zometa.  Has chronic low back discomfort and tiredness.  Myeloma parameters are being monitored.  Patient has standing orders for blood work and chemotherapy.  She is going to be away for 6 weeks starting June 1 and will have treatment in [place] and she will come back in 2 months   Oncology History   Cancer Staging   Malignant neoplasm of right breast in female, estrogen receptor positive, unspecified site of breast (HCC)  Staging form: Breast, AJCC 8th Edition  - Clinical stage from 7/7/2024: Stage IA (cT1, cN0, cM0, G1, ER+, TX+, HER2-) - Signed by Chris Davis MD on 7/7/2024  Stage prefix: Initial diagnosis  Histologic grading system: 3 grade system    Multiple myeloma not having achieved remission (HCC)  Staging form: Plasma Cell Myeloma and Disorders, AJCC 8th Edition  - Clinical stage from 12/24/2023: RISS Stage I  (Beta-2-microglobulin (mg/L): 2.1, Albumin (g/dL): 4.3, ISS: Stage I, High-risk cytogenetics: Absent, LDH: Normal) - Signed by Chris Davis MD on 2023  Stage prefix: Initial diagnosis  Beta 2 microglobulin range (mg/L): Less than 3.5  Albumin range (g/dL): Greater than or equal to 3.5  Cytogenetics: No abnormalities  Lactate dehydrogenase (LDH) (U/L): 70  Serum calcium level: Normal  Serum creatinine level: Normal  Bone disease on imaging: Present  Number of bone lesions identified on imagin  Hemoglobin (Hgb) (g/dL): 12.9  Pretreatment monoclonal protein level in serum (M spike) (g/dL): 1.1  Oncology History   Malignant neoplasm of right breast (HCC) (Resolved)   2000 Surgery    Right breast mastectomy     2012 Initial Diagnosis    Malignant neoplasm of right breast (HCC)      Chemotherapy    Tamoxifen for 5 years     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HCC) (Resolved)   2018 Initial Diagnosis    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HCC)     2018 Biopsy    Final Diagnosis   A. Bone, L-4, core biopsy:  - Fragments of trabecular bone with changes compatible with prior fracture site.  - 10% lambda predominant plasmacytosis. See note.  - Hematopoietic marrow with trilineage hematopoiesis.  - No epithelial elements identified, confirmed with negative keratin AE1/AE3 stains.           2019 - 3/7/2019 Radiation    Plan ID Energy Fractions Dose per Fraction (cGy) Dose Correction (cGy) Total Dose Delivered (cGy) Elapsed Days   L3_L5 Spine 10X/6X 25 /  180 0 4,500 34          Multiple myeloma not having achieved remission (HCC)   2019 Initial Diagnosis    Multiple myeloma not having achieved remission (HCC)     3/16/2020 - 8/3/2020 Chemotherapy    bortezomib (VELCADE) subcutaneous injection 2.1 mg, 1.3 mg/m2 = 2.1 mg, Subcutaneous, Once, 3 of 4 cycles  Administration: 2.1 mg (3/16/2020), 2.1 mg (2020), 2.1 mg (3/23/2020), 2.1 mg (3/30/2020), 2.1 mg (2020), 2.1 mg (2020), 2.1 mg (2020), 2.1 mg (2020), 2.1  mg (2020), 2.1 mg (2020), 2.1 mg (2020), 2.1 mg (2020)     2023 -  Cancer Staged    Staging form: Plasma Cell Myeloma and Disorders, AJCC 8th Edition  - Clinical stage from 2023: RISS Stage I (Beta-2-microglobulin (mg/L): 2.1, Albumin (g/dL): 4.3, ISS: Stage I, High-risk cytogenetics: Absent, LDH: Normal) - Signed by Chris Davis MD on 2023  Stage prefix: Initial diagnosis  Beta 2 microglobulin range (mg/L): Less than 3.5  Albumin range (g/dL): Greater than or equal to 3.5  Cytogenetics: No abnormalities  Lactate dehydrogenase (LDH) (U/L): 70  Serum calcium level: Normal  Serum creatinine level: Normal  Bone disease on imaging: Present  Number of bone lesions identified on imagin  Hemoglobin (Hgb) (g/dL): 12.9  Pretreatment monoclonal protein level in serum (M spike) (g/dL): 1.1       2025 -  Chemotherapy    daratumumab-hyaluronidase (DARZALEX FASPRO), 1,800 mg, 4 of 12 cycles  Administration: 1,800 mg (2025), 1,800 mg (2025), 1,800 mg (2025), 1,800 mg (3/13/2025), 1,800 mg (3/27/2025), 1,800 mg (4/3/2025), 1,800 mg (2025), 1,800 mg (3/20/2025), 1,800 mg (2025)     Malignant neoplasm of right breast in female, estrogen receptor positive, unspecified site of breast (HCC)   2024 Initial Diagnosis    Malignant 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  Symptoms are as in HPI.  Presently no fevers, chills, bleeding, skin rash, weight loss, night sweats and no swelling of the ankles and no swollen glands.  No frequent  or severe infections.  No claudication.  No other neurological, cardiac, pulmonary, GI or  symptoms at present other than listed in HPI.      Objective   /80 (BP Location: Left arm, Patient Position: Sitting, Cuff Size: Adult)   Pulse 83   Temp 97.5 °F (36.4 °C) (Temporal)   Resp 18   Ht 5' (1.524 m)   Wt 61.2 kg (135 lb)   LMP  (LMP Unknown)   SpO2 97%   BMI 26.37 kg/m²     Pain Screening:  Pain Score: 0-No pain  ECOG   1  Physical Exam  Vitals reviewed.   Constitutional:       General: She is not in acute distress.     Appearance: Normal appearance. She is not ill-appearing.   HENT:      Head: Normocephalic and atraumatic.      Mouth/Throat:      Comments: No thrush.    Eyes:      General: No scleral icterus.     Conjunctiva/sclera: Conjunctivae normal.       Cardiovascular:      Rate and Rhythm: Normal rate and regular rhythm.      Heart sounds: Normal heart sounds. No murmur heard.  Pulmonary:      Effort: Pulmonary effort is normal. No respiratory distress.      Breath sounds: Normal breath sounds. No rhonchi or rales.   Abdominal:      General: Abdomen is flat. There is no distension.      Palpations: Abdomen is soft. There is no mass.      Tenderness: There is no abdominal tenderness.      Comments: No ascites.      Musculoskeletal:         General: No swelling or tenderness. Normal range of motion.      Cervical back: Normal range of motion. No rigidity or tenderness.      Right lower leg: No edema.      Left lower leg: No edema.      Comments: No calf tenderness.   Lymphadenopathy:      Cervical: No cervical adenopathy.      Upper Body:      Right upper body: No supraclavicular or axillary adenopathy.      Left upper body: No supraclavicular or axillary adenopathy.     Skin:     General: Skin is warm.      Coloration: Skin is not jaundiced or pale.      Findings: No bruising or rash.      Nails: There is no clubbing.     Neurological:      General: No focal deficit present.      Mental  Status: She is alert and oriented to person, place, and time.      Motor: No weakness.      Coordination: Coordination normal.      Gait: Gait normal.     Psychiatric:         Mood and Affect: Mood normal.         Behavior: Behavior normal.         Thought Content: Thought content normal.         Judgment: Judgment normal.         Labs: I have reviewed the following labs:  Lab Results   Component Value Date/Time    WBC 8.60 05/16/2025 11:05 AM    RBC 3.83 05/16/2025 11:05 AM    Hemoglobin 12.6 05/16/2025 11:05 AM    Hematocrit 37.9 05/16/2025 11:05 AM    MCV 99 (H) 05/16/2025 11:05 AM    MCH 32.9 05/16/2025 11:05 AM    RDW 17.2 (H) 05/16/2025 11:05 AM    Platelets 182 05/16/2025 11:05 AM    Segmented % 62 05/16/2025 11:05 AM    Lymphocytes % 19 05/16/2025 11:05 AM    Monocytes % 16 (H) 05/16/2025 11:05 AM    Eosinophils Relative 1 05/16/2025 11:05 AM    Basophils Relative 0 05/16/2025 11:05 AM    Immature Grans % 2 05/16/2025 11:05 AM    Absolute Neutrophils 5.43 05/16/2025 11:05 AM     Lab Results   Component Value Date/Time    Potassium 4.3 05/16/2025 11:05 AM    Potassium 4.4 04/07/2025 02:03 PM    Chloride 104 05/16/2025 11:05 AM    Chloride 106 04/07/2025 02:03 PM    Carbon Dioxide 27 04/07/2025 02:03 PM    CO2 26 05/16/2025 11:05 AM    BUN 13 05/16/2025 11:05 AM    BUN 13 04/07/2025 02:03 PM    Creatinine 0.81 05/16/2025 11:05 AM    Creatinine 0.68 04/07/2025 02:03 PM    Glucose, Fasting 95 05/16/2025 11:05 AM    Calcium 9.9 05/16/2025 11:05 AM    Calcium 9.2 04/07/2025 02:03 PM    AST 16 05/16/2025 11:05 AM    ASPAR AMINOTRANSFERASE 15 04/07/2025 02:03 PM    ALT 13 05/16/2025 11:05 AM    ALANINE AMINOTRANSFERASE 14 04/07/2025 02:03 PM    Alkaline Phosphatase 67 05/16/2025 11:05 AM    Alkaline Phosphatase 59 04/07/2025 02:03 PM    Total Protein 6.7 05/16/2025 11:05 AM    Total Protein 6.6 05/16/2025 11:05 AM    Protein, Total 6.1 04/07/2025 02:03 PM    Albumin 4.7 05/16/2025 11:05 AM    ALBUMIN 4.3 04/07/2025  02:03 PM    Total Bilirubin 0.71 05/16/2025 11:05 AM    Total Bilirubin 0.4 04/07/2025 02:03 PM    eGFRcr 92 04/07/2025 02:03 PM    eGFR 72 05/16/2025 11:05 AM   Uric acid 2.4    Free light chain ratio 0.03  IgA 20.  IgG 451 and IgM 25  CT low dose whole body myeloma scan  Status: Final result     PACS Images - GE     Show images for CT low dose whole body myeloma scan  PACS Images - Sectra     Show images for CT low dose whole body myeloma scan  Study Result    Narrative & Impression   WHOLE BODY LOW DOSE CT WITHOUT IV CONTRAST (MULTIPLE MYELOMA PROTOCOL.)     INDICATION:   C90.00: Multiple myeloma not having achieved remission.     COMPARISON: CT low-dose whole-body 11/30/2023     TECHNIQUE: Low radiation dose thin section CT examination of the whole body was performed without intravenous or oral contrast according to a protocol specifically designed to evaluate for possible multiple myeloma. Scan included the head, cervical   spine, chest, abdomen, pelvis, as well as the humeri and femurs.  Axial, sagittal, and coronal 2D reformatted images were created from the source data and submitted for interpretation.  Evaluation for pathology in nonosseous structures is limited.     Radiation dose length product (DLP) for this visit:  365.58 mGy-cm .  This examination, like all CT scans performed in the [facility], was performed utilizing techniques to minimize radiation dose exposure, including the use of iterative   reconstruction and automated exposure control.     Enteric contrast was not administered.     FINDINGS:     Reporting of Bone findings below are based on the 2014 International Myeloma Working Group (IMWG) recommendations.     BONE FINDINGS:  SUSPICIOUS OSTEOLYTIC LESIONS: None.  VERTEBRAL COMPRESSION FRACTURES: There are no potentially acute/malignant compression fracture. There are chronic, benign compression fracture(s) at: T8-L5, not appreciably changed  PERIPHERAL MEDULLARY  PATTERN: Mixed pattern.     VISUALIZED BRAIN: No acute abnormalities are seen.     HEAD/NECK: Unremarkable.     CHEST     LUNGS: No focal airspace opacity. No tracheal or endobronchial lesion.     PLEURA: No pleural effusion. No pneumothorax.     HEART/GREAT VESSELS: Heart is unremarkable for patient's age.  No thoracic aortic aneurysm.     MEDIASTINUM AND RUDY:  Unremarkable.     ABDOMEN     LIVER/BILIARY TREE:  There are one or more hepatic simple cyst(s) present.  No CT evidence of suspicious solid hepatic mass.  Normal hepatic contours.  No biliary dilatation.     GALLBLADDER:  No calcified gallstones. No pericholecystic inflammatory change.     SPLEEN:  Unremarkable.     PANCREAS: Unchanged small linear calcification in the pancreatic body, otherwise unremarkable.     ADRENAL GLANDS:  Unremarkable.     KIDNEYS/URETERS:  Unremarkable. No hydronephrosis.     STOMACH AND BOWEL: Small hiatal hernia. No disproportionate dilation of the small or large bowel. Colonic diverticulosis.     APPENDIX: Absent     ABDOMINOPELVIC CAVITY:  No ascites.  No pneumoperitoneum.  No lymphadenopathy.     VESSELS:  Atherosclerotic changes are present.  No evidence of aneurysm.     PELVIS     REPRODUCTIVE ORGANS: Surgical changes of prior hysterectomy.     URINARY BLADDER:  Unremarkable.     ABDOMINAL WALL/INGUINAL REGIONS:  Unremarkable.     IMPRESSION:     WHOLE BODY LOW DOSE CT FOR EVALUATION OF MULTIPLE MYELOMA:     OSTEOLYTIC LESIONS: None.  PROBABLY MALIGNANT VERTEBRAL FRACTURE: None. Unchanged multilevel thoracolumbar compression fracture deformities, likely secondary to osteoporosis.  SUSPICIOUS PERIPHERAL MEDULLARY PATTERN: No.                          Workstation performed: [id number]        Imaging    CT low dose whole body myeloma scan (Order: [id number]) - 1/3/2025

## 2025-05-22 NOTE — PATIENT INSTRUCTIONS
Patient has standing orders for blood work and chemotherapy.  She is going to be away for 6 weeks starting June 1 and will have treatment in [place] and she will come back in 2 months

## 2025-05-22 NOTE — ASSESSMENT & PLAN NOTE
Patient is here with her .  She has relapsed multiple myeloma and has been on Darzalex, Pomalyst and Decadron and she also needs an Granix because of neutropenia in spite of loading dose of Pomalyst 1 mg daily, 3 weeks on and 1 week off.  She is also on acyclovir, allopurinol, baby aspirin and Zometa.  Has chronic low back discomfort.  Myeloma parameters are being monitored.

## 2025-05-22 NOTE — PROGRESS NOTES
Patient tolerated Darzalex x2 - x1 R sided abd, x1 L sided abd - bandaids in place. No parameters noted for Granix injection. Per plan, OK to give Granix same day as Darzalex.    Patient tolerated Granix injection to LEFT arm, bandaid in place.     Patient aware of next appt at [facility] infusion 5/29 at 1100, declines AVS. Patient will be leaving for [place] in June and may not be returning until November.

## 2025-05-22 NOTE — ASSESSMENT & PLAN NOTE
She had treatment for right breast cancer.  No evidence of recurrent or metastatic breast cancer.

## 2025-05-24 PROBLEM — E55.9 VITAMIN D DEFICIENCY: Status: ACTIVE | Noted: 2025-05-24

## 2025-05-27 DIAGNOSIS — C90.00 MULTIPLE MYELOMA NOT HAVING ACHIEVED REMISSION (HCC): ICD-10-CM

## 2025-05-28 DIAGNOSIS — C90.00 MULTIPLE MYELOMA NOT HAVING ACHIEVED REMISSION (HCC): ICD-10-CM

## 2025-05-29 ENCOUNTER — HOSPITAL ENCOUNTER (OUTPATIENT)
Dept: INFUSION CENTER | Facility: CLINIC | Age: 75
Discharge: HOME | End: 2025-05-29
Attending: INTERNAL MEDICINE
Payer: MEDICARE

## 2025-05-29 DIAGNOSIS — T45.1X5A CHEMOTHERAPY INDUCED NEUTROPENIA (HCC): Primary | ICD-10-CM

## 2025-05-29 DIAGNOSIS — D70.1 CHEMOTHERAPY INDUCED NEUTROPENIA (HCC): Primary | ICD-10-CM

## 2025-05-29 PROCEDURE — 96372 THER/PROPH/DIAG INJ SC/IM: CPT

## 2025-05-29 RX ORDER — ACETAMINOPHEN 325 MG/1
650 TABLET ORAL ONCE
OUTPATIENT
Start: 2025-06-19

## 2025-05-29 RX ORDER — DIPHENHYDRAMINE HCL 25 MG
25 TABLET ORAL ONCE
OUTPATIENT
Start: 2025-06-19

## 2025-05-29 RX ORDER — ACETAMINOPHEN 325 MG/1
650 TABLET ORAL ONCE
OUTPATIENT
Start: 2025-06-05

## 2025-05-29 RX ORDER — DIPHENHYDRAMINE HCL 25 MG
25 TABLET ORAL ONCE
OUTPATIENT
Start: 2025-06-05

## 2025-05-29 RX ADMIN — TBO-FILGRASTIM 300 MCG: 300 INJECTION, SOLUTION SUBCUTANEOUS at 10:53

## 2025-05-29 NOTE — PROGRESS NOTES
Patient tolerated injection into right arm today without complications. Patient is leaving on 6/1 for vacation all summer. Patient is set up to receive her treatments in [place] while she is away. Patient understands to call when she gets back to get back on our schedule.

## 2025-06-22 DIAGNOSIS — C90.00 MULTIPLE MYELOMA NOT HAVING ACHIEVED REMISSION (HCC): ICD-10-CM

## 2025-06-30 DIAGNOSIS — C90.00 MULTIPLE MYELOMA NOT HAVING ACHIEVED REMISSION (HCC): ICD-10-CM

## 2025-06-30 RX ORDER — DEXAMETHASONE 4 MG/1
TABLET ORAL
Qty: 60 TABLET | Refills: 1 | Status: SHIPPED | OUTPATIENT
Start: 2025-06-30

## 2025-07-15 DIAGNOSIS — C90.00 MULTIPLE MYELOMA NOT HAVING ACHIEVED REMISSION (HCC): ICD-10-CM

## 2025-07-27 DIAGNOSIS — F41.9 ANXIETY: ICD-10-CM

## 2025-07-28 DIAGNOSIS — C90.00 MULTIPLE MYELOMA NOT HAVING ACHIEVED REMISSION (HCC): ICD-10-CM

## 2025-07-28 DIAGNOSIS — Z90.721 S/P HYSTERECTOMY WITH OOPHORECTOMY: Primary | ICD-10-CM

## 2025-07-28 DIAGNOSIS — M81.8 OSTEOPOROSIS OF MULTIPLE SITES ASSOCIATED WITH MULTIPLE MYELOMA (HCC): ICD-10-CM

## 2025-07-28 DIAGNOSIS — C90.00 OSTEOPOROSIS OF MULTIPLE SITES ASSOCIATED WITH MULTIPLE MYELOMA (HCC): ICD-10-CM

## 2025-07-28 DIAGNOSIS — Z90.710 S/P HYSTERECTOMY WITH OOPHORECTOMY: Primary | ICD-10-CM

## 2025-07-28 DIAGNOSIS — T45.1X5A CHEMOTHERAPY INDUCED NEUTROPENIA (HCC): Primary | ICD-10-CM

## 2025-07-28 DIAGNOSIS — D70.1 CHEMOTHERAPY INDUCED NEUTROPENIA (HCC): Primary | ICD-10-CM

## 2025-07-28 DIAGNOSIS — S22.009A CLOSED FRACTURE OF MULTIPLE THORACIC VERTEBRAE, INITIAL ENCOUNTER (HCC): ICD-10-CM

## 2025-07-29 RX ORDER — ESCITALOPRAM OXALATE 10 MG/1
10 TABLET ORAL DAILY
Qty: 90 TABLET | Refills: 3 | Status: SHIPPED | OUTPATIENT
Start: 2025-07-29

## (undated) DEVICE — BIPOLAR CORD DISP

## (undated) DEVICE — MONOPOLAR CURVED SCISSORS: Brand: ENDOWRIST;DAVINCI SI

## (undated) DEVICE — NEEDLE 25G X 1 1/2

## (undated) DEVICE — VIAL DECANTER

## (undated) DEVICE — SKIN MARKER DUAL TIP WITH RULER CAP, FLEXIBLE RULER AND LABELS: Brand: DEVON

## (undated) DEVICE — TROCAR PORT ACCESS 12 X120MM W/BLDLS OPTICAL TIP AIRSEAL

## (undated) DEVICE — 3000CC GUARDIAN II: Brand: GUARDIAN

## (undated) DEVICE — SYRINGE 10ML LL

## (undated) DEVICE — ADHESIVE SKN CLSR HISTOACRYL FLEX 0.5ML LF

## (undated) DEVICE — FLOSEAL HEMOSTATIC MATRIX, 10 ML: Brand: FLOSEAL

## (undated) DEVICE — CHLORAPREP HI-LITE 26ML ORANGE

## (undated) DEVICE — GLOVE PI ULTRA TOUCH SZ.7.5

## (undated) DEVICE — FLOSEAL APPLICATOR TIP ENDOSCOPIC

## (undated) DEVICE — TROCAR: Brand: KII FIOS FIRST ENTRY

## (undated) DEVICE — INTENDED FOR TISSUE SEPARATION, AND OTHER PROCEDURES THAT REQUIRE A SHARP SURGICAL BLADE TO PUNCTURE OR CUT.: Brand: BARD-PARKER SAFETY BLADES SIZE 11, STERILE

## (undated) DEVICE — X-RAY DETECTABLE SPONGES,16 PLY: Brand: VISTEC

## (undated) DEVICE — MAYO STAND COVER: Brand: CONVERTORS

## (undated) DEVICE — BASIC SINGLE BASIN 2-LF: Brand: MEDLINE INDUSTRIES, INC.

## (undated) DEVICE — SUT MONOCRYL 4-0 PS-2 27 IN Y426H

## (undated) DEVICE — PREMIUM DRY TRAY LF: Brand: MEDLINE INDUSTRIES, INC.

## (undated) DEVICE — KIT, BETHLEHEM ROBOTIC PROST: Brand: CARDINAL HEALTH

## (undated) DEVICE — DRAPE EQUIPMENT RF WAND

## (undated) DEVICE — ASTOUND STANDARD SURGICAL GOWN, XL: Brand: CONVERTORS

## (undated) DEVICE — ANTI-FOG SOLUTION WITH FOAM PAD: Brand: DEVON

## (undated) DEVICE — ROBOT 8.5-13MM CANNULA SEALS

## (undated) DEVICE — TRAY FOLEY 16FR URIMETER SILICONE SURESTEP

## (undated) DEVICE — GLOVE INDICATOR PI UNDERGLOVE SZ 7 BLUE

## (undated) DEVICE — TIP COVER ACCESSORY

## (undated) DEVICE — SCD SEQUENTIAL COMPRESSION COMFORT SLEEVE MEDIUM KNEE LENGTH: Brand: KENDALL SCD

## (undated) DEVICE — GLOVE INDICATOR PI UNDERGLOVE SZ 8 BLUE

## (undated) DEVICE — LIGHT HANDLE COVER SLEEVE DISP BLUE STELLAR

## (undated) DEVICE — SPECIMEN TRAP: Brand: ARGYLE

## (undated) DEVICE — OCCLUDER COLPO-PNEUMO

## (undated) DEVICE — ROBOT INST CANNULA 8MM OBTURATOR BLUNT DISP

## (undated) DEVICE — FENESTRATED BIPOLAR FORCEPS: Brand: ENDOWRIST;DAVINCI SI

## (undated) DEVICE — LARGE NEEDLE DRIVER: Brand: ENDOWRIST;DAVINCI SI

## (undated) DEVICE — SUT VICRYL 0 CT-1 27 IN J260H

## (undated) DEVICE — CHLORHEXIDINE 4PCT 4 OZ

## (undated) DEVICE — ROBOT ACCESSORY KIT 4 ARM

## (undated) DEVICE — SUT STRATAFIX SPIRAL 2-0 CT-1 30 CM SXPP1B410

## (undated) DEVICE — DRAPE SHEET THREE QUARTER

## (undated) DEVICE — BLUE HEAT SCOPE WARMER

## (undated) DEVICE — AIRSEAL TUBE SMOKE EVAC LUMENX3 FILTERED

## (undated) DEVICE — PROGRASP FORCEPS: Brand: ENDOWRIST;DAVINCI SI